# Patient Record
Sex: FEMALE | Race: WHITE | Employment: OTHER | URBAN - METROPOLITAN AREA
[De-identification: names, ages, dates, MRNs, and addresses within clinical notes are randomized per-mention and may not be internally consistent; named-entity substitution may affect disease eponyms.]

---

## 2017-01-05 ENCOUNTER — OFFICE VISIT (OUTPATIENT)
Dept: ORTHOPEDIC SURGERY | Age: 67
End: 2017-01-05

## 2017-01-05 VITALS
HEART RATE: 71 BPM | HEIGHT: 66 IN | SYSTOLIC BLOOD PRESSURE: 125 MMHG | WEIGHT: 143.08 LBS | BODY MASS INDEX: 22.99 KG/M2 | DIASTOLIC BLOOD PRESSURE: 76 MMHG

## 2017-01-05 DIAGNOSIS — Z96.611 STATUS POST TOTAL SHOULDER ARTHROPLASTY, RIGHT: Primary | ICD-10-CM

## 2017-01-05 DIAGNOSIS — M12.811 ROTATOR CUFF ARTHROPATHY, RIGHT: ICD-10-CM

## 2017-01-05 DIAGNOSIS — M25.511 RIGHT SHOULDER PAIN, UNSPECIFIED CHRONICITY: ICD-10-CM

## 2017-01-05 PROBLEM — Z96.619 STATUS POST TOTAL SHOULDER ARTHROPLASTY: Status: ACTIVE | Noted: 2017-01-05

## 2017-01-05 PROCEDURE — 99024 POSTOP FOLLOW-UP VISIT: CPT | Performed by: ORTHOPAEDIC SURGERY

## 2017-02-02 ENCOUNTER — OFFICE VISIT (OUTPATIENT)
Dept: ORTHOPEDIC SURGERY | Age: 67
End: 2017-02-02

## 2017-02-02 VITALS
DIASTOLIC BLOOD PRESSURE: 72 MMHG | HEIGHT: 66 IN | BODY MASS INDEX: 22.5 KG/M2 | SYSTOLIC BLOOD PRESSURE: 118 MMHG | HEART RATE: 82 BPM | WEIGHT: 140 LBS

## 2017-02-02 DIAGNOSIS — Z96.611 STATUS POST TOTAL SHOULDER ARTHROPLASTY, RIGHT: Primary | ICD-10-CM

## 2017-02-02 PROCEDURE — 99024 POSTOP FOLLOW-UP VISIT: CPT | Performed by: ORTHOPAEDIC SURGERY

## 2017-03-30 ENCOUNTER — OFFICE VISIT (OUTPATIENT)
Dept: ORTHOPEDIC SURGERY | Age: 67
End: 2017-03-30

## 2017-03-30 VITALS — BODY MASS INDEX: 22.5 KG/M2 | HEIGHT: 66 IN | WEIGHT: 139.99 LBS

## 2017-03-30 DIAGNOSIS — M79.645 FINGER PAIN, LEFT: ICD-10-CM

## 2017-03-30 DIAGNOSIS — M25.80 SESAMOIDITIS: Primary | ICD-10-CM

## 2017-03-30 PROCEDURE — 1123F ACP DISCUSS/DSCN MKR DOCD: CPT | Performed by: ORTHOPAEDIC SURGERY

## 2017-03-30 PROCEDURE — 4040F PNEUMOC VAC/ADMIN/RCVD: CPT | Performed by: ORTHOPAEDIC SURGERY

## 2017-03-30 PROCEDURE — G8484 FLU IMMUNIZE NO ADMIN: HCPCS | Performed by: ORTHOPAEDIC SURGERY

## 2017-03-30 PROCEDURE — G8419 CALC BMI OUT NRM PARAM NOF/U: HCPCS | Performed by: ORTHOPAEDIC SURGERY

## 2017-03-30 PROCEDURE — 99213 OFFICE O/P EST LOW 20 MIN: CPT | Performed by: ORTHOPAEDIC SURGERY

## 2017-03-30 PROCEDURE — 1036F TOBACCO NON-USER: CPT | Performed by: ORTHOPAEDIC SURGERY

## 2017-03-30 PROCEDURE — G8427 DOCREV CUR MEDS BY ELIG CLIN: HCPCS | Performed by: ORTHOPAEDIC SURGERY

## 2017-03-30 PROCEDURE — 73140 X-RAY EXAM OF FINGER(S): CPT | Performed by: ORTHOPAEDIC SURGERY

## 2017-03-30 PROCEDURE — 3017F COLORECTAL CA SCREEN DOC REV: CPT | Performed by: ORTHOPAEDIC SURGERY

## 2017-03-30 PROCEDURE — 3014F SCREEN MAMMO DOC REV: CPT | Performed by: ORTHOPAEDIC SURGERY

## 2017-03-30 PROCEDURE — 1090F PRES/ABSN URINE INCON ASSESS: CPT | Performed by: ORTHOPAEDIC SURGERY

## 2017-03-30 PROCEDURE — G8400 PT W/DXA NO RESULTS DOC: HCPCS | Performed by: ORTHOPAEDIC SURGERY

## 2017-07-06 ENCOUNTER — TELEPHONE (OUTPATIENT)
Dept: FAMILY MEDICINE CLINIC | Age: 67
End: 2017-07-06

## 2017-07-06 ENCOUNTER — OFFICE VISIT (OUTPATIENT)
Dept: FAMILY MEDICINE CLINIC | Age: 67
End: 2017-07-06

## 2017-07-06 VITALS
HEART RATE: 86 BPM | OXYGEN SATURATION: 97 % | WEIGHT: 152 LBS | HEIGHT: 66 IN | SYSTOLIC BLOOD PRESSURE: 98 MMHG | BODY MASS INDEX: 24.43 KG/M2 | DIASTOLIC BLOOD PRESSURE: 40 MMHG

## 2017-07-06 DIAGNOSIS — Z12.39 BREAST CANCER SCREENING: ICD-10-CM

## 2017-07-06 DIAGNOSIS — G25.81 RLS (RESTLESS LEGS SYNDROME): ICD-10-CM

## 2017-07-06 DIAGNOSIS — M79.642 PAIN OF LEFT HAND: ICD-10-CM

## 2017-07-06 DIAGNOSIS — E11.9 TYPE 2 DIABETES MELLITUS WITHOUT COMPLICATION, WITHOUT LONG-TERM CURRENT USE OF INSULIN (HCC): ICD-10-CM

## 2017-07-06 DIAGNOSIS — E03.9 HYPOTHYROIDISM, UNSPECIFIED TYPE: Chronic | ICD-10-CM

## 2017-07-06 DIAGNOSIS — K59.09 CHRONIC CONSTIPATION: ICD-10-CM

## 2017-07-06 DIAGNOSIS — F60.3 BORDERLINE PERSONALITY DISORDER (HCC): Chronic | ICD-10-CM

## 2017-07-06 DIAGNOSIS — E78.00 HYPERCHOLESTEREMIA: ICD-10-CM

## 2017-07-06 DIAGNOSIS — F32.1 MODERATE MAJOR DEPRESSION (HCC): Chronic | ICD-10-CM

## 2017-07-06 DIAGNOSIS — I10 ESSENTIAL HYPERTENSION: Primary | Chronic | ICD-10-CM

## 2017-07-06 LAB
A/G RATIO: 2 (ref 1.1–2.2)
ALBUMIN SERPL-MCNC: 4.4 G/DL (ref 3.4–5)
ALP BLD-CCNC: 84 U/L (ref 40–129)
ALT SERPL-CCNC: 19 U/L (ref 10–40)
ANION GAP SERPL CALCULATED.3IONS-SCNC: 13 MMOL/L (ref 3–16)
AST SERPL-CCNC: 21 U/L (ref 15–37)
BILIRUB SERPL-MCNC: 0.4 MG/DL (ref 0–1)
BUN BLDV-MCNC: 21 MG/DL (ref 7–20)
CALCIUM SERPL-MCNC: 9.7 MG/DL (ref 8.3–10.6)
CHLORIDE BLD-SCNC: 100 MMOL/L (ref 99–110)
CHOLESTEROL, TOTAL: 173 MG/DL (ref 0–199)
CO2: 30 MMOL/L (ref 21–32)
CREAT SERPL-MCNC: 0.7 MG/DL (ref 0.6–1.2)
GFR AFRICAN AMERICAN: >60
GFR NON-AFRICAN AMERICAN: >60
GLOBULIN: 2.2 G/DL
GLUCOSE BLD-MCNC: 74 MG/DL (ref 70–99)
HCT VFR BLD CALC: 35.3 % (ref 36–48)
HDLC SERPL-MCNC: 73 MG/DL (ref 40–60)
HEMOGLOBIN: 11.9 G/DL (ref 12–16)
LDL CHOLESTEROL CALCULATED: 80 MG/DL
MCH RBC QN AUTO: 31.6 PG (ref 26–34)
MCHC RBC AUTO-ENTMCNC: 33.8 G/DL (ref 31–36)
MCV RBC AUTO: 93.4 FL (ref 80–100)
PDW BLD-RTO: 13.3 % (ref 12.4–15.4)
PLATELET # BLD: 328 K/UL (ref 135–450)
PMV BLD AUTO: 7.4 FL (ref 5–10.5)
POTASSIUM SERPL-SCNC: 4.3 MMOL/L (ref 3.5–5.1)
RBC # BLD: 3.78 M/UL (ref 4–5.2)
SODIUM BLD-SCNC: 143 MMOL/L (ref 136–145)
TOTAL PROTEIN: 6.6 G/DL (ref 6.4–8.2)
TRIGL SERPL-MCNC: 99 MG/DL (ref 0–150)
TSH REFLEX: 1.92 UIU/ML (ref 0.27–4.2)
VLDLC SERPL CALC-MCNC: 20 MG/DL
WBC # BLD: 5 K/UL (ref 4–11)

## 2017-07-06 PROCEDURE — 99203 OFFICE O/P NEW LOW 30 MIN: CPT | Performed by: NURSE PRACTITIONER

## 2017-07-06 RX ORDER — DULOXETIN HYDROCHLORIDE 60 MG/1
60 CAPSULE, DELAYED RELEASE ORAL 2 TIMES DAILY
Qty: 180 CAPSULE | Refills: 1 | Status: SHIPPED | OUTPATIENT
Start: 2017-07-06 | End: 2017-10-12 | Stop reason: SDUPTHER

## 2017-07-06 RX ORDER — LANOLIN ALCOHOL/MO/W.PET/CERES
3 CREAM (GRAM) TOPICAL NIGHTLY
Qty: 90 TABLET | Refills: 1 | Status: SHIPPED | OUTPATIENT
Start: 2017-07-06 | End: 2017-10-12 | Stop reason: SDUPTHER

## 2017-07-06 RX ORDER — B-COMPLEX WITH VITAMIN C
1 TABLET ORAL 2 TIMES DAILY
Qty: 180 TABLET | Refills: 1 | Status: SHIPPED | OUTPATIENT
Start: 2017-07-06 | End: 2017-10-12 | Stop reason: SDUPTHER

## 2017-07-06 RX ORDER — ASPIRIN 81 MG/1
81 TABLET, CHEWABLE ORAL DAILY
Qty: 90 TABLET | Refills: 1 | Status: SHIPPED | OUTPATIENT
Start: 2017-07-06 | End: 2017-10-12 | Stop reason: SDUPTHER

## 2017-07-06 RX ORDER — ROPINIROLE 1 MG/1
1 TABLET, FILM COATED ORAL NIGHTLY
Qty: 90 TABLET | Refills: 1 | Status: SHIPPED | OUTPATIENT
Start: 2017-07-06 | End: 2017-10-12 | Stop reason: SDUPTHER

## 2017-07-06 RX ORDER — ROPINIROLE 1 MG/1
1 TABLET, FILM COATED ORAL DAILY PRN
Qty: 90 TABLET | Refills: 1 | Status: SHIPPED | OUTPATIENT
Start: 2017-07-06 | End: 2017-12-13

## 2017-07-06 RX ORDER — HYDROCHLOROTHIAZIDE 25 MG/1
25 TABLET ORAL DAILY
Qty: 90 TABLET | Refills: 1 | Status: SHIPPED | OUTPATIENT
Start: 2017-07-06 | End: 2017-10-12 | Stop reason: SDUPTHER

## 2017-07-06 RX ORDER — QUETIAPINE 150 MG/1
150 TABLET, FILM COATED, EXTENDED RELEASE ORAL NIGHTLY
Qty: 90 TABLET | Refills: 1 | Status: SHIPPED | OUTPATIENT
Start: 2017-07-06 | End: 2017-10-12 | Stop reason: SDUPTHER

## 2017-07-06 RX ORDER — SPIRONOLACTONE 50 MG/1
50 TABLET, FILM COATED ORAL DAILY
Qty: 90 TABLET | Refills: 1 | Status: SHIPPED | OUTPATIENT
Start: 2017-07-06 | End: 2017-10-12 | Stop reason: SDUPTHER

## 2017-07-06 RX ORDER — LEVOTHYROXINE SODIUM 0.07 MG/1
75 TABLET ORAL DAILY
Qty: 90 TABLET | Refills: 1 | Status: SHIPPED | OUTPATIENT
Start: 2017-07-06 | End: 2017-10-12 | Stop reason: SDUPTHER

## 2017-07-06 RX ORDER — ESCITALOPRAM OXALATE 20 MG/1
20 TABLET ORAL DAILY
Qty: 90 TABLET | Refills: 1 | Status: SHIPPED | OUTPATIENT
Start: 2017-07-06 | End: 2017-10-12 | Stop reason: SDUPTHER

## 2017-07-06 RX ORDER — TRAZODONE HYDROCHLORIDE 100 MG/1
100 TABLET ORAL NIGHTLY
Qty: 90 TABLET | Refills: 1 | Status: SHIPPED | OUTPATIENT
Start: 2017-07-06 | End: 2017-10-12 | Stop reason: SDUPTHER

## 2017-07-06 RX ORDER — ATORVASTATIN CALCIUM 20 MG/1
20 TABLET, FILM COATED ORAL DAILY
Qty: 90 TABLET | Refills: 1 | Status: SHIPPED | OUTPATIENT
Start: 2017-07-06 | End: 2017-10-12 | Stop reason: SDUPTHER

## 2017-07-06 RX ORDER — IBUPROFEN 200 MG
600 TABLET ORAL EVERY MORNING
Qty: 270 TABLET | Refills: 1 | Status: SHIPPED | OUTPATIENT
Start: 2017-07-06 | End: 2017-10-12

## 2017-07-07 LAB
ESTIMATED AVERAGE GLUCOSE: 119.8 MG/DL
HBA1C MFR BLD: 5.8 %

## 2017-08-31 ENCOUNTER — HOSPITAL ENCOUNTER (OUTPATIENT)
Dept: MAMMOGRAPHY | Age: 67
Discharge: HOME | End: 2017-08-31
Attending: NURSE PRACTITIONER | Admitting: NURSE PRACTITIONER

## 2017-08-31 DIAGNOSIS — Z12.31 VISIT FOR SCREENING MAMMOGRAM: ICD-10-CM

## 2017-09-01 DIAGNOSIS — N63.0 BREAST MASS: Primary | ICD-10-CM

## 2017-09-07 ENCOUNTER — HOSPITAL ENCOUNTER (OUTPATIENT)
Dept: MAMMOGRAPHY | Age: 67
Discharge: HOME | End: 2017-09-07
Attending: NURSE PRACTITIONER | Admitting: NURSE PRACTITIONER

## 2017-09-07 DIAGNOSIS — N63.0 BREAST MASS: ICD-10-CM

## 2017-09-07 DIAGNOSIS — N63.0 BREAST MASS: Primary | ICD-10-CM

## 2017-09-07 DIAGNOSIS — R92.8 ABNORMAL MAMMOGRAM, UNSPECIFIED: ICD-10-CM

## 2017-09-13 RX ORDER — CLINDAMYCIN HYDROCHLORIDE 300 MG/1
CAPSULE ORAL
Qty: 12 CAPSULE | Refills: 0 | Status: SHIPPED | OUTPATIENT
Start: 2017-09-13 | End: 2017-09-20 | Stop reason: SDUPTHER

## 2017-09-20 RX ORDER — CLINDAMYCIN HYDROCHLORIDE 300 MG/1
CAPSULE ORAL
Qty: 12 CAPSULE | Refills: 0 | Status: SHIPPED | OUTPATIENT
Start: 2017-09-20 | End: 2017-10-12

## 2017-10-12 ENCOUNTER — OFFICE VISIT (OUTPATIENT)
Dept: FAMILY MEDICINE CLINIC | Age: 67
End: 2017-10-12

## 2017-10-12 VITALS
BODY MASS INDEX: 25.39 KG/M2 | WEIGHT: 158 LBS | OXYGEN SATURATION: 97 % | DIASTOLIC BLOOD PRESSURE: 60 MMHG | SYSTOLIC BLOOD PRESSURE: 112 MMHG | HEIGHT: 66 IN | HEART RATE: 95 BPM

## 2017-10-12 DIAGNOSIS — Z23 IMMUNIZATION DUE: ICD-10-CM

## 2017-10-12 DIAGNOSIS — E78.00 HYPERCHOLESTEREMIA: ICD-10-CM

## 2017-10-12 DIAGNOSIS — F32.1 MODERATE MAJOR DEPRESSION (HCC): Chronic | ICD-10-CM

## 2017-10-12 DIAGNOSIS — G25.81 RLS (RESTLESS LEGS SYNDROME): ICD-10-CM

## 2017-10-12 DIAGNOSIS — R20.2 PARESTHESIAS: Primary | ICD-10-CM

## 2017-10-12 DIAGNOSIS — E11.9 TYPE 2 DIABETES MELLITUS WITHOUT COMPLICATION, WITHOUT LONG-TERM CURRENT USE OF INSULIN (HCC): ICD-10-CM

## 2017-10-12 DIAGNOSIS — I10 ESSENTIAL HYPERTENSION: Chronic | ICD-10-CM

## 2017-10-12 DIAGNOSIS — K59.09 CHRONIC CONSTIPATION: ICD-10-CM

## 2017-10-12 DIAGNOSIS — G47.00 INSOMNIA, UNSPECIFIED TYPE: ICD-10-CM

## 2017-10-12 DIAGNOSIS — M19.90 ARTHRITIS: ICD-10-CM

## 2017-10-12 DIAGNOSIS — F60.3 BORDERLINE PERSONALITY DISORDER (HCC): Chronic | ICD-10-CM

## 2017-10-12 DIAGNOSIS — E03.9 HYPOTHYROIDISM, UNSPECIFIED TYPE: Chronic | ICD-10-CM

## 2017-10-12 PROCEDURE — 1036F TOBACCO NON-USER: CPT | Performed by: NURSE PRACTITIONER

## 2017-10-12 PROCEDURE — 1090F PRES/ABSN URINE INCON ASSESS: CPT | Performed by: NURSE PRACTITIONER

## 2017-10-12 PROCEDURE — 90732 PPSV23 VACC 2 YRS+ SUBQ/IM: CPT | Performed by: NURSE PRACTITIONER

## 2017-10-12 PROCEDURE — 3046F HEMOGLOBIN A1C LEVEL >9.0%: CPT | Performed by: NURSE PRACTITIONER

## 2017-10-12 PROCEDURE — G8400 PT W/DXA NO RESULTS DOC: HCPCS | Performed by: NURSE PRACTITIONER

## 2017-10-12 PROCEDURE — 4040F PNEUMOC VAC/ADMIN/RCVD: CPT | Performed by: NURSE PRACTITIONER

## 2017-10-12 PROCEDURE — G8419 CALC BMI OUT NRM PARAM NOF/U: HCPCS | Performed by: NURSE PRACTITIONER

## 2017-10-12 PROCEDURE — G8427 DOCREV CUR MEDS BY ELIG CLIN: HCPCS | Performed by: NURSE PRACTITIONER

## 2017-10-12 PROCEDURE — G0009 ADMIN PNEUMOCOCCAL VACCINE: HCPCS | Performed by: NURSE PRACTITIONER

## 2017-10-12 PROCEDURE — 3017F COLORECTAL CA SCREEN DOC REV: CPT | Performed by: NURSE PRACTITIONER

## 2017-10-12 PROCEDURE — 3014F SCREEN MAMMO DOC REV: CPT | Performed by: NURSE PRACTITIONER

## 2017-10-12 PROCEDURE — G0008 ADMIN INFLUENZA VIRUS VAC: HCPCS | Performed by: NURSE PRACTITIONER

## 2017-10-12 PROCEDURE — 99213 OFFICE O/P EST LOW 20 MIN: CPT | Performed by: NURSE PRACTITIONER

## 2017-10-12 PROCEDURE — 1123F ACP DISCUSS/DSCN MKR DOCD: CPT | Performed by: NURSE PRACTITIONER

## 2017-10-12 PROCEDURE — G8484 FLU IMMUNIZE NO ADMIN: HCPCS | Performed by: NURSE PRACTITIONER

## 2017-10-12 PROCEDURE — 90662 IIV NO PRSV INCREASED AG IM: CPT | Performed by: NURSE PRACTITIONER

## 2017-10-12 RX ORDER — ESCITALOPRAM OXALATE 20 MG/1
20 TABLET ORAL DAILY
Qty: 90 TABLET | Refills: 1 | Status: SHIPPED | OUTPATIENT
Start: 2017-10-12 | End: 2018-05-16

## 2017-10-12 RX ORDER — LEVOTHYROXINE SODIUM 0.07 MG/1
75 TABLET ORAL DAILY
Qty: 90 TABLET | Refills: 1 | Status: SHIPPED | OUTPATIENT
Start: 2017-10-12 | End: 2018-06-13 | Stop reason: SDUPTHER

## 2017-10-12 RX ORDER — QUETIAPINE 150 MG/1
150 TABLET, FILM COATED, EXTENDED RELEASE ORAL NIGHTLY
Qty: 90 TABLET | Refills: 1 | Status: SHIPPED | OUTPATIENT
Start: 2017-10-12 | End: 2019-05-22 | Stop reason: ALTCHOICE

## 2017-10-12 RX ORDER — LANOLIN ALCOHOL/MO/W.PET/CERES
3 CREAM (GRAM) TOPICAL NIGHTLY
Qty: 90 TABLET | Refills: 1 | Status: SHIPPED | OUTPATIENT
Start: 2017-10-12 | End: 2018-07-31 | Stop reason: SDUPTHER

## 2017-10-12 RX ORDER — ATORVASTATIN CALCIUM 20 MG/1
20 TABLET, FILM COATED ORAL DAILY
Qty: 90 TABLET | Refills: 1 | Status: SHIPPED | OUTPATIENT
Start: 2017-10-12 | End: 2018-06-13 | Stop reason: SDUPTHER

## 2017-10-12 RX ORDER — TRAZODONE HYDROCHLORIDE 100 MG/1
100 TABLET ORAL NIGHTLY
Qty: 90 TABLET | Refills: 1 | Status: SHIPPED | OUTPATIENT
Start: 2017-10-12 | End: 2018-06-13 | Stop reason: SDUPTHER

## 2017-10-12 RX ORDER — B-COMPLEX WITH VITAMIN C
1 TABLET ORAL 2 TIMES DAILY
Qty: 180 TABLET | Refills: 1 | Status: SHIPPED | OUTPATIENT
Start: 2017-10-12

## 2017-10-12 RX ORDER — HYDROCHLOROTHIAZIDE 25 MG/1
25 TABLET ORAL DAILY
Qty: 90 TABLET | Refills: 1 | Status: SHIPPED | OUTPATIENT
Start: 2017-10-12 | End: 2018-06-13 | Stop reason: SDUPTHER

## 2017-10-12 RX ORDER — DULOXETIN HYDROCHLORIDE 60 MG/1
60 CAPSULE, DELAYED RELEASE ORAL 2 TIMES DAILY
Qty: 180 CAPSULE | Refills: 1 | Status: SHIPPED | OUTPATIENT
Start: 2017-10-12 | End: 2018-06-13 | Stop reason: SDUPTHER

## 2017-10-12 RX ORDER — SPIRONOLACTONE 50 MG/1
50 TABLET, FILM COATED ORAL DAILY
Qty: 90 TABLET | Refills: 1 | Status: SHIPPED | OUTPATIENT
Start: 2017-10-12 | End: 2018-06-13 | Stop reason: SDUPTHER

## 2017-10-12 RX ORDER — ROPINIROLE 1 MG/1
1 TABLET, FILM COATED ORAL NIGHTLY
Qty: 90 TABLET | Refills: 1 | Status: SHIPPED | OUTPATIENT
Start: 2017-10-12 | End: 2018-06-11 | Stop reason: SDUPTHER

## 2017-10-12 RX ORDER — DICLOFENAC SODIUM 75 MG/1
75 TABLET, DELAYED RELEASE ORAL 2 TIMES DAILY
Qty: 60 TABLET | Refills: 2 | Status: SHIPPED | OUTPATIENT
Start: 2017-10-12 | End: 2018-01-11 | Stop reason: SDUPTHER

## 2017-10-12 RX ORDER — ASPIRIN 81 MG/1
81 TABLET, CHEWABLE ORAL DAILY
Qty: 90 TABLET | Refills: 1 | Status: SHIPPED | OUTPATIENT
Start: 2017-10-12 | End: 2018-06-13 | Stop reason: SDUPTHER

## 2017-10-12 ASSESSMENT — ENCOUNTER SYMPTOMS
BACK PAIN: 1
WHEEZING: 0
SHORTNESS OF BREATH: 0
COUGH: 0

## 2017-10-12 NOTE — PROGRESS NOTES
for cough, shortness of breath and wheezing. Cardiovascular: Negative for chest pain, palpitations and leg swelling. Musculoskeletal: Positive for arthralgias and back pain. Negative for gait problem, joint swelling, myalgias, neck pain and neck stiffness. Right shoulder replacement 2017   Neurological: Positive for numbness. Negative for seizures, syncope, speech difficulty, light-headedness and headaches. Hematological: Negative for adenopathy. Does not bruise/bleed easily. /60 (Site: Left Arm, Position: Sitting)   Pulse 95   Ht 5' 6\" (1.676 m)   Wt 158 lb (71.7 kg)   SpO2 97%   BMI 25.50 kg/m²    Objective:   Physical Exam   Constitutional: She is oriented to person, place, and time. She appears well-developed and well-nourished. No distress. HENT:   Head: Normocephalic and atraumatic. Cardiovascular: Normal rate, regular rhythm and normal heart sounds. Exam reveals no gallop and no friction rub. No murmur heard. Pulmonary/Chest: Effort normal and breath sounds normal. No respiratory distress. She has no wheezes. She has no rales. Neurological: She is alert and oriented to person, place, and time. No cranial nerve deficit or sensory deficit. Reflex Scores:       Tricep reflexes are 2+ on the right side and 2+ on the left side. Bicep reflexes are 2+ on the right side and 2+ on the left side. Right wrist: Tinel's and compression reproduce symptoms    Skin: Skin is warm and dry. No rash noted. She is not diaphoretic. No erythema. No pallor. Psychiatric: She has a normal mood and affect. Her behavior is normal. Judgment and thought content normal.   Nursing note and vitals reviewed. Assessment:   1. Paresthesias  - EMG; Future    2. Type 2 diabetes mellitus without complication, without long-term current use of insulin (HCC)  - metFORMIN (GLUCOPHAGE) 500 MG tablet; Take 1 tablet by mouth daily At 1600  Dispense: 90 tablet; Refill: 1    3.  Insomnia, unspecified type  - melatonin 3 MG TABS tablet; Take 1 tablet by mouth nightly  Dispense: 90 tablet; Refill: 1    4. Arthritis  - diclofenac (VOLTAREN) 75 MG EC tablet; Take 1 tablet by mouth 2 times daily  Dispense: 60 tablet; Refill: 2    5. RLS (restless legs syndrome)  - rOPINIRole (REQUIP) 1 MG tablet; Take 1 tablet by mouth nightly  Dispense: 90 tablet; Refill: 1    6. Moderate major depression (HCC)  - escitalopram (LEXAPRO) 20 MG tablet; Take 1 tablet by mouth daily  Dispense: 90 tablet; Refill: 1  - QUEtiapine (SEROQUEL XR) 150 MG TB24 extended release tablet; Take 1 tablet by mouth nightly  Dispense: 90 tablet; Refill: 1  - DULoxetine (CYMBALTA) 60 MG extended release capsule; Take 1 capsule by mouth 2 times daily  Dispense: 180 capsule; Refill: 1  - traZODone (DESYREL) 100 MG tablet; Take 1 tablet by mouth nightly  Dispense: 90 tablet; Refill: 1    7. Essential hypertension  - hydrochlorothiazide (HYDRODIURIL) 25 MG tablet; Take 1 tablet by mouth daily  Dispense: 90 tablet; Refill: 1  - spironolactone (ALDACTONE) 50 MG tablet; Take 1 tablet by mouth daily  Dispense: 90 tablet; Refill: 1    8. Hypothyroidism, unspecified type  - levothyroxine (SYNTHROID) 75 MCG tablet; Take 1 tablet by mouth Daily  Dispense: 90 tablet; Refill: 1    9. Borderline personality disorder  - QUEtiapine (SEROQUEL XR) 150 MG TB24 extended release tablet; Take 1 tablet by mouth nightly  Dispense: 90 tablet; Refill: 1  - DULoxetine (CYMBALTA) 60 MG extended release capsule; Take 1 capsule by mouth 2 times daily  Dispense: 180 capsule; Refill: 1  - traZODone (DESYREL) 100 MG tablet; Take 1 tablet by mouth nightly  Dispense: 90 tablet; Refill: 1    10. Hypercholesteremia  - atorvastatin (LIPITOR) 20 MG tablet; Take 1 tablet by mouth daily  Dispense: 90 tablet; Refill: 1    11. Chronic constipation  - linaclotide (LINZESS) 145 MCG capsule; Take 2 capsules by mouth every morning (before breakfast)  Dispense: 180 capsule; Refill: 1    12.

## 2017-10-24 ENCOUNTER — HOSPITAL ENCOUNTER (OUTPATIENT)
Dept: NEUROLOGY | Age: 67
Discharge: HOME | End: 2017-10-24
Admitting: NURSE PRACTITIONER

## 2017-10-24 DIAGNOSIS — R20.2 PARESTHESIA OF SKIN: ICD-10-CM

## 2017-10-24 NOTE — PROCEDURES
Site NR Peak (ms) Norm Peak (ms) P-T Amp (µV) Norm P-T Amp Site1 Site2 Delta-P (ms) Dist (cm) Stan (m/s) Norm Stan (m/s)   Left Median Anti Sensory (2nd Digit)   Wrist    6.5 <3.6 7.6 >10 Wrist 2nd Digit 6.5 14.0 22    Right Median Anti Sensory (2nd Digit)   Wrist    6.2 <3.6 20.5 >10 Wrist 2nd Digit 6.2 14.0 23    Left Ulnar Anti Sensory (5th Digit)   Wrist    3.8 <3.7 23.2 >15.0 Wrist 5th Digit 3.8 14.0 37    Right Ulnar Anti Sensory (5th Digit)   Wrist    3.8 <3.7 16.1 >15.0 Wrist 5th Digit 3.8 14.0 37      Motor Summary Table     Stim Site NR Onset (ms) Norm Onset (ms) O-P Amp (mV) Norm O-P Amp Site1 Site2 Delta-0 (ms) Dist (cm) Stan (m/s) Norm Stan (m/s)   Left Median Motor (Abd Poll Brev)   Wrist    6.7 <4.3 4.4 >5 Elbow Wrist 4.0 21.0 53 >50   Elbow    10.7  4.8          Right Median Motor (Abd Poll Brev)   Wrist    6.1 <4.3 7.2 >5 Elbow Wrist 3.8 22.0 58 >50   Elbow    9.9  5.9          Left Ulnar Motor (Abd Dig Minimi)   Wrist    4.0 <4.2 3.6 >3 B Elbow Wrist 3.7 23.0 62 >50   B Elbow    7.7  4.5  A Elbow B Elbow 1.3 8.0 62 >50   A Elbow    9.0  4.4          Right Ulnar Motor (Abd Dig Minimi)   Wrist    3.8 <4.2 6.6 >3 B Elbow Wrist 3.5 23.0 66 >50   B Elbow    7.3  4.8  A Elbow B Elbow 1.0 6.0 60 >50   A Elbow    8.3  3.7            EMG   Side Muscle Nerve Root Ins Act Fibs Psw Amp Dur Poly Recrt Int Efren Dave Comment   Right Deltoid Axillary C5-6 Nml Nml Nml Nml Nml 0 Nml Nml    Right Biceps Musculocut C5-6 Nml Nml Nml Nml Nml 0 Nml Nml    Right Triceps Radial C6-7-8 Nml Nml Nml Nml Nml 0 Nml Nml    Right BrachioRad Radial C5-6 Nml Nml Nml Nml Nml 0 Nml Nml    Right PronatorTeres Median C6-7 Nml Nml Nml Nml Nml 0 Nml Nml    Right Ext Indicis Radial (Post Int) C7-8 Nml Nml Nml Nml Nml 0 Nml Nml    Right 1stDorInt Ulnar C8-T1 Nml Nml Nml Nml Nml 0 Nml Nml    Right Abd Poll Brev Median C8-T1 Nml Nml Nml Nml Nml 0 Nml Nml    Right Cervical Parasp Up Rami C1-3 Nml Nml Nml         Right Cervical Parasp Mid Rami C4-6 Nml Nml Nml         Right Cervical Parasp Low Rami C7-8 Nml Nml Nml         Left Deltoid Axillary C5-6 Nml Nml Nml Nml Nml 0 Nml Nml    Left Biceps Musculocut C5-6 Nml Nml Nml Nml Nml 0 Nml Nml    Left Triceps Radial C6-7-8 Nml Nml Nml Nml Nml 0 Nml Nml    Left BrachioRad Radial C5-6 Nml Nml Nml Nml Nml 0 Nml Nml    Left PronatorTeres Median C6-7 Nml Nml Nml Nml Nml 0 Nml Nml    Left Ext Indicis Radial (Post Int) C7-8 Nml Nml Nml Nml Nml 0 Nml Nml    Left 1stDorInt Ulnar C8-T1 Nml Nml Nml Nml Nml 0 Nml Nml    Left Abd Poll Brev Median C8-T1 Nml Nml Nml Nml Nml 0 Nml Nml    Left Cervical Parasp Up Rami C1-3 Nml Nml Nml         Left Cervical Parasp Mid Rami C4-6 Nml Nml Nml         Left Cervical Parasp Low Rami C7-8 Nml Nml Nml           Electronically signed by Juan Yan DO on 10/24/2017 at 8:34 AM

## 2017-11-03 ENCOUNTER — TELEPHONE (OUTPATIENT)
Dept: FAMILY MEDICINE CLINIC | Age: 67
End: 2017-11-03

## 2017-11-03 DIAGNOSIS — G56.03 CARPAL TUNNEL SYNDROME, BILATERAL: Primary | ICD-10-CM

## 2017-11-05 NOTE — TELEPHONE ENCOUNTER
EMG shows bilateral carpal tunnel, moderate in severity. She should follow up with orthopedic hand specialist, you can give her Dr. Britta Garcia office information.

## 2017-11-10 ENCOUNTER — TELEPHONE (OUTPATIENT)
Dept: FAMILY MEDICINE CLINIC | Age: 67
End: 2017-11-10

## 2017-11-14 ENCOUNTER — OFFICE VISIT (OUTPATIENT)
Dept: FAMILY MEDICINE CLINIC | Age: 67
End: 2017-11-14

## 2017-11-14 VITALS
SYSTOLIC BLOOD PRESSURE: 120 MMHG | DIASTOLIC BLOOD PRESSURE: 60 MMHG | BODY MASS INDEX: 25.88 KG/M2 | WEIGHT: 161 LBS | HEIGHT: 66 IN | HEART RATE: 81 BPM | OXYGEN SATURATION: 97 %

## 2017-11-14 DIAGNOSIS — M25.512 CHRONIC LEFT SHOULDER PAIN: Primary | ICD-10-CM

## 2017-11-14 DIAGNOSIS — S76.012A MUSCLE STRAIN OF LEFT GLUTEAL REGION, INITIAL ENCOUNTER: ICD-10-CM

## 2017-11-14 DIAGNOSIS — Z23 IMMUNIZATION DUE: ICD-10-CM

## 2017-11-14 DIAGNOSIS — G89.29 CHRONIC LEFT SHOULDER PAIN: Primary | ICD-10-CM

## 2017-11-14 PROCEDURE — 90715 TDAP VACCINE 7 YRS/> IM: CPT | Performed by: NURSE PRACTITIONER

## 2017-11-14 PROCEDURE — G8419 CALC BMI OUT NRM PARAM NOF/U: HCPCS | Performed by: NURSE PRACTITIONER

## 2017-11-14 PROCEDURE — 3017F COLORECTAL CA SCREEN DOC REV: CPT | Performed by: NURSE PRACTITIONER

## 2017-11-14 PROCEDURE — 1123F ACP DISCUSS/DSCN MKR DOCD: CPT | Performed by: NURSE PRACTITIONER

## 2017-11-14 PROCEDURE — G8400 PT W/DXA NO RESULTS DOC: HCPCS | Performed by: NURSE PRACTITIONER

## 2017-11-14 PROCEDURE — 4040F PNEUMOC VAC/ADMIN/RCVD: CPT | Performed by: NURSE PRACTITIONER

## 2017-11-14 PROCEDURE — G8427 DOCREV CUR MEDS BY ELIG CLIN: HCPCS | Performed by: NURSE PRACTITIONER

## 2017-11-14 PROCEDURE — G8484 FLU IMMUNIZE NO ADMIN: HCPCS | Performed by: NURSE PRACTITIONER

## 2017-11-14 PROCEDURE — 3014F SCREEN MAMMO DOC REV: CPT | Performed by: NURSE PRACTITIONER

## 2017-11-14 PROCEDURE — 99213 OFFICE O/P EST LOW 20 MIN: CPT | Performed by: NURSE PRACTITIONER

## 2017-11-14 PROCEDURE — 90471 IMMUNIZATION ADMIN: CPT | Performed by: NURSE PRACTITIONER

## 2017-11-14 PROCEDURE — 1090F PRES/ABSN URINE INCON ASSESS: CPT | Performed by: NURSE PRACTITIONER

## 2017-11-14 PROCEDURE — 1036F TOBACCO NON-USER: CPT | Performed by: NURSE PRACTITIONER

## 2017-11-14 RX ORDER — METHYLPREDNISOLONE 4 MG/1
TABLET ORAL
Qty: 1 KIT | Refills: 0 | Status: SHIPPED | OUTPATIENT
Start: 2017-11-14 | End: 2017-12-11

## 2017-11-14 RX ORDER — BACLOFEN 10 MG/1
10 TABLET ORAL 3 TIMES DAILY
Qty: 30 TABLET | Refills: 0 | Status: SHIPPED | OUTPATIENT
Start: 2017-11-14 | End: 2017-11-24

## 2017-11-14 NOTE — PROGRESS NOTES
Normocephalic and atraumatic. Eyes: Conjunctivae and EOM are normal. Pupils are equal, round, and reactive to light. Right eye exhibits no discharge. Left eye exhibits no discharge. No scleral icterus. Neck: Normal range of motion. Neck supple. No tracheal deviation present. No thyromegaly present. Cardiovascular: Normal rate, regular rhythm and normal heart sounds. Exam reveals no gallop and no friction rub. No murmur heard. Pulmonary/Chest: Effort normal and breath sounds normal. No stridor. No respiratory distress. She has no wheezes. She has no rales. She exhibits no tenderness. Musculoskeletal:        Left shoulder: She exhibits decreased range of motion and tenderness. She exhibits no bony tenderness. Left shoulder: decreased ROM, difficultly raising arm above head   Lymphadenopathy:     She has no cervical adenopathy. Neurological: She is alert and oriented to person, place, and time. No cranial nerve deficit. Skin: Skin is warm and dry. No rash noted. She is not diaphoretic. No erythema. No pallor. Nursing note and vitals reviewed. Assessment:   1. Chronic left shoulder pain  - methylPREDNISolone (MEDROL DOSEPACK) 4 MG tablet; Take by mouth. Dispense: 1 kit; Refill: 0  - baclofen (LIORESAL) 10 MG tablet; Take 1 tablet by mouth 3 times daily for 10 days  Dispense: 30 tablet; Refill: 0  - Angelica Gibbs MD (Sports, Shoulder)    2. Muscle strain of left gluteal region, initial encounter  - methylPREDNISolone (MEDROL DOSEPACK) 4 MG tablet; Take by mouth. Dispense: 1 kit; Refill: 0  - baclofen (LIORESAL) 10 MG tablet; Take 1 tablet by mouth 3 times daily for 10 days  Dispense: 30 tablet; Refill: 0    3.  Immunization due  - Tdap (age 10y-63y) IM (Adacel)    Plan:   - Medrol dose pack (oral steroid taper)- hold diclofenac while taking  - Baclofen (muscle relaxer) can take 3 times daily as needed for leg and shoulder pain, this may make you drowsy so space out from

## 2017-11-14 NOTE — PATIENT INSTRUCTIONS
Medrol dose pack (oral steroid taper)- hold diclofenac while taking  Baclofen (muscle relaxer) can take 3 times daily as needed for leg and shoulder pain, this may make you drowsy so space out from other drowsy medications.    Referral to Dr. Jovan Gomez is no improvement in shoulder pain  Follow up in 1 week if needed

## 2017-11-20 ENCOUNTER — OFFICE VISIT (OUTPATIENT)
Dept: ORTHOPEDIC SURGERY | Age: 67
End: 2017-11-20

## 2017-11-20 VITALS
DIASTOLIC BLOOD PRESSURE: 77 MMHG | HEIGHT: 66 IN | WEIGHT: 160.94 LBS | BODY MASS INDEX: 25.86 KG/M2 | SYSTOLIC BLOOD PRESSURE: 138 MMHG | HEART RATE: 82 BPM

## 2017-11-20 DIAGNOSIS — M25.532 LEFT WRIST PAIN: Primary | ICD-10-CM

## 2017-11-20 DIAGNOSIS — M25.531 RIGHT WRIST PAIN: ICD-10-CM

## 2017-11-20 PROBLEM — G56.03 BILATERAL CARPAL TUNNEL SYNDROME: Status: ACTIVE | Noted: 2017-11-20

## 2017-11-20 PROCEDURE — G8400 PT W/DXA NO RESULTS DOC: HCPCS | Performed by: ORTHOPAEDIC SURGERY

## 2017-11-20 PROCEDURE — 4040F PNEUMOC VAC/ADMIN/RCVD: CPT | Performed by: ORTHOPAEDIC SURGERY

## 2017-11-20 PROCEDURE — 1123F ACP DISCUSS/DSCN MKR DOCD: CPT | Performed by: ORTHOPAEDIC SURGERY

## 2017-11-20 PROCEDURE — 1036F TOBACCO NON-USER: CPT | Performed by: ORTHOPAEDIC SURGERY

## 2017-11-20 PROCEDURE — 1090F PRES/ABSN URINE INCON ASSESS: CPT | Performed by: ORTHOPAEDIC SURGERY

## 2017-11-20 PROCEDURE — G8484 FLU IMMUNIZE NO ADMIN: HCPCS | Performed by: ORTHOPAEDIC SURGERY

## 2017-11-20 PROCEDURE — 3017F COLORECTAL CA SCREEN DOC REV: CPT | Performed by: ORTHOPAEDIC SURGERY

## 2017-11-20 PROCEDURE — 3014F SCREEN MAMMO DOC REV: CPT | Performed by: ORTHOPAEDIC SURGERY

## 2017-11-20 PROCEDURE — 99214 OFFICE O/P EST MOD 30 MIN: CPT | Performed by: ORTHOPAEDIC SURGERY

## 2017-11-20 PROCEDURE — G8427 DOCREV CUR MEDS BY ELIG CLIN: HCPCS | Performed by: ORTHOPAEDIC SURGERY

## 2017-11-20 PROCEDURE — G8419 CALC BMI OUT NRM PARAM NOF/U: HCPCS | Performed by: ORTHOPAEDIC SURGERY

## 2017-11-20 NOTE — PROGRESS NOTES
Assessment: EMG documented moderate to severe bilateral carpal tunnel syndrome which is quite symptomatic. #2 moderate osteoarthritis of both carpometacarpal joints    Treatment Plan: I don't think her osteoarthritis is symptomatic enough to warrant operative intervention. I do think her carpal tunnel symptoms are severe enough to warrant operative intervention we've discussed risks and benefits of surgery including the incidence of pillar pain scar tissue tenderness and recurrence. We have tentatively scheduled her for bilateral carpal tunnel releases    Return for post op. Chief Complaint:  Hand Pain (OPNP TEODORA HAND PAIN, HAND NUMBNESS , EMG NEW XRAYS )      History of Present Illness  Gricelda Nogueira is a 78 y.o. female. Patient's very nice lady that I had seen about 3-4 years ago with bilateral CMC arthritis. We treated her with neoprene thumb stabilization splints and this is definitely helped her symptoms slightly. She now presents with a 1-2 year history of increasing numbness and tingling in both hands. She states that wakes her up in the middle of the night with deep aching pain in her hand she has to shake them to get this to go away. She has had EMGs prior to today's visit. Medical History  Patient's medications, allergies, past medical, surgical, social and family histories were reviewed and updated as appropriate. Review of Systems  Complete Review of Systems performed and is non-contributory except for what is noted in HPI. Vital Signs  Vitals:    11/20/17 0841   BP: 138/77   Pulse: 82   Weight: 160 lb 15 oz (73 kg)   Height: 5' 5.98\" (1.676 m)     Body mass index is 25.99 kg/m². Physical Exam  Constitutional:  Patient is well-nourished and demonstrates normal hygiene. Mental Status:  Patient is alert and oriented to person, place and time.   Skin:  No rashes or erythema    Right Hand Examination:  Inspection:  No atrophy  Finger Range of Motion:  Full  Wrist Range of Motion:

## 2017-12-08 ENCOUNTER — TELEPHONE (OUTPATIENT)
Dept: ORTHOPEDIC SURGERY | Age: 67
End: 2017-12-08

## 2017-12-11 ENCOUNTER — OFFICE VISIT (OUTPATIENT)
Dept: FAMILY MEDICINE CLINIC | Age: 67
End: 2017-12-11

## 2017-12-11 VITALS
SYSTOLIC BLOOD PRESSURE: 116 MMHG | BODY MASS INDEX: 25.55 KG/M2 | OXYGEN SATURATION: 96 % | DIASTOLIC BLOOD PRESSURE: 64 MMHG | WEIGHT: 159 LBS | TEMPERATURE: 97.9 F | HEART RATE: 88 BPM | HEIGHT: 66 IN

## 2017-12-11 DIAGNOSIS — G56.02 CARPAL TUNNEL SYNDROME OF LEFT WRIST: ICD-10-CM

## 2017-12-11 DIAGNOSIS — E11.9 TYPE 2 DIABETES MELLITUS WITHOUT COMPLICATION, WITHOUT LONG-TERM CURRENT USE OF INSULIN (HCC): ICD-10-CM

## 2017-12-11 DIAGNOSIS — Z01.818 PRE-OP EXAM: ICD-10-CM

## 2017-12-11 DIAGNOSIS — Z01.818 PRE-OP EXAM: Primary | ICD-10-CM

## 2017-12-11 LAB
ANION GAP SERPL CALCULATED.3IONS-SCNC: 10 MMOL/L (ref 3–16)
BUN BLDV-MCNC: 19 MG/DL (ref 7–20)
CALCIUM SERPL-MCNC: 10.1 MG/DL (ref 8.3–10.6)
CHLORIDE BLD-SCNC: 101 MMOL/L (ref 99–110)
CO2: 32 MMOL/L (ref 21–32)
CREAT SERPL-MCNC: 0.8 MG/DL (ref 0.6–1.2)
CREATININE URINE POCT: 200
GFR AFRICAN AMERICAN: >60
GFR NON-AFRICAN AMERICAN: >60
GLUCOSE BLD-MCNC: 72 MG/DL (ref 70–99)
MICROALBUMIN/CREAT 24H UR: 10 MG/G{CREAT}
MICROALBUMIN/CREAT UR-RTO: 30
POTASSIUM SERPL-SCNC: 4.5 MMOL/L (ref 3.5–5.1)
SODIUM BLD-SCNC: 143 MMOL/L (ref 136–145)

## 2017-12-11 PROCEDURE — G8419 CALC BMI OUT NRM PARAM NOF/U: HCPCS | Performed by: NURSE PRACTITIONER

## 2017-12-11 PROCEDURE — 93000 ELECTROCARDIOGRAM COMPLETE: CPT | Performed by: NURSE PRACTITIONER

## 2017-12-11 PROCEDURE — 3014F SCREEN MAMMO DOC REV: CPT | Performed by: NURSE PRACTITIONER

## 2017-12-11 PROCEDURE — 4040F PNEUMOC VAC/ADMIN/RCVD: CPT | Performed by: NURSE PRACTITIONER

## 2017-12-11 PROCEDURE — G8484 FLU IMMUNIZE NO ADMIN: HCPCS | Performed by: NURSE PRACTITIONER

## 2017-12-11 PROCEDURE — G8427 DOCREV CUR MEDS BY ELIG CLIN: HCPCS | Performed by: NURSE PRACTITIONER

## 2017-12-11 PROCEDURE — 1090F PRES/ABSN URINE INCON ASSESS: CPT | Performed by: NURSE PRACTITIONER

## 2017-12-11 PROCEDURE — 82044 UR ALBUMIN SEMIQUANTITATIVE: CPT | Performed by: NURSE PRACTITIONER

## 2017-12-11 PROCEDURE — 99213 OFFICE O/P EST LOW 20 MIN: CPT | Performed by: NURSE PRACTITIONER

## 2017-12-11 PROCEDURE — 3017F COLORECTAL CA SCREEN DOC REV: CPT | Performed by: NURSE PRACTITIONER

## 2017-12-11 NOTE — PROGRESS NOTES
[facility]   Telephone: [phone]  Fax: [phone]  Preoperative History & Physical        DIAGNOSIS:  Carpal tunnel syndrome    PROCEDURE:  Carpal tunnel release, left      History Obtained From:  patient    HISTORY OF PRESENT ILLNESS:    The patient is a 78 y.o. female who presents for preoperative examination for left carpal tunnel release with Clinton Tong on 12/19/2017 at [facility]. She denies complaints or concerns today.       Past Medical History:   Diagnosis Date    Bipolar disorder ([address]     Borderline personality disorder     Borderline personality disorder     Chronic pain     Colon disorder     hard time pushing stool out    Constipation     Diabetes mellitus ([address]     Diabetic neuropathy ([address]     Electric shock     ECT therapy    Hyperlipidemia     Hypertension     Insomnia     Major depressive disorder, recurrent ([address]     Morbid obesity ([address]     Osteoarthrosis     PONV (postoperative nausea and vomiting)     RLS (restless legs syndrome)     Suicidal ideation     Unspecified hypothyroidism     Vitamin deficiency      Past Surgical History:   Procedure Laterality Date    APPENDECTOMY      BACK SURGERY      COLONOSCOPY      ELBOW SURGERY Right     for pinched nerve    JOINT REPLACEMENT Bilateral     knees    KNEE SURGERY      bilateral    SHOULDER ARTHROSCOPY Right 12/02/2016    Right Total Shoulder Arthroscopy with Reverse Ball and Socket Deta Prosthesis    SHOULDER SURGERY      TONSILLECTOMY       Outpatient Encounter Prescriptions as of 12/11/2017   Medication Sig Dispense Refill    diclofenac (VOLTAREN) 75 MG EC tablet Take 1 tablet by mouth 2 times daily 60 tablet 2    rOPINIRole (REQUIP) 1 MG tablet Take 1 tablet by mouth nightly 90 tablet 1    escitalopram (LEXAPRO) 20 MG tablet Take 1 tablet by mouth daily 90 tablet 1    hydrochlorothiazide (HYDRODIURIL) 25 MG tablet Take 1 tablet by mouth daily 90 tablet 1    spironolactone (ALDACTONE) 50 MG tablet Take 1 tablet by mouth daily 90 tablet 1    vitamin D (CHOLECALCIFEROL) 1000 UNIT TABS tablet Take 1 tablet by mouth daily 90 tablet 1    levothyroxine (SYNTHROID) 75 MCG tablet Take 1 tablet by mouth Daily 90 tablet 1    QUEtiapine (SEROQUEL XR) 150 MG TB24 extended release tablet Take 1 tablet by mouth nightly 90 tablet 1    metFORMIN (GLUCOPHAGE) 500 MG tablet Take 1 tablet by mouth daily At 1600 90 tablet 1    B Complex Vitamins (VITAMIN B COMPLEX) TABS Take 1 tablet by mouth 2 times daily 180 tablet 1    aspirin 81 MG chewable tablet Take 1 tablet by mouth daily 90 tablet 1    DULoxetine (CYMBALTA) 60 MG extended release capsule Take 1 capsule by mouth 2 times daily 180 capsule 1    atorvastatin (LIPITOR) 20 MG tablet Take 1 tablet by mouth daily 90 tablet 1    traZODone (DESYREL) 100 MG tablet Take 1 tablet by mouth nightly 90 tablet 1    melatonin 3 MG TABS tablet Take 1 tablet by mouth nightly 90 tablet 1    linaclotide (LINZESS) 145 MCG capsule Take 2 capsules by mouth every morning (before breakfast) 180 capsule 1    rOPINIRole (REQUIP) 1 MG tablet Take 1 tablet by mouth daily as needed (Restless legs syndrome) 90 tablet 1    magnesium hydroxide (MILK OF MAGNESIA) 400 MG/5ML suspension Take by mouth daily as needed for Constipation      LORazepam (ATIVAN) 0.5 MG tablet Take 0.5 mg by mouth every 6 hours as needed for Anxiety      bisacodyl (DULCOLAX) 10 MG suppository Place 10 mg rectally daily as needed for Constipation      bisacodyl (DULCOLAX) 5 MG EC tablet Take 5 mg by mouth 2 times daily      acetaminophen (TYLENOL) 325 MG tablet Take 650 mg by mouth every 6 hours as needed for Pain.  [DISCONTINUED] methylPREDNISolone (MEDROL DOSEPACK) 4 MG tablet Take by mouth. 1 kit 0     No facility-administered encounter medications on file as of 12/11/2017. Allergies:  Levaquin [levofloxacin in d5w];  Levofloxacin; Lidocaine; and Pcn wheezing    NEUROLOGIC:  Awake, alert, oriented to name, place and time. Cranial nerves II-XII are grossly intact. Motor is 5 out of 5 bilaterally. DATA:  EKG:  Date:  12-  I have reviewed EKG with the following interpretation:  Impression:  NSR      ASSESSMENT AND PLAN:  Pre-op exam  - EKG 12 Lead  - BASIC METABOLIC PANEL; Future    Carpal tunnel syndrome of left wrist      Type 2 diabetes mellitus without complication, without long-term current use of insulin (HCC)  - POCT microalbumin  - BASIC METABOLIC PANEL; Future      1. Preoperative workup as follows: ECG  2. Change in medication regimen before surgery: Discontinue NSAIDs (Diclofenac and aspirin) 7 days before surgery  3. Prophylaxis for cardiac events with perioperative beta-blockers: Not indicated  ACC/AHA indications for pre-operative beta-blocker use:    · Vascular surgery with history of postitive stress test  · Intermediate or high risk surgery with history of CAD   · Intermediate or high risk surgery with multiple clinical predictors of CAD- 2 of the following: history of compensated or prior heart failure, history of cerebrovascular disease, DM, or renal insufficiency    Routine administration of higher-dose, long-acting metoprolol in beta-blockernaïve patients on the day of surgery, and in the absence of dose titration is associated with an overall increase in mortality. Beta-blockers should be started days to weeks prior to surgery and titrated to pulse < 70.  4. Deep vein thrombosis prophylaxis: regimen to be chosen by surgical team  5. No contraindications to planned surgery      Yelitza Aaron CNP    [address]  [address]  [phone]

## 2017-12-11 NOTE — PATIENT INSTRUCTIONS
Eat and drink nothing after midnight the night before the planned procedure. Stop all aspirin, ibuprofen, aleve (tylenol/acetaminophen is ok) for seven days prior to the procedure. Ideally your blood pressure goal should be below 130/80. You can learn more about blood pressure and ways to incorporate a healthy lifestlye to help treat it from the American Heart Association website: www.heart. org under the  Getting Healthy link, and the [address] website: www.nhlbi.nih.gov/hbp    A for Activity  It helps your blood pressure when you exercise regularly. You should try to exercise at least 3 times a week for 20 minutes at a pace that you can comfortably carry on a conversation without being out of breath. B for BMI  The Body Mass Index should be below 30. This is your weight and height measurement. A BMI below 30 is preferred to help lower the risk of Type 2 diabetes, high cholesterol, heart disease, high blood pressure, sleep apnea, gallbladder disease and strokes. C for Control your Salt Intake  Avoid adding salt to your food. Avoid processed food, fast food, and junk food which all has high levels of sodium added. Read labels and get no more than 1500-2300mg of sodium a day.      How confident do you feel that you will be able to begin working on your goals before next visit? :     *Some confidence       Please record your blood pressure once every two weeks below:  Date  Blood pressure

## 2017-12-12 ENCOUNTER — OFFICE VISIT (OUTPATIENT)
Dept: ORTHOPEDIC SURGERY | Age: 67
End: 2017-12-12

## 2017-12-12 VITALS — BODY MASS INDEX: 25.55 KG/M2 | HEIGHT: 66 IN | WEIGHT: 158.95 LBS

## 2017-12-12 DIAGNOSIS — M25.512 LEFT SHOULDER PAIN, UNSPECIFIED CHRONICITY: Primary | ICD-10-CM

## 2017-12-12 PROCEDURE — 3014F SCREEN MAMMO DOC REV: CPT | Performed by: PHYSICIAN ASSISTANT

## 2017-12-12 PROCEDURE — G8400 PT W/DXA NO RESULTS DOC: HCPCS | Performed by: PHYSICIAN ASSISTANT

## 2017-12-12 PROCEDURE — G8419 CALC BMI OUT NRM PARAM NOF/U: HCPCS | Performed by: PHYSICIAN ASSISTANT

## 2017-12-12 PROCEDURE — G8427 DOCREV CUR MEDS BY ELIG CLIN: HCPCS | Performed by: PHYSICIAN ASSISTANT

## 2017-12-12 PROCEDURE — 1123F ACP DISCUSS/DSCN MKR DOCD: CPT | Performed by: PHYSICIAN ASSISTANT

## 2017-12-12 PROCEDURE — 1036F TOBACCO NON-USER: CPT | Performed by: PHYSICIAN ASSISTANT

## 2017-12-12 PROCEDURE — G8484 FLU IMMUNIZE NO ADMIN: HCPCS | Performed by: PHYSICIAN ASSISTANT

## 2017-12-12 PROCEDURE — 1090F PRES/ABSN URINE INCON ASSESS: CPT | Performed by: PHYSICIAN ASSISTANT

## 2017-12-12 PROCEDURE — 99213 OFFICE O/P EST LOW 20 MIN: CPT | Performed by: PHYSICIAN ASSISTANT

## 2017-12-12 PROCEDURE — 3017F COLORECTAL CA SCREEN DOC REV: CPT | Performed by: PHYSICIAN ASSISTANT

## 2017-12-12 PROCEDURE — 4040F PNEUMOC VAC/ADMIN/RCVD: CPT | Performed by: PHYSICIAN ASSISTANT

## 2017-12-12 NOTE — PROGRESS NOTES
REFERRING PHYSICIAN: Nelsy Aguilar CNP    CHIEF COMPLAINT:  Left shoulder pain     HISTORY OF PRESENT ILLNESS:  Corona Knox is a 78-year-old right-hand-dominant female who presents today at the request of Emilia Staples CNP for evaluation consultation of her left shoulder. She has had 1+ years of atraumatic left shoulder discomfort. She reports her symptoms worsen this fall while raking leaves. She reports pain at night, especially when lying on her left shoulder that is affecting her sleep. She reports intermittent numbness and tingling in her left hand which relates to carpal tunnel. She is a history of a right shoulder reverse total shoulder arthroplasty by Dr. Brit Guevara approximately 1 year ago due to an irreparable rotator cuff tear and glenohumeral osteoarthritis. She reports that she continues to have some limitations and discomfort following this procedure. REVIEW OF SYSTEMS:  Pertinent items are noted in the HPI. Review of systems was reviewed from Patient History Form dated on December 12, 2017 and available in the patient's chart under the Media tab. CONSTITUTIONAL: Denies unexplained weight loss, fevers, chills or fatigue  NEUROLOGICAL: Denies unsteady gait or progressive weakness  SKIN: Denies skin changes, delayed healing, rash, itching     PHYSICAL EXAMINATION: Inspection of the left shoulder reveals warm, dry, intact skin. There is no adenopathy. The distal neurovascular exam is grossly intact. There is moderate tenderness about the posterior aspect of the left shoulder. Range of motion reveals 95° of active forward elevation. She has 10° of external rotation with her arm at her side. She has 50° of shoulder abduction. She has 15° of external rotation and 5° of internal rotation with her arm maximally abducted. She has 4 out of 5 strength in forward elevation and external rotation.   She has discomfort and weakness with a belly press test.  She has discomfort and weakness with resisted supination. Sensation about the lateral aspect of the left shoulder in an axillary nerve distribution pattern is intact. She is able to actively contract her deltoid. Examination of the contralateral shoulder reveals no atrophy or deformity. The skin is warm and dry. There is a well-healed incision. Range of motion reveals 120° of active forward elevation. She is able position her hand behind her head. She has external rotation with her arm at her side to neutral.  She has 90° of shoulder abduction. She has 0° of external rotation and 0° of internal rotation with the arm maximally abducted. She has 5 minus out of 5 strength in forward elevation. She has 4+ out of 5 strength in external rotation. There is no focal tenderness with palpation. The distal neurovascular exam is grossly intact. Cervical spine: The skin is warm and dry. There is no swelling, warmth, or erythema. Range of motion is within normal limits. There is no paraspinal or spinous process tenderness. Spurling's sign is negative and did not produce shoulder pain. The distal neurovascular exam is grossly intact. X-RAYS: 4 views of the left shoulder were obtained in the office and reviewed today. Glenohumeral joint is diminished. There is osteophyte formation visualized in the inferior aspect of the glenoid. There are changes about the undersurface of the lateral acromion and greater tuberosity. The acromiohumeral interval is severely diminished. Lung fields are clear with normal pulmonary markings. ASSESSMENT:    1. Left shoulder osteoarthritis  2. Rotator cuff arthropathy    PLAN: I had detailed discussion with Vern Mckeon regarding diagnosis and treatment options. I recommended an MRI to evaluate the integrity of the rotator cuff. I'll see her back for reevaluation following the MRI. She is in agreement with this plan.     Patient seen and examined by Arabella Casillas PA-C and Dr. Jose Elias Pereira MD

## 2017-12-15 ENCOUNTER — TELEPHONE (OUTPATIENT)
Dept: ORTHOPEDIC SURGERY | Age: 67
End: 2017-12-15

## 2017-12-15 NOTE — TELEPHONE ENCOUNTER
Spoke with patient about MRI denial. Looks as if another office has been trying to push through an order, and we can not get approval at this time. Made the patient award to get  A hold of whoever in the order to cancel theirs or to go through with it and then follow up afterwards.

## 2017-12-18 RX ORDER — CAPSAICIN 0.15 %
LIQUID (ML) TOPICAL
Qty: 180 TABLET | Refills: 1 | Status: SHIPPED | OUTPATIENT
Start: 2017-12-18 | End: 2019-05-22 | Stop reason: ALTCHOICE

## 2017-12-19 ENCOUNTER — HOSPITAL ENCOUNTER (OUTPATIENT)
Dept: SURGERY | Age: 67
Discharge: HOME | End: 2017-12-19
Attending: ORTHOPAEDIC SURGERY | Admitting: ORTHOPAEDIC SURGERY

## 2017-12-19 VITALS
SYSTOLIC BLOOD PRESSURE: 108 MMHG | WEIGHT: 158 LBS | TEMPERATURE: 97 F | BODY MASS INDEX: 25.39 KG/M2 | OXYGEN SATURATION: 97 % | DIASTOLIC BLOOD PRESSURE: 63 MMHG | HEART RATE: 67 BPM | RESPIRATION RATE: 19 BRPM | HEIGHT: 66 IN

## 2017-12-19 LAB
GLUCOSE BLD-MCNC: 92 MG/DL (ref 70–99)
PERFORMED ON: NORMAL

## 2017-12-19 RX ORDER — LABETALOL HYDROCHLORIDE 5 MG/ML
5 INJECTION, SOLUTION INTRAVENOUS
Status: DISCONTINUED | OUTPATIENT
Start: 2017-12-19 | End: 2017-12-20 | Stop reason: HOSPADM

## 2017-12-19 RX ORDER — HYDRALAZINE HYDROCHLORIDE 20 MG/ML
5 INJECTION INTRAMUSCULAR; INTRAVENOUS EVERY 30 MIN PRN
Status: DISCONTINUED | OUTPATIENT
Start: 2017-12-19 | End: 2017-12-20 | Stop reason: HOSPADM

## 2017-12-19 RX ORDER — DIPHENHYDRAMINE HYDROCHLORIDE 50 MG/ML
6.25 INJECTION INTRAMUSCULAR; INTRAVENOUS
Status: ACTIVE | OUTPATIENT
Start: 2017-12-19 | End: 2017-12-19

## 2017-12-19 RX ORDER — ONDANSETRON 2 MG/ML
4 INJECTION INTRAMUSCULAR; INTRAVENOUS EVERY 30 MIN PRN
Status: DISCONTINUED | OUTPATIENT
Start: 2017-12-19 | End: 2017-12-20 | Stop reason: HOSPADM

## 2017-12-19 RX ORDER — OXYCODONE HYDROCHLORIDE AND ACETAMINOPHEN 5; 325 MG/1; MG/1
2 TABLET ORAL PRN
Status: ACTIVE | OUTPATIENT
Start: 2017-12-19 | End: 2017-12-19

## 2017-12-19 RX ORDER — SODIUM CHLORIDE, SODIUM LACTATE, POTASSIUM CHLORIDE, CALCIUM CHLORIDE 600; 310; 30; 20 MG/100ML; MG/100ML; MG/100ML; MG/100ML
INJECTION, SOLUTION INTRAVENOUS CONTINUOUS
Status: DISCONTINUED | OUTPATIENT
Start: 2017-12-19 | End: 2017-12-20 | Stop reason: HOSPADM

## 2017-12-19 RX ORDER — OXYCODONE HYDROCHLORIDE AND ACETAMINOPHEN 5; 325 MG/1; MG/1
1 TABLET ORAL PRN
Status: ACTIVE | OUTPATIENT
Start: 2017-12-19 | End: 2017-12-19

## 2017-12-19 RX ORDER — MEPERIDINE HYDROCHLORIDE 50 MG/ML
12.5 INJECTION INTRAMUSCULAR; INTRAVENOUS; SUBCUTANEOUS EVERY 5 MIN PRN
Status: DISCONTINUED | OUTPATIENT
Start: 2017-12-19 | End: 2017-12-20 | Stop reason: HOSPADM

## 2017-12-19 RX ADMIN — SODIUM CHLORIDE, SODIUM LACTATE, POTASSIUM CHLORIDE, CALCIUM CHLORIDE: 600; 310; 30; 20 INJECTION, SOLUTION INTRAVENOUS at 10:41

## 2017-12-19 ASSESSMENT — PAIN - FUNCTIONAL ASSESSMENT: PAIN_FUNCTIONAL_ASSESSMENT: 0-10

## 2017-12-19 ASSESSMENT — PAIN SCALES - GENERAL
PAINLEVEL_OUTOF10: 0
PAINLEVEL_OUTOF10: 0

## 2017-12-19 NOTE — ANESTHESIA PRE-OP
Department of Anesthesiology  Preprocedure Note       Name:  Cyndee Springer   Age:  78 y.o.  :  1950                                          MRN:  [id number]         Date:  2017      Surgeon:    Procedure:    Medications prior to admission:   Prior to Admission medications    Medication Sig Start Date End Date Taking?  Authorizing Provider   Vitamins-Lipotropics ([employer] BALANCED B50) TABS TAKE 1 TABLET BY MOUTH TWICE A DAY 17  Yes Alex Pagan CNP   rOPINIRole (REQUIP) 1 MG tablet Take 1 tablet by mouth nightly 10/12/17  Yes Alex Pagan CNP   escitalopram (LEXAPRO) 20 MG tablet Take 1 tablet by mouth daily 10/12/17  Yes Alex Pagan, YUDI   hydrochlorothiazide (HYDRODIURIL) 25 MG tablet Take 1 tablet by mouth daily 10/12/17  Yes Alex Pagan, YUDI   spironolactone (ALDACTONE) 50 MG tablet Take 1 tablet by mouth daily 10/12/17  Yes Alex Pagan CNP   vitamin D (CHOLECALCIFEROL) 1000 UNIT TABS tablet Take 1 tablet by mouth daily 10/12/17  Yes Alex Pagan CNP   levothyroxine (SYNTHROID) 75 MCG tablet Take 1 tablet by mouth Daily 10/12/17  Yes Alex Pagan CNP   QUEtiapine (SEROQUEL XR) 150 MG TB24 extended release tablet Take 1 tablet by mouth nightly 10/12/17  Yes Alex Pagan CNP   metFORMIN (GLUCOPHAGE) 500 MG tablet Take 1 tablet by mouth daily At 1600 10/12/17  Yes Alex Pagan CNP   B Complex Vitamins (VITAMIN B COMPLEX) TABS Take 1 tablet by mouth 2 times daily 10/12/17  Yes Alex Pagan CNP   DULoxetine (CYMBALTA) 60 MG extended release capsule Take 1 capsule by mouth 2 times daily 10/12/17  Yes Aelx Pagan CNP   atorvastatin (LIPITOR) 20 MG tablet Take 1 tablet by mouth daily 10/12/17  Yes Alex Pagan CNP   traZODone (DESYREL) 100 MG tablet Take 1 tablet by mouth nightly 10/12/17  Yes Alex Pagan CNP   melatonin 3 MG TABS tablet Take 1 tablet by mouth nightly 10/12/17 daily 180 capsule 1    atorvastatin (LIPITOR) 20 MG tablet Take 1 tablet by mouth daily 90 tablet 1    traZODone (DESYREL) 100 MG tablet Take 1 tablet by mouth nightly 90 tablet 1    melatonin 3 MG TABS tablet Take 1 tablet by mouth nightly 90 tablet 1    linaclotide (LINZESS) 145 MCG capsule Take 2 capsules by mouth every morning (before breakfast) 180 capsule 1    bisacodyl (DULCOLAX) 10 MG suppository Place 10 mg rectally daily as needed for Constipation      bisacodyl (DULCOLAX) 5 MG EC tablet Take 10 mg by mouth 2 times daily       diclofenac (VOLTAREN) 75 MG EC tablet Take 1 tablet by mouth 2 times daily 60 tablet 2    aspirin 81 MG chewable tablet Take 1 tablet by mouth daily 90 tablet 1    LORazepam (ATIVAN) 0.5 MG tablet Take 0.5 mg by mouth every 6 hours as needed for Anxiety      acetaminophen (TYLENOL) 325 MG tablet Take 650 mg by mouth every 6 hours as needed for Pain.        Current Facility-Administered Medications   Medication Dose Route Frequency Provider Last Rate Last Dose    lactated ringers infusion   Intravenous Continuous Irvin Cobos MD 50 mL/hr at 12/19/17 1041      clindamycin (CLEOCIN) 900 mg in dextrose 5% 50 mL IVPB  900 mg Intravenous On Call to [facility], [place]        HYDROmorphone (DILAUDID) injection 0.5 mg  0.5 mg Intravenous Q10 Min PRN Fox Lara MD        HYDROmorphone (DILAUDID) injection 0.5 mg  0.5 mg Intravenous Q5 Min PRN Fox Lara MD        oxyCODONE-acetaminophen (PERCOCET) 5-325 MG per tablet 1 tablet  1 tablet Oral PRN Fox Lara MD        Or    oxyCODONE-acetaminophen (PERCOCET) 5-325 MG per tablet 2 tablet  2 tablet Oral PRN Fox Lara MD        diphenhydrAMINE (BENADRYL) injection 6.25 mg  6.25 mg Intravenous Once PRN Fox Lara MD        [facility]) injection 4 mg  4 mg Intravenous Q30 Min PRN Fox Lara MD        labetalol (NORMODYNE;TRANDATE) injection 5 mg 5 mg Intravenous Q15 Min PRN Marcelo Reyes MD        hydrALAZINE (APRESOLINE) injection 5 mg  5 mg Intravenous Q30 Min PRN Marcelo Reyes MD        meperidine (DEMEROL) injection 12.5 mg  12.5 mg Intravenous Q5 Min PRN Marcelo Reyes MD           Allergies: Allergies   Allergen Reactions    Levaquin [Levofloxacin In D5w]      Unknown reaction    Levofloxacin     Lidocaine Hives     Tested at allergist for marcaine and procaine - tested negative for these allergies . Reaction was noted after dental procedure when she was a child-probable Novacaine    Pcn [Penicillins] Hives       Problem List:    Patient Active Problem List   Diagnosis Code    CMC arthritis, thumb, degenerative M18.9    Rotator cuff tear M75.100    Vertigo R42    Encephalopathy G93.40    Polypharmacy Z79.899    Borderline personality disorder F60.3    Moderate major depression ([address] F32.1    HTN (hypertension) I10    Hypothyroid E03.9    Osteoarthritis of glenohumeral joint M19.019    Rotator cuff arthropathy M12.819    Right shoulder pain M25.511    Status post total shoulder arthroplasty Z96.619    Chronic constipation K59.09    Bilateral carpal tunnel syndrome G56.03    Right wrist pain M25.531    Left wrist pain M25.532       Past Medical History:        Diagnosis Date    Borderline personality disorder     Chronic pain     Colon disorder     hard time pushing stool out    Constipation     Diabetes mellitus ([address]     Diabetic neuropathy ([address]     Electric shock     ECT therapy    Hyperlipidemia     Hypertension     Insomnia     Major depressive disorder, recurrent ([address]     Morbid obesity ([address]     Osteoarthrosis     PONV (postoperative nausea and vomiting)     RLS (restless legs syndrome)     Suicidal ideation     Unspecified hypothyroidism     Vitamin deficiency        Past Surgical History:        Procedure Laterality Date    APPENDECTOMY      BACK SURGERY      COLONOSCOPY      ELBOW SURGERY Right     for pinched nerve    JOINT REPLACEMENT Bilateral     knees    KNEE SURGERY      bilateral VAS    SHOULDER ARTHROSCOPY Right 12/02/2016    Right Total Shoulder Arthroscopy with Reverse Peyton Uriostegui and Socket Deta Prosthesis    TONSILLECTOMY         Social History:    Social History   Substance Use Topics    Smoking status: Former Smoker     Packs/day: 2.00     Years: 20.00     Types: Cigarettes     Start date: 12/2/1971     Quit date: 12/2/1991    Smokeless tobacco: Never Used    Alcohol use No                                Counseling given: Not Answered      Vital Signs (Current): There were no vitals filed for this visit. BP Readings from Last 3 Encounters:   12/11/17 116/64   11/20/17 138/77   11/14/17 120/60       NPO Status: Time of last liquid consumption: 2300                        Time of last solid consumption: 2100                        Date of last liquid consumption: 12/18/17                        Date of last solid food consumption: 12/18/17    BMI:   Wt Readings from Last 3 Encounters:   12/13/17 158 lb (71.7 kg)   12/12/17 158 lb 15.2 oz (72.1 kg)   12/11/17 159 lb (72.1 kg)     There is no height or weight on file to calculate BMI. Anesthesia Evaluation  Patient summary reviewed and Nursing notes reviewed   history of anesthetic complications: PONV. Airway: Mallampati: II     Neck ROM: full   Dental:          Pulmonary:                              Cardiovascular:    (+) hypertension:,                   Neuro/Psych:   (+) neuromuscular disease:, psychiatric history:            GI/Hepatic/Renal:             Endo/Other:    (+) hypothyroidism::., .                 Abdominal:           Vascular:                                        Anesthesia Plan      general     ASA 2     (Medications & allergies reviewed  All available lab & EKG data reviewed)  Induction: intravenous.       Anesthetic plan and risks discussed with patient. Plan discussed with CRNA.                   Emilia Chapa MD   12/19/2017

## 2017-12-19 NOTE — PROGRESS NOTES
POCT      Blood Glucose:92      (Normal Range 70-99)    PT:      (Normal Range 10-12. 8)    INR:      (Normal Range 0.85-1.15)

## 2017-12-19 NOTE — OP NOTE
Operative Report  Patient:  Gabe  Record #: [id number]  Date: 12/19/2017  Surgeon:  Tommy Castañeda. Chelly Augustine M.D. PREOPERATIVE DIAGNOSIS:  1. left carpal tunnel syndrome. POSTOPERATIVE DIAGNOSIS:  1. left carpal tunnel syndrome. PROCEDURE PERFORMED:  1. left open carpal tunnel release. SURGEON:  Tommy Castañeda. Chelly Augustine M.D. ANESTHESIA: General    COMPLICATIONS:  None    DETAILS OF PROCEDURE:  The patient was placed on the operating table in the supine position and General anesthesia was placed. The hand was scrubbed with ChloraPrep, and draped in a sterile fashion. The arm was exsanguinated using an Ace bandage and a well padded tourniquet was inflated to 250 mmHg. A curvilinear incision was made paralleling the thenar crease but ulnar to the palmaris longus tendon. Dissection was carried down through the palmar aponeurosis to expose the transverse carpal ligament. The transverse carpal ligament was first divided in its mid portion using the scalpel, and the median nerve was identified. The distal-most portion of the transverse carpal ligament was completely divided taking care to visualize and avoid injury to the motor branch of the median nerve. The proximal subcutaneous tissues were then elevated from the proximal portion of the transverse carpal ligament and distal antebrachial fascia, and this area was completely divided ensuring complete release. The floor of the carpal tunnel was palpated. There were no significant bony prominences present within the carpal tunnel. The tourniquet was then deflated. Hemostasis was achieved using the bipolar electrocautery. The wound edges were infiltrated with 0.5% Marcaine, and the wound was closed with 5-0 nylon vertical mattress sutures. A dry sterile dressing with Polysporin, adaptic and a volar splint were applied. The patient tolerated the procedure well.

## 2017-12-20 NOTE — ANESTHESIA POST-OP
Postoperative Anesthesia Note    Name:    Ginny Turcios  MRN:      [id number]    Patient Vitals for the past 12 hrs:   BP Temp Temp src Pulse Resp SpO2 Height Weight   12/19/17 1250 108/63 - - 67 19 97 % - -   12/19/17 1237 115/64 97 °F (36.1 °C) Temporal 67 15 98 % - -   12/19/17 1232 108/61 - - 67 11 99 % - -   12/19/17 1222 (!) 101/54 97.3 °F (36.3 °C) Temporal 71 14 (!) 10 % - -   12/19/17 1054 122/77 98.9 °F (37.2 °C) Temporal 77 14 93 % 5' 6\" (1.676 m) 158 lb (71.7 kg)        LABS:    CBC  Lab Results   Component Value Date/Time    WBC 5.0 07/06/2017 09:09 AM    HGB 11.9 (L) 07/06/2017 09:09 AM    HCT 35.3 (L) 07/06/2017 09:09 AM     07/06/2017 09:09 AM     RENAL  Lab Results   Component Value Date/Time     12/11/2017 04:07 PM    K 4.5 12/11/2017 04:07 PM     12/11/2017 04:07 PM    CO2 32 12/11/2017 04:07 PM    BUN 19 12/11/2017 04:07 PM    CREATININE 0.8 12/11/2017 04:07 PM    GLUCOSE 72 12/11/2017 04:07 PM     COAGS  No results found for: PROTIME, INR, APTT    Intake & Output: In: 800 [I.V.:800]  Out: -     Nausea & Vomiting:  No    Level of Consciousness:  Awake    Pain Assessment:  Adequate analgesia    Anesthesia Complications:  No apparent anesthetic complications    SUMMARY      Vital signs stable  OK to discharge from Stage I post anesthesia care.   Care transferred from Anesthesiology department on discharge from perioperative area

## 2017-12-27 ENCOUNTER — NURSE ONLY (OUTPATIENT)
Dept: ORTHOPEDIC SURGERY | Age: 67
End: 2017-12-27

## 2017-12-27 DIAGNOSIS — G56.02 CARPAL TUNNEL SYNDROME ON LEFT: Primary | ICD-10-CM

## 2018-01-10 ENCOUNTER — OFFICE VISIT (OUTPATIENT)
Dept: FAMILY MEDICINE CLINIC | Age: 68
End: 2018-01-10

## 2018-01-10 ENCOUNTER — TELEPHONE (OUTPATIENT)
Dept: ORTHOPEDIC SURGERY | Age: 68
End: 2018-01-10

## 2018-01-10 VITALS
DIASTOLIC BLOOD PRESSURE: 60 MMHG | SYSTOLIC BLOOD PRESSURE: 124 MMHG | HEIGHT: 66 IN | OXYGEN SATURATION: 96 % | HEART RATE: 98 BPM | WEIGHT: 165 LBS | BODY MASS INDEX: 26.52 KG/M2

## 2018-01-10 DIAGNOSIS — S76.302D LEFT HAMSTRING INJURY, SUBSEQUENT ENCOUNTER: Primary | ICD-10-CM

## 2018-01-10 DIAGNOSIS — M79.605 PAIN OF LEFT LOWER EXTREMITY: ICD-10-CM

## 2018-01-10 DIAGNOSIS — E11.9 CONTROLLED TYPE 2 DIABETES MELLITUS WITHOUT COMPLICATION, WITH LONG-TERM CURRENT USE OF INSULIN (HCC): ICD-10-CM

## 2018-01-10 DIAGNOSIS — Z79.4 CONTROLLED TYPE 2 DIABETES MELLITUS WITHOUT COMPLICATION, WITH LONG-TERM CURRENT USE OF INSULIN (HCC): ICD-10-CM

## 2018-01-10 LAB — HBA1C MFR BLD: 6.1 %

## 2018-01-10 PROCEDURE — G8427 DOCREV CUR MEDS BY ELIG CLIN: HCPCS | Performed by: NURSE PRACTITIONER

## 2018-01-10 PROCEDURE — 83036 HEMOGLOBIN GLYCOSYLATED A1C: CPT | Performed by: NURSE PRACTITIONER

## 2018-01-10 PROCEDURE — 4040F PNEUMOC VAC/ADMIN/RCVD: CPT | Performed by: NURSE PRACTITIONER

## 2018-01-10 PROCEDURE — 1090F PRES/ABSN URINE INCON ASSESS: CPT | Performed by: NURSE PRACTITIONER

## 2018-01-10 PROCEDURE — G8484 FLU IMMUNIZE NO ADMIN: HCPCS | Performed by: NURSE PRACTITIONER

## 2018-01-10 PROCEDURE — 1123F ACP DISCUSS/DSCN MKR DOCD: CPT | Performed by: NURSE PRACTITIONER

## 2018-01-10 PROCEDURE — 3017F COLORECTAL CA SCREEN DOC REV: CPT | Performed by: NURSE PRACTITIONER

## 2018-01-10 PROCEDURE — 99213 OFFICE O/P EST LOW 20 MIN: CPT | Performed by: NURSE PRACTITIONER

## 2018-01-10 PROCEDURE — 1036F TOBACCO NON-USER: CPT | Performed by: NURSE PRACTITIONER

## 2018-01-10 PROCEDURE — G8419 CALC BMI OUT NRM PARAM NOF/U: HCPCS | Performed by: NURSE PRACTITIONER

## 2018-01-10 PROCEDURE — 3046F HEMOGLOBIN A1C LEVEL >9.0%: CPT | Performed by: NURSE PRACTITIONER

## 2018-01-10 PROCEDURE — 3014F SCREEN MAMMO DOC REV: CPT | Performed by: NURSE PRACTITIONER

## 2018-01-10 PROCEDURE — G8400 PT W/DXA NO RESULTS DOC: HCPCS | Performed by: NURSE PRACTITIONER

## 2018-01-10 RX ORDER — BACLOFEN 10 MG/1
10 TABLET ORAL 2 TIMES DAILY
Qty: 20 TABLET | Refills: 0 | Status: SHIPPED | OUTPATIENT
Start: 2018-01-10 | End: 2018-05-22

## 2018-01-10 ASSESSMENT — ENCOUNTER SYMPTOMS: BACK PAIN: 1

## 2018-01-10 NOTE — PROGRESS NOTES
Subjective:      Patient ID: Fern Herzog is a 78 y.o. female. Morris Jones is here with complaints of pain to left buttock and posterior thigh that started about 8 weeks ago after injuring herself while playing pickle ball. She was reaching for a ball down low with her left leg extended and felt a \"pop\" and then was unable to apply pressure to that leg for a few minutes. Since injury pain has improved somewhat but still has been unable to play pickle ball which she really enjoys due to the pain. Pain is worsened with sitting more than standing or walking. She has tried rest and diclofenac with slight improvement in her symptoms. She denies any leg weakness or loss of control of bowel or bladder. She also complains of morning sleepiness, thinks it is related to her night time medications. (Trazodone, melatonin, Requip, Seroquel)      Leg Pain    The incident occurred more than 1 week ago (2 months). Incident location: playing pickle ball. Injury mechanism: over extending leg. The pain is present in the left thigh. The quality of the pain is described as aching. The pain is at a severity of 3/10. Pertinent negatives include no inability to bear weight, loss of motion, loss of sensation or numbness. Exacerbated by: sitting, walking, running. She has tried rest and heat for the symptoms. The treatment provided mild relief. Review of Systems   Cardiovascular: Negative for chest pain, palpitations and leg swelling. Musculoskeletal: Positive for arthralgias and back pain. Negative for gait problem, joint swelling, myalgias, neck pain and neck stiffness. Left shoulder pain on going, has been seeing [facility] and trying to get MRI approved through insurance. Pain of left hamstring x 8 weeks ago after injury from playing pickle ball. Neurological: Negative for numbness.      Vitals:    01/10/18 0757   BP: 124/60   Pulse: 98   SpO2: 96%   Weight: 165 lb (74.8 kg)   Height: 5' 6\" (1.676 m) Objective:   Physical Exam   Constitutional: She appears well-developed and well-nourished. No distress. HENT:   Head: Normocephalic and atraumatic. Cardiovascular: Normal rate, regular rhythm and normal heart sounds. Exam reveals no gallop and no friction rub. No murmur heard. Pulmonary/Chest: Effort normal and breath sounds normal. No respiratory distress. She has no wheezes. She has no rales. She exhibits no tenderness. Musculoskeletal:        Left upper leg: She exhibits tenderness. She exhibits no bony tenderness, no swelling, no edema and no deformity. Legs:  Neurological: She is alert. No cranial nerve deficit. Skin: Skin is warm and dry. She is not diaphoretic. Nursing note and vitals reviewed. Assessment/Plan:   1. Left hamstring injury, subsequent encounter  - [facility]  - External Referral To Physical Therapy  - baclofen (LIORESAL) 10 MG tablet; Take 1 tablet by mouth 2 times daily  Dispense: 20 tablet; Refill: 0    2. Pain of left lower extremity  - OSR PT - [facility]  - External Referral To Physical Therapy  - XR HIP BILATERAL W AP PELVIS (2 VIEWS); Future    3. Controlled type 2 diabetes mellitus without complication, with long-term current use of insulin ([facility])  - POCT glycosylated hemoglobin (Hb A1C)-6.1    Patient Instructions   Half melatonin dose nightly or take every other night to see if it helps with morning sleepiness. Physical therapy referral for hamstring pain  Continue diclofenac  Baclofen twice daily as needed (muscle relaxer)      Ideally your blood pressure goal should be below 130/80. You can learn more about blood pressure and ways to incorporate a healthy lifestlye to help treat it from the American Heart Association website: www.heart. org under the  Getting Healthy link, and the [address] website: www.nhlbi.nih.gov/hbp    A for Activity  It helps your blood pressure when you tablet 1    B Complex Vitamins (VITAMIN B COMPLEX) TABS Take 1 tablet by mouth 2 times daily 180 tablet 1    aspirin 81 MG chewable tablet Take 1 tablet by mouth daily 90 tablet 1    DULoxetine (CYMBALTA) 60 MG extended release capsule Take 1 capsule by mouth 2 times daily 180 capsule 1    atorvastatin (LIPITOR) 20 MG tablet Take 1 tablet by mouth daily 90 tablet 1    traZODone (DESYREL) 100 MG tablet Take 1 tablet by mouth nightly 90 tablet 1    melatonin 3 MG TABS tablet Take 1 tablet by mouth nightly 90 tablet 1    linaclotide (LINZESS) 145 MCG capsule Take 2 capsules by mouth every morning (before breakfast) 180 capsule 1    LORazepam (ATIVAN) 0.5 MG tablet Take 0.5 mg by mouth every 6 hours as needed for Anxiety      bisacodyl (DULCOLAX) 10 MG suppository Place 10 mg rectally daily as needed for Constipation      bisacodyl (DULCOLAX) 5 MG EC tablet Take 10 mg by mouth 2 times daily       acetaminophen (TYLENOL) 325 MG tablet Take 650 mg by mouth every 6 hours as needed for Pain. No facility-administered encounter medications on file as of 1/10/2018.

## 2018-01-10 NOTE — PATIENT INSTRUCTIONS
Half melatonin dose nightly or take every other night to see if it helps with morning sleepiness. Physical therapy referral for hamstring pain  Continue diclofenac  Baclofen twice daily as needed (muscle relaxer)      Ideally your blood pressure goal should be below 130/80. You can learn more about blood pressure and ways to incorporate a healthy lifestlye to help treat it from the American Heart Association website: www.heart. org under the  Getting Healthy link, and the [address] website: www.nhlbi.nih.gov/hbp    A for Activity  It helps your blood pressure when you exercise regularly. You should try to exercise at least 3 times a week for 20 minutes at a pace that you can comfortably carry on a conversation without being out of breath. B for BMI  The Body Mass Index should be below 30. This is your weight and height measurement. A BMI below 30 is preferred to help lower the risk of Type 2 diabetes, high cholesterol, heart disease, high blood pressure, sleep apnea, gallbladder disease and strokes. C for Control your Salt Intake  Avoid adding salt to your food. Avoid processed food, fast food, and junk food which all has high levels of sodium added. Read labels and get no more than 1500-2300mg of sodium a day.      How confident do you feel that you will be able to begin working on your goals before next visit? :     *Some confidence       Please record your blood pressure once every two weeks below:  Date  Blood pressure

## 2018-01-11 DIAGNOSIS — M19.90 ARTHRITIS: ICD-10-CM

## 2018-01-11 RX ORDER — DICLOFENAC SODIUM 75 MG/1
75 TABLET, DELAYED RELEASE ORAL 2 TIMES DAILY
Qty: 60 TABLET | Refills: 2 | Status: SHIPPED | OUTPATIENT
Start: 2018-01-11 | End: 2018-05-16

## 2018-01-30 ENCOUNTER — HOSPITAL ENCOUNTER (OUTPATIENT)
Dept: PHYSICAL THERAPY | Age: 68
Discharge: HOME | End: 2018-01-31
Admitting: INTERNAL MEDICINE

## 2018-01-30 NOTE — PLAN OF CARE
[facility], [address]  Phone: [phone]  Fax [phone]     Sana Bran    Dear Dr. Delvin Gonzalez MD, Cheo Staples CNP ,    We had the pleasure of evaluating the following patient for physical therapy services at [address] [place] [address]. A summary of our findings can be found in the initial assessment below. This includes our plan of care. If you have any questions or concerns regarding these findings, please do not hesitate to contact me at the office phone number checked above. Thank you for the referral.       Physician Signature:_______________________________Date:__________________  By signing above (or electronic signature), therapists plan is approved by physician    Patient: Anil Schmidt   : 1950   MRN: [id number]  Referring Physician:  Wan Woodruff     Evaluation Date: 2018      Medical Diagnosis Information: L hamstring strain D55.330U,  TX DX Pain in LE79.605                                                Insurance information[facility]  [facility]     Precautions/ Contra-indications:   Latex Allergy:  [x]NO      []YES  Preferred Language for Healthcare:   [x]English       []other:    SUBJECTIVE: Patient stated complaint: Pain in L HS which causes severe pain sitting and stopped her from playing pickle ball    Relevant Medical History 2 TKR about 20 yrs ago, Reverse R TSR , Carpal tunnel surgery L  Scheduled for R  Functional Disability Index: LEFS  56=30%    Pain Scale: 9/10  Easing factors: Getting up and off the L H'  Provocative factors: Sitting for 10+ min. , playing pickleball    Type: []Constant   [x]Intermittent  []Radiating []Localized []other:     Numbness/Tingling: R hand,scheduled for release 18    Occupation/School: Retired    Living Status/Prior Level of Function: Independent with ADLs and IADLs,  2 story apt.  Lives alone  OBJECTIVE: ROM LEFT ** RIGHT   HIP Flex 100 100   HIP Abd 45 45   HIP Ext     HIP IR     HIP ER     Knee ext -10 -15   Knee Flex 124 126   Ankle PF     Ankle DF     Ankle In     Ankle Ev     Strength  LEFT WNL  RIGHT WNL   HIP Flexors     HIP Abductors     HIP Ext     Hip ER     Knee EXT (quad) 5- 5-   Knee Flex (HS)     Ankle DF     Ankle PF     Ankle Inv     Ankle EV          Circumference  Mid apex  7 cm prox             Reflexes/Sensation:    []Dermatomes/Myotomes intact    [x]Reflexes equal and normal bilaterally   []Other:    Joint mobility:    []Normal    [x]Hypo   []Hyper    Palpation:   Tender at the origin of the medial L HS  Functional Mobility/Transfers: Indep    Posture: Good    Bandages/Dressings/Incisions: NA    Gait: (include devices/WB status) B loss of extension in the Ks (B TKR) and some L HS. Antalgia  Orthopedic Special Tests:                        [x] Patient history, allergies, meds reviewed. Medical chart reviewed. See intake form. Review Of Systems (ROS):  [x]Performed Review of systems (Integumentary, CardioPulmonary, Neurological) by intake and observation. Intake form has been scanned into medical record. Patient has been instructed to contact their primary care physician regarding ROS issues if not already being addressed at this time.       Co-morbidities/Complexities (which will affect course of rehabilitation):   []None           Arthritic conditions   []Rheumatoid arthritis (M05.9)  [x]Osteoarthritis (M19.91)   Cardiovascular conditions   [x]Hypertension (I10)  []Hyperlipidemia (E78.5)  []Angina pectoris (I20)  []Atherosclerosis (I70)   Musculoskeletal conditions   []Disc pathology   []Congenital spine pathologies   []Prior surgical intervention  []Osteoporosis (M81.8)  []Osteopenia (M85.8)   Endocrine conditions   [x]Hypothyroid (E03.9)  []Hyperthyroid Gastrointestinal conditions   []Constipation (V60.93)   Metabolic conditions   []Morbid obesity (E66.01)  [x]Diabetes type 1(E10.65) or elements   [] a total of 3 or more elements   [] a total of 4 or more elements   [x] A clinical presentation with:  [] stable and/or uncomplicated characteristics   [x] evolving clinical presentation with changing characteristics  [] unstable and unpredictable characteristics;   [x] Clinical decision making of [] low, [] moderate, [] high complexity using standardized patient assessment instrument and/or measurable assessment of functional outcome. [] EVAL (LOW) 03435 (typically 20 minutes face-to-face)  [x] EVAL (MOD) 18728 (typically 30 minutes face-to-face)  [] EVAL (HIGH) 04066 (typically 45 minutes face-to-face)  [] RE-EVAL       PLAN:   Frequency/Duration:  2 days per week for 6 Weeks:  Interventions:  [x]  Therapeutic exercise including: strength training, ROM, for Lower extremity and core   [x]  NMR activation and proprioception for LE, Glutes and Core   [x]  Manual therapy as indicated for LE, Hip and spine to include: Dry Needling/IASTM, STM, PROM, Gr I-IV mobilizations, manipulation. [x] Modalities as needed that may include: thermal agents, E-stim, Biofeedback, US, iontophoresis as indicated  [x] Patient education on joint protection, postural re-education, activity modification, progression of HEP. HEP instruction: (see scanned forms)    GOALS:  Patient stated goal:  To be able sit w/o pain in the L H', return to pickleball as tolerated    Therapist goals for Patient:   Short Term Goals: To be achieved in: 2 weeks  1. Independent in HEP and progression per patient tolerance, in order to prevent re-injury. 2. Patient will have a decrease in pain to facilitate improvement in movement, function, and ADLs as indicated by Functional Deficits. Long Term Goals: To be achieved in: weeks  1. Disability index score of 20% or less for the LEFS to assist with reaching prior level of function.    2. Patient will demonstrate increased AROM to [facility] to allow for proper joint functioning as indicated by

## 2018-01-30 NOTE — FLOWSHEET NOTE
verbal/tactile cueing for activities related to strengthening, flexibility, endurance, ROM for improvements in LE, proximal hip, and core control with self care, mobility, lifting, ambulation.  [] (02243) Provided verbal/tactile cueing for activities related to improving balance, coordination, kinesthetic sense, posture, motor skill, proprioception  to assist with LE, proximal hip, and core control in self care, mobility, lifting, ambulation and eccentric single leg control.      NMR and Therapeutic Activities:    [x] (30018 or 56416) Provided verbal/tactile cueing for activities related to improving balance, coordination, kinesthetic sense, posture, motor skill, proprioception and motor activation to allow for proper function of core, proximal hip and LE with self care and ADLs  [] (38016) Gait Re-education- Provided training and instruction to the patient for proper LE, core and proximal hip recruitment and positioning and eccentric body weight control with ambulation re-education including up and down stairs     Home Exercise Program:    [x] (99336) Reviewed/Progressed HEP activities related to strengthening, flexibility, endurance, ROM of core, proximal hip and LE for functional self-care, mobility, lifting and ambulation/stair navigation   [] (86991)Reviewed/Progressed HEP activities related to improving balance, coordination, kinesthetic sense, posture, motor skill, proprioception of core, proximal hip and LE for self care, mobility, lifting, and ambulation/stair navigation      Manual Treatments:  PROM / STM / Oscillations-Mobs:  G-I, II, III, IV (PA's, Inf., Post.)  [x] (61018) Provided manual therapy to mobilize LE, proximal hip and/or LS spine soft tissue/joints for the purpose of modulating pain, promoting relaxation,  increasing ROM, reducing/eliminating soft tissue swelling/inflammation/restriction, improving soft tissue extensibility and allowing for proper ROM for normal function with self care,

## 2018-01-31 ENCOUNTER — TELEPHONE (OUTPATIENT)
Dept: ORTHOPEDIC SURGERY | Age: 68
End: 2018-01-31

## 2018-02-01 ENCOUNTER — HOSPITAL ENCOUNTER (OUTPATIENT)
Dept: PHYSICAL THERAPY | Age: 68
Discharge: HOME | End: 2018-02-02
Admitting: INTERNAL MEDICINE

## 2018-02-01 ENCOUNTER — HOSPITAL ENCOUNTER (OUTPATIENT)
Dept: PHYSICAL THERAPY | Age: 68
Discharge: HOME | End: 2018-02-28
Attending: INTERNAL MEDICINE | Admitting: INTERNAL MEDICINE

## 2018-02-01 NOTE — FLOWSHEET NOTE
improving balance, coordination, kinesthetic sense, posture, motor skill, proprioception  to assist with LE, proximal hip, and core control in self care, mobility, lifting, ambulation and eccentric single leg control. NMR and Therapeutic Activities:    [x] (55270 or 91780) Provided verbal/tactile cueing for activities related to improving balance, coordination, kinesthetic sense, posture, motor skill, proprioception and motor activation to allow for proper function of core, proximal hip and LE with self care and ADLs  [] (47083) Gait Re-education- Provided training and instruction to the patient for proper LE, core and proximal hip recruitment and positioning and eccentric body weight control with ambulation re-education including up and down stairs     Home Exercise Program:    [x] (55725) Reviewed/Progressed HEP activities related to strengthening, flexibility, endurance, ROM of core, proximal hip and LE for functional self-care, mobility, lifting and ambulation/stair navigation   [] (71215)Reviewed/Progressed HEP activities related to improving balance, coordination, kinesthetic sense, posture, motor skill, proprioception of core, proximal hip and LE for self care, mobility, lifting, and ambulation/stair navigation      Manual Treatments:  PROM / STM / Oscillations-Mobs:  G-I, II, III, IV (PA's, Inf., Post.)  [x] (62840) Provided manual therapy to mobilize LE, proximal hip and/or LS spine soft tissue/joints for the purpose of modulating pain, promoting relaxation,  increasing ROM, reducing/eliminating soft tissue swelling/inflammation/restriction, improving soft tissue extensibility and allowing for proper ROM for normal function with self care, mobility, lifting and ambulation.      Modalities:  Declined    Charges:  Timed Code Treatment Minutes: 45   Total Treatment Minutes: 50     [] EVAL (LOW) 30428 (typically 20 minutes face-to-face)  [] EVAL (MOD) 88852 (typically 30 minutes face-to-face)  [] EVAL (HIGH)

## 2018-02-07 ENCOUNTER — TELEPHONE (OUTPATIENT)
Dept: ORTHOPEDIC SURGERY | Age: 68
End: 2018-02-07

## 2018-02-08 ENCOUNTER — TELEPHONE (OUTPATIENT)
Dept: ORTHOPEDIC SURGERY | Age: 68
End: 2018-02-08

## 2018-02-08 ENCOUNTER — HOSPITAL ENCOUNTER (OUTPATIENT)
Dept: PHYSICAL THERAPY | Age: 68
Discharge: HOME | End: 2018-02-09
Admitting: INTERNAL MEDICINE

## 2018-02-08 NOTE — FLOWSHEET NOTE
verbal/tactile cueing for activities related to improving balance, coordination, kinesthetic sense, posture, motor skill, proprioception  to assist with LE, proximal hip, and core control in self care, mobility, lifting, ambulation and eccentric single leg control. NMR and Therapeutic Activities:    [x] (69826 or 09429) Provided verbal/tactile cueing for activities related to improving balance, coordination, kinesthetic sense, posture, motor skill, proprioception and motor activation to allow for proper function of core, proximal hip and LE with self care and ADLs  [] (43694) Gait Re-education- Provided training and instruction to the patient for proper LE, core and proximal hip recruitment and positioning and eccentric body weight control with ambulation re-education including up and down stairs     Home Exercise Program:    [x] (62830) Reviewed/Progressed HEP activities related to strengthening, flexibility, endurance, ROM of core, proximal hip and LE for functional self-care, mobility, lifting and ambulation/stair navigation   [] (42703)Reviewed/Progressed HEP activities related to improving balance, coordination, kinesthetic sense, posture, motor skill, proprioception of core, proximal hip and LE for self care, mobility, lifting, and ambulation/stair navigation      Manual Treatments:  PROM / STM / Oscillations-Mobs:  G-I, II, III, IV (PA's, Inf., Post.)  [x] (33987) Provided manual therapy to mobilize LE, proximal hip and/or LS spine soft tissue/joints for the purpose of modulating pain, promoting relaxation,  increasing ROM, reducing/eliminating soft tissue swelling/inflammation/restriction, improving soft tissue extensibility and allowing for proper ROM for normal function with self care, mobility, lifting and ambulation.      Modalities:  Ice 10\"    Charges:  Timed Code Treatment Minutes: 45   Total Treatment Minutes: 55     [] EVAL (LOW) 93350 (typically 20 minutes face-to-face)  [] EVAL (MOD) 12762

## 2018-02-13 ENCOUNTER — HOSPITAL ENCOUNTER (OUTPATIENT)
Dept: PHYSICAL THERAPY | Age: 68
Discharge: HOME | End: 2018-02-14
Admitting: INTERNAL MEDICINE

## 2018-02-13 NOTE — FLOWSHEET NOTE
[facility],  [address] [place]  Phone: [phone]  Fax [phone]    Physical Therapy Daily Treatment Note  Date:  2018    Patient Name:  Patito Newman    :  1950  MRN: [id number]  Restrictions/Precautions:    Medical/Treatment Diagnosis Information:     ·  L HS strain'   Pain in Frances Manzano' M39.926  Insurance/Certification information: [employer]   No copay med. nec  Physician Information: Kimber Medina    Plan of care signed (Y/N):     Date of Patient follow up with Physician:     G-Code (if applicable):      Date G-Code Applied:         Progress Note: []  Yes  [x]  No  Next due by: Visit #10       Latex Allergy:  [x]NO      []YES  Preferred Language for Healthcare:   [x]English       []other:    Visit # Insurance Allowable Requires auth   5 BMN    [x]no        []yes:       Pain level:  0-2/10 when sitting     SUBJECTIVE: No c/o increased pain after eval.  Doing HEP    OBJECTIVE:  TTP over  L Proximal HS and piriformis  Observation:   Test measurements:      RESTRICTIONS/PRECAUTIONS: B TKR, L carpal tunnel release, R reverse TSR, , lumbar surgery about 15-20, R elbow release.     Exercises/Interventions:     Therapeutic Ex Sets/sec Reps Notes   Seated HS S 3x30\"     Seated Calf S w/ belt 3x30\"     Piriformis S, K to opp S' 20\" x3 B,     Supine K' extension S with weight 3'     Prone QS 5+ sec x10     AVINASH 5\"15x     Clamshells 2x10 B VA    Bridges w/ abd 5\" 10x VA    SLR, LSLR 2x10 each #2    Static K' ext S B 2' 4#    HR/TR 20x     Manual Intervention      ROM, S H' rotators, STM over piriformis/ HS origins, H' flex S, K' ext S 15'                                   NMR re-education                                              Therapeutic Exercise and NMR EXR  [x] (61294) Provided verbal/tactile cueing for activities related to strengthening, flexibility, endurance, ROM for improvements in LE, proximal hip, and core control

## 2018-02-15 ENCOUNTER — HOSPITAL ENCOUNTER (OUTPATIENT)
Dept: PHYSICAL THERAPY | Age: 68
Discharge: HOME | End: 2018-02-16
Admitting: INTERNAL MEDICINE

## 2018-02-15 NOTE — FLOWSHEET NOTE
45   Total Treatment Minutes: 55     [] EVAL (LOW) 42162 (typically 20 minutes face-to-face)  [] EVAL (MOD) 41927 (typically 30 minutes face-to-face)  [] EVAL (HIGH) 36372 (typically 45 minutes face-to-face)  [] RE-EVAL     [x] EY(95456) x  2   [] IONTO  [] NMR (78011) x      [x] VASO  [x] Manual (26287) x       [] Other:  [] TA x       [] Mech Traction (04050)  [] ES(attended) (19192)      [] ES (un) (09659):      GOALS:   Patient stated goal:  To be able sit w/o pain in the L H', return to pickleball as tolerated    Therapist goals for Patient:   Short Term Goals: To be achieved in: 2 weeks  1. Independent in HEP and progression per patient tolerance, in order to prevent re-injury. 2. Patient will have a decrease in pain to facilitate improvement in movement, function, and ADLs as indicated by Functional Deficits. Long Term Goals: To be achieved in: weeks  1. Disability index score of 20% or less for the LEFS to assist with reaching prior level of function. 2. Patient will demonstrate increased AROM to [facility] to allow for proper joint functioning as indicated by patients Functional Deficits. 3. Patient will demonstrate an increase in Strength to good proximal hip strength and control, within 5lb HHD in LE to allow for proper functional mobility as indicated by patients Functional Deficits. 4. Patient will return to all functional activities without increased symptoms or restriction. 5. Patient stated goal:  To be able sit w/o pain in the L H', return to pickleball as tolerated       Progression Towards Functional goals:  [x] Patient is progressing as expected towards functional goals listed. [] Progression is slowed due to complexities listed. [] Progression has been slowed due to co-morbidities.   [] Plan just implemented, too soon to assess goals progression  [] Other:     ASSESSMENT:  See eval    Treatment/Activity Tolerance:  [x] Patient tolerated treatment well [] Patient limited by wayne  [] Patient limited by pain  [] Patient limited by other medical complications  [] Other:     Prognosis: [x] Good [] Fair  [] Poor    Patient Requires Follow-up: [x] Yes  [] No    PLAN: See eval  [x] Continue per plan of care [] Alter current plan (see comments)  [] Plan of care initiated [] Hold pending MD visit [] Discharge    Electronically signed by: Cheryl Sylvester PT

## 2018-02-20 ENCOUNTER — HOSPITAL ENCOUNTER (OUTPATIENT)
Dept: PHYSICAL THERAPY | Age: 68
Discharge: HOME | End: 2018-02-21
Admitting: INTERNAL MEDICINE

## 2018-02-20 NOTE — FLOWSHEET NOTE
Provided verbal/tactile cueing for activities related to strengthening, flexibility, endurance, ROM for improvements in LE, proximal hip, and core control with self care, mobility, lifting, ambulation.  [] (41951) Provided verbal/tactile cueing for activities related to improving balance, coordination, kinesthetic sense, posture, motor skill, proprioception  to assist with LE, proximal hip, and core control in self care, mobility, lifting, ambulation and eccentric single leg control.      NMR and Therapeutic Activities:    [x] (02701 or 41112) Provided verbal/tactile cueing for activities related to improving balance, coordination, kinesthetic sense, posture, motor skill, proprioception and motor activation to allow for proper function of core, proximal hip and LE with self care and ADLs  [] (97534) Gait Re-education- Provided training and instruction to the patient for proper LE, core and proximal hip recruitment and positioning and eccentric body weight control with ambulation re-education including up and down stairs     Home Exercise Program:    [x] (95210) Reviewed/Progressed HEP activities related to strengthening, flexibility, endurance, ROM of core, proximal hip and LE for functional self-care, mobility, lifting and ambulation/stair navigation   [] (88397)Reviewed/Progressed HEP activities related to improving balance, coordination, kinesthetic sense, posture, motor skill, proprioception of core, proximal hip and LE for self care, mobility, lifting, and ambulation/stair navigation      Manual Treatments:  PROM / STM / Oscillations-Mobs:  G-I, II, III, IV (PA's, Inf., Post.)  [x] (54144) Provided manual therapy to mobilize LE, proximal hip and/or LS spine soft tissue/joints for the purpose of modulating pain, promoting relaxation,  increasing ROM, reducing/eliminating soft tissue swelling/inflammation/restriction, improving soft tissue extensibility and allowing for proper ROM for normal function with self care, mobility, lifting and ambulation. Modalities:     Charges:  Timed Code Treatment Minutes: 48   Total Treatment Minutes: 48     [] EVAL (LOW) 99486 (typically 20 minutes face-to-face)  [] EVAL (MOD) 43376 (typically 30 minutes face-to-face)  [] EVAL (HIGH) 17055 (typically 45 minutes face-to-face)  [] RE-EVAL     [x] CA(55409) x  2   [] IONTO  [] NMR (62578) x      [] VASO  [x] Manual (20942) x       [] Other:  [] TA x       [] Mech Traction (57207)  [] ES(attended) (86813)      [] ES (un) (42500):      GOALS:   Patient stated goal:  To be able sit w/o pain in the L H', return to pickleball as tolerated    Therapist goals for Patient:   Short Term Goals: To be achieved in: 2 weeks  1. Independent in HEP and progression per patient tolerance, in order to prevent re-injury. 2. Patient will have a decrease in pain to facilitate improvement in movement, function, and ADLs as indicated by Functional Deficits. Long Term Goals: To be achieved in: weeks  1. Disability index score of 20% or less for the LEFS to assist with reaching prior level of function. 2. Patient will demonstrate increased AROM to [facility] to allow for proper joint functioning as indicated by patients Functional Deficits. 3. Patient will demonstrate an increase in Strength to good proximal hip strength and control, within 5lb HHD in LE to allow for proper functional mobility as indicated by patients Functional Deficits. 4. Patient will return to all functional activities without increased symptoms or restriction. 5. Patient stated goal:  To be able sit w/o pain in the L H', return to pickleball as tolerated       Progression Towards Functional goals:  [x] Patient is progressing as expected towards functional goals listed. [] Progression is slowed due to complexities listed. [] Progression has been slowed due to co-morbidities.   [] Plan just implemented, too soon to assess goals progression  [] Other:     ASSESSMENT:  See

## 2018-02-22 ENCOUNTER — HOSPITAL ENCOUNTER (OUTPATIENT)
Dept: PHYSICAL THERAPY | Age: 68
Discharge: HOME | End: 2018-02-23
Admitting: INTERNAL MEDICINE

## 2018-02-27 ENCOUNTER — HOSPITAL ENCOUNTER (OUTPATIENT)
Dept: PHYSICAL THERAPY | Age: 68
Discharge: HOME | End: 2018-02-28
Admitting: INTERNAL MEDICINE

## 2018-02-27 NOTE — FLOWSHEET NOTE
Ladders                Square               Jump/Hop  Low                      Med.                      High                                                            Modality Declined    Initials                             SA    Time spent with PT assistant

## 2018-02-27 NOTE — FLOWSHEET NOTE
Trainer 2-27-18          Therapeutic Exercise and NMR EXR  [x] (31873) Provided verbal/tactile cueing for activities related to strengthening, flexibility, endurance, ROM for improvements in LE, proximal hip, and core control with self care, mobility, lifting, ambulation.  [] (50951) Provided verbal/tactile cueing for activities related to improving balance, coordination, kinesthetic sense, posture, motor skill, proprioception  to assist with LE, proximal hip, and core control in self care, mobility, lifting, ambulation and eccentric single leg control.      NMR and Therapeutic Activities:    [x] (86544 or 82244) Provided verbal/tactile cueing for activities related to improving balance, coordination, kinesthetic sense, posture, motor skill, proprioception and motor activation to allow for proper function of core, proximal hip and LE with self care and ADLs  [] (60898) Gait Re-education- Provided training and instruction to the patient for proper LE, core and proximal hip recruitment and positioning and eccentric body weight control with ambulation re-education including up and down stairs     Home Exercise Program:    [x] (30316) Reviewed/Progressed HEP activities related to strengthening, flexibility, endurance, ROM of core, proximal hip and LE for functional self-care, mobility, lifting and ambulation/stair navigation   [] (43553)Reviewed/Progressed HEP activities related to improving balance, coordination, kinesthetic sense, posture, motor skill, proprioception of core, proximal hip and LE for self care, mobility, lifting, and ambulation/stair navigation      Manual Treatments:  PROM / STM / Oscillations-Mobs:  G-I, II, III, IV (PA's, Inf., Post.)  [x] (67174) Provided manual therapy to mobilize LE, proximal hip and/or LS spine soft tissue/joints for the purpose of modulating pain, promoting relaxation,  increasing ROM, reducing/eliminating soft tissue swelling/inflammation/restriction, improving

## 2018-03-01 ENCOUNTER — HOSPITAL ENCOUNTER (OUTPATIENT)
Dept: PHYSICAL THERAPY | Age: 68
Discharge: HOME | End: 2018-03-02
Admitting: INTERNAL MEDICINE

## 2018-03-01 ENCOUNTER — HOSPITAL ENCOUNTER (OUTPATIENT)
Dept: PHYSICAL THERAPY | Age: 68
Discharge: HOME | End: 2018-03-31
Attending: INTERNAL MEDICINE | Admitting: INTERNAL MEDICINE

## 2018-03-06 ENCOUNTER — HOSPITAL ENCOUNTER (OUTPATIENT)
Dept: PHYSICAL THERAPY | Age: 68
Discharge: HOME | End: 2018-03-07
Admitting: INTERNAL MEDICINE

## 2018-03-06 NOTE — FLOWSHEET NOTE
[facility], [address] [place]  Phone: [phone]  Fax [phone]    Physical Therapy Daily Treatment Note  Date:  3/6/2018    Patient Name:  Awilda Bray    :  1950  MRN: [id number]  Restrictions/Precautions:    Medical/Treatment Diagnosis Information:     ·  L HS strain'   Pain in Shireen Rodgers' L17.531  Insurance/Certification information: [employer]   No copay med. nec  Physician Information: Luis Solares Day    Plan of care signed (Y/N):     Date of Patient follow up with Physician:     G-Code (if applicable):      Date G-Code Applied:         Progress Note: []  Yes  [x]  No  Next due by: Visit #10       Latex Allergy:  [x]NO      []YES  Preferred Language for Healthcare:   [x]English       []other:    Visit # Insurance Allowable Requires auth   9 BMN    [x]no        []yes:       Pain level:  0-1/10 when sitting     SUBJECTIVE: Pt. Reports feeling better, decreased pain and Increased flexibility Doing HEP. Some soreness over L proximal HS and in the R paraspinals lumbar. She reports over all improvement with ex/HEP She states since [place] she has gardened, dug ing the garden and picked up debris. She states her L HS and R L/S area are sore 5/10. Very flat affect, hard to pull out information    OBJECTIVE:   Observation:   Test measurements: -4 ext L,  -7 R both with some over pressure     RESTRICTIONS/PRECAUTIONS: B TKR, L carpal tunnel release, R reverse TSR, , lumbar surgery about 15-20, R elbow release.     Exercises/Interventions:     Therapeutic Ex Sets/sec Reps Notes   Seated HS S  B 3x30\"     /incline S 3x30\"     Piriformis S, K to opp S' 20\" x3 B,           Prone QS  Prone K' ext S   2' 5+ sec x10     AVINASH 5\"15x     Clamshells 2x10 B OVB Issued GTB   Bridges w/ abd 5\" 10x WA    SLR, LSLR  Warm up on bike 5' 2x10 each #2    Static K' ext S B 2' 4#    HR/TR 20x  Toe raise is B difficult   Manual Intervention      ROM, S H' LS spine soft tissue/joints for the purpose of modulating pain, promoting relaxation,  increasing ROM, reducing/eliminating soft tissue swelling/inflammation/restriction, improving soft tissue extensibility and allowing for proper ROM for normal function with self care, mobility, lifting and ambulation. Modalities:ICE 10'  Charges:  Timed Code Treatment Minutes: 48   Total Treatment Minutes: 58     [] EVAL (LOW) 83784 (typically 20 minutes face-to-face)  [] EVAL (MOD) 08767 (typically 30 minutes face-to-face)  [] EVAL (HIGH) 13938 (typically 45 minutes face-to-face)  [] RE-EVAL     [x] UZ(36903) x  2   [] IONTO  [] NMR (49778) x      [] VASO  [x] Manual (78544) x       [] Other:  [] TA x       [] Mech Traction (93275)  [] ES(attended) (60768)      [] ES (un) (21551):      GOALS:   Patient stated goal:  To be able sit w/o pain in the L H', return to pickleball as tolerated    Therapist goals for Patient:   Short Term Goals: To be achieved in: 2 weeks  1. Independent in HEP and progression per patient tolerance, in order to prevent re-injury. 2. Patient will have a decrease in pain to facilitate improvement in movement, function, and ADLs as indicated by Functional Deficits. Long Term Goals: To be achieved in: weeks  1. Disability index score of 20% or less for the LEFS to assist with reaching prior level of function. 2. Patient will demonstrate increased AROM to [facility] to allow for proper joint functioning as indicated by patients Functional Deficits. 3. Patient will demonstrate an increase in Strength to good proximal hip strength and control, within 5lb HHD in LE to allow for proper functional mobility as indicated by patients Functional Deficits. 4. Patient will return to all functional activities without increased symptoms or restriction.    5. Patient stated goal:  To be able sit w/o pain in the L H', return to pickleball as tolerated       Progression Towards Functional goals:  [x] Patient is

## 2018-03-08 ENCOUNTER — HOSPITAL ENCOUNTER (OUTPATIENT)
Dept: PHYSICAL THERAPY | Age: 68
Discharge: HOME | End: 2018-03-09
Admitting: INTERNAL MEDICINE

## 2018-03-08 NOTE — FLOWSHEET NOTE
[facility], [address] [place]  Phone: [phone]  Fax [phone]    Physical Therapy Daily Treatment Note  Date:  3/8/2018    Patient Name:  Luz Elena Pichardo    :  1950  MRN: [id number]  Restrictions/Precautions:    Medical/Treatment Diagnosis Information:     ·  L HS strain'   Pain in Roberta Wright' F91.941  Insurance/Certification information: Bertha Aponte   No copay med. nec  Physician Information: Praveena Ortiz Day    Plan of care signed (Y/N):     Date of Patient follow up with Physician:     G-Code (if applicable):      Date G-Code Applied:         Progress Note: [x]  Yes  []  No  Next due by: Visit #10       Latex Allergy:  [x]NO      []YES  Preferred Language for Healthcare:   [x]English       []other:    Visit # Insurance Allowable Requires auth   10 BMN    [x]no        []yes:       Pain level:  0-1/10 when sitting     SUBJECTIVE: Pt. Reports feeling better, decreased pain and Increased flexibility Doing HEP. Some soreness over L proximal HS and in the R paraspinals lumbar. She reports over all improvement with ex/HEP She states since LV she has gardened, dug ing the garden and picked up debris. She states her L HS and R L/S area are sore 5/10. Very flat affect, hard to pull out information    OBJECTIVE: SEE 10th VISIT NOTE  Observation:   Test measurements    RESTRICTIONS/PRECAUTIONS: B TKR, L carpal tunnel release, R reverse TSR, , lumbar surgery about 15-20, R elbow release.     Exercises/Interventions:     Therapeutic Ex Sets/sec Reps Notes   Seated HS S  B 3x30\"     /incline S 3x30\"     Piriformis S, K to opp S' 20\" x3 B,     TRUE HS, H' Flex, rotation X     Prone QS  Prone K' ext S   2' 5+ sec x10     AVINASH 5\"15x     Clamshells 2x10 B OVB Issued GTB   Bridges w/ abd 5\" 10x LA    SLR, LSLR  Warm up on bike 5' 2x10 each #2    Static K' ext S B 2' 4#    HR/TR 20x  Toe raise is B difficult   Manual Intervention      ROM, S H'

## 2018-03-13 ENCOUNTER — HOSPITAL ENCOUNTER (OUTPATIENT)
Dept: PHYSICAL THERAPY | Age: 68
Discharge: HOME | End: 2018-03-14
Admitting: INTERNAL MEDICINE

## 2018-03-13 NOTE — FLOWSHEET NOTE
[facility],  [address] [place]  Phone: [phone]  Fax [phone]    ATHLETIC TRAINING [address]  Date:  3/13/2018    Patient Name:  Francisca Orellana    :    MRN: [id number]  Restrictions/Precautions:    Medical/Treatment Diagnosis Information:  ·   L HS strain'   Pain in Pro [place]' M25.552  ·    Physician Information:    Burak Large Day                                   Activity Log                                                               DOS/DOI:                                                             Date: 3/13/18   ATC Communication:  Pickle ball  HX R TSA, L shldr OA  Gets R LB sharp pains w/ flexion exercises. Bike    Airdyne    Elliptical    Treadmill        Hamstring stretch    Quad stretch    Hip flexor stretch    Piriformis stretch    Gastroc stretch    Soleus stretch        Leg press  Bilat. 60# 2x10                      Unilat. Calf press 60# 2x10   Knee ext. Knee flex. Squats   Mini                   Wall                   [facility]        Step   Up               Up and Over               Lat. Down                   [facility]  Flex. ABd 15# R/L 2x10             ADd              Ext        Cable Column/Theraband    Rows NV                                                 Ext. Lat. pulldown NV                                                 Chops                                                  Trunk Rot.         Modality declined   Initials DB   Time spent with PT assistant
increased symptoms or restriction. 5. Patient stated goal:  To be able sit w/o pain in the L H', return to pickleball as tolerated       Progression Towards Functional goals:  [x] Patient is progressing as expected towards functional goals listed. [] Progression is slowed due to complexities listed. [] Progression has been slowed due to co-morbidities.   [] Plan just implemented, too soon to assess goals progression  [] Other:     ASSESSMENT:  See eval    Treatment/Activity Tolerance:  [x] Patient tolerated treatment well [] Patient limited by fatique  [] Patient limited by pain  [] Patient limited by other medical complications  [] Other:     Prognosis: [x] Good [] Fair  [] Poor    Patient Requires Follow-up: [x] Yes  [] No    PLAN: See eval  [x] Continue per plan of care [] Alter current plan (see comments)  [] Plan of care initiated [] Hold pending MD visit [] Discharge    Electronically signed by: Dorma Cabot, PT

## 2018-03-20 ENCOUNTER — HOSPITAL ENCOUNTER (OUTPATIENT)
Dept: PHYSICAL THERAPY | Age: 68
Discharge: HOME | End: 2018-03-21
Admitting: INTERNAL MEDICINE

## 2018-03-20 NOTE — FLOWSHEET NOTE
Functional Deficits. 3. Patient will demonstrate an increase in Strength to good proximal hip strength and control, within 5lb HHD in LE to allow for proper functional mobility as indicated by patients Functional Deficits. 4. Patient will return to all functional activities without increased symptoms or restriction. 5. Patient stated goal:  To be able sit w/o pain in the L H', return to pickleball as tolerated       Progression Towards Functional goals:  [x] Patient is progressing as expected towards functional goals listed. [] Progression is slowed due to complexities listed. [] Progression has been slowed due to co-morbidities.   [] Plan just implemented, too soon to assess goals progression  [] Other:     ASSESSMENT:  See eval    Treatment/Activity Tolerance:  [x] Patient tolerated treatment well [] Patient limited by fatique  [] Patient limited by pain  [] Patient limited by other medical complications  [] Other:     Prognosis: [x] Good [] Fair  [] Poor    Patient Requires Follow-up: [x] Yes  [] No    PLAN: See eval  [x] Continue per plan of care [] Alter current plan (see comments)  [] Plan of care initiated [] Hold pending MD visit [] Discharge    Electronically signed by: Paulie Machuca, PT

## 2018-03-22 ENCOUNTER — HOSPITAL ENCOUNTER (OUTPATIENT)
Dept: PHYSICAL THERAPY | Age: 68
Discharge: HOME | End: 2018-03-23
Admitting: INTERNAL MEDICINE

## 2018-03-22 NOTE — FLOWSHEET NOTE
proximal hip and LE for functional self-care, mobility, lifting and ambulation/stair navigation   [] (44095)Reviewed/Progressed HEP activities related to improving balance, coordination, kinesthetic sense, posture, motor skill, proprioception of core, proximal hip and LE for self care, mobility, lifting, and ambulation/stair navigation      Manual Treatments:  PROM / STM / Oscillations-Mobs:  G-I, II, III, IV (PA's, Inf., Post.)  [x] (23080) Provided manual therapy to mobilize LE, proximal hip and/or LS spine soft tissue/joints for the purpose of modulating pain, promoting relaxation,  increasing ROM, reducing/eliminating soft tissue swelling/inflammation/restriction, improving soft tissue extensibility and allowing for proper ROM for normal function with self care, mobility, lifting and ambulation. Modalities:ICE 10'  Charges:  Timed Code Treatment Minutes: 60'   Total Treatment Minutes: 79'     [] EVAL (LOW) 455 1011 (typically 20 minutes face-to-face)  [] EVAL (MOD) 27353 (typically 30 minutes face-to-face)  [] EVAL (HIGH) 50646 (typically 45 minutes face-to-face)  [] RE-EVAL     [x] ZU(71737) x  2   [] IONTO  [] NMR (20704) x      [] VASO  [x] Manual (99794) x       [] Other:  [] TA x       [] Mech Traction (19656)  [] ES(attended) (06138)      [] ES (un) (85900):      GOALS:   Patient stated goal:  To be able sit w/o pain in the L H', return to pickleball as tolerated    Therapist goals for Patient:   Short Term Goals: To be achieved in: 2 weeks  1. Independent in HEP and progression per patient tolerance, in order to prevent re-injury. 2. Patient will have a decrease in pain to facilitate improvement in movement, function, and ADLs as indicated by Functional Deficits. Long Term Goals: To be achieved in: weeks  1. Disability index score of 20% or less for the LEFS to assist with reaching prior level of function.    2. Patient will demonstrate increased AROM to [facility] to allow for proper joint functioning as

## 2018-03-27 ENCOUNTER — HOSPITAL ENCOUNTER (OUTPATIENT)
Dept: PHYSICAL THERAPY | Age: 68
Discharge: HOME | End: 2018-03-28
Admitting: INTERNAL MEDICINE

## 2018-03-27 ENCOUNTER — OFFICE VISIT (OUTPATIENT)
Dept: ORTHOPEDIC SURGERY | Age: 68
End: 2018-03-27

## 2018-03-27 VITALS — HEIGHT: 66 IN | BODY MASS INDEX: 26.5 KG/M2 | WEIGHT: 164.9 LBS

## 2018-03-27 DIAGNOSIS — M75.122 COMPLETE TEAR OF LEFT ROTATOR CUFF: Primary | ICD-10-CM

## 2018-03-27 PROCEDURE — 3014F SCREEN MAMMO DOC REV: CPT | Performed by: ORTHOPAEDIC SURGERY

## 2018-03-27 PROCEDURE — G8427 DOCREV CUR MEDS BY ELIG CLIN: HCPCS | Performed by: ORTHOPAEDIC SURGERY

## 2018-03-27 PROCEDURE — G8482 FLU IMMUNIZE ORDER/ADMIN: HCPCS | Performed by: ORTHOPAEDIC SURGERY

## 2018-03-27 PROCEDURE — 1036F TOBACCO NON-USER: CPT | Performed by: ORTHOPAEDIC SURGERY

## 2018-03-27 PROCEDURE — G8419 CALC BMI OUT NRM PARAM NOF/U: HCPCS | Performed by: ORTHOPAEDIC SURGERY

## 2018-03-27 PROCEDURE — 1123F ACP DISCUSS/DSCN MKR DOCD: CPT | Performed by: ORTHOPAEDIC SURGERY

## 2018-03-27 PROCEDURE — 99213 OFFICE O/P EST LOW 20 MIN: CPT | Performed by: ORTHOPAEDIC SURGERY

## 2018-03-27 PROCEDURE — 1090F PRES/ABSN URINE INCON ASSESS: CPT | Performed by: ORTHOPAEDIC SURGERY

## 2018-03-27 PROCEDURE — 4040F PNEUMOC VAC/ADMIN/RCVD: CPT | Performed by: ORTHOPAEDIC SURGERY

## 2018-03-27 PROCEDURE — 3017F COLORECTAL CA SCREEN DOC REV: CPT | Performed by: ORTHOPAEDIC SURGERY

## 2018-03-27 PROCEDURE — G8400 PT W/DXA NO RESULTS DOC: HCPCS | Performed by: ORTHOPAEDIC SURGERY

## 2018-03-27 NOTE — FLOWSHEET NOTE
ambulation/stair navigation   [] (56048)Reviewed/Progressed HEP activities related to improving balance, coordination, kinesthetic sense, posture, motor skill, proprioception of core, proximal hip and LE for self care, mobility, lifting, and ambulation/stair navigation      Manual Treatments:  PROM / STM / Oscillations-Mobs:  G-I, II, III, IV (PA's, Inf., Post.)  [x] (75343) Provided manual therapy to mobilize LE, proximal hip and/or LS spine soft tissue/joints for the purpose of modulating pain, promoting relaxation,  increasing ROM, reducing/eliminating soft tissue swelling/inflammation/restriction, improving soft tissue extensibility and allowing for proper ROM for normal function with self care, mobility, lifting and ambulation. Modalities:10'  Charges:  Timed Code Treatment Minutes: 60'   Total Treatment Minutes: 61'     [] EVAL (LOW) 54254 (typically 20 minutes face-to-face)  [] EVAL (MOD) 14244 (typically 30 minutes face-to-face)  [] EVAL (HIGH) 93605 (typically 45 minutes face-to-face)  [] RE-EVAL     [x] UR(37436) x  2   [] IONTO  [x] NMR (65937) x      [] VASO  [x] Manual (31919) x       [] Other:  [] TA x       [] Mech Traction (45646)  [] ES(attended) (50141)      [] ES (un) (59433):      GOALS:   Patient stated goal:  To be able sit w/o pain in the L H', return to pickleball as tolerated    Therapist goals for Patient:   Short Term Goals: To be achieved in: 2 weeks  1. Independent in HEP and progression per patient tolerance, in order to prevent re-injury. 2. Patient will have a decrease in pain to facilitate improvement in movement, function, and ADLs as indicated by Functional Deficits. Long Term Goals: To be achieved in: weeks  1. Disability index score of 20% or less for the LEFS to assist with reaching prior level of function. 2. Patient will demonstrate increased AROM to [facility] to allow for proper joint functioning as indicated by patients Functional Deficits.    3. Patient will demonstrate an increase in Strength to good proximal hip strength and control, within 5lb HHD in LE to allow for proper functional mobility as indicated by patients Functional Deficits. 4. Patient will return to all functional activities without increased symptoms or restriction. 5. Patient stated goal:  To be able sit w/o pain in the L H', return to pickleball as tolerated       Progression Towards Functional goals:  [x] Patient is progressing as expected towards functional goals listed. [] Progression is slowed due to complexities listed. [] Progression has been slowed due to co-morbidities.   [] Plan just implemented, too soon to assess goals progression  [] Other:     ASSESSMENT:  See eval    Treatment/Activity Tolerance:  [x] Patient tolerated treatment well [] Patient limited by fatique  [] Patient limited by pain  [] Patient limited by other medical complications  [] Other:     Prognosis: [x] Good [] Fair  [] Poor    Patient Requires Follow-up: [x] Yes  [] No    PLAN: See eval  [x] Continue per plan of care [] Alter current plan (see comments)  [] Plan of care initiated [] Hold pending MD visit [] Discharge    Electronically signed by: Stefani Jamison PT

## 2018-03-29 ENCOUNTER — HOSPITAL ENCOUNTER (OUTPATIENT)
Dept: PHYSICAL THERAPY | Age: 68
Discharge: HOME | End: 2018-03-30
Admitting: INTERNAL MEDICINE

## 2018-03-29 NOTE — FLOWSHEET NOTE
an increase in Strength to good proximal hip strength and control, within 5lb HHD in LE to allow for proper functional mobility as indicated by patients Functional Deficits. 4. Patient will return to all functional activities without increased symptoms or restriction. 5. Patient stated goal:  To be able sit w/o pain in the L H', return to pickleball as tolerated       Progression Towards Functional goals:  [x] Patient is progressing as expected towards functional goals listed. [] Progression is slowed due to complexities listed. [] Progression has been slowed due to co-morbidities.   [] Plan just implemented, too soon to assess goals progression  [] Other:     ASSESSMENT:  See eval    Treatment/Activity Tolerance:  [x] Patient tolerated treatment well [] Patient limited by fatique  [] Patient limited by pain  [] Patient limited by other medical complications  [] Other:     Prognosis: [x] Good [] Fair  [] Poor    Patient Requires Follow-up: [x] Yes  [] No    PLAN: See eval  [x] Continue per plan of care [] Alter current plan (see comments)  [] Plan of care initiated [] Hold pending MD visit [] Discharge    Electronically signed by: Lissa Castillo PT

## 2018-03-29 NOTE — DISCHARGE SUMMARY
[facility],  [address] [facility] [place]  Phone: [phone]  Fax [phone]       Physical Therapy Discharge  Date: 3/29/2018        Patient Name:  Ed Drummond    :  1950  MRN: [id number]  Referring Physician Dr. Morgan Townsend  Diagnosis: HS strain  L                         ICD Code:Pain L LE 79.605  [] Surgical [x] Conservative  Therapy Diagnosis/Practice Pattern:     Number of Comorbidities:  []0     [x]1-2    []3+  Total number of visits: 15  Reporting Period:   Beginning XSEW:3-09-62   End GAPD:    OBJECTIVE  Test used Initial score Discharge Score   Pain Summary VAS 6-9/10 0-3/10   Functional questionnaire LEFS 56=30% 67=16%   Functional Testing            ROM  K' ext R-15 K'ext L -10    3-29-18 -10 -7   Strength  5/5 5/5              Functional Limitation G-Code (if applicable):   Original: JOHANN, Goal:  CJ,  DC:            Test/tests used to determine % limitation:  Actual Score used to drive % limitation:  LEFS    Treatment to date:  [x] Therapeutic Exercise    [] Modalities:  [] Therapeutic Activity             []Ultrasound            []Electrical Stimulation  [] Gait Training     []Cervical Traction    [] Lumbar Traction  [x] Neuromuscular Re-education [x] Cold/hotpack         []Iontophoresis  [x] Instruction in HEP      Other:  [x] Manual Therapy                   []                       ?           []   Assessment:  [x] All Goals were achieved.   [] The following goals were achieved (#'s):  [] The following goals were not achieved for the following reasons:/assessmen of improvement as it relates to each goal:    Plan of Care:  [x] Discharge from Therapy Services due to:    Reason for Discharge:   [x] All goals achieved    [] Patient having surgery  [] Physician discontinued therapy  [] Insurance/Financial Limitations [] Patient did not return for follow up visits [] Home program/1 visit only   [] No subjective or

## 2018-04-01 ENCOUNTER — HOSPITAL ENCOUNTER (OUTPATIENT)
Dept: PHYSICAL THERAPY | Age: 68
Discharge: HOME | End: 2018-04-30
Attending: INTERNAL MEDICINE | Admitting: INTERNAL MEDICINE

## 2018-04-17 LAB
CATARACTS: POSITIVE
DIABETIC RETINOPATHY: NEGATIVE
GLAUCOMA: NEGATIVE
INTRAOCULAR PRESSURE EYE: NORMAL
VISUAL ACUITY DISTANCE LEFT EYE: NORMAL
VISUAL ACUITY DISTANCE RIGHT EYE: NORMAL

## 2018-05-16 ENCOUNTER — OFFICE VISIT (OUTPATIENT)
Dept: FAMILY MEDICINE CLINIC | Age: 68
End: 2018-05-16

## 2018-05-16 VITALS
DIASTOLIC BLOOD PRESSURE: 60 MMHG | BODY MASS INDEX: 26.82 KG/M2 | SYSTOLIC BLOOD PRESSURE: 116 MMHG | HEIGHT: 65 IN | OXYGEN SATURATION: 97 % | WEIGHT: 161 LBS | HEART RATE: 79 BPM

## 2018-05-16 DIAGNOSIS — Z01.818 PREOP EXAMINATION: ICD-10-CM

## 2018-05-16 DIAGNOSIS — I10 ESSENTIAL HYPERTENSION: Chronic | ICD-10-CM

## 2018-05-16 DIAGNOSIS — E11.9 TYPE 2 DIABETES MELLITUS WITHOUT COMPLICATION, WITHOUT LONG-TERM CURRENT USE OF INSULIN (HCC): ICD-10-CM

## 2018-05-16 DIAGNOSIS — M75.122 COMPLETE TEAR OF LEFT ROTATOR CUFF: ICD-10-CM

## 2018-05-16 DIAGNOSIS — Z01.818 PREOP EXAMINATION: Primary | ICD-10-CM

## 2018-05-16 LAB
A/G RATIO: 2 (ref 1.1–2.2)
ALBUMIN SERPL-MCNC: 4.4 G/DL (ref 3.4–5)
ALP BLD-CCNC: 78 U/L (ref 40–129)
ALT SERPL-CCNC: 20 U/L (ref 10–40)
ANION GAP SERPL CALCULATED.3IONS-SCNC: 13 MMOL/L (ref 3–16)
AST SERPL-CCNC: 21 U/L (ref 15–37)
BASOPHILS ABSOLUTE: 0.1 K/UL (ref 0–0.2)
BASOPHILS RELATIVE PERCENT: 1.3 %
BILIRUB SERPL-MCNC: 0.4 MG/DL (ref 0–1)
BUN BLDV-MCNC: 19 MG/DL (ref 7–20)
CALCIUM SERPL-MCNC: 9.7 MG/DL (ref 8.3–10.6)
CHLORIDE BLD-SCNC: 99 MMOL/L (ref 99–110)
CO2: 29 MMOL/L (ref 21–32)
CREAT SERPL-MCNC: 0.7 MG/DL (ref 0.6–1.2)
EOSINOPHILS ABSOLUTE: 0.2 K/UL (ref 0–0.6)
EOSINOPHILS RELATIVE PERCENT: 3.1 %
GFR AFRICAN AMERICAN: >60
GFR NON-AFRICAN AMERICAN: >60
GLOBULIN: 2.2 G/DL
GLUCOSE BLD-MCNC: 101 MG/DL (ref 70–99)
HBA1C MFR BLD: 5.8 %
HCT VFR BLD CALC: 35.6 % (ref 36–48)
HEMOGLOBIN: 12.1 G/DL (ref 12–16)
LYMPHOCYTES ABSOLUTE: 1.7 K/UL (ref 1–5.1)
LYMPHOCYTES RELATIVE PERCENT: 33.1 %
MCH RBC QN AUTO: 32.2 PG (ref 26–34)
MCHC RBC AUTO-ENTMCNC: 34 G/DL (ref 31–36)
MCV RBC AUTO: 94.8 FL (ref 80–100)
MONOCYTES ABSOLUTE: 0.5 K/UL (ref 0–1.3)
MONOCYTES RELATIVE PERCENT: 9.6 %
NEUTROPHILS ABSOLUTE: 2.7 K/UL (ref 1.7–7.7)
NEUTROPHILS RELATIVE PERCENT: 52.9 %
PDW BLD-RTO: 12.9 % (ref 12.4–15.4)
PLATELET # BLD: 355 K/UL (ref 135–450)
PMV BLD AUTO: 7.7 FL (ref 5–10.5)
POTASSIUM SERPL-SCNC: 4.2 MMOL/L (ref 3.5–5.1)
RBC # BLD: 3.76 M/UL (ref 4–5.2)
SODIUM BLD-SCNC: 141 MMOL/L (ref 136–145)
TOTAL PROTEIN: 6.6 G/DL (ref 6.4–8.2)
WBC # BLD: 5 K/UL (ref 4–11)

## 2018-05-16 PROCEDURE — G8400 PT W/DXA NO RESULTS DOC: HCPCS | Performed by: NURSE PRACTITIONER

## 2018-05-16 PROCEDURE — G8419 CALC BMI OUT NRM PARAM NOF/U: HCPCS | Performed by: NURSE PRACTITIONER

## 2018-05-16 PROCEDURE — 2022F DILAT RTA XM EVC RTNOPTHY: CPT | Performed by: NURSE PRACTITIONER

## 2018-05-16 PROCEDURE — 1090F PRES/ABSN URINE INCON ASSESS: CPT | Performed by: NURSE PRACTITIONER

## 2018-05-16 PROCEDURE — 83036 HEMOGLOBIN GLYCOSYLATED A1C: CPT | Performed by: NURSE PRACTITIONER

## 2018-05-16 PROCEDURE — G8427 DOCREV CUR MEDS BY ELIG CLIN: HCPCS | Performed by: NURSE PRACTITIONER

## 2018-05-16 PROCEDURE — 1123F ACP DISCUSS/DSCN MKR DOCD: CPT | Performed by: NURSE PRACTITIONER

## 2018-05-16 PROCEDURE — 3017F COLORECTAL CA SCREEN DOC REV: CPT | Performed by: NURSE PRACTITIONER

## 2018-05-16 PROCEDURE — 1036F TOBACCO NON-USER: CPT | Performed by: NURSE PRACTITIONER

## 2018-05-16 PROCEDURE — 99213 OFFICE O/P EST LOW 20 MIN: CPT | Performed by: NURSE PRACTITIONER

## 2018-05-16 PROCEDURE — 3044F HG A1C LEVEL LT 7.0%: CPT | Performed by: NURSE PRACTITIONER

## 2018-05-16 PROCEDURE — 4040F PNEUMOC VAC/ADMIN/RCVD: CPT | Performed by: NURSE PRACTITIONER

## 2018-05-16 RX ORDER — CLONIDINE HYDROCHLORIDE 0.1 MG/1
0.1 TABLET ORAL 2 TIMES DAILY PRN
COMMUNITY
End: 2018-05-22

## 2018-05-16 ASSESSMENT — PATIENT HEALTH QUESTIONNAIRE - PHQ9
1. LITTLE INTEREST OR PLEASURE IN DOING THINGS: 0
SUM OF ALL RESPONSES TO PHQ QUESTIONS 1-9: 0
SUM OF ALL RESPONSES TO PHQ9 QUESTIONS 1 & 2: 0
2. FEELING DOWN, DEPRESSED OR HOPELESS: 0

## 2018-05-21 ENCOUNTER — TELEPHONE (OUTPATIENT)
Dept: FAMILY MEDICINE CLINIC | Age: 68
End: 2018-05-21

## 2018-05-21 DIAGNOSIS — L98.9 SKIN LESION OF FACE: Primary | ICD-10-CM

## 2018-05-22 ENCOUNTER — TELEPHONE (OUTPATIENT)
Dept: DERMATOLOGY | Age: 68
End: 2018-05-22

## 2018-05-23 ENCOUNTER — TELEPHONE (OUTPATIENT)
Dept: ORTHOPEDIC SURGERY | Age: 68
End: 2018-05-23

## 2018-05-30 ENCOUNTER — HOSPITAL ENCOUNTER (OUTPATIENT)
Dept: SURGERY | Age: 68
Discharge: HOME | End: 2018-05-30
Attending: ORTHOPAEDIC SURGERY | Admitting: ORTHOPAEDIC SURGERY

## 2018-05-30 VITALS
HEART RATE: 81 BPM | OXYGEN SATURATION: 95 % | HEIGHT: 65 IN | SYSTOLIC BLOOD PRESSURE: 117 MMHG | WEIGHT: 156 LBS | DIASTOLIC BLOOD PRESSURE: 61 MMHG | RESPIRATION RATE: 21 BRPM | TEMPERATURE: 97.7 F | BODY MASS INDEX: 25.99 KG/M2

## 2018-05-30 DIAGNOSIS — M75.122 COMPLETE ROTATOR CUFF TEAR OF LEFT SHOULDER: ICD-10-CM

## 2018-05-30 LAB
GLUCOSE BLD-MCNC: 123 MG/DL (ref 70–99)
GLUCOSE BLD-MCNC: 91 MG/DL (ref 70–99)
PERFORMED ON: ABNORMAL
PERFORMED ON: NORMAL

## 2018-05-30 RX ORDER — SODIUM CHLORIDE 0.9 % (FLUSH) 0.9 %
10 SYRINGE (ML) INJECTION EVERY 12 HOURS SCHEDULED
Status: DISCONTINUED | OUTPATIENT
Start: 2018-05-30 | End: 2018-05-31 | Stop reason: HOSPADM

## 2018-05-30 RX ORDER — SODIUM CHLORIDE, SODIUM LACTATE, POTASSIUM CHLORIDE, CALCIUM CHLORIDE 600; 310; 30; 20 MG/100ML; MG/100ML; MG/100ML; MG/100ML
INJECTION, SOLUTION INTRAVENOUS CONTINUOUS
Status: DISCONTINUED | OUTPATIENT
Start: 2018-05-30 | End: 2018-05-31 | Stop reason: HOSPADM

## 2018-05-30 RX ORDER — OXYCODONE HYDROCHLORIDE AND ACETAMINOPHEN 5; 325 MG/1; MG/1
2 TABLET ORAL PRN
Status: ACTIVE | OUTPATIENT
Start: 2018-05-30 | End: 2018-05-30

## 2018-05-30 RX ORDER — HYDRALAZINE HYDROCHLORIDE 20 MG/ML
5 INJECTION INTRAMUSCULAR; INTRAVENOUS EVERY 10 MIN PRN
Status: DISCONTINUED | OUTPATIENT
Start: 2018-05-30 | End: 2018-05-31 | Stop reason: HOSPADM

## 2018-05-30 RX ORDER — ACETAMINOPHEN 10 MG/ML
1000 INJECTION, SOLUTION INTRAVENOUS ONCE
Status: COMPLETED | OUTPATIENT
Start: 2018-05-30 | End: 2018-05-30

## 2018-05-30 RX ORDER — MORPHINE SULFATE 2 MG/ML
2 INJECTION, SOLUTION INTRAMUSCULAR; INTRAVENOUS EVERY 5 MIN PRN
Status: DISCONTINUED | OUTPATIENT
Start: 2018-05-30 | End: 2018-05-31 | Stop reason: HOSPADM

## 2018-05-30 RX ORDER — LABETALOL HYDROCHLORIDE 5 MG/ML
5 INJECTION, SOLUTION INTRAVENOUS EVERY 10 MIN PRN
Status: DISCONTINUED | OUTPATIENT
Start: 2018-05-30 | End: 2018-05-31 | Stop reason: HOSPADM

## 2018-05-30 RX ORDER — MEPERIDINE HYDROCHLORIDE 50 MG/ML
12.5 INJECTION INTRAMUSCULAR; INTRAVENOUS; SUBCUTANEOUS EVERY 5 MIN PRN
Status: DISCONTINUED | OUTPATIENT
Start: 2018-05-30 | End: 2018-05-31 | Stop reason: HOSPADM

## 2018-05-30 RX ORDER — MORPHINE SULFATE 2 MG/ML
1 INJECTION, SOLUTION INTRAMUSCULAR; INTRAVENOUS EVERY 5 MIN PRN
Status: DISCONTINUED | OUTPATIENT
Start: 2018-05-30 | End: 2018-05-31 | Stop reason: HOSPADM

## 2018-05-30 RX ORDER — OXYCODONE HYDROCHLORIDE AND ACETAMINOPHEN 5; 325 MG/1; MG/1
1 TABLET ORAL EVERY 6 HOURS PRN
Qty: 28 TABLET | Refills: 0 | Status: SHIPPED | OUTPATIENT
Start: 2018-05-30 | End: 2018-06-06

## 2018-05-30 RX ORDER — DIPHENHYDRAMINE HYDROCHLORIDE 50 MG/ML
12.5 INJECTION INTRAMUSCULAR; INTRAVENOUS
Status: ACTIVE | OUTPATIENT
Start: 2018-05-30 | End: 2018-05-30

## 2018-05-30 RX ORDER — OXYCODONE HYDROCHLORIDE AND ACETAMINOPHEN 5; 325 MG/1; MG/1
1 TABLET ORAL PRN
Status: ACTIVE | OUTPATIENT
Start: 2018-05-30 | End: 2018-05-30

## 2018-05-30 RX ORDER — PROMETHAZINE HYDROCHLORIDE 25 MG/ML
6.25 INJECTION, SOLUTION INTRAMUSCULAR; INTRAVENOUS
Status: ACTIVE | OUTPATIENT
Start: 2018-05-30 | End: 2018-05-30

## 2018-05-30 RX ORDER — SODIUM CHLORIDE 0.9 % (FLUSH) 0.9 %
10 SYRINGE (ML) INJECTION PRN
Status: DISCONTINUED | OUTPATIENT
Start: 2018-05-30 | End: 2018-05-31 | Stop reason: HOSPADM

## 2018-05-30 RX ORDER — ONDANSETRON 2 MG/ML
4 INJECTION INTRAMUSCULAR; INTRAVENOUS
Status: ACTIVE | OUTPATIENT
Start: 2018-05-30 | End: 2018-05-30

## 2018-05-30 RX ADMIN — SODIUM CHLORIDE, SODIUM LACTATE, POTASSIUM CHLORIDE, CALCIUM CHLORIDE: 600; 310; 30; 20 INJECTION, SOLUTION INTRAVENOUS at 06:50

## 2018-05-30 RX ADMIN — ACETAMINOPHEN 1000 MG: 10 INJECTION, SOLUTION INTRAVENOUS at 07:20

## 2018-05-30 ASSESSMENT — PAIN - FUNCTIONAL ASSESSMENT: PAIN_FUNCTIONAL_ASSESSMENT: 0-10

## 2018-06-01 ENCOUNTER — HOSPITAL ENCOUNTER (OUTPATIENT)
Dept: PHYSICAL THERAPY | Age: 68
Discharge: HOME | End: 2018-06-30
Attending: ORTHOPAEDIC SURGERY | Admitting: ORTHOPAEDIC SURGERY

## 2018-06-05 ENCOUNTER — OFFICE VISIT (OUTPATIENT)
Dept: ORTHOPEDIC SURGERY | Age: 68
End: 2018-06-05

## 2018-06-05 ENCOUNTER — HOSPITAL ENCOUNTER (OUTPATIENT)
Dept: PHYSICAL THERAPY | Age: 68
Discharge: HOME | End: 2018-06-06
Admitting: ORTHOPAEDIC SURGERY

## 2018-06-05 ENCOUNTER — HOSPITAL ENCOUNTER (OUTPATIENT)
Dept: PHYSICAL THERAPY | Age: 68
Discharge: HOME | End: 2018-05-31
Admitting: ORTHOPAEDIC SURGERY

## 2018-06-05 DIAGNOSIS — M75.122 COMPLETE ROTATOR CUFF TEAR OF LEFT SHOULDER: ICD-10-CM

## 2018-06-05 DIAGNOSIS — Z98.890 S/P ROTATOR CUFF REPAIR: Primary | ICD-10-CM

## 2018-06-05 PROCEDURE — 99024 POSTOP FOLLOW-UP VISIT: CPT | Performed by: ORTHOPAEDIC SURGERY

## 2018-06-11 DIAGNOSIS — G25.81 RLS (RESTLESS LEGS SYNDROME): ICD-10-CM

## 2018-06-11 RX ORDER — ROPINIROLE 1 MG/1
1 TABLET, FILM COATED ORAL NIGHTLY
Qty: 90 TABLET | Refills: 1 | Status: SHIPPED | OUTPATIENT
Start: 2018-06-11 | End: 2018-12-06 | Stop reason: SDUPTHER

## 2018-06-13 DIAGNOSIS — E03.9 HYPOTHYROIDISM, UNSPECIFIED TYPE: Chronic | ICD-10-CM

## 2018-06-13 DIAGNOSIS — E78.00 HYPERCHOLESTEREMIA: ICD-10-CM

## 2018-06-13 DIAGNOSIS — F32.1 MODERATE MAJOR DEPRESSION (HCC): Chronic | ICD-10-CM

## 2018-06-13 DIAGNOSIS — F60.3 BORDERLINE PERSONALITY DISORDER (HCC): Chronic | ICD-10-CM

## 2018-06-13 DIAGNOSIS — E11.9 TYPE 2 DIABETES MELLITUS WITHOUT COMPLICATION, WITHOUT LONG-TERM CURRENT USE OF INSULIN (HCC): ICD-10-CM

## 2018-06-13 DIAGNOSIS — I10 ESSENTIAL HYPERTENSION: Chronic | ICD-10-CM

## 2018-06-13 RX ORDER — ATORVASTATIN CALCIUM 20 MG/1
20 TABLET, FILM COATED ORAL DAILY
Qty: 90 TABLET | Refills: 1 | Status: SHIPPED | OUTPATIENT
Start: 2018-06-13 | End: 2018-12-06 | Stop reason: SDUPTHER

## 2018-06-13 RX ORDER — TRAZODONE HYDROCHLORIDE 100 MG/1
100 TABLET ORAL NIGHTLY
Qty: 90 TABLET | Refills: 1 | Status: SHIPPED | OUTPATIENT
Start: 2018-06-13 | End: 2018-11-12 | Stop reason: ALTCHOICE

## 2018-06-13 RX ORDER — DULOXETIN HYDROCHLORIDE 60 MG/1
60 CAPSULE, DELAYED RELEASE ORAL 2 TIMES DAILY
Qty: 180 CAPSULE | Refills: 1 | Status: SHIPPED | OUTPATIENT
Start: 2018-06-13 | End: 2021-01-26

## 2018-06-13 RX ORDER — ESCITALOPRAM OXALATE 20 MG/1
20 TABLET ORAL DAILY
Qty: 90 TABLET | Refills: 1 | Status: SHIPPED | OUTPATIENT
Start: 2018-06-13 | End: 2018-09-12

## 2018-06-13 RX ORDER — HYDROCHLOROTHIAZIDE 25 MG/1
25 TABLET ORAL DAILY
Qty: 90 TABLET | Refills: 1 | Status: SHIPPED | OUTPATIENT
Start: 2018-06-13 | End: 2018-12-06 | Stop reason: SDUPTHER

## 2018-06-13 RX ORDER — LEVOTHYROXINE SODIUM 0.07 MG/1
75 TABLET ORAL DAILY
Qty: 90 TABLET | Refills: 1 | Status: SHIPPED | OUTPATIENT
Start: 2018-06-13 | End: 2018-12-06 | Stop reason: SDUPTHER

## 2018-06-13 RX ORDER — SPIRONOLACTONE 50 MG/1
50 TABLET, FILM COATED ORAL DAILY
Qty: 90 TABLET | Refills: 1 | Status: SHIPPED | OUTPATIENT
Start: 2018-06-13 | End: 2018-12-06 | Stop reason: SDUPTHER

## 2018-06-27 DIAGNOSIS — K59.09 CHRONIC CONSTIPATION: ICD-10-CM

## 2018-06-28 RX ORDER — LINACLOTIDE 145 UG/1
290 CAPSULE, GELATIN COATED ORAL
Qty: 180 CAPSULE | Refills: 1 | Status: SHIPPED | OUTPATIENT
Start: 2018-06-28 | End: 2019-01-22 | Stop reason: SDUPTHER

## 2018-07-01 ENCOUNTER — HOSPITAL ENCOUNTER (OUTPATIENT)
Dept: PHYSICAL THERAPY | Age: 68
Discharge: HOME | End: 2018-07-01
Attending: ORTHOPAEDIC SURGERY | Admitting: ORTHOPAEDIC SURGERY

## 2018-07-19 ENCOUNTER — HOSPITAL ENCOUNTER (OUTPATIENT)
Dept: PHYSICAL THERAPY | Age: 68
Setting detail: THERAPIES SERIES
Discharge: HOME | End: 2018-07-19
Payer: COMMERCIAL

## 2018-07-19 PROCEDURE — 97140 MANUAL THERAPY 1/> REGIONS: CPT | Performed by: PHYSICAL THERAPIST

## 2018-07-19 PROCEDURE — G0283 ELEC STIM OTHER THAN WOUND: HCPCS | Performed by: PHYSICAL THERAPIST

## 2018-07-19 PROCEDURE — 97110 THERAPEUTIC EXERCISES: CPT | Performed by: PHYSICAL THERAPIST

## 2018-07-19 NOTE — FLOWSHEET NOTE
[facility], [address]  Phone: [phone]  Fax [phone]      Physical Therapy Daily Treatment Note  Date:  2018    Patient Name:  Anderson Fishman    :  1950  MRN: [id number]  Restrictions/Precautions:    Medical/Treatment Diagnosis Information:  Diagnosis: L shoulder s/p RTC repair, SAD and bicep tenotomy scx 18 M75.102  Treatment Diagnosis: L shoulder pain B36.759   Insurance/Certification information:  PT Insurance Information: luis m   Physician Information:  Referring Practitioner: Dr. Eusebio Méndez of care signed (Y/N): sent; POC expires 18    Date of Patient follow up with Physician: 6 weeks     G-Code (if applicable):      Date G-Code Applied:  18 quick dash 86%        Progress Note: []  Yes  [x]  No  Next due by: Visit #10      Latex Allergy:  [x]NO      []YES  Preferred Language for Healthcare:   [x]English       []other:    Visit # Insurance Allowable Requires auth    (used 15 already)    [x]no        []yes:     Pain level:  0-6/10 L shoulder     SUBJECTIVE:  Pt returns after 6 wk recovery from surgery  RTC/SAD surgery --. She reports DC the brace last week and just wears it when out. She c/o twinges of pain. OBJECTIVE:   Observation:   Test measurements:  AA flexion 138deg, 55 deg of ER with arm in 50 deg abd    RESTRICTIONS/PRECAUTIONS: R reverse TSR     Exercises/Interventions:   Therapeutic Ex Sets/rep comments   Elbow flex-ext AAROM  10x  Hep    Shoulder shrug  x10  Hep    scap squeeze  5\" x10  Hep    Sitting ER  5\" x10  Hep    Table sldies flex  10\"x10  Scoot back on rolling stool;  Hep     10 min      10x  Hep    AA  Flex., ER in 50 Abd w/ cane     B ceiling punch with cane 2x10                                                      Manual Intervention 15'    Jt mobs, PROM                                   NMR re-education

## 2018-07-23 ENCOUNTER — HOSPITAL ENCOUNTER (OUTPATIENT)
Dept: PHYSICAL THERAPY | Age: 68
Setting detail: THERAPIES SERIES
Discharge: HOME | End: 2018-07-23
Payer: COMMERCIAL

## 2018-07-23 PROCEDURE — 97112 NEUROMUSCULAR REEDUCATION: CPT | Performed by: PHYSICAL THERAPIST

## 2018-07-23 PROCEDURE — G0283 ELEC STIM OTHER THAN WOUND: HCPCS | Performed by: PHYSICAL THERAPIST

## 2018-07-23 PROCEDURE — 97140 MANUAL THERAPY 1/> REGIONS: CPT | Performed by: PHYSICAL THERAPIST

## 2018-07-23 PROCEDURE — 97110 THERAPEUTIC EXERCISES: CPT | Performed by: PHYSICAL THERAPIST

## 2018-07-23 NOTE — FLOWSHEET NOTE
Isometrics 10x                                       Manual Intervention 15'    Jt mobs, PROM                                   NMR re-education     RTC isolation                                            Therapeutic Exercise and NMR EXR  [] (12201) Provided verbal/tactile cueing for activities related to strengthening, flexibility, endurance, ROM  for improvements in scapular, scapulothoracic and UE control with self care, reaching, carrying, lifting, house/yardwork, driving/computer work.    [] (58973) Provided verbal/tactile cueing for activities related to improving balance, coordination, kinesthetic sense, posture, motor skill, proprioception  to assist with  scapular, scapulothoracic and UE control with self care, reaching, carrying, lifting, house/yardwork, driving/computer work. Therapeutic Activities:    [] (41124 or 99938) Provided verbal/tactile cueing for activities related to improving balance, coordination, kinesthetic sense, posture, motor skill, proprioception and motor activation to allow for proper function of scapular, scapulothoracic and UE control with self care, carrying, lifting, driving/computer work.      Home Exercise Program:    [x] (24319) Reviewed/Progressed HEP activities related to strengthening, flexibility, endurance, ROM of scapular, scapulothoracic and UE control with self care, reaching, carrying, lifting, house/yardwork, driving/computer work  [] (42559) Reviewed/Progressed HEP activities related to improving balance, coordination, kinesthetic sense, posture, motor skill, proprioception of scapular, scapulothoracic and UE control with self care, reaching, carrying, lifting, house/yardwork, driving/computer work      Manual Treatments:  PROM / STM / Oscillations-Mobs:  G-I, II, III, IV (PA's, Inf., Post.)  [] (63488) Provided manual therapy to mobilize soft tissue/joints of cervical/CT, scapular GHJ and UE for the purpose of modulating pain, promoting relaxation,  increasing ROM, reducing/eliminating soft tissue swelling/inflammation/restriction, improving soft tissue extensibility and allowing for proper ROM for normal function with self care, reaching, carrying, lifting, house/yardwork, driving/computer work    Modalities:  ES PM with CP to L shoulder for  15 min     Charges:  Timed Code Treatment Minutes: 45   Total Treatment Minutes: 60     [x] EVAL (LOW) 96787 (typically 20 minutes face-to-face)  [] EVAL (MOD) 48418 (typically 30 minutes face-to-face)  [] EVAL (HIGH) 26540 (typically 45 minutes face-to-face)  [] RE-EVAL     [x] KN(85190) x      [] IONTO  [x] NMR (43273) x      [] VASO  [x] Manual (03514) x       [] Other:  [] TA x       [] Mech Traction (32554)  [] ES(attended) (88524)      [x] ES (un) (11577):     GOALS:  Therapist goals for Patient:   Short Term Goals: To be achieved in: 2 weeks  1. Independent in HEP and progression per patient tolerance, in order to prevent re-injury. 2. Patient will have a decrease in pain to facilitate improvement in movement, function, and ADLs as indicated by Functional Deficits. Long Term Goals: To be achieved in: 12 weeks  1. Disability index score of 25% or less for the [facility] to assist with reaching prior level of function. 2. Patient will demonstrate increased AROM to  L shoulder flex to 160°, abd to 160°, IR to 50° and ER to 70° to allow for proper joint functioning as indicated by patients Functional Deficits. 3. Patient will demonstrate an increase in Strength to L shoulder to at least 4/5  to allow for proper functional mobility as indicated by patients Functional Deficits. 4. Patient will return to being able to use L UE for ADL's including dressing, reaching, and lifting  without increased symptoms or restriction. 5. D/c to [facility] for return to pickle ball      Progression Towards Functional goals:  [] Patient is progressing as expected towards functional goals listed.     [] Progression is slowed due to complexities listed. [] Progression has been slowed due to co-morbidities.   [x] Plan just implemented, too soon to assess goals progression  [] Other:     ASSESSMENT:  See eval    Treatment/Activity Tolerance:  [x] Patient tolerated treatment well [] Patient limited by fatique  [] Patient limited by pain  [] Patient limited by other medical complications  [] Other:     Prognosis: [x] Good [] Fair  [] Poor    Patient Requires Follow-up: [x] Yes  [] No    PLAN: See eval; pt to do HEP and call or email if any questions or issues ; pt to return in 6 weeks for PT 2x week per MD orders   [] Continue per plan of care [] Alter current plan (see comments)  [x] Plan of care initiated [] Hold pending MD visit [] Discharge    Electronically signed by: Ventura Thomas, [address]

## 2018-07-25 ENCOUNTER — TELEPHONE (OUTPATIENT)
Dept: FAMILY MEDICINE CLINIC | Age: 68
End: 2018-07-25

## 2018-07-25 DIAGNOSIS — F32.1 MODERATE MAJOR DEPRESSION (HCC): Primary | Chronic | ICD-10-CM

## 2018-07-25 DIAGNOSIS — F60.3 BORDERLINE PERSONALITY DISORDER (HCC): Chronic | ICD-10-CM

## 2018-07-25 NOTE — TELEPHONE ENCOUNTER
Patient is actually going to see Dr. Jaida Blancas after all because [facility] is not on her insurance.

## 2018-07-26 ENCOUNTER — HOSPITAL ENCOUNTER (OUTPATIENT)
Dept: PHYSICAL THERAPY | Age: 68
Setting detail: THERAPIES SERIES
Discharge: HOME | End: 2018-07-26
Payer: COMMERCIAL

## 2018-07-26 PROCEDURE — 97112 NEUROMUSCULAR REEDUCATION: CPT | Performed by: PHYSICAL THERAPIST

## 2018-07-26 PROCEDURE — 97140 MANUAL THERAPY 1/> REGIONS: CPT | Performed by: PHYSICAL THERAPIST

## 2018-07-26 PROCEDURE — G0283 ELEC STIM OTHER THAN WOUND: HCPCS | Performed by: PHYSICAL THERAPIST

## 2018-07-26 PROCEDURE — 97110 THERAPEUTIC EXERCISES: CPT | Performed by: PHYSICAL THERAPIST

## 2018-07-26 NOTE — FLOWSHEET NOTE
Isometrics 5 ways 10x    TB SBS, extension Y/RTB 2x10                                  Manual Intervention 15'    Jt mobs, PROM                                   NMR re-education     RTC isolation                                            Therapeutic Exercise and NMR EXR  [] (06850) Provided verbal/tactile cueing for activities related to strengthening, flexibility, endurance, ROM  for improvements in scapular, scapulothoracic and UE control with self care, reaching, carrying, lifting, house/yardwork, driving/computer work.    [] (92673) Provided verbal/tactile cueing for activities related to improving balance, coordination, kinesthetic sense, posture, motor skill, proprioception  to assist with  scapular, scapulothoracic and UE control with self care, reaching, carrying, lifting, house/yardwork, driving/computer work. Therapeutic Activities:    [] (00559 or 04823) Provided verbal/tactile cueing for activities related to improving balance, coordination, kinesthetic sense, posture, motor skill, proprioception and motor activation to allow for proper function of scapular, scapulothoracic and UE control with self care, carrying, lifting, driving/computer work.      Home Exercise Program:    [x] (36186) Reviewed/Progressed HEP activities related to strengthening, flexibility, endurance, ROM of scapular, scapulothoracic and UE control with self care, reaching, carrying, lifting, house/yardwork, driving/computer work  [] (57784) Reviewed/Progressed HEP activities related to improving balance, coordination, kinesthetic sense, posture, motor skill, proprioception of scapular, scapulothoracic and UE control with self care, reaching, carrying, lifting, house/yardwork, driving/computer work      Manual Treatments:  PROM / STM / Oscillations-Mobs:  G-I, II, III, IV (PA's, Inf., Post.)  [x] (53952) Provided manual therapy to mobilize soft tissue/joints of cervical/CT, scapular GHJ and UE for the purpose of modulating pain, promoting relaxation,  increasing ROM, reducing/eliminating soft tissue swelling/inflammation/restriction, improving soft tissue extensibility and allowing for proper ROM for normal function with self care, reaching, carrying, lifting, house/yardwork, driving/computer work    Modalities:  ES PM with CP to L shoulder for  15 min     Charges:  Timed Code Treatment Minutes: 45   Total Treatment Minutes: 60     [] EVAL (LOW) 89458 (typically 20 minutes face-to-face)  [] EVAL (MOD) 95291 (typically 30 minutes face-to-face)  [] EVAL (HIGH) 12992 (typically 45 minutes face-to-face)  [] RE-EVAL     [x] WG(03035) x      [] IONTO  [x] NMR (92317) x      [] VASO  [x] Manual (30905) x       [] Other:  [] TA x       [] Mech Traction (45838)  [] ES(attended) (06122)      [x] ES (un) (81323):     GOALS:  Therapist goals for Patient:   Short Term Goals: To be achieved in: 2 weeks  1. Independent in HEP and progression per patient tolerance, in order to prevent re-injury. 2. Patient will have a decrease in pain to facilitate improvement in movement, function, and ADLs as indicated by Functional Deficits. Long Term Goals: To be achieved in: 12 weeks  1. Disability index score of 25% or less for the [facility] to assist with reaching prior level of function. 2. Patient will demonstrate increased AROM to  L shoulder flex to 160°, abd to 160°, IR to 50° and ER to 70° to allow for proper joint functioning as indicated by patients Functional Deficits. 3. Patient will demonstrate an increase in Strength to L shoulder to at least 4/5  to allow for proper functional mobility as indicated by patients Functional Deficits. 4. Patient will return to being able to use L UE for ADL's including dressing, reaching, and lifting  without increased symptoms or restriction. 5. D/c to [facility] for return to pickle ball      Progression Towards Functional goals:  [x] Patient is progressing as expected towards functional goals listed.     [] Progression is slowed due to complexities listed. [] Progression has been slowed due to co-morbidities.   [] Plan just implemented, too soon to assess goals progression  [] Other:     ASSESSMENT:  See eval    Treatment/Activity Tolerance:  [x] Patient tolerated treatment well [] Patient limited by fatique  [] Patient limited by pain  [] Patient limited by other medical complications  [] Other:     Prognosis: [x] Good [] Fair  [] Poor    Patient Requires Follow-up: [x] Yes  [] No    PLAN: See eval; pt to do HEP and call or email if any questions or issues ; pt to return in 6 weeks for PT 2x week per MD orders   [x] Continue per plan of care [] Alter current plan (see comments)  [] Plan of care initiated [] Hold pending MD visit [] Discharge    Electronically signed by: Catarino Flynn, [address]

## 2018-07-30 ENCOUNTER — HOSPITAL ENCOUNTER (OUTPATIENT)
Dept: PHYSICAL THERAPY | Age: 68
Setting detail: THERAPIES SERIES
Discharge: HOME | End: 2018-07-30
Payer: COMMERCIAL

## 2018-07-30 PROCEDURE — 97110 THERAPEUTIC EXERCISES: CPT | Performed by: PHYSICAL THERAPIST

## 2018-07-30 PROCEDURE — 97140 MANUAL THERAPY 1/> REGIONS: CPT | Performed by: PHYSICAL THERAPIST

## 2018-07-30 PROCEDURE — G0283 ELEC STIM OTHER THAN WOUND: HCPCS | Performed by: PHYSICAL THERAPIST

## 2018-07-30 NOTE — FLOWSHEET NOTE
Intervention     Jt mobs, PROM 15'                                   NMR re-education                                                 Therapeutic Exercise and NMR EXR  [x] (65774) Provided verbal/tactile cueing for activities related to strengthening, flexibility, endurance, ROM  for improvements in scapular, scapulothoracic and UE control with self care, reaching, carrying, lifting, house/yardwork, driving/computer work.    [] (37066) Provided verbal/tactile cueing for activities related to improving balance, coordination, kinesthetic sense, posture, motor skill, proprioception  to assist with  scapular, scapulothoracic and UE control with self care, reaching, carrying, lifting, house/yardwork, driving/computer work. Therapeutic Activities:    [] (05488 or 56007) Provided verbal/tactile cueing for activities related to improving balance, coordination, kinesthetic sense, posture, motor skill, proprioception and motor activation to allow for proper function of scapular, scapulothoracic and UE control with self care, carrying, lifting, driving/computer work.      Home Exercise Program:    [x] (26591) Reviewed/Progressed HEP activities related to strengthening, flexibility, endurance, ROM of scapular, scapulothoracic and UE control with self care, reaching, carrying, lifting, house/yardwork, driving/computer work  [] (42542) Reviewed/Progressed HEP activities related to improving balance, coordination, kinesthetic sense, posture, motor skill, proprioception of scapular, scapulothoracic and UE control with self care, reaching, carrying, lifting, house/yardwork, driving/computer work      Manual Treatments:  PROM / STM / Oscillations-Mobs:  G-I, II, III, IV (PA's, Inf., Post.)  [x] (85709) Provided manual therapy to mobilize soft tissue/joints of cervical/CT, scapular GHJ and UE for the purpose of modulating pain, promoting relaxation,  increasing ROM, reducing/eliminating soft tissue swelling/inflammation/restriction,

## 2018-07-31 DIAGNOSIS — G47.00 INSOMNIA, UNSPECIFIED TYPE: ICD-10-CM

## 2018-08-01 RX ORDER — OMEPRAZOLE MAGNESIUM 20 MG
3 CAPSULE,DELAYED RELEASE (ENTERIC COATED) ORAL NIGHTLY
Qty: 90 TABLET | Refills: 1 | Status: SHIPPED | OUTPATIENT
Start: 2018-08-01 | End: 2019-01-22 | Stop reason: SDUPTHER

## 2018-08-01 NOTE — TELEPHONE ENCOUNTER
Last ov 06/05/2018   Future Appointments  Date Time Provider Anastasiia Fuentesi   8/2/2018 9:00 AM Erleen Clamp, PT [facility] AT [place] AND PT None   8/6/2018 10:00 AM Erleen Clamp, PT [facility] AT [place] AND PT None   8/8/2018 9:30 AM Erleen Clamp, PT [facility] AND PT None   8/13/2018 10:30 AM Sonya Collins, PT [facility] AND PT None   8/16/2018 9:00 AM Erleen Clamp, PT [facility] AND PT None   8/20/2018 9:00 AM Erleen Clamp, PT [facility] AND PT None   8/23/2018 9:00 AM Erleen Clamp, PT [facility] AND PT None   8/27/2018 9:00 AM Erleen Clamp, PT [facility] AND PT None   8/30/2018 9:00 AM Erleen Clamp, PT [facility] AND PT None   9/17/2018 9:30 AM MD German Yuan [facility]

## 2018-08-02 ENCOUNTER — HOSPITAL ENCOUNTER (OUTPATIENT)
Dept: PHYSICAL THERAPY | Age: 68
Setting detail: THERAPIES SERIES
Discharge: HOME | End: 2018-08-02
Payer: COMMERCIAL

## 2018-08-02 PROCEDURE — 97140 MANUAL THERAPY 1/> REGIONS: CPT | Performed by: PHYSICAL THERAPIST

## 2018-08-02 PROCEDURE — 97110 THERAPEUTIC EXERCISES: CPT | Performed by: PHYSICAL THERAPIST

## 2018-08-02 PROCEDURE — G0283 ELEC STIM OTHER THAN WOUND: HCPCS | Performed by: PHYSICAL THERAPIST

## 2018-08-02 NOTE — FLOWSHEET NOTE
reducing/eliminating soft tissue swelling/inflammation/restriction, improving soft tissue extensibility and allowing for proper ROM for normal function with self care, reaching, carrying, lifting, house/yardwork, driving/computer work    Modalities:  ES PM with CP to L shoulder for  15 min     Charges:  Timed Code Treatment Minutes: 45   Total Treatment Minutes: 60     [] EVAL (LOW) 54241 (typically 20 minutes face-to-face)  [] EVAL (MOD) 68495 (typically 30 minutes face-to-face)  [] EVAL (HIGH) 25896 (typically 45 minutes face-to-face)  [] RE-EVAL     [x] ZU(20751) x  2   [] IONTO  [] NMR (59596) x      [] VASO  [x] Manual (59356) x  1    [] Other:  [] TA x       [] Mech Traction (58619)  [] ES(attended) (80933)      [x] ES (un) (56378):     GOALS:  Therapist goals for Patient:   Short Term Goals: To be achieved in: 2 weeks  1. Independent in HEP and progression per patient tolerance, in order to prevent re-injury. 2. Patient will have a decrease in pain to facilitate improvement in movement, function, and ADLs as indicated by Functional Deficits. Long Term Goals: To be achieved in: 12 weeks  1. Disability index score of 25% or less for the [facility] to assist with reaching prior level of function. 2. Patient will demonstrate increased AROM to  L shoulder flex to 160°, abd to 160°, IR to 50° and ER to 70° to allow for proper joint functioning as indicated by patients Functional Deficits. 3. Patient will demonstrate an increase in Strength to L shoulder to at least 4/5  to allow for proper functional mobility as indicated by patients Functional Deficits. 4. Patient will return to being able to use L UE for ADL's including dressing, reaching, and lifting  without increased symptoms or restriction. 5. D/c to [place] for return to pickle ball      Progression Towards Functional goals:  [x] Patient is progressing as expected towards functional goals listed.     [] Progression is slowed due to complexities

## 2018-08-06 ENCOUNTER — HOSPITAL ENCOUNTER (OUTPATIENT)
Dept: PHYSICAL THERAPY | Age: 68
Setting detail: THERAPIES SERIES
Discharge: HOME | End: 2018-08-06
Payer: COMMERCIAL

## 2018-08-06 PROCEDURE — G0283 ELEC STIM OTHER THAN WOUND: HCPCS | Performed by: PHYSICAL THERAPIST

## 2018-08-06 PROCEDURE — 97140 MANUAL THERAPY 1/> REGIONS: CPT | Performed by: PHYSICAL THERAPIST

## 2018-08-06 PROCEDURE — 97110 THERAPEUTIC EXERCISES: CPT | Performed by: PHYSICAL THERAPIST

## 2018-08-06 NOTE — FLOWSHEET NOTE
assess goals progression  [] Other:     ASSESSMENT: tight L shoulder beginning of treatment, improved with stretches and MT.  Pt very weak with shoulder ER     Treatment/Activity Tolerance:  [x] Patient tolerated treatment well [] Patient limited by fatique  [] Patient limited by pain  [] Patient limited by other medical complications  [] Other:     Prognosis: [x] Good [] Fair  [] Poor    Patient Requires Follow-up: [x] Yes  [] No    PLAN:   [x] Continue per plan of care [] Alter current plan (see comments)  [] Plan of care initiated [] Hold pending MD visit [] Discharge    Electronically signed by: [place] [id number]

## 2018-08-08 ENCOUNTER — HOSPITAL ENCOUNTER (OUTPATIENT)
Dept: PHYSICAL THERAPY | Age: 68
Setting detail: THERAPIES SERIES
Discharge: HOME | End: 2018-08-08
Payer: COMMERCIAL

## 2018-08-08 PROCEDURE — 97140 MANUAL THERAPY 1/> REGIONS: CPT | Performed by: PHYSICAL THERAPIST

## 2018-08-08 PROCEDURE — G0283 ELEC STIM OTHER THAN WOUND: HCPCS | Performed by: PHYSICAL THERAPIST

## 2018-08-08 PROCEDURE — 97110 THERAPEUTIC EXERCISES: CPT | Performed by: PHYSICAL THERAPIST

## 2018-08-08 NOTE — FLOWSHEET NOTE
pain, promoting relaxation,  increasing ROM, reducing/eliminating soft tissue swelling/inflammation/restriction, improving soft tissue extensibility and allowing for proper ROM for normal function with self care, reaching, carrying, lifting, house/yardwork, driving/computer work    Modalities:  ES PM with CP to L shoulder for  15 min     Charges:  Timed Code Treatment Minutes: 45   Total Treatment Minutes: 60     [] EVAL (LOW) 20038 (typically 20 minutes face-to-face)  [] EVAL (MOD) 57307 (typically 30 minutes face-to-face)  [] EVAL (HIGH) 66950 (typically 45 minutes face-to-face)  [] RE-EVAL     [x] SZ(01015) x  2   [] IONTO  [] NMR (51852) x      [] VASO  [x] Manual (89883) x  1    [] Other:  [] TA x       [] Mech Traction (53478)  [] ES(attended) (60842)      [x] ES (un) (66608):     GOALS:  Therapist goals for Patient:   Short Term Goals: To be achieved in: 2 weeks  1. Independent in HEP and progression per patient tolerance, in order to prevent re-injury. Met   2. Patient will have a decrease in pain to facilitate improvement in movement, function, and ADLs as indicated by Functional Deficits. met     Long Term Goals: To be achieved in: 12 weeks  1. Disability index score of 25% or less for the [facility] to assist with reaching prior level of function. 2. Patient will demonstrate increased AROM to  L shoulder flex to 160°, abd to 160°, IR to 50° and ER to 70° to allow for proper joint functioning as indicated by patients Functional Deficits improving . 3. Patient will demonstrate an increase in Strength to L shoulder to at least 4/5  to allow for proper functional mobility as indicated by patients Functional Deficits. 4. Patient will return to being able to use L UE for ADL's including dressing, reaching, and lifting  without increased symptoms or restriction. Improving   5.  D/c to [facility] [place] for return to pickle ball      Progression Towards Functional goals:  [x] Patient is progressing as expected towards functional goals listed. [] Progression is slowed due to complexities listed. [] Progression has been slowed due to co-morbidities. [] Plan just implemented, too soon to assess goals progression  [] Other:     ASSESSMENT: decreased discomfort with shoulder PROM today, increasing mobility.      Treatment/Activity Tolerance:  [x] Patient tolerated treatment well [] Patient limited by fatique  [] Patient limited by pain  [] Patient limited by other medical complications  [] Other:     Prognosis: [x] Good [] Fair  [] Poor    Patient Requires Follow-up: [x] Yes  [] No    PLAN:   [x] Continue per plan of care [] Alter current plan (see comments)  [] Plan of care initiated [] Hold pending MD visit [] Discharge    Electronically signed by: [place] [id number]

## 2018-08-13 ENCOUNTER — HOSPITAL ENCOUNTER (OUTPATIENT)
Dept: PHYSICAL THERAPY | Age: 68
Setting detail: THERAPIES SERIES
Discharge: HOME | End: 2018-08-13
Payer: COMMERCIAL

## 2018-08-13 PROCEDURE — G0283 ELEC STIM OTHER THAN WOUND: HCPCS

## 2018-08-13 PROCEDURE — 97110 THERAPEUTIC EXERCISES: CPT

## 2018-08-13 PROCEDURE — 97140 MANUAL THERAPY 1/> REGIONS: CPT

## 2018-08-13 NOTE — FLOWSHEET NOTE
[facility], [address] [place]  Phone: [phone]  Fax [phone]      Physical Therapy Daily Treatment Note  Date:  2018    Patient Name:  Earl Snell    :  1950  MRN: [id number]  Restrictions/Precautions:    Medical/Treatment Diagnosis Information:  Diagnosis: L shoulder s/p RTC repair, SAD and bicep tenotomy scx 18 M75.102  Treatment Diagnosis: L shoulder pain B77.024   Insurance/Certification information:  PT Insurance Information: luis m   Physician Information:  Referring Practitioner: Dr. Kenzie Combs of care signed (Y/N): sent; POC expires 18    Date of Patient follow up with Physician: September     G-Code (if applicable):      Date G-Code Applied:  18 quick dash 86%        Progress Note: []  Yes  [x]  No  Next due by: Visit #10      Latex Allergy:  [x]NO      []YES  Preferred Language for Healthcare:   [x]English       []other:    Visit #20 Insurance Allowable Requires auth    (used 15 already)    [x]no        []yes:     Pain level: 1/10 L shoulder (ant)    SUBJECTIVE: Pt states that she did not do her HEP over the weekend. OBJECTIVE: 10th Visit Progress Note NV.  10.5wks post op  Observation: Pt enters clinic with 85° active shoulder flexion and scapular hiking on the left. Significant improvement in active left shoulder flexion ROM and decrease pain when patient receives manual cuing @ left scapula. Test measurements:AROM by end of  w/ mild scap hike. PROM 153°. Stressed importance of increasing compliance with HEP with patient.   (Previous:  L shoulder flex AROM 125°)    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    Exercises/Interventions:   Therapeutic Ex Sets/rep comments   Arm bike  5 min warm up   Pulley flex  5\" 15     Pulley IR 5\" 10    Wall Wipes Y's 0# 2x10  1\" 2x10 MC @ scap   Shld flexion dips in Trustretch H10x5    Plank Mat's Edge Scapular Reviewed/Progressed HEP activities related to improving balance, coordination, kinesthetic sense, posture, motor skill, proprioception of scapular, scapulothoracic and UE control with self care, reaching, carrying, lifting, house/yardwork, driving/computer work      Manual Treatments:  PROM / STM / Oscillations-Mobs:  G-I, II, III, IV (PA's, Inf., Post.)  [x] (35670) Provided manual therapy to mobilize soft tissue/joints of cervical/CT, scapular GHJ and UE for the purpose of modulating pain, promoting relaxation,  increasing ROM, reducing/eliminating soft tissue swelling/inflammation/restriction, improving soft tissue extensibility and allowing for proper ROM for normal function with self care, reaching, carrying, lifting, house/yardwork, driving/computer work    Modalities:  ES PM with CP to L shoulder for  15 min     Charges:  Timed Code Treatment Minutes: 50   Total Treatment Minutes: 65     [] EVAL (LOW) 29358 (typically 20 minutes face-to-face)  [] EVAL (MOD) 98376 (typically 30 minutes face-to-face)  [] EVAL (HIGH) 76742 (typically 45 minutes face-to-face)  [] RE-EVAL     [x] HI(22815) x  2   [] IONTO  [] NMR (14272) x      [] VASO  [x] Manual (26185) x  1    [] Other:  [] TA x       [] Mech Traction (44026)  [] ES(attended) (32102)      [x] ES (un) (40986):     GOALS:  Therapist goals for Patient:   Short Term Goals: To be achieved in: 2 weeks  1. Independent in HEP and progression per patient tolerance, in order to prevent re-injury. Met   2. Patient will have a decrease in pain to facilitate improvement in movement, function, and ADLs as indicated by Functional Deficits. met     Long Term Goals: To be achieved in: 12 weeks  1. Disability index score of 25% or less for the [facility] to assist with reaching prior level of function.    2. Patient will demonstrate increased AROM to  L shoulder flex to 160°, abd to 160°, IR to 50° and ER to 70° to allow for proper joint functioning as indicated by patients

## 2018-08-16 ENCOUNTER — HOSPITAL ENCOUNTER (OUTPATIENT)
Dept: PHYSICAL THERAPY | Age: 68
Setting detail: THERAPIES SERIES
Discharge: HOME | End: 2018-08-16
Payer: COMMERCIAL

## 2018-08-16 PROCEDURE — 97140 MANUAL THERAPY 1/> REGIONS: CPT | Performed by: PHYSICAL THERAPIST

## 2018-08-16 PROCEDURE — G8987 SELF CARE CURRENT STATUS: HCPCS | Performed by: PHYSICAL THERAPIST

## 2018-08-16 PROCEDURE — G0283 ELEC STIM OTHER THAN WOUND: HCPCS | Performed by: PHYSICAL THERAPIST

## 2018-08-16 PROCEDURE — G8988 SELF CARE GOAL STATUS: HCPCS | Performed by: PHYSICAL THERAPIST

## 2018-08-16 PROCEDURE — 97110 THERAPEUTIC EXERCISES: CPT | Performed by: PHYSICAL THERAPIST

## 2018-08-16 NOTE — FLOWSHEET NOTE
balance, coordination, kinesthetic sense, posture, motor skill, proprioception of scapular, scapulothoracic and UE control with self care, reaching, carrying, lifting, house/yardwork, driving/computer work      Manual Treatments:  PROM / STM / Oscillations-Mobs:  G-I, II, III, IV (PA's, Inf., Post.)  [x] (19314) Provided manual therapy to mobilize soft tissue/joints of cervical/CT, scapular GHJ and UE for the purpose of modulating pain, promoting relaxation,  increasing ROM, reducing/eliminating soft tissue swelling/inflammation/restriction, improving soft tissue extensibility and allowing for proper ROM for normal function with self care, reaching, carrying, lifting, house/yardwork, driving/computer work    Modalities:  ES PM with CP to L shoulder for  15 min     Charges:  Timed Code Treatment Minutes: 50   Total Treatment Minutes: 65     [] EVAL (LOW) 18921 (typically 20 minutes face-to-face)  [] EVAL (MOD) 25068 (typically 30 minutes face-to-face)  [] EVAL (HIGH) 82158 (typically 45 minutes face-to-face)  [] RE-EVAL     [x] DR(67736) x  2   [] IONTO  [] NMR (91461) x      [] VASO  [x] Manual (04187) x  1    [] Other:  [] TA x       [] Mech Traction (32072)  [] ES(attended) (43342)      [x] ES (un) (12251):     GOALS:  Therapist goals for Patient:   Short Term Goals: To be achieved in: 2 weeks  1. Independent in HEP and progression per patient tolerance, in order to prevent re-injury. Met   2. Patient will have a decrease in pain to facilitate improvement in movement, function, and ADLs as indicated by Functional Deficits. met     Long Term Goals: To be achieved in: 12 weeks  1. Disability index score of 25% or less for the [facility] to assist with reaching prior level of function. 2. Patient will demonstrate increased AROM to  L shoulder flex to 160°, abd to 160°, IR to 50° and ER to 70° to allow for proper joint functioning as indicated by patients Functional Deficits improving . 3.  Patient will demonstrate an increase in Strength to L shoulder to at least 4/5  to allow for proper functional mobility as indicated by patients Functional Deficits. 4. Patient will return to being able to use L UE for ADL's including dressing, reaching, and lifting  without increased symptoms or restriction. Improving   5. D/c to [facility] for return to pickle ball      Progression Towards Functional goals:  [x] Patient is progressing as expected towards functional goals listed. [] Progression is slowed due to complexities listed. [] Progression has been slowed due to co-morbidities.   [] Plan just implemented, too soon to assess goals progression  [] Other:     ASSESSMENT:see 10 th visit note     Treatment/Activity Tolerance:  [x] Patient tolerated treatment well [] Patient limited by fatique  [] Patient limited by pain  [] Patient limited by other medical complications  [] Other:     Prognosis: [x] Good [] Fair  [] Poor    Patient Requires Follow-up: [x] Yes  [] No    PLAN:   [x] Continue per plan of care [] Alter current plan (see comments)  [] Plan of care initiated [] Hold pending MD visit [] Discharge    Electron[place]: [place]  [id number]

## 2018-08-16 NOTE — PLAN OF CARE
[facility], [address]     Physical Therapy 10th Visit Progress Note    Date: 2018        Patient Name:  Chelsey Mauricio    :  1950  MRN: [id number]  Referring Physician: Dr. Geraldine Friedman   Diagnosis:      L shoulder s/p RTC repair, SAD and bicep tenotomy scx 18                 ICD Code: M75.102  Therapy Diagnosis/Practice Pattern: 4I       Total number of visits: 10   Reporting Period:   Beginning Date:18   End Date:18     OBJECTIVE  Test used Initial score Current Score   Pain Summary  3-4/10  0/10    Functional questionnaire Quick dash  86% 34%   Functional Testing            ROM Shoulder flex   abd  ° with shrug   68°    IR  ER    0° at 20° abd  T12  C7   Strength Shoulder flex   abd   3-  2+    IR  ER   4-  2+/3-        Functional Limitation G-Code (if applicable):         PT G-Codes  Functional Assessment Tool Used: quick dash   Score: 34%  Functional Limitation: Self care  Self Care Current Status (): At least 20 percent but less than 40 percent impaired, limited or restricted  Self Care Goal Status (): At least 20 percent but less than 40 percent impaired, limited or restricted   Test/tests used to determine % limitation:  Actual Score used to drive % limitation:    Treatment to date:  [x] Therapeutic Exercise    [x] Modalities:  [] Therapeutic Activity             []Ultrasound            [x]Electrical Stimulation  [] Gait Training     []Cervical Traction    [] Lumbar Traction  [x] Neuromuscular Re-education [x] Cold/hotpack         []Iontophoresis  [x] Instruction in HEP      Other:  [x] Manual Therapy                   []                       ?           []  Assessment:  [x] Improvement noted relative to goals:improving shoulder ROM but still very weak L shoulder.  Able to wash hair but still has difficulty with reaching overhead   [] No Improvement noted related to

## 2018-08-20 ENCOUNTER — APPOINTMENT (OUTPATIENT)
Dept: PHYSICAL THERAPY | Age: 68
End: 2018-08-20
Payer: COMMERCIAL

## 2018-08-20 ENCOUNTER — HOSPITAL ENCOUNTER (OUTPATIENT)
Dept: PHYSICAL THERAPY | Age: 68
Setting detail: THERAPIES SERIES
Discharge: HOME | End: 2018-08-20
Payer: COMMERCIAL

## 2018-08-20 ENCOUNTER — TELEPHONE (OUTPATIENT)
Dept: FAMILY MEDICINE CLINIC | Age: 68
End: 2018-08-20

## 2018-08-20 DIAGNOSIS — N63.10 BREAST MASS, RIGHT: Primary | ICD-10-CM

## 2018-08-20 DIAGNOSIS — R92.8 OTHER ABNORMAL AND INCONCLUSIVE FINDINGS ON DIAGNOSTIC IMAGING OF BREAST: ICD-10-CM

## 2018-08-20 PROCEDURE — 97112 NEUROMUSCULAR REEDUCATION: CPT

## 2018-08-20 PROCEDURE — 97110 THERAPEUTIC EXERCISES: CPT

## 2018-08-20 PROCEDURE — 97140 MANUAL THERAPY 1/> REGIONS: CPT

## 2018-08-20 PROCEDURE — G0283 ELEC STIM OTHER THAN WOUND: HCPCS

## 2018-08-20 NOTE — FLOWSHEET NOTE
[facility], [address] [place]  Phone: [phone]  Fax [phone]      Physical Therapy Daily Treatment Note  Date:  2018    Patient Name:  Meg Bates    :  1950  MRN: [id number]  Restrictions/Precautions:    Medical/Treatment Diagnosis Information:  Diagnosis: L shoulder s/p RTC repair, SAD and bicep tenotomy scx 18 M75.102  Treatment Diagnosis: L shoulder pain J85.456   Insurance/Certification information:  PT Insurance Information: luis m   Physician Information:  Referring Practitioner: Dr. Emily Stroud of care signed (Y/N): sent; POC expires 18    Date of Patient follow up with Physician: September     G-Code (if applicable):      Date G-Code Applied:  18 quick dash 34%        Progress Note: []  Yes  [x]  No  Next due by: Visit #10      Latex Allergy:  [x]NO      []YES  Preferred Language for Healthcare:   [x]English       []other:    Visit #20 Insurance Allowable Requires auth   11 30 (used 15 already)    [x]no        []yes:     Pain level: 2-3 /10 L shoulder     SUBJECTIVE:  Pt states that she will be moving in the near future. OBJECTIVE:  11.5 wks post op  Pt cautioned about using left UE to lift more than 5# at waist level. Observation: Mild left shld crepitus above 90° w/ Y's  Initiated manually resisted PNF diagonal patterns to facilitate good left shoulder kinesis above 90°. Progressed HEP to add RTC TB strengthening @ 0. Pt given new handout after good technique demonstrated with all new TE.   Test measurements:      RESTRICTIONS/PRECAUTIONS: R reverse TSR -18    Exercises/Interventions:   Therapeutic Ex Sets/rep comments   Arm bike  5 min warm up   Pulley flex  5\" 15     Pulley IR 5\" 10    Wall Wipes Y's 1# 4x5 MC @ scap   Prone SB in chair T's 2# 2x10    Prone SB in chair Y's 0# 2x10    Standing TB ER RTB 2x10 +hep 8-20   Standing Extension GTB 2x10 +hep 8-20 Staniding IR GTB 2x10 +hep 8-20   Standing TB SA Punch in stride BTB 3x10    Wall ER stretch  10\"x6     Trustretch  bicep stretch  15\"x5     Supine SP  2x10   3#   SL shoulder ER  1# 2x10  Hep    Supine rythmic stab       Ball on wall   Circles 30x3 CW and CCW     See ATC sheet   Started 8/8/18    Prone shoulder abd  0# 2x6  Challenging, with asst    Prone MTrow, lats  2# 3x10 ea     SB wall push ups  3x10     Pulley flex and scaption with weight  2# 3x10 ea          Manual Intervention     Jt mobs, PROM 15'                                   NMR re-education     Supine PNF man resist D1 3x5    Supine PNF man resist D2 3x5                                      Therapeutic Exercise and NMR EXR  [x] (34751) Provided verbal/tactile cueing for activities related to strengthening, flexibility, endurance, ROM  for improvements in scapular, scapulothoracic and UE control with self care, reaching, carrying, lifting, house/yardwork, driving/computer work.    [] (58498) Provided verbal/tactile cueing for activities related to improving balance, coordination, kinesthetic sense, posture, motor skill, proprioception  to assist with  scapular, scapulothoracic and UE control with self care, reaching, carrying, lifting, house/yardwork, driving/computer work. Therapeutic Activities:    [] (88730 or 83390) Provided verbal/tactile cueing for activities related to improving balance, coordination, kinesthetic sense, posture, motor skill, proprioception and motor activation to allow for proper function of scapular, scapulothoracic and UE control with self care, carrying, lifting, driving/computer work.      Home Exercise Program:    [x] (73029) Reviewed/Progressed HEP activities related to strengthening, flexibility, endurance, ROM of scapular, scapulothoracic and UE control with self care, reaching, carrying, lifting, house/yardwork, driving/computer work  [] (92190) Reviewed/Progressed HEP activities related to improving balance, coordination, kinesthetic sense, posture, motor skill, proprioception of scapular, scapulothoracic and UE control with self care, reaching, carrying, lifting, house/yardwork, driving/computer work      Manual Treatments:  PROM / STM / Oscillations-Mobs:  G-I, II, III, IV (PA's, Inf., Post.)  [x] (65040) Provided manual therapy to mobilize soft tissue/joints of cervical/CT, scapular GHJ and UE for the purpose of modulating pain, promoting relaxation,  increasing ROM, reducing/eliminating soft tissue swelling/inflammation/restriction, improving soft tissue extensibility and allowing for proper ROM for normal function with self care, reaching, carrying, lifting, house/yardwork, driving/computer work    Modalities:  ES PM with CP to L shoulder for  15 min     Charges:  Timed Code Treatment Minutes: 65   Total Treatment Minutes: 80     [] EVAL (LOW) 50540 (typically 20 minutes face-to-face)  [] EVAL (MOD) 81755 (typically 30 minutes face-to-face)  [] EVAL (HIGH) 41644 (typically 45 minutes face-to-face)  [] RE-EVAL     [x] MP(73725) x  2   [] IONTO  [x] NMR (70584) x  1   [] VASO  [x] Manual (24256) x  1    [] Other:  [] TA x       [] Mech Traction (37066)  [] ES(attended) (90579)      [x] ES (un) (38594):     GOALS:  Therapist goals for Patient:   Short Term Goals: To be achieved in: 2 weeks  1. Independent in HEP and progression per patient tolerance, in order to prevent re-injury. Met   2. Patient will have a decrease in pain to facilitate improvement in movement, function, and ADLs as indicated by Functional Deficits. met     Long Term Goals: To be achieved in: 12 weeks  1. Disability index score of 25% or less for the [facility] to assist with reaching prior level of function. 2. Patient will demonstrate increased AROM to  L shoulder flex to 160°, abd to 160°, IR to 50° and ER to 70° to allow for proper joint functioning as indicated by patients Functional Deficits improving . 3.  Patient will demonstrate an

## 2018-08-20 NOTE — FLOWSHEET NOTE
2x10 15# 3x10 15# 3x10                              PNF D2   (down) 5# 2x10 R/L    Modality ES/CP 15' ES/CP 15'    Initials JLW DB EP   Time spent with PT assistant

## 2018-08-21 ENCOUNTER — HOSPITAL ENCOUNTER (OUTPATIENT)
Dept: WOMENS IMAGING | Age: 68
Discharge: HOME | End: 2018-08-21
Payer: COMMERCIAL

## 2018-08-21 ENCOUNTER — TELEPHONE (OUTPATIENT)
Dept: FAMILY MEDICINE CLINIC | Age: 68
End: 2018-08-21

## 2018-08-21 DIAGNOSIS — R92.8 ABNORMAL MAMMOGRAM: ICD-10-CM

## 2018-08-21 DIAGNOSIS — R92.8 ABNORMAL MAMMOGRAM: Primary | ICD-10-CM

## 2018-08-21 DIAGNOSIS — N63.10 LUMP OF RIGHT BREAST: ICD-10-CM

## 2018-08-21 PROCEDURE — G0279 TOMOSYNTHESIS, MAMMO: HCPCS

## 2018-08-23 ENCOUNTER — HOSPITAL ENCOUNTER (OUTPATIENT)
Dept: PHYSICAL THERAPY | Age: 68
Setting detail: THERAPIES SERIES
Discharge: HOME | End: 2018-08-23
Payer: COMMERCIAL

## 2018-08-23 PROCEDURE — G0283 ELEC STIM OTHER THAN WOUND: HCPCS | Performed by: PHYSICAL THERAPIST

## 2018-08-23 PROCEDURE — 97112 NEUROMUSCULAR REEDUCATION: CPT | Performed by: PHYSICAL THERAPIST

## 2018-08-23 PROCEDURE — 97110 THERAPEUTIC EXERCISES: CPT | Performed by: PHYSICAL THERAPIST

## 2018-08-23 PROCEDURE — 97140 MANUAL THERAPY 1/> REGIONS: CPT | Performed by: PHYSICAL THERAPIST

## 2018-08-23 NOTE — FLOWSHEET NOTE
[facility], [address] [place]  Phone: [phone]  Fax [phone]      Physical Therapy Daily Treatment Note  Date:  2018    Patient Name:  Alexsandra Deleon    :  1950  MRN: [id number]  Restrictions/Precautions:    Medical/Treatment Diagnosis Information:  Diagnosis: L shoulder s/p RTC repair, SAD and bicep tenotomy scx 18 M75.102  Treatment Diagnosis: L shoulder pain F42.077   Insurance/Certification information:  PT Insurance Information: luis m   Physician Information:  Referring Practitioner: Dr. Natalee Freed of care signed (Y/N): sent; POC expires 18    Date of Patient follow up with Physician: September     G-Code (if applicable):      Date G-Code Applied:  18 quick dash 34%        Progress Note: []  Yes  [x]  No  Next due by: Visit #10      Latex Allergy:  [x]NO      []YES  Preferred Language for Healthcare:   [x]English       []other:    Visit #20 Insurance Allowable Requires auth    (used 15 already)    [x]no        []yes:     Pain level: 0 /10 L shoulder     SUBJECTIVE:  Pt reports her shoulder feels a little stronger    OBJECTIVE:  12 wks post op  Pt cautioned about using left UE to lift more than 5# at waist level.   Observation:   Test measurements: L shoulder flex AROM 138° decreased UT shrug       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    Exercises/Interventions:   Therapeutic Ex Sets/rep comments   Arm bike  5 min warm up   Pulley flex  5\" 15     Pulley IR 5\" 10    Wall Wipes Y's 1# 4x5 MC @ scap   Prone SB in chair T's 2# 2x10    Prone SB in chair Y's 0# 2x10    Standing TB ER RTB 2x10 +hep 8-20   Standing Extension GTB 2x10 +hep 8-20   Staniding IR GTB 2x10 +hep 8-20   Standing TB SA Punch in stride BTB 3x10    Wall ER stretch  10\"x6     Trustretch  bicep stretch  15\"x5     SL shoulder abd rhythmic stab at 30° 3x20\"     SL shoulder ER  1# 3x15  Hep    Supine shoulder flex to 90 circles   3# 3x10 CW and CCW     Ball on wall   Circles 30x3 CW and CCW     See ATC sheet   Started 8/8/18    Prone shoulder abd  0# 2x6  Challenging, with asst    Supine scap retraction with TB with shoulder flex to 90° 5\" 3x5 OTB     SB wall push ups  3x10     Standing shoulder flex and scaption AROM      Ball on wall crawls  4# 2x5     Manual Intervention     Jt mobs, PROM 15'                                   NMR re-education     Supine PNF man resist D1 3x5    Supine PNF man resist D2 3x5                                      Therapeutic Exercise and NMR EXR  [x] (80037) Provided verbal/tactile cueing for activities related to strengthening, flexibility, endurance, ROM  for improvements in scapular, scapulothoracic and UE control with self care, reaching, carrying, lifting, house/yardwork, driving/computer work.    [] (12112) Provided verbal/tactile cueing for activities related to improving balance, coordination, kinesthetic sense, posture, motor skill, proprioception  to assist with  scapular, scapulothoracic and UE control with self care, reaching, carrying, lifting, house/yardwork, driving/computer work. Therapeutic Activities:    [] (46315 or 58988) Provided verbal/tactile cueing for activities related to improving balance, coordination, kinesthetic sense, posture, motor skill, proprioception and motor activation to allow for proper function of scapular, scapulothoracic and UE control with self care, carrying, lifting, driving/computer work.      Home Exercise Program:    [x] (10054) Reviewed/Progressed HEP activities related to strengthening, flexibility, endurance, ROM of scapular, scapulothoracic and UE control with self care, reaching, carrying, lifting, house/yardwork, driving/computer work  [] (34177) Reviewed/Progressed HEP activities related to improving balance, coordination, kinesthetic sense, posture, motor skill, proprioception of scapular, scapulothoracic and UE control Functional Deficits. 4. Patient will return to being able to use L UE for ADL's including dressing, reaching, and lifting  without increased symptoms or restriction. Improving   5. D/c to [facility] for return to [employer]      Progression Towards Functional goals:  [x] Patient is progressing as expected towards functional goals listed. [] Progression is slowed due to complexities listed. [] Progression has been slowed due to co-morbidities.   [] Plan just implemented, too soon to assess goals progression  [] Other:     ASSESSMENT: slowly improving shoulder AROM with decreasing UT shrug but still very weak RTC     Treatment/Activity Tolerance:  [x] Patient tolerated treatment well [] Patient limited by fatique  [] Patient limited by pain  [] Patient limited by other medical complications  [] Other:     Prognosis: [x] Good [] Fair  [] Poor    Patient Requires Follow-up: [x] Yes  [] No    PLAN:   [x] Continue per plan of care [] Alter current plan (see comments)  [] Plan of care initiated [] Hold pending MD visit [] Discharge    Electronically [place]: [place]  [id number]

## 2018-08-27 ENCOUNTER — HOSPITAL ENCOUNTER (OUTPATIENT)
Dept: PHYSICAL THERAPY | Age: 68
Setting detail: THERAPIES SERIES
Discharge: HOME | End: 2018-08-27
Payer: COMMERCIAL

## 2018-08-27 PROCEDURE — G8988 SELF CARE GOAL STATUS: HCPCS | Performed by: PHYSICAL THERAPIST

## 2018-08-27 PROCEDURE — 97140 MANUAL THERAPY 1/> REGIONS: CPT | Performed by: PHYSICAL THERAPIST

## 2018-08-27 PROCEDURE — G8987 SELF CARE CURRENT STATUS: HCPCS | Performed by: PHYSICAL THERAPIST

## 2018-08-27 PROCEDURE — 97110 THERAPEUTIC EXERCISES: CPT | Performed by: PHYSICAL THERAPIST

## 2018-08-27 PROCEDURE — G0283 ELEC STIM OTHER THAN WOUND: HCPCS | Performed by: PHYSICAL THERAPIST

## 2018-08-27 PROCEDURE — 97112 NEUROMUSCULAR REEDUCATION: CPT | Performed by: PHYSICAL THERAPIST

## 2018-08-27 NOTE — FLOWSHEET NOTE
[facility], [address] [place]  Phone: [phone]  Fax [phone]    ATHLETIC TRAINING [address]  Date:  2018    Patient Name:  Baldemra Banda    :  1950  MRN: [id number]  Restrictions/Precautions:    Medical/Treatment Diagnosis Information:  ·  L shld s/p RC repair, SAD, biceps tenotomy  DOS 18  ·  L shld pain  Physician Information:   Dr. 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 On IH schedule for [place] (). Fatigued - worked w/ Lduy Sen for 1 hr           Plyoback      Chop                             Chest                             ER Flip         OVL R/L Pullbacks 7x       Long/Short lever  Flex. Scap.                                     ABd.                 punch  8\" 1/2 wall YTB 10x3\" 8\" 1/2 wall YTB 20x3\"  8\" 1/2 wall YTB 20x3\"           Body/Cardio Blade Flex/Ext                                        IR/ER                                        ABd/ADd                Push-up      Plus                               Wall                             Table                            Floor                Ball on Wall                    Tramp                Theraband    Rows/Ext                              IR     RTB 3x10                         ER     YTB 3x10                         No money                              Horiz.  ABd                              Biceps                              Triceps                              Punches Cable Column   Rows 20# (B) 2x10 20# 3x10 20# 3x10 IH 20# (F7) B 2x10 IH 20# (F7) B 3x10                              Ext. 15# (B) 2x10 15# 3x10 15# 3x10 IH 15# (A7) B 2x10 IH 15# (A7) B 3x10                              Lat. Pulldown  25# 3x10  IH 25# (A7) B 2x10 IH 25# (A7) B 3x10                              Biceps                                   Triceps 15# (B) 2x10 15# 3x10 15# 3x10 IH 15# (A7) B 2x10 IH 15# (A7) B 3x10                              PNF D2   (down) 5# 2x10 R/L      Modality ES/CP 15' ES/CP 15'  ES/CP 15' ES/CP 15'   Initials JLW DB EP BEHZAD DB   Time spent with PT assistant

## 2018-08-27 NOTE — PLAN OF CARE
To be achieved in: 2 weeks  1. Independent in HEP and progression per patient tolerance, in order to prevent re-injury. Met   2. Patient will have a decrease in pain to facilitate improvement in movement, function, and ADLs as indicated by Functional Deficits. met      Long Term Goals: To be achieved in: 12 weeks  1. Disability index score of 25% or less for the [facility] to assist with reaching prior level of function. Improving   2. Patient will demonstrate increased AROM to  L shoulder flex to 160°, abd to 160°, IR to 50° and ER to 70° to allow for proper joint functioning as indicated by patients Functional Deficits. Improving   3. Patient will demonstrate an increase in Strength to L shoulder to at least 4/5  to allow for proper functional mobility as indicated by patients Functional Deficits. Improving   4. Patient will return to being able to use L UE for ADL's including dressing, reaching, and lifting  without increased symptoms or restriction. Improving   5. D/c to [facility] for return to [employer]                 Rehab Potential:   []Excellent   [x] Good   [] Fair   [] Poor    Plan of Care:  [x] Continue Current Therapy Intervention    Frequency/Duration:  1-2 days per week for 4-6  Weeks:  HEP instruction: pt has written HEP and TB's   1. Therapeutic exercise including: strength training, ROM, NMR and proprioception for the scapula, core and Upper extremity  2. Manual therapy as indicated including Dry Needling/IASTM, STM, PROM, Gr I-IV mobilizations, spinal mobilization/manipulation. 3. Modalities as needed including: thermal agents, E-stim, US, iontophoresis as indicated. 4. Patient education on joint protection, activity modification, progression of HEP.        Electronically signed by:  Anabella Trinidad PT

## 2018-08-27 NOTE — FLOWSHEET NOTE
[facility], [address] [place]  Phone: [phone]  Fax [phone]      Physical Therapy Daily Treatment Note  Date:  2018    Patient Name:  Janes Reddy    :  1950  MRN: [id number]  Restrictions/Precautions:    Medical/Treatment Diagnosis Information:  Diagnosis: L shoulder s/p RTC repair, SAD and bicep tenotomy scx 18 M75.102  Treatment Diagnosis: L shoulder pain D70.463   Insurance/Certification information:  PT Insurance Information: luis m   Physician Information:  Referring Practitioner: Dr. Ada Clay of care signed (Y/N): sent; POC expires 18    Date of Patient follow up with Physician: September     G-Code (if applicable):      Date G-Code Applied:  18 quick dash 34%        Progress Note: []  Yes  [x]  No  Next due by: Visit #10      Latex Allergy:  [x]NO      []YES  Preferred Language for Healthcare:   [x]English       []other:    Visit #20 Insurance Allowable Requires auth   13 30 (used 15 already)    [x]no        []yes:     Pain level:  /10 L shoulder     SUBJECTIVE:  See POC     OBJECTIVE:see pOC   12.5  wks post op  Pt cautioned about using left UE to lift more than 5# at waist level.   Observation:   Test measurements:        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    Exercises/Interventions:   Therapeutic Ex Sets/rep comments   Arm bike  5 min warm up   Pulley flex  5\" 15     Pulley IR 5\" 10    Wall Wipes Y's 1# 4x5 MC @ scap   Prone SB in chair T's 2# 2x10    Prone SB in chair Y's 0# 2x10    Standing TB ER RTB 2x10 +hep 8-20   Standing Extension GTB 2x10 +hep 8-20   Staniding IR GTB 2x10 +hep 8-20   Standing TB SA Punch in stride BTB 3x10    Wall ER stretch  10\"x6     Trustretch  bicep stretch  15\"x5     SL shoulder abd rhythmic stab at 30° 3x20\"     SL shoulder ER  1# 3x15  Hep    Supine shoulder flex to 90 circles   3# 3x10 CW and CCW     Ball on wall   Circles 30x3 CW and CCW     See ATC sheet   Started 8/8/18    Prone shoulder abd  0# 2x6  Challenging, with asst    Supine scap retraction with TB with shoulder flex to 90° 5\" 3x5 OTB     SB wall push ups  3x10     Standing shoulder flex and scaption AROM  2x6 ea     Ball on wall crawls  4# 2x5     Manual Intervention     Jt mobs, PROM 15'                                   NMR re-education     Supine PNF man resist D1 2x6    Supine PNF man resist D2 2x6    Wall wipes PNF D1 and D2  x10 ea                                  Therapeutic Exercise and NMR EXR  [x] (78275) Provided verbal/tactile cueing for activities related to strengthening, flexibility, endurance, ROM  for improvements in scapular, scapulothoracic and UE control with self care, reaching, carrying, lifting, house/yardwork, driving/computer work.    [] (26387) Provided verbal/tactile cueing for activities related to improving balance, coordination, kinesthetic sense, posture, motor skill, proprioception  to assist with  scapular, scapulothoracic and UE control with self care, reaching, carrying, lifting, house/yardwork, driving/computer work. Therapeutic Activities:    [] (04362 or 42276) Provided verbal/tactile cueing for activities related to improving balance, coordination, kinesthetic sense, posture, motor skill, proprioception and motor activation to allow for proper function of scapular, scapulothoracic and UE control with self care, carrying, lifting, driving/computer work.      Home Exercise Program:    [x] (49481) Reviewed/Progressed HEP activities related to strengthening, flexibility, endurance, ROM of scapular, scapulothoracic and UE control with self care, reaching, carrying, lifting, house/yardwork, driving/computer work  [] (17199) Reviewed/Progressed HEP activities related to improving balance, coordination, kinesthetic sense, posture, motor skill, proprioception of scapular, scapulothoracic and UE control with self care, reaching, carrying, return to being able to use L UE for ADL's including dressing, reaching, and lifting  without increased symptoms or restriction. Improving   5. D/c to [facility] for return to [employer]      Progression Towards Functional goals:  [x] Patient is progressing as expected towards functional goals listed. [] Progression is slowed due to complexities listed. [] Progression has been slowed due to co-morbidities.   [] Plan just implemented, too soon to assess goals progression  [] Other:     ASSESSMENT: see POC     Treatment/Activity Tolerance:  [x] Patient tolerated treatment well [] Patient limited by fatique  [] Patient limited by pain  [] Patient limited by other medical complications  [] Other:     Prognosis: [x] Good [] Fair  [] Poor    Patient Requires Follow-up: [x] Yes  [] No    PLAN: Sent POC to insurance to request add'l visits   [x] Continue per plan of care [] Alter current plan (see comments)  [] Plan of care initiated [] Hold pending MD visit [] Discharge    Electronically signed by: [place]  [id number]

## 2018-08-29 ENCOUNTER — TELEPHONE (OUTPATIENT)
Dept: FAMILY MEDICINE CLINIC | Age: 68
End: 2018-08-29

## 2018-08-30 ENCOUNTER — HOSPITAL ENCOUNTER (OUTPATIENT)
Dept: PHYSICAL THERAPY | Age: 68
Setting detail: THERAPIES SERIES
Discharge: HOME | End: 2018-08-30
Payer: COMMERCIAL

## 2018-08-30 PROCEDURE — 97140 MANUAL THERAPY 1/> REGIONS: CPT | Performed by: PHYSICAL THERAPIST

## 2018-08-30 PROCEDURE — G0283 ELEC STIM OTHER THAN WOUND: HCPCS | Performed by: PHYSICAL THERAPIST

## 2018-08-30 PROCEDURE — 97110 THERAPEUTIC EXERCISES: CPT | Performed by: PHYSICAL THERAPIST

## 2018-08-30 NOTE — FLOWSHEET NOTE
flex to 90 circles   3# 3x10 CW and CCW     Behind the back IR with band  BTB 10\" x10     See ATC sheet   Started 8/8/18    Prone superman shoulder flex stretch single arm  1 min x2     Supine scap retraction with TB with shoulder flex to 90° 5\" 3x5 OTB     SB wall push ups       Standing shoulder flex and scaption AROM  2x6 ea     Ball on wall crawls  4# 2x5     Manual Intervention     Jt mobs, PROM 15'                                   NMR re-education     Supine PNF man resist D1     Supine PNF man resist D2 2x8     Wall wipes PNF D1 and D2  x10 ea                                  Therapeutic Exercise and NMR EXR  [x] (75190) Provided verbal/tactile cueing for activities related to strengthening, flexibility, endurance, ROM  for improvements in scapular, scapulothoracic and UE control with self care, reaching, carrying, lifting, house/yardwork, driving/computer work.    [] (95384) Provided verbal/tactile cueing for activities related to improving balance, coordination, kinesthetic sense, posture, motor skill, proprioception  to assist with  scapular, scapulothoracic and UE control with self care, reaching, carrying, lifting, house/yardwork, driving/computer work. Therapeutic Activities:    [] (05374 or 54480) Provided verbal/tactile cueing for activities related to improving balance, coordination, kinesthetic sense, posture, motor skill, proprioception and motor activation to allow for proper function of scapular, scapulothoracic and UE control with self care, carrying, lifting, driving/computer work.      Home Exercise Program:    [x] (67300) Reviewed/Progressed HEP activities related to strengthening, flexibility, endurance, ROM of scapular, scapulothoracic and UE control with self care, reaching, carrying, lifting, house/yardwork, driving/computer work  [] (90243) Reviewed/Progressed HEP activities related to improving balance, coordination, kinesthetic sense, posture, motor skill, proprioception of functional mobility as indicated by patients Functional Deficits. 4. Patient will return to being able to use L UE for ADL's including dressing, reaching, and lifting  without increased symptoms or restriction. Improving   5. D/c to [facility] for return to [employer]      Progression Towards Functional goals:  [x] Patient is progressing as expected towards functional goals listed. [] Progression is slowed due to complexities listed. [] Progression has been slowed due to co-morbidities.   [] Plan just implemented, too soon to assess goals progression  [] Other:     ASSESSMENT: improved ability to reach with L UE with decreased UT shrug   Treatment/Activity Tolerance:  [x] Patient tolerated treatment well [] Patient limited by fatique  [] Patient limited by pain  [] Patient limited by other medical complications  [] Other:     Prognosis: [x] Good [] Fair  [] Poor    Patient Requires Follow-up: [x] Yes  [] No    PLAN: Pt to call before next PT visit to make sure insurance has approved add'l visits   [x] Continue per plan of care [] Alter current plan (see comments)  [] Plan of care initiated [] Hold pending MD visit [] Discharge    Electronically signed by: [place]  [id number]

## 2018-08-30 NOTE — FLOWSHEET NOTE
20# (F1) 3x10                              Ext. 15# 3x10 IH 15# (A7) B 2x10 IH 15# (A7) B 3x10 IH 15# (B77) 3x10                              Lat. Pulldown  IH 25# (A7) B 2x10 IH 25# (A7) B 3x10 IH 25# A7 3x10                              Biceps                                  Triceps 15# 3x10 IH 15# (A7) B 2x10 IH 15# (A7) B 3x10 IH 15# (A7) 3x10                              PNF D2        Modality  ES/CP 13' ES/CP 15' ES/CP   Initials EP JLW DB EP   Time spent with PT assistant

## 2018-09-04 ENCOUNTER — APPOINTMENT (OUTPATIENT)
Dept: PHYSICAL THERAPY | Age: 68
End: 2018-09-04
Payer: COMMERCIAL

## 2018-09-07 ENCOUNTER — HOSPITAL ENCOUNTER (OUTPATIENT)
Dept: PHYSICAL THERAPY | Age: 68
Setting detail: THERAPIES SERIES
Discharge: HOME | End: 2018-09-07
Payer: COMMERCIAL

## 2018-09-07 NOTE — BRIEF OP NOTE
[facility], [address], [address] [place] [address]      Physical Therapy  Cancellation/No-show Note  Patient Name:  Medina Rosas  :  1950   Date:  2018  Cancelled visits to date: 1  No-shows to date: 0    For today's appointment patient:  [x]  Cancelled  []  Rescheduled appointment  []  No-show     Reason given by patient:  []  Patient ill  []  Conflicting appointment  []  No transportation    []  Conflict with work  []  No reason given  [x]  Other: Does not yet have insurance approval to continue with PT RX.     Comments:      Electronically signed by:  Dana Gillespie, [address]

## 2018-09-10 ENCOUNTER — HOSPITAL ENCOUNTER (OUTPATIENT)
Dept: PHYSICAL THERAPY | Age: 68
Setting detail: THERAPIES SERIES
Discharge: HOME | End: 2018-09-10
Payer: COMMERCIAL

## 2018-09-12 DIAGNOSIS — F60.3 BORDERLINE PERSONALITY DISORDER (HCC): Chronic | ICD-10-CM

## 2018-09-12 DIAGNOSIS — F32.1 MODERATE MAJOR DEPRESSION (HCC): Chronic | ICD-10-CM

## 2018-09-12 NOTE — TELEPHONE ENCOUNTER
Patient had a Overtone test and it showed that the Lexapro does not metabolize well in her system so the nurse practitioner in the psychiatry office changed the Lexapro to [address]. She is asking for a refill on that medication and her Seroquel.

## 2018-09-13 ENCOUNTER — HOSPITAL ENCOUNTER (OUTPATIENT)
Dept: PHYSICAL THERAPY | Age: 68
Setting detail: THERAPIES SERIES
Discharge: HOME | End: 2018-09-13
Payer: COMMERCIAL

## 2018-09-13 PROCEDURE — 97140 MANUAL THERAPY 1/> REGIONS: CPT | Performed by: PHYSICAL THERAPIST

## 2018-09-13 PROCEDURE — G0283 ELEC STIM OTHER THAN WOUND: HCPCS | Performed by: PHYSICAL THERAPIST

## 2018-09-13 PROCEDURE — 97110 THERAPEUTIC EXERCISES: CPT | Performed by: PHYSICAL THERAPIST

## 2018-09-13 RX ORDER — QUETIAPINE 150 MG/1
150 TABLET, FILM COATED, EXTENDED RELEASE ORAL NIGHTLY
Qty: 90 TABLET | Refills: 1 | OUTPATIENT
Start: 2018-09-13

## 2018-09-13 NOTE — FLOWSHEET NOTE
[facility] [facility], [address] [facility] [place]  Phone: [phone]  Fax [phone]      Physical Therapy Daily Treatment Note  Date:  2018    Patient Name:  Moe Garcia    :  1950  MRN: [id number]  Restrictions/Precautions:    Medical/Treatment Diagnosis Information:  Diagnosis: L shoulder s/p RTC repair, SAD and bicep tenotomy scx 18 M75.102  Treatment Diagnosis: L shoulder pain H20.834   Insurance/Certification information:  PT Insurance Information: luis m   Physician Information:  Referring Practitioner: Dr. Lenin Zacarias of care signed (Y/N): sent; POC expires 10/8/18     Date of Patient follow up with Physician: in 4 months      G-Code (if applicable):      Date G-Code Applied:  18 quick dash 34%        Progress Note: []  Yes  [x]  No  Next due by: Visit #10      Latex Allergy:  [x]NO      []YES  Preferred Language for Healthcare:   [x]English       []other:    Visit #20 Insurance Allowable Requires auth   15 30 (used 15 already)+7    [x]no        []yes:     Pain level:  3-4/10 L shoulder     SUBJECTIVE:    Pt reports she has been approved for 7 more visits. Pt reports she has been doing shoulder ER with a band (red band) and has been lifting her arm out to the side. Pt reports that is all she does for her HEP. Pt reports her shoulder nags all the time.      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    Exercises/Interventions:   Therapeutic Ex Sets/rep comments   Arm bike  5 min warm up   Pulley flex  5\" 15     Pulley IR 10\" 10 Hep 18    Wall Wipes Y's 1# 4x5 MC @ scap   Prone SB in chair T's 2# 2x10 HEP 18    Prone SB in chair Y's 0# 2x10    Standing TB ER RTB 2x10 +hep 8-20   Standing Extension GTB 2x10 +hep 8-20 Staniding IR GTB 2x10 +hep 8-20   TB row  GTB 2x10  Hep 9/13/18    Wall ER stretch  10\"x6     Trustretch  bicep stretch  15\"x5     SL shoulder abd rhythmic stab at 30° 3x20\"  1#   SL shoulder ER  1# 3x15  Hep    Supine shoulder flex to 90 circles   3# 3x10 CW and CCW     Behind the back IR with band  BTB 10\" x10     See ATC sheet   Started 8/8/18    Prone superman shoulder flex stretch single arm  1 min x2     Supine scap retraction with TB with shoulder flex to 90° 5\" 3x5 OTB     TB LT  GTB 2x10  9/13/18    Standing shoulder flex and scaption AROM  2x6 ea     Ball on wall crawls  4# 2x5     Manual Intervention     Jt mobs, PROM 10'                                   NMR re-education     Supine PNF man resist D1     Supine PNF man resist D2    Wall wipes PNF D1 and D2                                  Therapeutic Exercise and NMR EXR  [x] (94352) Provided verbal/tactile cueing for activities related to strengthening, flexibility, endurance, ROM  for improvements in scapular, scapulothoracic and UE control with self care, reaching, carrying, lifting, house/yardwork, driving/computer work.    [] (49731) Provided verbal/tactile cueing for activities related to improving balance, coordination, kinesthetic sense, posture, motor skill, proprioception  to assist with  scapular, scapulothoracic and UE control with self care, reaching, carrying, lifting, house/yardwork, driving/computer work. Therapeutic Activities:    [] (72514 or 33486) Provided verbal/tactile cueing for activities related to improving balance, coordination, kinesthetic sense, posture, motor skill, proprioception and motor activation to allow for proper function of scapular, scapulothoracic and UE control with self care, carrying, lifting, driving/computer work.      Home Exercise Program:    [x] (78038) Reviewed/Progressed HEP activities related to strengthening, flexibility, endurance, ROM of scapular, scapulothoracic and UE control with self care, reaching, carrying, lifting, house/yardwork, driving/computer work  [] (28713) Reviewed/Progressed HEP activities related to improving balance, coordination, kinesthetic sense, posture, motor skill, proprioception of scapular, scapulothoracic and UE control with self care, reaching, carrying, lifting, house/yardwork, driving/computer work      Manual Treatments:  PROM / STM / Oscillations-Mobs:  G-I, II, III, IV (PA's, Inf., Post.)  [x] (38191) Provided manual therapy to mobilize soft tissue/joints of cervical/CT, scapular GHJ and UE for the purpose of modulating pain, promoting relaxation,  increasing ROM, reducing/eliminating soft tissue swelling/inflammation/restriction, improving soft tissue extensibility and allowing for proper ROM for normal function with self care, reaching, carrying, lifting, house/yardwork, driving/computer work    Modalities:  ES PM with CP to L shoulder for  15 min     Charges:  Timed Code Treatment Minutes: 45   Total Treatment Minutes: 60     [] EVAL (LOW) 05325 (typically 20 minutes face-to-face)  [] EVAL (MOD) 51225 (typically 30 minutes face-to-face)  [] EVAL (HIGH) 19288 (typically 45 minutes face-to-face)  [] RE-EVAL     [x] QY(44945) x  2   [] IONTO  [] NMR (09413) x      [] VASO  [x] Manual (74339) x  1    [] Other:  [] TA x       [] Mech Traction (25316)  [] ES(attended) (08433)      [x] ES (un) (72945):     GOALS:  Therapist goals for Patient:   Short Term Goals: To be achieved in: 2 weeks  1. Independent in HEP and progression per patient tolerance, in order to prevent re-injury. Met   2. Patient will have a decrease in pain to facilitate improvement in movement, function, and ADLs as indicated by Functional Deficits. met     Long Term Goals: To be achieved in: 12 weeks  1. Disability index score of 25% or less for the [facility] to assist with reaching prior level of function.    2. Patient will demonstrate increased AROM to  L shoulder flex to 160°, abd to 160°, IR to 50° and

## 2018-09-17 ENCOUNTER — OFFICE VISIT (OUTPATIENT)
Dept: DERMATOLOGY | Age: 68
End: 2018-09-17

## 2018-09-17 DIAGNOSIS — L57.0 AK (ACTINIC KERATOSIS): Primary | ICD-10-CM

## 2018-09-17 DIAGNOSIS — L82.1 SEBORRHEIC KERATOSIS: ICD-10-CM

## 2018-09-17 DIAGNOSIS — D22.9 MULTIPLE NEVI: ICD-10-CM

## 2018-09-17 DIAGNOSIS — L81.4 LENTIGINES: ICD-10-CM

## 2018-09-17 DIAGNOSIS — L72.0 EPIDERMAL CYST: ICD-10-CM

## 2018-09-17 PROCEDURE — G8419 CALC BMI OUT NRM PARAM NOF/U: HCPCS | Performed by: DERMATOLOGY

## 2018-09-17 PROCEDURE — 1036F TOBACCO NON-USER: CPT | Performed by: DERMATOLOGY

## 2018-09-17 PROCEDURE — 99213 OFFICE O/P EST LOW 20 MIN: CPT | Performed by: DERMATOLOGY

## 2018-09-17 PROCEDURE — 3017F COLORECTAL CA SCREEN DOC REV: CPT | Performed by: DERMATOLOGY

## 2018-09-17 PROCEDURE — 4040F PNEUMOC VAC/ADMIN/RCVD: CPT | Performed by: DERMATOLOGY

## 2018-09-17 PROCEDURE — 17000 DESTRUCT PREMALG LESION: CPT | Performed by: DERMATOLOGY

## 2018-09-17 PROCEDURE — 1101F PT FALLS ASSESS-DOCD LE1/YR: CPT | Performed by: DERMATOLOGY

## 2018-09-17 PROCEDURE — 1123F ACP DISCUSS/DSCN MKR DOCD: CPT | Performed by: DERMATOLOGY

## 2018-09-17 PROCEDURE — 1090F PRES/ABSN URINE INCON ASSESS: CPT | Performed by: DERMATOLOGY

## 2018-09-17 PROCEDURE — G8400 PT W/DXA NO RESULTS DOC: HCPCS | Performed by: DERMATOLOGY

## 2018-09-17 PROCEDURE — G8427 DOCREV CUR MEDS BY ELIG CLIN: HCPCS | Performed by: DERMATOLOGY

## 2018-09-17 NOTE — PROGRESS NOTES
[facility]  Rani Tigre Vogt  1950    80 y.o. female     Date of Visit: 9/17/2018    Last seen:     Chief Complaint: lesions  Chief Complaint   Patient presents with    Skin Lesion     red patch on nose and lesion on neck and back     History of Present Illness:    1. Here for a pink area on the nasal tip x 1-2 years; asx; no bleeding, scabbing, scaling. No trx tried. 2. She has a has bump on the posteroir aspect of the neck that she would like checked. Had grown and become more painful recently and now feeling better and flatter. 3. She has a spot on the back that she would like to have checked - possibly growing. No other changing moles/lesions. Not wearing sunscreen regularly - working in the yard or taking a walk. No personal or family hx of skin cancer. She is having a shoulder replacement done this week. Review of Systems:  Gen: Feels well, good sense of health. Skin: No changing moles or lesions. Past Medical History, Family History, Surgical History, Medications and Allergies reviewed.     Outpatient Prescriptions Marked as Taking for the 9/17/18 encounter (Office Visit) with Elizabeth Herring MD   Medication Sig Dispense Refill    [employer] MELATONIN 3 MG TABS tablet TAKE 1 TABLET BY MOUTH NIGHTLY 90 tablet 1    LINZESS 145 MCG capsule TAKE 2 CAPSULES BY MOUTH EVERY MORNING (BEFORE BREAKFAST) 180 capsule 1    DULoxetine (CYMBALTA) 60 MG extended release capsule TAKE 1 CAPSULE BY MOUTH 2 TIMES DAILY 180 capsule 1    levothyroxine (SYNTHROID) 75 MCG tablet TAKE 1 TABLET BY MOUTH DAILY 90 tablet 1    spironolactone (ALDACTONE) 50 MG tablet TAKE 1 TABLET BY MOUTH DAILY 90 tablet 1    metFORMIN (GLUCOPHAGE) 500 MG tablet TAKE 1 TABLET BY MOUTH DAILY AT 4 PM 90 tablet 1    atorvastatin (LIPITOR) 20 MG tablet TAKE 1 TABLET BY MOUTH DAILY 90 tablet 1    traZODone (DESYREL) 100 MG tablet TAKE 1 TABLET BY MOUTH

## 2018-09-18 ENCOUNTER — HOSPITAL ENCOUNTER (OUTPATIENT)
Dept: PHYSICAL THERAPY | Age: 68
Setting detail: THERAPIES SERIES
Discharge: HOME | End: 2018-09-18
Payer: COMMERCIAL

## 2018-09-18 PROCEDURE — 97110 THERAPEUTIC EXERCISES: CPT | Performed by: PHYSICAL THERAPIST

## 2018-09-18 PROCEDURE — 97140 MANUAL THERAPY 1/> REGIONS: CPT | Performed by: PHYSICAL THERAPIST

## 2018-09-18 NOTE — FLOWSHEET NOTE
[facility] [facility], [address] [place]  Phone: [phone]  Fax [phone]      Physical Therapy Daily Treatment Note  Date:  2018    Patient Name:  Barrett Huddleston    :  1950  MRN: [id number]  Restrictions/Precautions:    Medical/Treatment Diagnosis Information:  Diagnosis: L shoulder s/p RTC repair, SAD and bicep tenotomy scx 18 M75.102  Treatment Diagnosis: L shoulder pain F95.082   Insurance/Certification information:  PT Insurance Information: luis m   Physician Information:  Referring Practitioner: Dr. Arpit Berman of care signed (Y/N): sent; POC expires 10/8/18     Date of Patient follow up with Physician: in 4 months      G-Code (if applicable):      Date G-Code Applied:  18 quick dash 34%        Progress Note: []  Yes  [x]  No  Next due by: Visit #10      Latex Allergy:  [x]NO      []YES  Preferred Language for Healthcare:   [x]English       []other:    Visit #20 Insurance Allowable Requires auth    (used 15 already)+7    [x]no        []yes:     Pain level:  /10 L shoulder     SUBJECTIVE:    Pt reports she started a part time job at [employer]. She has been so busy working that she has not been able to be consistent with HEP .  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    Exercises/Interventions:   Therapeutic Ex Sets/rep comments   Arm bike  5 min warm up   Pulley flex  5\" 15     Pulley IR 10\" 10 Hep 18    Wall Wipes Y's 2x10 MC @ scap   Prone SB in chair T's 2# 2x10 HEP 18    Prone SB in chair Y's 0# 2x10    Standing TB ER RTB 2x10 +hep 8-20   Standing Extension GTB 2x10 +hep 8-20   Staniding IR GTB 2x10 +hep 8-20   TB row  GTB 2x10  Hep 18    Wall ER stretch  20\"x4     Trustretch  bicep stretch  5     SL shoulder abd rhythmic stab at 30° 3x20\"  1#   SL shoulder ER  1# 3x15  Hep    Supine shoulder flex to 90 circles       Behind the back IR with band  BTB 10\" x10     See ATC sheet  DND today secondary to time  Started 8/8/18    Prone superman shoulder flex stretch single arm       Supine scap retraction with TB with shoulder flex to 90°      TB LT  GTB 2x10  9/13/18    Standing shoulder flex and scaption AROM with TB initiation  2x10 ea YTB      Ball on wall crawls  4# x10      Manual Intervention     Jt mobs, PROM 12'                                   NMR re-education     Supine PNF man resist D1     Supine PNF  D2 2x10 YTB    Wall wipes PNF D1 and D2                                  Therapeutic Exercise and NMR EXR  [x] (39694) Provided verbal/tactile cueing for activities related to strengthening, flexibility, endurance, ROM  for improvements in scapular, scapulothoracic and UE control with self care, reaching, carrying, lifting, house/yardwork, driving/computer work.    [] (37917) Provided verbal/tactile cueing for activities related to improving balance, coordination, kinesthetic sense, posture, motor skill, proprioception  to assist with  scapular, scapulothoracic and UE control with self care, reaching, carrying, lifting, house/yardwork, driving/computer work. Therapeutic Activities:    [] (18255 or 61850) Provided verbal/tactile cueing for activities related to improving balance, coordination, kinesthetic sense, posture, motor skill, proprioception and motor activation to allow for proper function of scapular, scapulothoracic and UE control with self care, carrying, lifting, driving/computer work.      Home Exercise Program:    [x] (48675) Reviewed/Progressed HEP activities related to strengthening, flexibility, endurance, ROM of scapular, scapulothoracic and UE control with self care, reaching, carrying, lifting, house/yardwork, driving/computer work  [] (25183) Reviewed/Progressed HEP activities related to improving balance, coordination, kinesthetic sense, posture, motor skill, proprioception of scapular, scapulothoracic and UE control with self care, reaching, carrying, lifting, house/yardwork, driving/computer work      Manual Treatments:  PROM / STM / Oscillations-Mobs:  G-I, II, III, IV (PA's, Inf., Post.)  [x] (13618) Provided manual therapy to mobilize soft tissue/joints of cervical/CT, scapular GHJ and UE for the purpose of modulating pain, promoting relaxation,  increasing ROM, reducing/eliminating soft tissue swelling/inflammation/restriction, improving soft tissue extensibility and allowing for proper ROM for normal function with self care, reaching, carrying, lifting, house/yardwork, driving/computer work    Modalities:   Pt declined secondary to time     Charges:  Timed Code Treatment Minutes: 45   Total Treatment Minutes: 45     [] EVAL (LOW) 42957 (typically 20 minutes face-to-face)  [] EVAL (MOD) 80320 (typically 30 minutes face-to-face)  [] EVAL (HIGH) 43923 (typically 45 minutes face-to-face)  [] RE-EVAL     [x] LW(11100) x  2   [] IONTO  [] NMR (74709) x      [] VASO  [x] Manual (35142) x  1    [] Other:  [] TA x       [] Mech Traction (07020)  [] ES(attended) (86658)      [x] ES (un) (57881):     GOALS:  Therapist goals for Patient:   Short Term Goals: To be achieved in: 2 weeks  1. Independent in HEP and progression per patient tolerance, in order to prevent re-injury. Met   2. Patient will have a decrease in pain to facilitate improvement in movement, function, and ADLs as indicated by Functional Deficits. met     Long Term Goals: To be achieved in: 12 weeks  1. Disability index score of 25% or less for the [facility] to assist with reaching prior level of function. 2. Patient will demonstrate increased AROM to  L shoulder flex to 160°, abd to 160°, IR to 50° and ER to 70° to allow for proper joint functioning as indicated by patients Functional Deficits improving . 3.  Patient will

## 2018-09-21 ENCOUNTER — HOSPITAL ENCOUNTER (OUTPATIENT)
Dept: PHYSICAL THERAPY | Age: 68
Setting detail: THERAPIES SERIES
Discharge: HOME | End: 2018-09-21
Payer: COMMERCIAL

## 2018-09-21 PROCEDURE — 97140 MANUAL THERAPY 1/> REGIONS: CPT

## 2018-09-21 PROCEDURE — 97110 THERAPEUTIC EXERCISES: CPT

## 2018-09-21 PROCEDURE — 97112 NEUROMUSCULAR REEDUCATION: CPT

## 2018-09-21 NOTE — FLOWSHEET NOTE
[facility] [facility], [address] [place] [address] [place]  Phone: [phone]  Fax [phone]      Physical Therapy Daily Treatment Note  Date:  2018    Patient Name:  Temi Echevarria    :  1950  MRN: [id number]  Restrictions/Precautions:    Medical/Treatment Diagnosis Information:  Diagnosis: L shoulder s/p RTC repair, SAD and bicep tenotomy scx 18 M75.102  Treatment Diagnosis: L shoulder pain A12.576   Insurance/Certification information:  PT Insurance Information: luis m   Physician Information:  Referring Practitioner: Dr. Swati Reed of care signed (Y/N): sent; POC expires 10/8/18     Date of Patient follow up with Physician: in 4 months      G-Code (if applicable):      Date G-Code Applied:        Progress Note: []  Yes  [x]  No  Next due by: Visit #10      Latex Allergy:  [x]NO      []YES  Preferred Language for Healthcare:   [x]English       []other:    Visit #20 Insurance Allowable Requires auth    (used 15 already)+7    [x]no        []yes:     Pain level: 1 /10 L shoulder     SUBJECTIVE:  Left shoulder feeling better, just weak getting it overhead, but slowly improving with this. OBJECTIVE:    16  wks post op  Observation: Initiated Prone PNF D1 manually resisted diagonals and RTC strengthening @ 90. Had to keep reps low for prone PNF because quick to fatigue. Progressed HEP and added Resisted SHLD Flex Wall Slides and Standing ER @ 90 Degrees to HEP. Test measurements: Flex and Scap strength above 90 degrees:4-/5 and continues to require manual cuing @ scapula for good kinesis.       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    Exercises/Interventions:   Therapeutic Ex Sets/rep comments   Arm bike  5 min warm up   Pulley flex  5\" 15     Pulley IR 10\" 10 Hep 18    Wall Wipes Y's 2x10 MC @ scap   Prone SB in chair T's 2# 2x10 HEP 18    Prone SB in chair Y's 0# 2x10    Overhead Deltoid scap 1# 4x5    Standing SA Punch BTB 3x10    Standing Overhead scap Retrac RTB 2x10 MC @ scap   Standing TB ER @ 90  3x10 +hep 9-21  MC @ scap   Standing Extension GTB 2x10 +hep 8-20   Standing TB IR @ 90 GTB 2x10    Hep 9/13/18    Wall ER stretch  20\"x4     Trustretch  bicep stretch  5     SL shoulder abd rhythmic stab at 30° 3x20\"  1#   SL shoulder ER  1# 3x15  Hep    Supine shoulder flex to 90 circles          See ATC sheet  DND today secondary to time  Started 8/8/18              TB LT  GTB 2x10  9/13/18    Standing shoulder flex and scaption AROM with TB initiation  2x10 ea YTB      Ball on wall crawls  4# x10      Manual Intervention     Jt mobs, PROM 15'                                   NMR re-education          Supine PNF  D2 man resist 2x10     Prone PNF D1 Man resist 4x5 Challenging   Standing PNF D1 YTB 3x5 MC @ scap                      Therapeutic Exercise and NMR EXR  [x] (72944) Provided verbal/tactile cueing for activities related to strengthening, flexibility, endurance, ROM  for improvements in scapular, scapulothoracic and UE control with self care, reaching, carrying, lifting, house/yardwork, driving/computer work.    [] (90561) Provided verbal/tactile cueing for activities related to improving balance, coordination, kinesthetic sense, posture, motor skill, proprioception  to assist with  scapular, scapulothoracic and UE control with self care, reaching, carrying, lifting, house/yardwork, driving/computer work. Therapeutic Activities:    [] (48973 or 97536) Provided verbal/tactile cueing for activities related to improving balance, coordination, kinesthetic sense, posture, motor skill, proprioception and motor activation to allow for proper function of scapular, scapulothoracic and UE control with self care, carrying, lifting, driving/computer work.      Home Exercise Program:    [x] (32999) Reviewed/Progressed HEP activities related to strengthening, flexibility, endurance, ROM of scapular, abd to 160°, IR to 50° and ER to 70° to allow for proper joint functioning as indicated by patients Functional Deficits improving . 3. Patient will demonstrate an increase in Strength to L shoulder to at least 4/5  to allow for proper functional mobility as indicated by patients Functional Deficits. Improving  4. Patient will return to being able to use L UE for ADL's including dressing, reaching, and lifting  without increased symptoms or restriction. Improving   5. D/c to [facility] for return to [employer]      Progression Towards Functional goals:  [x] Patient is progressing as expected towards functional goals listed. [] Progression is slowed due to complexities listed. [] Progression has been slowed due to co-morbidities. [] Plan just implemented, too soon to assess goals progression  [] Other:     ASSESSMENT: Pt requires MC @ scap for all TE above 90 degrees. Requires extra rest in between sets when working above 90 degrees.     Treatment/Activity Tolerance:  [x] Patient tolerated treatment well [] Patient limited by fatique  [] Patient limited by pain  [] Patient limited by other medical complications  [] Other:     Prognosis: [x] Good [] Fair  [] Poor    Patient Requires Follow-up: [x] Yes  [] No    PLAN:   [x] Continue per plan of care [] Alter current plan (see comments)  [] Plan of care initiated [] Hold pending MD visit [] Discharge    Electronically signed by: Yumiko Osorio, [address]

## 2018-09-24 ENCOUNTER — HOSPITAL ENCOUNTER (OUTPATIENT)
Dept: PHYSICAL THERAPY | Age: 68
Setting detail: THERAPIES SERIES
Discharge: HOME | End: 2018-09-24
Payer: COMMERCIAL

## 2018-09-24 PROCEDURE — 97140 MANUAL THERAPY 1/> REGIONS: CPT | Performed by: PHYSICAL THERAPIST

## 2018-09-24 PROCEDURE — 97110 THERAPEUTIC EXERCISES: CPT | Performed by: PHYSICAL THERAPIST

## 2018-09-24 PROCEDURE — 97112 NEUROMUSCULAR REEDUCATION: CPT | Performed by: PHYSICAL THERAPIST

## 2018-09-24 NOTE — FLOWSHEET NOTE
[facility] [facility], [address] [place]  Phone: [phone]  Fax [phone]    ATHLETIC TRAINING [address]  Date:  2018    Patient Name:  Tari Tan    :  1950  MRN: [id number]  Restrictions/Precautions:    Medical/Treatment Diagnosis Information:  ·  L shld s/p RC repair, SAD, biceps tenotomy  DOS 18  ·  L shld pain  Physician Information:   Dr. 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 Scap.                                   ABd.             punch 8\" 1/2 wll YTB 30x5\" 8\" 1/2 wall YTB 20x3\" 6\" 1/2 OVL 20x5'         Body/Cardio Blade Flex/Ext                                      IR/ER                                      ABd/ADd            Push-up      Plus                             Wall                           Table                          Floor            Ball on Wall                  Tramp            Theraband    Rows/Ext                            IR RTB 3x10 w/PT @90                          ER YTB 3x10 w/PT @90                          No money                            Horiz.  ABd                            Biceps                            Triceps                            Punches            Cable Column   Rows IH 20# 3x12 IH 20# 3x12 IH 20# 3x12                              Ext. IH 15# 3x12 IH 15# 3x12 IH 15# 3x12 Dmitri Gallardo IH 25# 3x12 IH 30# 3x12 IH 30# 3x12                              Biceps                                 Triceps IH 15# 3x12 IH 15# 3x12 IH 15# 3x12                              PNF D2       Modality PM/CP 13' CP 15' CP 15'   Initials EP JLW EP   Time spent with PT assistant

## 2018-09-24 NOTE — FLOWSHEET NOTE
2x10     Wall ER stretch  20\"x4     Trustretch  bicep stretch  5     SL shoulder abd rhythmic stab at 30° 3x20\"  1#   SL shoulder ER  2# 3x10 Hep    Supine shoulder flex to 90 circles          See ATC sheet    Started 8/8/18              TB LT  GTB 2x10  9/13/18    Standing shoulder flex and scaption AROM with TB initiation  2x10 ea YTB      Ball on wall crawls  4# x10      Manual Intervention     Jt mobs, PROM 15'                                   NMR re-education          Supine PNF  D2 man resist 2x10     Prone PNF D1 Man resist 4x5 Challenging   Standing PNF D1 YTB 3x5 MC @ scap                      Therapeutic Exercise and NMR EXR  [x] (23830) Provided verbal/tactile cueing for activities related to strengthening, flexibility, endurance, ROM  for improvements in scapular, scapulothoracic and UE control with self care, reaching, carrying, lifting, house/yardwork, driving/computer work.    [] (34041) Provided verbal/tactile cueing for activities related to improving balance, coordination, kinesthetic sense, posture, motor skill, proprioception  to assist with  scapular, scapulothoracic and UE control with self care, reaching, carrying, lifting, house/yardwork, driving/computer work. Therapeutic Activities:    [] (85128 or 81054) Provided verbal/tactile cueing for activities related to improving balance, coordination, kinesthetic sense, posture, motor skill, proprioception and motor activation to allow for proper function of scapular, scapulothoracic and UE control with self care, carrying, lifting, driving/computer work.      Home Exercise Program:    [x] (25860) Reviewed/Progressed HEP activities related to strengthening, flexibility, endurance, ROM of scapular, scapulothoracic and UE control with self care, reaching, carrying, lifting, house/yardwork, driving/computer work  [] (80249) Reviewed/Progressed HEP activities related to improving balance, coordination, kinesthetic sense, posture, motor skill, proprioception of scapular, scapulothoracic and UE control with self care, reaching, carrying, lifting, house/yardwork, driving/computer work      Manual Treatments:  PROM / STM / Oscillations-Mobs:  G-I, II, III, IV (Tulio, Inf., Post.)  [x] (97112) Provided manual therapy to mobilize soft tissue/joints of cervical/CT, scapular GHJ and UE for the purpose of modulating pain, promoting relaxation,  increasing ROM, reducing/eliminating soft tissue swelling/inflammation/restriction, improving soft tissue extensibility and allowing for proper ROM for normal function with self care, reaching, carrying, lifting, house/yardwork, driving/computer work    Modalities:CPx15 min left shoulder    Charges:  Timed Code Treatment Minutes: 45   Total Treatment Minutes: 60     [] EVAL (LOW) 49246 (typically 20 minutes face-to-face)  [] EVAL (MOD) 56940 (typically 30 minutes face-to-face)  [] EVAL (HIGH) 68772 (typically 45 minutes face-to-face)  [] RE-EVAL     [x] QQ(07329) x  1   [] IONTO  [x] NMR (87945) x  1   [] VASO  [x] Manual (27001) x  1    [] Other:  [] TA x       [] Mech Traction (81438)  [] ES(attended) (23236)      [] ES (un) (42048):     GOALS:  Therapist goals for Patient:   Short Term Goals: To be achieved in: 2 weeks  1. Independent in HEP and progression per patient tolerance, in order to prevent re-injury. Met   2. Patient will have a decrease in pain to facilitate improvement in movement, function, and ADLs as indicated by Functional Deficits. met     Long Term Goals: To be achieved in: 12 weeks  1. Disability index score of 25% or less for the [facility] to assist with reaching prior level of function. 2. Patient will demonstrate increased AROM to  L shoulder flex to 160°, abd to 160°, IR to 50° and ER to 70° to allow for proper joint functioning as indicated by patients Functional Deficits improving . 3.  Patient will demonstrate an increase in Strength to L shoulder to at least 4/5  to allow for proper functional mobility as indicated by patients Functional Deficits. Improving  4. Patient will return to being able to use L UE for ADL's including dressing, reaching, and lifting  without increased symptoms or restriction. Improving   5. D/c to [facility] for return to pickle ball      Progression Towards Functional goals:  [x] Patient is progressing as expected towards functional goals listed. [] Progression is slowed due to complexities listed. [] Progression has been slowed due to co-morbidities.   [] Plan just implemented, too soon to assess goals progression  [] Other:     ASSESSMENT: pt has increasing L shoulder flex AROM and decreasing UT shrug, but still fatigues with L shoulder strengthening exercises     Treatment/Activity Tolerance:  [x] Patient tolerated treatment well [] Patient limited by fatique  [] Patient limited by pain  [] Patient limited by other medical complications  [] Other:     Prognosis: [x] Good [] Fair  [] Poor    Patient Requires Follow-up: [x] Yes  [] No    PLAN: Cont to focus on L shoulder RTC, scap stab strengthening especialy above 90° flex   [x] Continue per plan of care [] Alter current plan (see comments)  [] Plan of care initiated [] Hold pending MD visit [] Discharge    Electronically signed [place]: [place]  [id number]

## 2018-10-01 ENCOUNTER — HOSPITAL ENCOUNTER (OUTPATIENT)
Dept: PHYSICAL THERAPY | Age: 68
Setting detail: THERAPIES SERIES
Discharge: HOME | End: 2018-10-01
Payer: COMMERCIAL

## 2018-10-01 PROCEDURE — 97112 NEUROMUSCULAR REEDUCATION: CPT | Performed by: PHYSICAL THERAPIST

## 2018-10-01 PROCEDURE — 97110 THERAPEUTIC EXERCISES: CPT | Performed by: PHYSICAL THERAPIST

## 2018-10-01 PROCEDURE — 97140 MANUAL THERAPY 1/> REGIONS: CPT | Performed by: PHYSICAL THERAPIST

## 2018-10-01 NOTE — FLOWSHEET NOTE
[facility] [address] [place]  Phone: [phone]  Fax [phone]    ATHLETIC TRAINING [address]  Date:  10/1/2018    Patient Name:  Mitchell Louis    :  1950  MRN: [id number]  Restrictions/Precautions:    Medical/Treatment Diagnosis Information:  ·  L shld s/p RC repair, SAD, biceps tenotomy  DOS 18  ·  L shld pain  Physician Information:   Dr. Deuce Rice  2wks    4 wks 10 wks 18 8 wks   3wks 6 wks 9 wks 12 wks                        Activity Log                                                          DOS/DOI: 18                                                         Date: 9/21/18 9/24/18 10/1/18   ATC Communication:  HX of R reverse TSA Dec 2016  Pickle ball - R handed Fatigued today, R arm is more limiting           Plyoback      Chop                           Chest                           ER Flip       OVL Pullbacks 3x10 OVL Pullbacks 3x10 OVL Pullbacks 3x10   Long/Short lever  Flex. Scap.                                   ABd.             punch 8\" 1/2 wall YTB 20x3\" 6\" 1/2 OVL 20x5' 1/2 wall YTB 30x5\"         Body/Cardio Blade Flex/Ext                                      IR/ER                                      ABd/ADd            Push-up      Plus                             Wall                           Table                          Floor            Ball on Wall                  Tramp            Theraband    Rows/Ext                            IR w/PT @90  YTB 3x10                         ER w/PT @90  YTB 3x10                         No money                            Horiz.  ABd                            Biceps                            Triceps                            Punches            Cable Column   Rows IH 20# 3x12 IH 20# 3x12 IH 25# 3x10                              Ext. IH 15# 3x12 IH 15# 3x12 IH 20# 3x10

## 2018-10-22 ENCOUNTER — HOSPITAL ENCOUNTER (OUTPATIENT)
Dept: PHYSICAL THERAPY | Age: 68
Setting detail: THERAPIES SERIES
Discharge: HOME | End: 2018-10-22
Payer: COMMERCIAL

## 2018-10-22 PROCEDURE — 97110 THERAPEUTIC EXERCISES: CPT | Performed by: PHYSICAL THERAPIST

## 2018-10-22 PROCEDURE — 97140 MANUAL THERAPY 1/> REGIONS: CPT | Performed by: PHYSICAL THERAPIST

## 2018-10-22 PROCEDURE — G0283 ELEC STIM OTHER THAN WOUND: HCPCS | Performed by: PHYSICAL THERAPIST

## 2018-10-22 NOTE — FLOWSHEET NOTE
[facility],  [address] [place]  Phone: [phone]  Fax [phone]      Physical Therapy Daily Treatment Note  Date:  10/22/2018    Patient Name:  Gerardo Morocho    :  1950  MRN: [id number]  Restrictions/Precautions:    Medical/Treatment Diagnosis Information:  Diagnosis: L shoulder s/p RTC repair, SAD and bicep tenotomy scx 18 M75.102  Treatment Diagnosis: L shoulder pain J38.502   Insurance/Certification information:  PT Insurance Information: luis m   Physician Information:  Referring Practitioner: Dr. Bethany Rincon of care signed (Y/N): sent; POC expires 10/8/18     Date of Patient follow up with Physician: in 4 months      G-Code (if applicable):      Date G-Code Applied:        Progress Note: []  Yes  [x]  No  Next due by: Visit #10      Latex Allergy:  [x]NO      []YES  Preferred Language for Healthcare:   [x]English       []other:    Visit #20 Insurance Allowable Requires auth    (used 15 already)+7    [x]no        []yes:     Pain level:  0-4/10 L shoulder     SUBJECTIVE: pt reports at times her shoulder feels great but she has times when she moves her shoulder a certain way that she gets pain but it quickly goes away. She is able to do most ADL's now with her L UE but still has trouble lifting with LUE and has not yet returned to pickle ball.  Pt reports she has not been doing her HEP because she has been too busy      OBJECTIVE:    19  wks post op; pt last treated 10/1/18   Observation:     Test measurements:L shoulder flex AROM 150° abd  AROM 135°  Again stressed importance of compliance with HEP for strengthening L shoulder       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    Exercises/Interventions:   Therapeutic Ex Sets/rep comments   Arm bike  5 min warm up   Pulley flex     Pulley IR Hep 18    Wall Wipes Y's 2x10 MC @ scap   Prone SB in chair T's 2# 2x10 HEP 18    Prone SB

## 2018-10-29 ENCOUNTER — HOSPITAL ENCOUNTER (OUTPATIENT)
Dept: PHYSICAL THERAPY | Age: 68
Setting detail: THERAPIES SERIES
Discharge: HOME | End: 2018-10-29
Payer: COMMERCIAL

## 2018-10-29 PROCEDURE — G8987 SELF CARE CURRENT STATUS: HCPCS | Performed by: PHYSICAL THERAPIST

## 2018-10-29 PROCEDURE — G0283 ELEC STIM OTHER THAN WOUND: HCPCS | Performed by: PHYSICAL THERAPIST

## 2018-10-29 PROCEDURE — 97110 THERAPEUTIC EXERCISES: CPT | Performed by: PHYSICAL THERAPIST

## 2018-10-29 PROCEDURE — 97140 MANUAL THERAPY 1/> REGIONS: CPT | Performed by: PHYSICAL THERAPIST

## 2018-10-29 PROCEDURE — G8989 SELF CARE D/C STATUS: HCPCS | Performed by: PHYSICAL THERAPIST

## 2018-10-29 PROCEDURE — 97112 NEUROMUSCULAR REEDUCATION: CPT | Performed by: PHYSICAL THERAPIST

## 2018-10-29 PROCEDURE — G8988 SELF CARE GOAL STATUS: HCPCS | Performed by: PHYSICAL THERAPIST

## 2018-10-29 NOTE — FLOWSHEET NOTE
[facility], [address] [place]  Phone: [phone]  Fax [phone]    ATHLETIC TRAINING [address]  Date:  10/29/2018    Patient Name:  Drea Zamudio    :  1950  MRN: [id number]  Restrictions/Precautions:    Medical/Treatment Diagnosis Information:  ·  L shld s/p RC repair, SAD, biceps tenotomy  DOS 18  ·  L shld pain  Physician Information:   Dr. Alesia Acosta  2wks    4 wks 10 wks 18 8 wks   3wks 6 wks 9 wks 12 wks                        Activity Log                                                          DOS/DOI: 18                                                         Date: 10/1/18 10/22/18 10/29/18   ATC Communication:  HX of R reverse TSA Dec 2016  Pickle ball - R handed  Unable to get above 90 ABD          Plyoback      Chop   towel slides 10x Clocks towel slides 10x 11/12/1 towel slides 10x  Clock towel slides 10x                        Chest                           ER Flip       OVL Pullbacks 3x10 YTB Pullbacks 3x10 YTB Pullbacks 2x10   Long/Short lever  Flex. Scap.                                   ABd.             punch 1/2 wall YTB 30x5\" 1/2 wall YTB 30x5\" 1/2 wall YTB 30x5\"         Body/Cardio Blade Flex/Ext                                      IR/ER                                      ABd/ADd            Push-up      Plus                             Wall                           Table                          Floor            Ball on Wall                  Tramp            Theraband    Rows/Ext                            IR YTB 3x10  YTB 3x10                         ER YTB 3x10  YTB 3x10                         No money                            Horiz.  ABd                            Biceps                            Triceps                            Punches            Cable Column   Rows IH 25# 3x10 20# 3x10

## 2018-10-29 NOTE — FLOWSHEET NOTE
[facility], [address]  Phone: [phone]  Fax [phone]      Physical Therapy Daily Treatment Note  Date:  10/29/2018    Patient Name:  Alexsandra Del Toro    :  1950  MRN: [id number]  Restrictions/Precautions:    Medical/Treatment Diagnosis Information:  Diagnosis: L shoulder s/p RTC repair, SAD and bicep tenotomy scx 18 M75.102  Treatment Diagnosis: L shoulder pain U74.580   Insurance/Certification information:  PT Insurance Information: luis m   Physician Information:  Referring Practitioner: Dr. Cammie Ballesteros of care signed (Y/N): sent; POC expires 10/8/18     Date of Patient follow up with Physician: in 4 months      G-Code (if applicable):      Date G-Code Applied:   PT G-Codes  Functional Assessment Tool Used: quick dash   Score: 34%  Functional Limitation: Self care  Self Care Current Status (): At least 20 percent but less than 40 percent impaired, limited or restricted  Self Care Goal Status (): At least 20 percent but less than 40 percent impaired, limited or restricted  Self Care Discharge Status (): At least 20 percent but less than 40 percent impaired, limited or restricted    Progress Note: [x]  Yes  []  No  Next due by: Visit #10      Latex Allergy:  [x]NO      []YES  Preferred Language for Healthcare:   [x]English       []other:    Visit #20 Insurance Allowable Requires auth    (used 15 already)+7    [x]no        []yes:     Pain level: /10 L shoulder     SUBJECTIVE: see d/c note     OBJECTIVE: see d/c note    20  wks post op;   Observation:     Test measurements  Again stressed importance of compliance with HEP for strengthening L shoulder       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    Exercises/Interventions:   Therapeutic Ex Sets/rep comments   Arm bike  5 min warm up   Pulley flex     Pulley IR Hep 18    Wall Wipes Y's 2x10 MC @ scap   Prone SB in

## 2018-11-12 ENCOUNTER — OFFICE VISIT (OUTPATIENT)
Dept: FAMILY MEDICINE CLINIC | Age: 68
End: 2018-11-12
Payer: COMMERCIAL

## 2018-11-12 VITALS
SYSTOLIC BLOOD PRESSURE: 116 MMHG | OXYGEN SATURATION: 97 % | HEART RATE: 90 BPM | HEIGHT: 65 IN | WEIGHT: 169 LBS | BODY MASS INDEX: 28.16 KG/M2 | DIASTOLIC BLOOD PRESSURE: 62 MMHG

## 2018-11-12 DIAGNOSIS — E11.9 TYPE 2 DIABETES MELLITUS WITHOUT COMPLICATION, WITHOUT LONG-TERM CURRENT USE OF INSULIN (HCC): Primary | ICD-10-CM

## 2018-11-12 DIAGNOSIS — Z78.0 POST-MENOPAUSAL: ICD-10-CM

## 2018-11-12 DIAGNOSIS — E03.9 HYPOTHYROIDISM, UNSPECIFIED TYPE: Chronic | ICD-10-CM

## 2018-11-12 DIAGNOSIS — Z23 IMMUNIZATION DUE: ICD-10-CM

## 2018-11-12 DIAGNOSIS — F60.3 BORDERLINE PERSONALITY DISORDER (HCC): Chronic | ICD-10-CM

## 2018-11-12 DIAGNOSIS — F32.1 MODERATE MAJOR DEPRESSION (HCC): Chronic | ICD-10-CM

## 2018-11-12 DIAGNOSIS — I10 ESSENTIAL HYPERTENSION: Chronic | ICD-10-CM

## 2018-11-12 LAB — HBA1C MFR BLD: 6 %

## 2018-11-12 PROCEDURE — 3044F HG A1C LEVEL LT 7.0%: CPT | Performed by: NURSE PRACTITIONER

## 2018-11-12 PROCEDURE — G8482 FLU IMMUNIZE ORDER/ADMIN: HCPCS | Performed by: NURSE PRACTITIONER

## 2018-11-12 PROCEDURE — G8419 CALC BMI OUT NRM PARAM NOF/U: HCPCS | Performed by: NURSE PRACTITIONER

## 2018-11-12 PROCEDURE — 2022F DILAT RTA XM EVC RTNOPTHY: CPT | Performed by: NURSE PRACTITIONER

## 2018-11-12 PROCEDURE — 1036F TOBACCO NON-USER: CPT | Performed by: NURSE PRACTITIONER

## 2018-11-12 PROCEDURE — 83036 HEMOGLOBIN GLYCOSYLATED A1C: CPT | Performed by: NURSE PRACTITIONER

## 2018-11-12 PROCEDURE — 1123F ACP DISCUSS/DSCN MKR DOCD: CPT | Performed by: NURSE PRACTITIONER

## 2018-11-12 PROCEDURE — 1090F PRES/ABSN URINE INCON ASSESS: CPT | Performed by: NURSE PRACTITIONER

## 2018-11-12 PROCEDURE — 90662 IIV NO PRSV INCREASED AG IM: CPT | Performed by: NURSE PRACTITIONER

## 2018-11-12 PROCEDURE — 99213 OFFICE O/P EST LOW 20 MIN: CPT | Performed by: NURSE PRACTITIONER

## 2018-11-12 PROCEDURE — G0008 ADMIN INFLUENZA VIRUS VAC: HCPCS | Performed by: NURSE PRACTITIONER

## 2018-11-12 PROCEDURE — 4040F PNEUMOC VAC/ADMIN/RCVD: CPT | Performed by: NURSE PRACTITIONER

## 2018-11-12 PROCEDURE — 1101F PT FALLS ASSESS-DOCD LE1/YR: CPT | Performed by: NURSE PRACTITIONER

## 2018-11-12 PROCEDURE — G8427 DOCREV CUR MEDS BY ELIG CLIN: HCPCS | Performed by: NURSE PRACTITIONER

## 2018-11-12 PROCEDURE — G8400 PT W/DXA NO RESULTS DOC: HCPCS | Performed by: NURSE PRACTITIONER

## 2018-11-12 PROCEDURE — 3017F COLORECTAL CA SCREEN DOC REV: CPT | Performed by: NURSE PRACTITIONER

## 2018-11-12 ASSESSMENT — ENCOUNTER SYMPTOMS
RESPIRATORY NEGATIVE: 1
GASTROINTESTINAL NEGATIVE: 1
ALLERGIC/IMMUNOLOGIC NEGATIVE: 1
EYES NEGATIVE: 1

## 2018-11-12 NOTE — PATIENT INSTRUCTIONS
Fasting blood work in [address] Monday to Friday 8-430 pm  Routine follow up in 6 months  Schedule Dexa Scan

## 2018-12-05 ENCOUNTER — TELEPHONE (OUTPATIENT)
Dept: FAMILY MEDICINE CLINIC | Age: 68
End: 2018-12-05

## 2018-12-06 DIAGNOSIS — E78.00 HYPERCHOLESTEREMIA: ICD-10-CM

## 2018-12-06 DIAGNOSIS — E11.9 TYPE 2 DIABETES MELLITUS WITHOUT COMPLICATION, WITHOUT LONG-TERM CURRENT USE OF INSULIN (HCC): ICD-10-CM

## 2018-12-06 DIAGNOSIS — G25.81 RLS (RESTLESS LEGS SYNDROME): ICD-10-CM

## 2018-12-06 DIAGNOSIS — F32.1 MODERATE MAJOR DEPRESSION (HCC): Chronic | ICD-10-CM

## 2018-12-06 DIAGNOSIS — I10 ESSENTIAL HYPERTENSION: Chronic | ICD-10-CM

## 2018-12-06 DIAGNOSIS — E03.9 HYPOTHYROIDISM, UNSPECIFIED TYPE: Chronic | ICD-10-CM

## 2018-12-06 RX ORDER — ROPINIROLE 1 MG/1
1 TABLET, FILM COATED ORAL NIGHTLY
Qty: 90 TABLET | Refills: 1 | Status: SHIPPED | OUTPATIENT
Start: 2018-12-06 | End: 2019-05-26 | Stop reason: SDUPTHER

## 2018-12-06 RX ORDER — ATORVASTATIN CALCIUM 20 MG/1
20 TABLET, FILM COATED ORAL DAILY
Qty: 90 TABLET | Refills: 1 | Status: SHIPPED | OUTPATIENT
Start: 2018-12-06 | End: 2019-05-26 | Stop reason: SDUPTHER

## 2018-12-06 RX ORDER — HYDROCHLOROTHIAZIDE 25 MG/1
25 TABLET ORAL DAILY
Qty: 90 TABLET | Refills: 1 | Status: SHIPPED | OUTPATIENT
Start: 2018-12-06 | End: 2019-05-26 | Stop reason: SDUPTHER

## 2018-12-06 RX ORDER — SPIRONOLACTONE 50 MG/1
50 TABLET, FILM COATED ORAL DAILY
Qty: 90 TABLET | Refills: 1 | Status: SHIPPED | OUTPATIENT
Start: 2018-12-06 | End: 2019-05-26 | Stop reason: SDUPTHER

## 2018-12-06 RX ORDER — LEVOTHYROXINE SODIUM 0.07 MG/1
75 TABLET ORAL DAILY
Qty: 90 TABLET | Refills: 1 | Status: SHIPPED | OUTPATIENT
Start: 2018-12-06 | End: 2019-05-26 | Stop reason: SDUPTHER

## 2018-12-06 RX ORDER — ESCITALOPRAM OXALATE 20 MG/1
20 TABLET ORAL DAILY
Qty: 90 TABLET | Refills: 1 | Status: SHIPPED | OUTPATIENT
Start: 2018-12-06 | End: 2019-05-22

## 2018-12-07 ENCOUNTER — HOSPITAL ENCOUNTER (OUTPATIENT)
Age: 68
Discharge: HOME | End: 2018-12-07
Payer: COMMERCIAL

## 2018-12-07 DIAGNOSIS — E11.9 TYPE 2 DIABETES MELLITUS WITHOUT COMPLICATION, WITHOUT LONG-TERM CURRENT USE OF INSULIN (HCC): ICD-10-CM

## 2018-12-07 DIAGNOSIS — E03.9 HYPOTHYROIDISM, UNSPECIFIED TYPE: Chronic | ICD-10-CM

## 2018-12-07 DIAGNOSIS — I10 ESSENTIAL HYPERTENSION: Chronic | ICD-10-CM

## 2018-12-07 LAB
A/G RATIO: 1.5 (ref 1.1–2.2)
ALBUMIN SERPL-MCNC: 4.3 G/DL (ref 3.4–5)
ALP BLD-CCNC: 102 U/L (ref 40–129)
ALT SERPL-CCNC: 19 U/L (ref 10–40)
ANION GAP SERPL CALCULATED.3IONS-SCNC: 12 MMOL/L (ref 3–16)
AST SERPL-CCNC: 22 U/L (ref 15–37)
BASOPHILS ABSOLUTE: 0 K/UL (ref 0–0.2)
BASOPHILS RELATIVE PERCENT: 0.7 %
BILIRUB SERPL-MCNC: 0.7 MG/DL (ref 0–1)
BUN BLDV-MCNC: 14 MG/DL (ref 7–20)
CALCIUM SERPL-MCNC: 9.3 MG/DL (ref 8.3–10.6)
CHLORIDE BLD-SCNC: 96 MMOL/L (ref 99–110)
CHOLESTEROL, TOTAL: 161 MG/DL (ref 0–199)
CO2: 30 MMOL/L (ref 21–32)
CREAT SERPL-MCNC: 0.7 MG/DL (ref 0.6–1.2)
EOSINOPHILS ABSOLUTE: 0.1 K/UL (ref 0–0.6)
EOSINOPHILS RELATIVE PERCENT: 2.1 %
GFR AFRICAN AMERICAN: >60
GFR NON-AFRICAN AMERICAN: >60
GLOBULIN: 2.8 G/DL
GLUCOSE BLD-MCNC: 105 MG/DL (ref 70–99)
HCT VFR BLD CALC: 37 % (ref 36–48)
HDLC SERPL-MCNC: 62 MG/DL (ref 40–60)
HEMOGLOBIN: 12.4 G/DL (ref 12–16)
LDL CHOLESTEROL CALCULATED: 85 MG/DL
LYMPHOCYTES ABSOLUTE: 2.3 K/UL (ref 1–5.1)
LYMPHOCYTES RELATIVE PERCENT: 34.1 %
MCH RBC QN AUTO: 31.4 PG (ref 26–34)
MCHC RBC AUTO-ENTMCNC: 33.5 G/DL (ref 31–36)
MCV RBC AUTO: 93.6 FL (ref 80–100)
MONOCYTES ABSOLUTE: 0.6 K/UL (ref 0–1.3)
MONOCYTES RELATIVE PERCENT: 9.3 %
NEUTROPHILS ABSOLUTE: 3.6 K/UL (ref 1.7–7.7)
NEUTROPHILS RELATIVE PERCENT: 53.8 %
PDW BLD-RTO: 13 % (ref 12.4–15.4)
PLATELET # BLD: 383 K/UL (ref 135–450)
PMV BLD AUTO: 7.6 FL (ref 5–10.5)
POTASSIUM SERPL-SCNC: 4.3 MMOL/L (ref 3.5–5.1)
RBC # BLD: 3.95 M/UL (ref 4–5.2)
SODIUM BLD-SCNC: 138 MMOL/L (ref 136–145)
TOTAL PROTEIN: 7.1 G/DL (ref 6.4–8.2)
TRIGL SERPL-MCNC: 68 MG/DL (ref 0–150)
TSH SERPL DL<=0.05 MIU/L-ACNC: 3.03 UIU/ML (ref 0.27–4.2)
VLDLC SERPL CALC-MCNC: 14 MG/DL
WBC # BLD: 6.7 K/UL (ref 4–11)

## 2018-12-07 PROCEDURE — 36415 COLL VENOUS BLD VENIPUNCTURE: CPT

## 2018-12-07 PROCEDURE — 84443 ASSAY THYROID STIM HORMONE: CPT

## 2018-12-07 PROCEDURE — 85025 COMPLETE CBC W/AUTO DIFF WBC: CPT

## 2018-12-07 PROCEDURE — 80053 COMPREHEN METABOLIC PANEL: CPT

## 2018-12-07 PROCEDURE — 80061 LIPID PANEL: CPT

## 2018-12-20 ENCOUNTER — HOSPITAL ENCOUNTER (OUTPATIENT)
Dept: WOMENS IMAGING | Age: 68
Discharge: HOME | End: 2018-12-20
Payer: COMMERCIAL

## 2018-12-20 DIAGNOSIS — Z78.0 POST-MENOPAUSAL: ICD-10-CM

## 2018-12-20 PROCEDURE — 77080 DXA BONE DENSITY AXIAL: CPT

## 2019-01-14 RX ORDER — CHOLECALCIFEROL (VITAMIN D3) 25 MCG
CAPSULE ORAL
Qty: 90 CAPSULE | Refills: 1 | Status: SHIPPED | OUTPATIENT
Start: 2019-01-14 | End: 2019-07-09 | Stop reason: SDUPTHER

## 2019-01-22 DIAGNOSIS — G47.00 INSOMNIA, UNSPECIFIED TYPE: ICD-10-CM

## 2019-01-22 DIAGNOSIS — K59.09 CHRONIC CONSTIPATION: ICD-10-CM

## 2019-01-22 RX ORDER — LINACLOTIDE 145 UG/1
290 CAPSULE, GELATIN COATED ORAL
Qty: 180 CAPSULE | Refills: 1 | Status: SHIPPED | OUTPATIENT
Start: 2019-01-22 | End: 2019-07-15 | Stop reason: SDUPTHER

## 2019-01-23 RX ORDER — OMEPRAZOLE MAGNESIUM 20 MG
3 CAPSULE,DELAYED RELEASE (ENTERIC COATED) ORAL NIGHTLY
Qty: 90 TABLET | Refills: 1 | Status: SHIPPED | OUTPATIENT
Start: 2019-01-23 | End: 2019-01-24 | Stop reason: SDUPTHER

## 2019-01-24 DIAGNOSIS — G47.00 INSOMNIA, UNSPECIFIED TYPE: ICD-10-CM

## 2019-01-25 RX ORDER — OMEPRAZOLE MAGNESIUM 20 MG
3 CAPSULE,DELAYED RELEASE (ENTERIC COATED) ORAL NIGHTLY
Qty: 90 TABLET | Refills: 1 | Status: SHIPPED | OUTPATIENT
Start: 2019-01-25

## 2019-03-25 ENCOUNTER — TELEPHONE (OUTPATIENT)
Dept: FAMILY MEDICINE CLINIC | Age: 69
End: 2019-03-25

## 2019-03-25 DIAGNOSIS — I51.9 HEART DISEASE: Primary | ICD-10-CM

## 2019-05-22 ENCOUNTER — OFFICE VISIT (OUTPATIENT)
Dept: FAMILY MEDICINE CLINIC | Age: 69
End: 2019-05-22
Payer: COMMERCIAL

## 2019-05-22 VITALS
WEIGHT: 177.8 LBS | SYSTOLIC BLOOD PRESSURE: 112 MMHG | HEIGHT: 65 IN | OXYGEN SATURATION: 97 % | DIASTOLIC BLOOD PRESSURE: 70 MMHG | BODY MASS INDEX: 29.62 KG/M2 | HEART RATE: 65 BPM

## 2019-05-22 DIAGNOSIS — G56.01 CARPAL TUNNEL SYNDROME OF RIGHT WRIST: ICD-10-CM

## 2019-05-22 DIAGNOSIS — Z91.81 AT HIGH RISK FOR FALLS: ICD-10-CM

## 2019-05-22 DIAGNOSIS — E11.9 TYPE 2 DIABETES MELLITUS WITHOUT COMPLICATION, WITHOUT LONG-TERM CURRENT USE OF INSULIN (HCC): Primary | ICD-10-CM

## 2019-05-22 LAB
CREATININE URINE POCT: 200
HBA1C MFR BLD: 6 %
MICROALBUMIN/CREAT 24H UR: 30 MG/G{CREAT}
MICROALBUMIN/CREAT UR-RTO: <30

## 2019-05-22 PROCEDURE — 83036 HEMOGLOBIN GLYCOSYLATED A1C: CPT | Performed by: NURSE PRACTITIONER

## 2019-05-22 PROCEDURE — 1123F ACP DISCUSS/DSCN MKR DOCD: CPT | Performed by: NURSE PRACTITIONER

## 2019-05-22 PROCEDURE — 1090F PRES/ABSN URINE INCON ASSESS: CPT | Performed by: NURSE PRACTITIONER

## 2019-05-22 PROCEDURE — 99214 OFFICE O/P EST MOD 30 MIN: CPT | Performed by: NURSE PRACTITIONER

## 2019-05-22 PROCEDURE — 3044F HG A1C LEVEL LT 7.0%: CPT | Performed by: NURSE PRACTITIONER

## 2019-05-22 PROCEDURE — 2022F DILAT RTA XM EVC RTNOPTHY: CPT | Performed by: NURSE PRACTITIONER

## 2019-05-22 PROCEDURE — G8419 CALC BMI OUT NRM PARAM NOF/U: HCPCS | Performed by: NURSE PRACTITIONER

## 2019-05-22 PROCEDURE — G8399 PT W/DXA RESULTS DOCUMENT: HCPCS | Performed by: NURSE PRACTITIONER

## 2019-05-22 PROCEDURE — 1036F TOBACCO NON-USER: CPT | Performed by: NURSE PRACTITIONER

## 2019-05-22 PROCEDURE — 3017F COLORECTAL CA SCREEN DOC REV: CPT | Performed by: NURSE PRACTITIONER

## 2019-05-22 PROCEDURE — G8428 CUR MEDS NOT DOCUMENT: HCPCS | Performed by: NURSE PRACTITIONER

## 2019-05-22 PROCEDURE — 4040F PNEUMOC VAC/ADMIN/RCVD: CPT | Performed by: NURSE PRACTITIONER

## 2019-05-22 PROCEDURE — 82044 UR ALBUMIN SEMIQUANTITATIVE: CPT | Performed by: NURSE PRACTITIONER

## 2019-05-22 RX ORDER — QUETIAPINE FUMARATE 100 MG/1
150 TABLET, FILM COATED ORAL NIGHTLY
COMMUNITY

## 2019-05-22 RX ORDER — CETIRIZINE HYDROCHLORIDE 10 MG/1
10 TABLET ORAL DAILY
COMMUNITY

## 2019-05-22 ASSESSMENT — ENCOUNTER SYMPTOMS
ABDOMINAL PAIN: 0
COUGH: 0
EYE PAIN: 0
WHEEZING: 0
PHOTOPHOBIA: 0
RECTAL PAIN: 0
EYE REDNESS: 0
ABDOMINAL DISTENTION: 0
STRIDOR: 0
EYE DISCHARGE: 0
SHORTNESS OF BREATH: 0
TROUBLE SWALLOWING: 0
APNEA: 0
CHOKING: 0
COLOR CHANGE: 0
VOICE CHANGE: 0
FACIAL SWELLING: 0
CHEST TIGHTNESS: 0

## 2019-05-22 NOTE — PROGRESS NOTES
HPI: Nubia Bai is a 77 y.o. female who presents for diabetes follow up. She is new to me in the office today as her primary provider is not available. She states she has not checked her blood sugar at home due to \"laziness\". She tries to stick to a healthy diet & takes her metformin consistantly. Her POC A1C today is 6.0. She states she is doing well. She is able to live on her own, working a [employer] store. She describes her mental health as good & states she is much better from two years ago. She denies any suicidal ideation, thought, intent, or plan. Diabetes education provided today:    Foot care: advised to wash feet daily, pat dry and apply lotion at night, avoiding between toes. Need to look at feet daily and report to a physician any signs of inflammation or skin damage. Discussed diabetes shoes and socks. Fats: Good fats and bad fats, meal planning and supplements. Physical activity: advised to exercise 5-7 days a week 30-60 mins at least. Discussed how it affects BS readings. Steroids and effects on blood sugars. Visual inspection:  Deformity/amputation: absent  Skin lesions/pre-ulcerative calluses: absent  Edema: right- negative, left- negative    Sensory exam:  Monofilament sensation: normal  (minimum of 5 random plantar locations tested, avoiding callused areas - > 1 area with absence of sensation is + for neuropathy)    Plus at least one of the following:  Pulses: normal,   Pinprick: Intact  Proprioception: Intact  Vibration (128 Hz): Intact    She declines any need of refills on her medication today. I encouraged her to call when she starts to run low. She she's her psychiatrist every 3 months. She states her psychiatrist fills her Cymbalta and Seroquel.      Today she request a referral for Dr. Yossi Hubbard as she has chronic carpal tunnel of the right hand/wrist. She states he repaired the left already and was supposed to get the right done, but has been putting it off. She now has (acute) increase signs & symptoms: It has been waking her up at night with burning and tingling. When she sits down and rest her arm she gets a burning, sharp sensation that's radiated up the arm from the right hand. She reports decreased strength in opening jars and carrying objects. She has had no injury to the hand/wrist or arm over the past few years.      Past Medical History:   Diagnosis Date    Borderline personality disorder (HCC)     Chronic pain     Colon disorder     hard time pushing stool out    Constipation     Diabetes mellitus ([address]     Diabetic neuropathy [facility] [address]     Electric shock     ECT therapy    Hyperlipidemia     Hypertension     Insomnia     [facility] depressive disorder, recurrent ([facility])     Morbid obesity ([facility])     Osteoarthrosis     RLS (restless legs syndrome)     Suicidal ideation     Unspecified hypothyroidism     Vitamin deficiency        Past Surgical History:   Procedure Laterality Date    APPENDECTOMY      BACK SURGERY      CARPAL TUNNEL RELEASE Left 2017    COLONOSCOPY      ELBOW SURGERY Right     for pinched nerve    JOINT REPLACEMENT Bilateral     knees    KNEE SURGERY Left     meniscus    SHOULDER ARTHROSCOPY Right 2016    Right Total Shoulder Arthroscopy with Reverse Ball and Socket Deta Prosthesis    SHOULDER ARTHROSCOPY Left 2018    subacromial decompression, rotator cuff repair, biceps stump debridement    TONSILLECTOMY         Social History     Tobacco Use    Smoking status: Former Smoker     Packs/day: 2.00     Years: 20.00     Pack years: 40.00     Types: Cigarettes     Start date: 1971     Last attempt to quit: 1991     Years since quittin.4    Smokeless tobacco: Never Used   Substance Use Topics    Alcohol use: No    Drug use: No       Family History   Problem Relation Age of Onset    Breast Cancer Mother     Stroke Father        Review of Systems   Constitutional: Negative for No respiratory distress. She has no rales. She exhibits no tenderness. Abdominal: Soft. Bowel sounds are normal. She exhibits no distension and no mass. There is no tenderness. There is no rebound and no guarding. No hernia. Musculoskeletal: Normal range of motion. She exhibits no edema, tenderness or deformity. Right  strength decreased compared to left on assessment. Lymphadenopathy:     She has no cervical adenopathy. Neurological: She is alert and oriented to person, place, and time. No cranial nerve deficit or sensory deficit. Coordination normal.   Skin: Skin is warm and dry. Capillary refill takes 2 to 3 seconds. No rash noted. She is not diaphoretic. No erythema. No pallor. Psychiatric: She has a normal mood and affect. Her behavior is normal. Thought content normal.   Vitals reviewed. Vitals:    05/22/19 0811   BP: 112/70   Site: Left Upper Arm   Position: Sitting   Cuff Size: Large Adult   Pulse: 65   SpO2: 97%   Weight: 177 lb 12.8 oz (80.6 kg)   Height: 5' 5\" (1.651 m)       Assessment/Plan:  1. Type 2 diabetes mellitus without complication, without long-term current use of insulin ([facility])  -continue healthy diet & good feet care including wearing supportive shoe wear. - POCT glycosylated hemoglobin (Hb A1C)  - POCT microalbumin    2. Carpal Tunnel Syndrome right Wrist  -referral to Dr. Gabriela Chávez ([phone] of patients choice)     3.  Depression:   -stable  -continue psychotherapy (every two weeks)  -Continue psychiatrist medication regimen      Outpatient Encounter Medications as of 5/22/2019   Medication Sig Dispense Refill    cetirizine (ZYRTEC ALLERGY) 10 MG tablet Take 10 mg by mouth daily      QUEtiapine (SEROQUEL) 100 MG tablet Take 100 mg by mouth daily      [facility] MELATONIN 3 MG TABS tablet TAKE 1 TABLET BY MOUTH NIGHTLY 90 tablet 1    LINZESS 145 MCG capsule TAKE 2 CAPSULES BY MOUTH EVERY MORNING (BEFORE BREAKFAST) 180 capsule 1    [facility] D3 1000 units CAPS TAKE 1 CAPSULE BY MOUTH EVERY DAY 90 capsule 1    atorvastatin (LIPITOR) 20 MG tablet TAKE 1 TABLET BY MOUTH DAILY 90 tablet 1    rOPINIRole (REQUIP) 1 MG tablet TAKE 1 TABLET BY MOUTH NIGHTLY 90 tablet 1    levothyroxine (SYNTHROID) 75 MCG tablet TAKE 1 TABLET BY MOUTH DAILY 90 tablet 1    hydrochlorothiazide (HYDRODIURIL) 25 MG tablet TAKE 1 TABLET BY MOUTH DAILY 90 tablet 1    spironolactone (ALDACTONE) 50 MG tablet TAKE 1 TABLET BY MOUTH DAILY 90 tablet 1    metFORMIN (GLUCOPHAGE) 500 MG tablet TAKE 1 TABLET BY MOUTH DAILY AT 4 PM 90 tablet 1    [facility] ASPIRIN CHILD 81 MG chewable tablet TAKE 1 TABLET BY MOUTH DAILY 90 tablet 1    DULoxetine (CYMBALTA) 60 MG extended release capsule TAKE 1 CAPSULE BY MOUTH 2 TIMES DAILY 180 capsule 1    B Complex Vitamins (VITAMIN B COMPLEX) TABS Take 1 tablet by mouth 2 times daily 180 tablet 1    bisacodyl (DULCOLAX) 5 MG EC tablet Take 10 mg by mouth 2 times daily       [DISCONTINUED] escitalopram (LEXAPRO) 20 MG tablet TAKE 1 TABLET BY MOUTH DAILY 90 tablet 1    [DISCONTINUED] brexpiprazole (REXULTI) 1 MG TABS tablet Take 1 mg by mouth Daily      [DISCONTINUED] brexpiprazole (REXULTI) 1 MG TABS tablet Take 1 mg by mouth daily      [DISCONTINUED] Vitamins-Lipotropics ([facility] BALANCED B50) TABS TAKE 1 TABLET BY MOUTH TWICE A DAY (Patient taking differently: TAKE 1 TABLET BY MOUTH DAILY) 180 tablet 1    [DISCONTINUED] QUEtiapine (SEROQUEL XR) 150 MG TB24 extended release tablet Take 1 tablet by mouth nightly 90 tablet 1    [DISCONTINUED] acetaminophen (TYLENOL) 325 MG tablet Take 650 mg by mouth every 6 hours as needed for Pain. No facility-administered encounter medications on file as of 5/22/2019. Argelia Buchanan CNP      On the basis of positive falls risk screening, assessment and plan is as follows: home safety tips provided.

## 2019-05-22 NOTE — PATIENT INSTRUCTIONS
Patient Education        Learning About Foot and Toenail Care  Foot and toenail care: Overview  Checking your loved one's feet and keeping them clean and soft can help prevent cracks and infection in the skin. This is especially important for people who have diabetes. Keeping toenails trimmed--and polished if that's what the person likes--also helps the person feel well-groomed. If the person you care for has diabetes or has foot problems, such as bad bunions and corns, think about taking them to see a podiatrist. This is a doctor who specializes in the care of the feet. Sometimes a podiatrist will come to the home if the person can't go out for visits. Try to take the person for salon pedicures if that is what they want. It's a chance to get out and see people and continue a favorite activity. You can do basic nail care at home. Usually all you need to do is keep the nails clean and at a safe length. How do you trim someone's toenails? Try to trim the person's nails every week. Or check the nails each week to see if they need to be trimmed. It's easiest to trim nails after the person has had a shower or foot bath. It makes the nails softer and easier to trim. Start by gathering your supplies. You will need toenail clippers and a nail file. You may also need nail polish and nail polish remover. To trim the nails:  1. Wash and dry your hands. You don't need to wear gloves. 2. Use nail polish remover to take off any polish. 3. Hold the person's foot and toe steady with one hand while you trim the nail with your other hand. Trim the nails straight across. Leave the nails a little longer at the corners so that the sharp ends don't cut into the skin. 4. Keep the nails no longer than the tip of the toes. 5. Let the nails dry if they are still damp and soft. 6. Use a nail file to gently smooth the edges of the nails, especially at the corners. They may be sharp after the nails are cut straight.   7. Apply nail

## 2019-05-26 DIAGNOSIS — E78.00 HYPERCHOLESTEREMIA: ICD-10-CM

## 2019-05-26 DIAGNOSIS — F32.1 MODERATE MAJOR DEPRESSION (HCC): Chronic | ICD-10-CM

## 2019-05-26 DIAGNOSIS — E03.9 HYPOTHYROIDISM, UNSPECIFIED TYPE: Chronic | ICD-10-CM

## 2019-05-26 DIAGNOSIS — E11.9 TYPE 2 DIABETES MELLITUS WITHOUT COMPLICATION, WITHOUT LONG-TERM CURRENT USE OF INSULIN (HCC): ICD-10-CM

## 2019-05-26 DIAGNOSIS — G25.81 RLS (RESTLESS LEGS SYNDROME): ICD-10-CM

## 2019-05-26 DIAGNOSIS — I10 ESSENTIAL HYPERTENSION: Chronic | ICD-10-CM

## 2019-05-28 RX ORDER — HYDROCHLOROTHIAZIDE 25 MG/1
25 TABLET ORAL DAILY
Qty: 90 TABLET | Refills: 1 | Status: SHIPPED | OUTPATIENT
Start: 2019-05-28 | End: 2019-11-18 | Stop reason: SDUPTHER

## 2019-05-28 RX ORDER — LORATADINE 10 MG
TABLET ORAL
Qty: 90 TABLET | Refills: 1 | Status: SHIPPED | OUTPATIENT
Start: 2019-05-28 | End: 2019-11-18 | Stop reason: SDUPTHER

## 2019-05-28 RX ORDER — ESCITALOPRAM OXALATE 20 MG/1
20 TABLET ORAL DAILY
Qty: 90 TABLET | Refills: 1 | Status: SHIPPED | OUTPATIENT
Start: 2019-05-28 | End: 2019-05-31 | Stop reason: ALTCHOICE

## 2019-05-28 RX ORDER — ROPINIROLE 1 MG/1
1 TABLET, FILM COATED ORAL NIGHTLY
Qty: 90 TABLET | Refills: 1 | Status: SHIPPED | OUTPATIENT
Start: 2019-05-28 | End: 2019-08-08

## 2019-05-28 RX ORDER — SPIRONOLACTONE 50 MG/1
50 TABLET, FILM COATED ORAL DAILY
Qty: 90 TABLET | Refills: 1 | Status: SHIPPED | OUTPATIENT
Start: 2019-05-28 | End: 2019-11-18 | Stop reason: SDUPTHER

## 2019-05-28 RX ORDER — LEVOTHYROXINE SODIUM 0.07 MG/1
75 TABLET ORAL DAILY
Qty: 90 TABLET | Refills: 1 | Status: SHIPPED | OUTPATIENT
Start: 2019-05-28 | End: 2019-11-18 | Stop reason: SDUPTHER

## 2019-05-28 RX ORDER — ATORVASTATIN CALCIUM 20 MG/1
20 TABLET, FILM COATED ORAL DAILY
Qty: 90 TABLET | Refills: 1 | Status: SHIPPED | OUTPATIENT
Start: 2019-05-28 | End: 2019-11-04 | Stop reason: SDUPTHER

## 2019-05-29 ENCOUNTER — OFFICE VISIT (OUTPATIENT)
Dept: ORTHOPEDIC SURGERY | Age: 69
End: 2019-05-29
Payer: COMMERCIAL

## 2019-05-29 VITALS — WEIGHT: 177.69 LBS | HEIGHT: 65 IN | RESPIRATION RATE: 16 BRPM | BODY MASS INDEX: 29.61 KG/M2

## 2019-05-29 DIAGNOSIS — M79.641 RIGHT HAND PAIN: Primary | ICD-10-CM

## 2019-05-29 PROCEDURE — 1090F PRES/ABSN URINE INCON ASSESS: CPT | Performed by: ORTHOPAEDIC SURGERY

## 2019-05-29 PROCEDURE — G8419 CALC BMI OUT NRM PARAM NOF/U: HCPCS | Performed by: ORTHOPAEDIC SURGERY

## 2019-05-29 PROCEDURE — 1123F ACP DISCUSS/DSCN MKR DOCD: CPT | Performed by: ORTHOPAEDIC SURGERY

## 2019-05-29 PROCEDURE — G8427 DOCREV CUR MEDS BY ELIG CLIN: HCPCS | Performed by: ORTHOPAEDIC SURGERY

## 2019-05-29 PROCEDURE — 4040F PNEUMOC VAC/ADMIN/RCVD: CPT | Performed by: ORTHOPAEDIC SURGERY

## 2019-05-29 PROCEDURE — 3017F COLORECTAL CA SCREEN DOC REV: CPT | Performed by: ORTHOPAEDIC SURGERY

## 2019-05-29 PROCEDURE — 1036F TOBACCO NON-USER: CPT | Performed by: ORTHOPAEDIC SURGERY

## 2019-05-29 PROCEDURE — G8399 PT W/DXA RESULTS DOCUMENT: HCPCS | Performed by: ORTHOPAEDIC SURGERY

## 2019-05-29 PROCEDURE — 99213 OFFICE O/P EST LOW 20 MIN: CPT | Performed by: ORTHOPAEDIC SURGERY

## 2019-05-29 NOTE — PROGRESS NOTES
Assessment: EMG documented severe right carpal tunnel syndrome. We performed a left carpal tunnel release on her last year with excellent result. Treatment Plan: We discussed this and benefits of doing a right carpal tunnel release and scheduled her for surgery    Return for post op. Chief Complaint:  Hand Pain (Had Left CTR 2018, she was scheduled for right side and had to postpone)      History of Present Illness  Danie Lynn is a 77 y.o. female.   Location: right hand Severity: burning pain for many years Duration: years Modifying factors: hanging hand down sometimes helps with symptoms  Associated symptoms: numbness and tingling    Contributory History  Around 10 year history of diabetes, takes Metformin    Medical History    Current Outpatient Medications on File Prior to Visit   Medication Sig Dispense Refill    rOPINIRole (REQUIP) 1 MG tablet TAKE 1 TABLET BY MOUTH NIGHTLY 90 tablet 1    hydrochlorothiazide (HYDRODIURIL) 25 MG tablet TAKE 1 TABLET BY MOUTH DAILY 90 tablet 1    metFORMIN (GLUCOPHAGE) 500 MG tablet TAKE 1 TABLET BY MOUTH DAILY AT 4 PM 90 tablet 1    atorvastatin (LIPITOR) 20 MG tablet TAKE 1 TABLET BY MOUTH DAILY 90 tablet 1    spironolactone (ALDACTONE) 50 MG tablet TAKE 1 TABLET BY MOUTH DAILY 90 tablet 1    levothyroxine (SYNTHROID) 75 MCG tablet TAKE 1 TABLET BY MOUTH DAILY 90 tablet 1    escitalopram (LEXAPRO) 20 MG tablet TAKE 1 TABLET BY MOUTH DAILY 90 tablet 1    CVS ASPIRIN ADULT LOW DOSE 81 MG chewable tablet TAKE 1 TABLET BY MOUTH DAILY 90 tablet 1    cetirizine (ZYRTEC ALLERGY) 10 MG tablet Take 10 mg by mouth daily      QUEtiapine (SEROQUEL) 100 MG tablet Take 100 mg by mouth daily      CVS MELATONIN 3 MG TABS tablet TAKE 1 TABLET BY MOUTH NIGHTLY 90 tablet 1    LINZESS 145 MCG capsule TAKE 2 CAPSULES BY MOUTH EVERY MORNING (BEFORE BREAKFAST) 180 capsule 1    CVS D3 1000 units CAPS TAKE 1 CAPSULE BY MOUTH EVERY DAY 90 capsule 1    DULoxetine (CYMBALTA) 60 MG extended release capsule TAKE 1 CAPSULE BY MOUTH 2 TIMES DAILY 180 capsule 1    B Complex Vitamins (VITAMIN B COMPLEX) TABS Take 1 tablet by mouth 2 times daily 180 tablet 1    bisacodyl (DULCOLAX) 5 MG EC tablet Take 10 mg by mouth 2 times daily        No current facility-administered medications on file prior to visit. Past Medical History:   Diagnosis Date    [facility]     Chronic pain     Colon disorder     hard time pushing stool out    Constipation     Diabetes mellitus ([facility]     Diabetic neuropathy ([facility]     Electric shock     ECT therapy    Hyperlipidemia     Hypertension     Insomnia     Major depressive disorder, recurrent (HCC)     Morbid obesity (HCC)     Osteoarthrosis     RLS (restless legs syndrome)     Suicidal ideation     Unspecified hypothyroidism     Vitamin deficiency      Allergies   Allergen Reactions    Levaquin [Levofloxacin In D5w]      Unknown reaction    Levofloxacin     Lidocaine Hives     Tested at allergist for marcaine and procaine - tested negative for these allergies . Reaction was noted after dental procedure when she was a child-probable Novacaine    Pcn [Penicillins] Hives     Social History     Socioeconomic History    Marital status:      Spouse name: Not on file    Number of children: Not on file    Years of education: Not on file    Highest education level: Not on file   Occupational History    Not on file   Social Needs    Financial resource strain: Not on file    Food insecurity:     Worry: Not on file     Inability: Not on file    Transportation needs:     Medical: Not on file     Non-medical: Not on file   Tobacco Use    Smoking status: Former Smoker     Packs/day: 2.00     Years: 20.00     Pack years: 40.00     Types: Cigarettes     Start date: 1971     Last attempt to quit: 1991     Years since quittin.5    Smokeless tobacco: Never Used   Substance and Sexual Activity    Alcohol use: No    Drug use: No    Sexual activity: Not on file   Lifestyle    Physical activity:     Days per week: Not on file     Minutes per session: Not on file    Stress: Not on file   Relationships    Social connections:     Talks on phone: Not on file     Gets together: Not on file     Attends Confucianist service: Not on file     Active member of club or organization: Not on file     Attends meetings of clubs or organizations: Not on file     Relationship status: Not on file    Intimate partner violence:     Fear of current or ex partner: Not on file     Emotionally abused: Not on file     Physically abused: Not on file     Forced sexual activity: Not on file   Other Topics Concern    Not on file   Social History Narrative    Not on file     Family History   Problem Relation Age of Onset    Breast Cancer Mother     Stroke Father        Patient's medications, allergies, past medical, surgical, social and family histories were reviewed and updated as appropriate. Review of Systems  Pertinent items are noted in HPI  Denies fever chills, confusion and bowel and bladder active change  Complete Review of Systems reviewed from patient history form dated 5/29/19 and available in the patients chart under the media tab. Vital Signs  Vitals:    05/29/19 0911   Resp: 16   Weight: 177 lb 11.1 oz (80.6 kg)   Height: 5' 5\" (1.651 m)     Body mass index is 29.57 kg/m². Physical Exam  Constitutional:  Normal nutrition  Mental Status:  Normal mental status  Skin:  No rashes or erythema  Lymphatic: No lymphadenopathy    Right Hand Examination:  Inspection:  No intrinsic atrophy or swelling  Finger Range of Motion:  Full  Wrist Range of Motion:  Normal  Vascular Exam:  Normal rate arterial pulses normal capillary refill  Neurologic Exam: Tinel sign is negative in the supraclavicular fossa, mid brachium, cubital tunnel, pronator teres regions.   It is positive in the carpal tunnel and Phalen's test is positive  Intrinsic Muscle Strength:  Normal median and ulnar  Extrinsic Muscle Strength: Normal  Special Tests:  Negative Riddle's maneuver    Left Hand Examination:  Inspection:  Healed carpal tunnel release scar no atrophy  Finger Range of Motion:  Full  Wrist Range of Motion:  Normal  Vascular Exam:  Normal capillary refill  Neurologic Exam: Negative Tinel's and Phalen's  Intrinsic Muscle Strength:  Normal  Extrinsic Muscle Strength: Normal  Special Tests:      Neck Exam:  No paraspinous tenderness    Additional Comments:     Additional Examinations:  X-Ray Findings:  PA lateral and carpal tunnel view x-rays of the right hand show early grade 2 osseous arthritis of the 1st carpometacarpal joint but no other significant pathology  Additional Diagnostic Test Findings: Previous EMGs documented severe right carpal tunnel syndrome    Office Procedures:      I spent 15 minutes, face to face, and greater than 50% was spent counseling with the patient discussing treatment options and answering questions regarding risk and benefits of carpal tunnel release including the different expected outcomes from doing the same procedure on both sides    This dictation was performed with a verbal recognition program. It is possible that there are still dictated errors within this office note. All efforts were made to ensure that this office note is accurate. Orders Placed This Encounter   Procedures    XR WRIST RIGHT (MIN 3 VIEWS)     Standing Status:   Future     Number of Occurrences:   1     Standing Expiration Date:   5/29/2020       Attestation: I have reviewed the chief complaint and history of present illness (including ROS and PFSH) and vital documentation by my staff and I agree with their documentation and have added where applicable.

## 2019-05-31 ENCOUNTER — OFFICE VISIT (OUTPATIENT)
Dept: FAMILY MEDICINE CLINIC | Age: 69
End: 2019-05-31
Payer: COMMERCIAL

## 2019-05-31 VITALS
SYSTOLIC BLOOD PRESSURE: 128 MMHG | DIASTOLIC BLOOD PRESSURE: 82 MMHG | WEIGHT: 179 LBS | HEIGHT: 65 IN | OXYGEN SATURATION: 95 % | BODY MASS INDEX: 29.82 KG/M2 | TEMPERATURE: 98.1 F | HEART RATE: 73 BPM

## 2019-05-31 DIAGNOSIS — Z01.811 PRE-OP CHEST EXAM: Primary | ICD-10-CM

## 2019-05-31 PROCEDURE — 93000 ELECTROCARDIOGRAM COMPLETE: CPT | Performed by: FAMILY MEDICINE

## 2019-05-31 PROCEDURE — 99214 OFFICE O/P EST MOD 30 MIN: CPT | Performed by: FAMILY MEDICINE

## 2019-05-31 PROCEDURE — 1090F PRES/ABSN URINE INCON ASSESS: CPT | Performed by: FAMILY MEDICINE

## 2019-05-31 PROCEDURE — G8419 CALC BMI OUT NRM PARAM NOF/U: HCPCS | Performed by: FAMILY MEDICINE

## 2019-05-31 PROCEDURE — G8427 DOCREV CUR MEDS BY ELIG CLIN: HCPCS | Performed by: FAMILY MEDICINE

## 2019-05-31 ASSESSMENT — ENCOUNTER SYMPTOMS
COLOR CHANGE: 0
NAUSEA: 0
ABDOMINAL PAIN: 0
EYE DISCHARGE: 0
SHORTNESS OF BREATH: 0
EYE REDNESS: 0

## 2019-05-31 NOTE — PROGRESS NOTES
Preoperative Consultation      Angelia Blanco  YOB: 1950    Date of Service:  5/31/2019  This patient presents to the office today for a preoperative consultation at the request of surgeon, Dr. Woodrow Chaparro for carpal tunnel release 6//  EMG showed severe carpal tunnel syndrome in Right hand  For years   getting worse with time. Worse with hanging hand downward. She has hx of left carpal tunnel release which she tolerated well and has improvement in.  , Conservative therapy of right hand, did not resolve condition. Complicated by DM and htn. bp controlled today, DM at goal with A1C of 6  No hx of allergy to anesthesia in her or her family  Lab Results   Component Value Date    LABA1C 6.0 05/22/2019     Lab Results   Component Value Date    .8 07/06/2017       Vitals:    05/31/19 0800 05/31/19 0806   BP: (!) 148/96 128/82   Site: Left Upper Arm Right Upper Arm   Position: Sitting Sitting   Cuff Size: Medium Adult Medium Adult   Pulse: 73    Temp: 98.1 °F (36.7 °C)    SpO2: 95%    Weight: 179 lb (81.2 kg)    Height: 5' 5\" (1.651 m)       Wt Readings from Last 2 Encounters:   05/31/19 179 lb (81.2 kg)   05/29/19 177 lb 11.1 oz (80.6 kg)     BP Readings from Last 3 Encounters:   05/31/19 128/82   05/22/19 112/70   11/09/18 116/62        Chief Complaint   Patient presents with   [facility] Pre-op Exam     Right hand Carpal tunnel/ Dr. Olive Shah/ 6/11/19w     Allergies   Allergen Reactions    Levaquin [Levofloxacin In D5w]      Unknown reaction    Levofloxacin     Lidocaine Hives     Tested at allergist for marcaine and procaine - tested negative for these allergies . Reaction was noted after dental procedure when she was a child-probable Novacaine    Pcn [Penicillins] Hives     Outpatient Medications Marked as Taking for the 5/31/19 encounter (Office Visit) with Natalia Scanlonr, DO   Medication Sig Dispense Refill    rOPINIRole (REQUIP) 1 MG tablet TAKE 1 TABLET BY MOUTH NIGHTLY 90 tablet 1  hydrochlorothiazide (HYDRODIURIL) 25 MG tablet TAKE 1 TABLET BY MOUTH DAILY 90 tablet 1    metFORMIN (GLUCOPHAGE) 500 MG tablet TAKE 1 TABLET BY MOUTH DAILY AT 4 PM 90 tablet 1    atorvastatin (LIPITOR) 20 MG tablet TAKE 1 TABLET BY MOUTH DAILY 90 tablet 1    spironolactone (ALDACTONE) 50 MG tablet TAKE 1 TABLET BY MOUTH DAILY 90 tablet 1    levothyroxine (SYNTHROID) 75 MCG tablet TAKE 1 TABLET BY MOUTH DAILY 90 tablet 1    CVS ASPIRIN ADULT LOW DOSE 81 MG chewable tablet TAKE 1 TABLET BY MOUTH DAILY 90 tablet 1    cetirizine (ZYRTEC ALLERGY) 10 MG tablet Take 10 mg by mouth daily      QUEtiapine (SEROQUEL) 100 MG tablet Take 100 mg by mouth daily      CVS MELATONIN 3 MG TABS tablet TAKE 1 TABLET BY MOUTH NIGHTLY 90 tablet 1    LINZESS 145 MCG capsule TAKE 2 CAPSULES BY MOUTH EVERY MORNING (BEFORE BREAKFAST) 180 capsule 1    CVS D3 1000 units CAPS TAKE 1 CAPSULE BY MOUTH EVERY DAY 90 capsule 1    DULoxetine (CYMBALTA) 60 MG extended release capsule TAKE 1 CAPSULE BY MOUTH 2 TIMES DAILY 180 capsule 1    B Complex Vitamins (VITAMIN B COMPLEX) TABS Take 1 tablet by mouth 2 times daily 180 tablet 1    bisacodyl (DULCOLAX) 5 MG EC tablet Take 10 mg by mouth 2 times daily                  Patient Active Problem List   Diagnosis    CMC arthritis, thumb, degenerative    Complete rotator cuff tear of left shoulder    Vertigo    Encephalopathy    Polypharmacy    Borderline personality disorder (HCC)    Moderate major depression (HCC)    HTN (hypertension)    Hypothyroid    Osteoarthritis of glenohumeral joint    Rotator cuff arthropathy    Right shoulder pain    Status post total shoulder arthroplasty    Chronic constipation    Bilateral carpal tunnel syndrome    Right wrist pain    Left wrist pain    Type 2 diabetes mellitus without complication, without long-term current use of insulin ([facility])    S/P rotator cuff repair       Past Medical History:   Diagnosis Date    [facility]     Chronic pain     Colon disorder     hard time pushing stool out    Constipation     Diabetes mellitus [facility]     Diabetic neuropathy (HCC)     Electric shock     ECT therapy    Hyperlipidemia     Hypertension     Insomnia     Major depressive disorder, recurrent (HCC)     Morbid obesity (HCC)     Osteoarthrosis     RLS (restless legs syndrome)     Suicidal ideation     Unspecified hypothyroidism     Vitamin deficiency      Past Surgical History:   Procedure Laterality Date    APPENDECTOMY      BACK SURGERY      CARPAL TUNNEL RELEASE Left 2017    COLONOSCOPY      ELBOW SURGERY Right     for pinched nerve    JOINT REPLACEMENT Bilateral     knees    KNEE SURGERY Left     meniscus    SHOULDER ARTHROSCOPY Right 2016    Right Total Shoulder Arthroscopy with Reverse Ball and Socket Deta Prosthesis    SHOULDER ARTHROSCOPY Left 2018    subacromial decompression, rotator cuff repair, biceps stump debridement    TONSILLECTOMY       Family History   Problem Relation Age of Onset    Breast Cancer Mother     Stroke Father      Social History     Socioeconomic History    Marital status:      Spouse name: Not on file    Number of children: Not on file    Years of education: Not on file    Highest education level: Not on file   Occupational History    Not on file   Social Needs    Financial resource strain: Not on file    Food insecurity:     Worry: Not on file     Inability: Not on file    Transportation needs:     Medical: Not on file     Non-medical: Not on file   Tobacco Use    Smoking status: Former Smoker     Packs/day: 2.00     Years: 20.00     Pack years: 40.00     Types: Cigarettes     Start date: 1971     Last attempt to quit: 1991     Years since quittin.5    Smokeless tobacco: Never Used   Substance and Sexual Activity    Alcohol use: No    Drug use: No    Sexual activity: Yes   Lifestyle    Physical activity:     Days friction rub. No murmur heard. Pulmonary/Chest: Effort normal and breath sounds normal. No stridor. No respiratory distress. She has no wheezes. She has no rales. Abdominal: Soft. Bowel sounds are normal. She exhibits no mass. There is no tenderness. Musculoskeletal: She exhibits no edema. Right shoulder: She exhibits normal pulse. Right wrist: She exhibits tenderness. + tinnels:Right hand pain and tingling   Lymphadenopathy:     She has no cervical adenopathy. Neurological: She is alert and oriented to person, place, and time. She has normal reflexes. Skin: Skin is warm and dry. No rash noted. She is not diaphoretic. No erythema. No pallor. Psychiatric: She has a normal mood and affect. Her behavior is normal. Judgment and thought content normal.   Nursing note and vitals reviewed. Assessment/Plan:       77 y.o. patient with planned surgery as above. Diagnosis Orders   1. Pre-op chest exam  EKG 12 Lead     Hector Gandhi was seen today for pre-op exam.    Diagnoses and all orders for this visit:    Pre-op chest exam  -     EKG 12 Lead        Pre op completed,   Cleared for surgery  Copy of this pre-op consultationwas sent to the surgeon via [employer], when in the [address] [place] [address] Rd system, as well as faxed to surgical pre op dept., and given to the patient to take with them on the day of surgery. Filled out form,see form.

## 2019-06-11 NOTE — PROGRESS NOTES
Rowena Cleburne    Age 77 y.o.    female    1950    MRN [id number]    Date___________   Arrival Time_____________  OR Time____________Duration____     Procedure(s):  RIGHT OPEN CARPAL TUNNEL RELEASE    Surgeon  ________________________________  Goldstein Linen   General   [place]       Phone [phone] (home)       [address]  Cell Work  ______________________________________________________________________________________________________________________________________________________________________________________________________________________________________________________________________________________________________________________________________________________________    PCP__________________________Phone__________________________________      H&P__________________Bringing      Chart              Epic    DOS           Called_______  EKG__________________Bringing      Chart              Epic    DOS           Called_______  LAB__________________ Bringing      Chart              Epic    DOS           Called_______  [facility] _______Bringing      Chart              Epic    DOS           Called_______      Cardiologist________________________ Phone___________________________      ? Catholic concerns / Waiver on Chart            [employer]________________  ? Pre-op Instructions Given [place]          _________________________________  ? Directions to Surgery Center                          _________________________________  ? Transportation Home_______________      _________________________________  ?  Crutches/Walker__________________        _________________________________      ________Pre-op Orders   _______Transcribed    _______Wt.  ________Pharmacy          _______SCD  ______VTE     ______[employer]  ________Consent

## 2019-06-12 NOTE — PROGRESS NOTES
Obstructive Sleep Apnea (SUE) Screening     Patient:  Jack Donovan    YOB: 1950      Medical Record #:  [id number]                     Date:  6/12/2019     1. Are you a loud and/or regular snorer? []  Yes       [x] No    2. Have you been observed to gasp or stop breathing during sleep? []  Yes       [x] No    3. Do you feel tired or groggy upon awakening or do you awaken with a headache?           []  Yes       [x] No    4. Are you often tired or fatigued during the wake time hours? []  Yes       [x] No    5. Do you fall asleep sitting, reading, watching TV or driving? []  Yes       [x] No    6. Do you often have problems with memory or concentration? []  Yes       [x] No    **If patient's score is ? 3 they are considered high risk for SUE. Notify the anesthesiologist of the high risk and document in focus note. Note:  If the patient's BMI is more than 35 kg m¯² , has neck circumference > 40 cm, and/or high blood pressure the risk is greater (© American Sleep Apnea Association, 2006).

## 2019-06-13 ENCOUNTER — TELEPHONE (OUTPATIENT)
Dept: ORTHOPEDIC SURGERY | Age: 69
End: 2019-06-13

## 2019-06-17 ENCOUNTER — ANESTHESIA EVENT (OUTPATIENT)
Dept: OPERATING ROOM | Age: 69
End: 2019-06-17
Payer: COMMERCIAL

## 2019-06-17 NOTE — ANESTHESIA PRE PROCEDURE
Department of Anesthesiology  Preprocedure Note       Name:  David Adams   Age:  77 y.o.  :  1950                                          MRN:  [id number]         Date:  2019      Surgeon:  Emmy Garcia MD    Procedure: RIGHT OPEN CARPAL TUNNEL RELEASE (Right Hand)    HPI:  David Adams is a 77 y.o. female with right hand burning pain for many years. It gets a little better with hanging hand down sometimes helps with symptoms. She also reports numbness and tingling. MG documented severe right carpal tunnel syndrome. She underwent a left carpal tunnel release on her last year with excellent result. Medications prior to admission:    rOPINIRole (REQUIP) 1 MG tablet TAKE 1 TABLET BY MOUTH NIGHTLY   hydrochlorothiazide (HYDRODIURIL) 25 MG tablet TAKE 1 TABLET BY MOUTH DAILY   metFORMIN (GLUCOPHAGE) 500 MG tablet TAKE 1 TABLET BY MOUTH DAILY AT 4 PM   atorvastatin (LIPITOR) 20 MG tablet TAKE 1 TABLET BY MOUTH DAILY   spironolactone (ALDACTONE) 50 MG tablet TAKE 1 TABLET BY MOUTH DAILY   levothyroxine (SYNTHROID) 75 MCG tablet TAKE 1 TABLET BY MOUTH DAILY   CVS ASPIRIN ADULT LOW DOSE 81 MG   TAKE 1 TABLET BY MOUTH DAILY   cetirizine (ZYRTEC ALLERGY) 10 MG tablet Take 10 mg by mouth daily   QUEtiapine (SEROQUEL) 100 MG tablet Take 100 mg by mouth daily   CVS MELATONIN 3 MG TABS tablet TAKE 1 TABLET BY MOUTH NIGHTLY   LINZESS 145 MCG capsule TAKE 2 CAPSULES BY MOUTH EVERY MORNING (BEFORE BREAKFAST)   CVS D3 1000 units CAPS TAKE 1 CAPSULE BY MOUTH EVERY DAY   DULoxetine (CYMBALTA) 60 MG ER TAKE 1 CAPSULE BY MOUTH 2 TIMES DAILY   B Complex Vitamins (VITAMIN B COMPLEX) TABS Take 1 tablet by mouth 2 times daily   bisacodyl (DULCOLAX) 5 MG EC tablet Take 10 mg by mouth 2 times daily      Allergies:     Lidocaine Hives     Tested at allergist for marcaine and procaine - tested negative for these allergies .   Reaction was noted after dental procedure when she was a child- probable Novacaine [NB: Novacaine is a trade name for Procaine]    Levaquin [Levofloxacin In D5w]      Unknown reaction    Levofloxacin      Pt.  Unaware of drug or reaction    Pcn [Penicillins] Hives     Problem List:     CMC arthritis, thumb, degenerative    Complete rotator cuff tear of left shoulder    Vertigo    Encephalopathy    Polypharmacy    Borderline personality disorder (HCC)    Moderate major depression (HCC)    HTN (hypertension)    Hypothyroid    Osteoarthritis of glenohumeral joint    Rotator cuff arthropathy    Right shoulder pain    Status post total shoulder arthroplasty    Chronic constipation    Bilateral carpal tunnel syndrome    Right wrist pain    Left wrist pain    Type 2 diabetes mellitus without complication, without long-term current use of insulin ([facility])    S/P rotator cuff repair     Past Medical History:     Borderline personality disorder ([facility])     Chronic pain     Colon disorder     hard time pushing stool out    Constipation     Diabetes mellitus ([address]     Diabetic neuropathy ([address]     Electric shock     ECT therapy    Hyperlipidemia     Hypertension     Insomnia     Major depressive disorder, recurrent ([address]     Morbid obesity (HCC)     Osteoarthrosis     RLS (restless legs syndrome)     Suicidal ideation     Unspecified hypothyroidism     Vitamin deficiency      Past Surgical History:     APPENDECTOMY      BACK SURGERY      CARPAL TUNNEL RELEASE Left 12/19/2017    COLONOSCOPY      ELBOW SURGERY Right     for pinched nerve    JOINT REPLACEMENT Bilateral     knees    KNEE SURGERY Left     meniscus    SHOULDER ARTHROSCOPY Right 12/02/2016    Right Total Shoulder Arthroscopy with Reverse Ball and Socket Deta Prosthesis    SHOULDER ARTHROSCOPY Left 05/30/2018    subacromial decompression, rotator cuff repair, biceps stump debridement    TONSILLECTOMY       Social History:     Smoking status: Former Smoker     Packs/day: 2.00     Years: 20.00     Pack years: 40.00     Types: Cigarettes     Start date: 1971     Last attempt to quit: 1991     Years since quittin.5    Smokeless tobacco: Never Used   Substance Use Topics    Alcohol use: Yes     Comment: rarely     Vital Signs (Current):         BP: 125/72 Pulse: 68   Resp: 16 SpO2: 99   Temp: 98.2 °F (36.8 °C)   Height: 5' 6\" (1.676 m)  (19) Weight: 170 lb (77.1 kg)  (19)   BMI: 27.5                           BP Readings from Last 3 Encounters:   19 128/82   19 112/70   18 116/62     NPO Status: > 8 hrs    CBC:    HGB 12.4 2018    HCT 37.0 2018     2018     CMP:     2018    K 4.3 2018    CL 96 2018    CO2 30 2018    BUN 14 2018    CREATININE 0.7 2018    GFRAA >60 2018    AGRATIO 1.5 2018    LABGLOM >60 2018    GLUCOSE 105 2018    PROT 7.1 2018    CALCIUM 9.3 2018    BILITOT 0.7 2018    ALKPHOS 102 2018    AST 22 2018    ALT 19 2018     Anesthesia Evaluation  Patient summary reviewed and Nursing notes reviewed  Airway: Mallampati: I  TM distance: >3 FB   Neck ROM: full  Mouth opening: > = 3 FB Dental:          Pulmonary:       (-) COPD, asthma and sleep apnea                           Cardiovascular:  Exercise tolerance: good (>4 METS),   (+) hypertension:,     (-)  angina and  KONG             ROS comment: EKG: NSR 70; nl axis; no acute ischemic changes     Neuro/Psych:   (+) psychiatric history:depression/anxiety    (-) seizures, TIA and CVA            ROS comment: H/O ECT-> some memory issues  GI/Hepatic/Renal:   (+) morbid obesity     (-) GERD and no renal disease       Endo/Other:    (+) DiabetesType II DM, , hypothyroidism::., .                 Abdominal:           Vascular:     - DVT and PE. Anesthesia Plan      general     ASA 2       Induction: intravenous. MIPS: Prophylactic antiemetics administered.   Anesthetic plan and risks discussed with patient and spouse. Plan discussed with CRNA.         Rudi Park MD

## 2019-06-18 ENCOUNTER — ANESTHESIA (OUTPATIENT)
Dept: OPERATING ROOM | Age: 69
End: 2019-06-18
Payer: COMMERCIAL

## 2019-06-18 ENCOUNTER — HOSPITAL ENCOUNTER (OUTPATIENT)
Age: 69
Setting detail: OUTPATIENT SURGERY
Discharge: HOME | End: 2019-06-18
Attending: ORTHOPAEDIC SURGERY | Admitting: ORTHOPAEDIC SURGERY
Payer: COMMERCIAL

## 2019-06-18 VITALS
HEART RATE: 61 BPM | DIASTOLIC BLOOD PRESSURE: 65 MMHG | HEIGHT: 66 IN | RESPIRATION RATE: 18 BRPM | OXYGEN SATURATION: 95 % | BODY MASS INDEX: 27.32 KG/M2 | TEMPERATURE: 97.9 F | WEIGHT: 170 LBS | SYSTOLIC BLOOD PRESSURE: 116 MMHG

## 2019-06-18 VITALS
SYSTOLIC BLOOD PRESSURE: 107 MMHG | OXYGEN SATURATION: 98 % | RESPIRATION RATE: 2 BRPM | DIASTOLIC BLOOD PRESSURE: 56 MMHG

## 2019-06-18 LAB
GLUCOSE BLD-MCNC: 124 MG/DL (ref 70–99)
PERFORMED ON: ABNORMAL

## 2019-06-18 PROCEDURE — 7100000010 HC PHASE II RECOVERY - FIRST 15 MIN: Performed by: ORTHOPAEDIC SURGERY

## 2019-06-18 PROCEDURE — 3700000001 HC ADD 15 MINUTES (ANESTHESIA): Performed by: ORTHOPAEDIC SURGERY

## 2019-06-18 PROCEDURE — 3700000000 HC ANESTHESIA ATTENDED CARE: Performed by: ORTHOPAEDIC SURGERY

## 2019-06-18 PROCEDURE — 7100000001 HC PACU RECOVERY - ADDTL 15 MIN: Performed by: ORTHOPAEDIC SURGERY

## 2019-06-18 PROCEDURE — 3600000004 HC SURGERY LEVEL 4 BASE: Performed by: ORTHOPAEDIC SURGERY

## 2019-06-18 PROCEDURE — 2709999900 HC NON-CHARGEABLE SUPPLY: Performed by: ORTHOPAEDIC SURGERY

## 2019-06-18 PROCEDURE — 6360000002 HC RX W HCPCS: Performed by: NURSE ANESTHETIST, CERTIFIED REGISTERED

## 2019-06-18 PROCEDURE — 7100000011 HC PHASE II RECOVERY - ADDTL 15 MIN: Performed by: ORTHOPAEDIC SURGERY

## 2019-06-18 PROCEDURE — 2580000003 HC RX 258: Performed by: ANESTHESIOLOGY

## 2019-06-18 PROCEDURE — 2500000003 HC RX 250 WO HCPCS: Performed by: ORTHOPAEDIC SURGERY

## 2019-06-18 PROCEDURE — 3600000014 HC SURGERY LEVEL 4 ADDTL 15MIN: Performed by: ORTHOPAEDIC SURGERY

## 2019-06-18 PROCEDURE — 7100000000 HC PACU RECOVERY - FIRST 15 MIN: Performed by: ORTHOPAEDIC SURGERY

## 2019-06-18 RX ORDER — PROMETHAZINE HYDROCHLORIDE 25 MG/ML
6.25 INJECTION, SOLUTION INTRAMUSCULAR; INTRAVENOUS
Status: DISCONTINUED | OUTPATIENT
Start: 2019-06-18 | End: 2019-06-18 | Stop reason: HOSPADM

## 2019-06-18 RX ORDER — SODIUM CHLORIDE, SODIUM LACTATE, POTASSIUM CHLORIDE, CALCIUM CHLORIDE 600; 310; 30; 20 MG/100ML; MG/100ML; MG/100ML; MG/100ML
INJECTION, SOLUTION INTRAVENOUS CONTINUOUS
Status: DISCONTINUED | OUTPATIENT
Start: 2019-06-18 | End: 2019-06-18 | Stop reason: HOSPADM

## 2019-06-18 RX ORDER — ONDANSETRON 2 MG/ML
INJECTION INTRAMUSCULAR; INTRAVENOUS PRN
Status: DISCONTINUED | OUTPATIENT
Start: 2019-06-18 | End: 2019-06-18 | Stop reason: SDUPTHER

## 2019-06-18 RX ORDER — BUPIVACAINE HYDROCHLORIDE 5 MG/ML
INJECTION, SOLUTION EPIDURAL; INTRACAUDAL PRN
Status: DISCONTINUED | OUTPATIENT
Start: 2019-06-18 | End: 2019-06-18 | Stop reason: ALTCHOICE

## 2019-06-18 RX ORDER — FENTANYL CITRATE 50 UG/ML
INJECTION, SOLUTION INTRAMUSCULAR; INTRAVENOUS PRN
Status: DISCONTINUED | OUTPATIENT
Start: 2019-06-18 | End: 2019-06-18 | Stop reason: SDUPTHER

## 2019-06-18 RX ORDER — ONDANSETRON 2 MG/ML
4 INJECTION INTRAMUSCULAR; INTRAVENOUS
Status: DISCONTINUED | OUTPATIENT
Start: 2019-06-18 | End: 2019-06-18 | Stop reason: HOSPADM

## 2019-06-18 RX ORDER — SODIUM CHLORIDE 0.9 % (FLUSH) 0.9 %
10 SYRINGE (ML) INJECTION EVERY 12 HOURS SCHEDULED
Status: DISCONTINUED | OUTPATIENT
Start: 2019-06-18 | End: 2019-06-18 | Stop reason: HOSPADM

## 2019-06-18 RX ORDER — OXYCODONE HYDROCHLORIDE AND ACETAMINOPHEN 5; 325 MG/1; MG/1
2 TABLET ORAL PRN
Status: DISCONTINUED | OUTPATIENT
Start: 2019-06-18 | End: 2019-06-18 | Stop reason: HOSPADM

## 2019-06-18 RX ORDER — MIDAZOLAM HYDROCHLORIDE 1 MG/ML
INJECTION INTRAMUSCULAR; INTRAVENOUS PRN
Status: DISCONTINUED | OUTPATIENT
Start: 2019-06-18 | End: 2019-06-18 | Stop reason: SDUPTHER

## 2019-06-18 RX ORDER — PROPOFOL 10 MG/ML
INJECTION, EMULSION INTRAVENOUS PRN
Status: DISCONTINUED | OUTPATIENT
Start: 2019-06-18 | End: 2019-06-18 | Stop reason: SDUPTHER

## 2019-06-18 RX ORDER — HYDRALAZINE HYDROCHLORIDE 20 MG/ML
5 INJECTION INTRAMUSCULAR; INTRAVENOUS EVERY 10 MIN PRN
Status: DISCONTINUED | OUTPATIENT
Start: 2019-06-18 | End: 2019-06-18 | Stop reason: HOSPADM

## 2019-06-18 RX ORDER — FENTANYL CITRATE 50 UG/ML
25 INJECTION, SOLUTION INTRAMUSCULAR; INTRAVENOUS EVERY 5 MIN PRN
Status: DISCONTINUED | OUTPATIENT
Start: 2019-06-18 | End: 2019-06-18 | Stop reason: HOSPADM

## 2019-06-18 RX ORDER — MEPERIDINE HYDROCHLORIDE 50 MG/ML
12.5 INJECTION INTRAMUSCULAR; INTRAVENOUS; SUBCUTANEOUS EVERY 5 MIN PRN
Status: DISCONTINUED | OUTPATIENT
Start: 2019-06-18 | End: 2019-06-18 | Stop reason: HOSPADM

## 2019-06-18 RX ORDER — SODIUM CHLORIDE 0.9 % (FLUSH) 0.9 %
10 SYRINGE (ML) INJECTION PRN
Status: DISCONTINUED | OUTPATIENT
Start: 2019-06-18 | End: 2019-06-18 | Stop reason: HOSPADM

## 2019-06-18 RX ORDER — OXYCODONE HYDROCHLORIDE AND ACETAMINOPHEN 5; 325 MG/1; MG/1
1 TABLET ORAL PRN
Status: DISCONTINUED | OUTPATIENT
Start: 2019-06-18 | End: 2019-06-18 | Stop reason: HOSPADM

## 2019-06-18 RX ADMIN — SODIUM CHLORIDE, POTASSIUM CHLORIDE, SODIUM LACTATE AND CALCIUM CHLORIDE: 600; 310; 30; 20 INJECTION, SOLUTION INTRAVENOUS at 08:12

## 2019-06-18 RX ADMIN — SODIUM CHLORIDE, POTASSIUM CHLORIDE, SODIUM LACTATE AND CALCIUM CHLORIDE: 600; 310; 30; 20 INJECTION, SOLUTION INTRAVENOUS at 06:58

## 2019-06-18 RX ADMIN — PROPOFOL 150 MG: 10 INJECTION, EMULSION INTRAVENOUS at 08:14

## 2019-06-18 RX ADMIN — FENTANYL CITRATE 50 MCG: 50 INJECTION INTRAMUSCULAR; INTRAVENOUS at 08:12

## 2019-06-18 RX ADMIN — MIDAZOLAM HYDROCHLORIDE 2 MG: 2 INJECTION, SOLUTION INTRAMUSCULAR; INTRAVENOUS at 08:12

## 2019-06-18 RX ADMIN — ONDANSETRON 4 MG: 2 INJECTION INTRAMUSCULAR; INTRAVENOUS at 08:24

## 2019-06-18 RX ADMIN — Medication 900 MG: at 08:24

## 2019-06-18 ASSESSMENT — PULMONARY FUNCTION TESTS
PIF_VALUE: 0
PIF_VALUE: 4
PIF_VALUE: 0
PIF_VALUE: 7
PIF_VALUE: 0
PIF_VALUE: 16
PIF_VALUE: 4
PIF_VALUE: 0
PIF_VALUE: 3
PIF_VALUE: 4
PIF_VALUE: 16
PIF_VALUE: 16
PIF_VALUE: 5
PIF_VALUE: 6
PIF_VALUE: 7
PIF_VALUE: 3
PIF_VALUE: 16
PIF_VALUE: 6
PIF_VALUE: 17
PIF_VALUE: 7

## 2019-06-18 ASSESSMENT — PAIN - FUNCTIONAL ASSESSMENT: PAIN_FUNCTIONAL_ASSESSMENT: 0-10

## 2019-06-18 ASSESSMENT — PAIN SCALES - GENERAL: PAINLEVEL_OUTOF10: 0

## 2019-06-18 NOTE — OP NOTE
Operative Report  Patient:  Lisa Brooks Record #: [id number]  Date: 6/18/2019  Surgeon:  Veta Osgood. Deette Madura, M.D. PREOPERATIVE DIAGNOSIS:  1. right carpal tunnel syndrome. POSTOPERATIVE DIAGNOSIS:  1. right carpal tunnel syndrome. PROCEDURE PERFORMED:  1. right open carpal tunnel release. SURGEON:  Veta Osgood. Deette Madura, M.D. ANESTHESIA: General    ESTIMATED BLOOD LOSS: Minimal    SPECIMENS: were not obtained and sent to pathology and/or micro    COMPLICATIONS:  None    DETAILS OF PROCEDURE:  The patient was placed on the operating table in the supine position and General anesthesia was placed. The hand was scrubbed with ChloraPrep, and draped in a sterile fashion. The arm was exsanguinated using an Ace bandage and a well padded tourniquet was inflated to 250 mmHg. A curvilinear incision was made paralleling the thenar crease but ulnar to the palmaris longus tendon. Dissection was carried down through the palmar aponeurosis to expose the transverse carpal ligament. The transverse carpal ligament was first divided in its mid portion using the scalpel, and the median nerve was identified. The distal-most portion of the transverse carpal ligament was completely divided taking care to visualize and avoid injury to the motor branch of the median nerve. The proximal subcutaneous tissues were then elevated from the proximal portion of the transverse carpal ligament and distal antebrachial fascia, and this area was completely divided ensuring complete release. The floor of the carpal tunnel was palpated. There were no significant bony prominences present within the carpal tunnel. The tourniquet was then deflated. Hemostasis was achieved using the bipolar electrocautery. The wound edges were infiltrated with 0.5% Marcaine, and the wound was closed with 5-0 nylon vertical mattress sutures. A dry sterile dressing with Polysporin, adaptic and a volar splint were applied.  The patient tolerated the procedure well.

## 2019-07-01 ENCOUNTER — OFFICE VISIT (OUTPATIENT)
Dept: ORTHOPEDIC SURGERY | Age: 69
End: 2019-07-01

## 2019-07-01 DIAGNOSIS — G56.01 RIGHT CARPAL TUNNEL SYNDROME: Primary | ICD-10-CM

## 2019-07-01 PROCEDURE — 99024 POSTOP FOLLOW-UP VISIT: CPT | Performed by: ORTHOPAEDIC SURGERY

## 2019-07-09 RX ORDER — CHOLECALCIFEROL (VITAMIN D3) 25 MCG
CAPSULE ORAL
Qty: 90 CAPSULE | Refills: 1 | Status: SHIPPED | OUTPATIENT
Start: 2019-07-09 | End: 2020-01-06

## 2019-07-15 DIAGNOSIS — K59.09 CHRONIC CONSTIPATION: ICD-10-CM

## 2019-08-08 ENCOUNTER — OFFICE VISIT (OUTPATIENT)
Dept: FAMILY MEDICINE CLINIC | Age: 69
End: 2019-08-08
Payer: COMMERCIAL

## 2019-08-08 VITALS
WEIGHT: 167 LBS | HEIGHT: 66 IN | SYSTOLIC BLOOD PRESSURE: 112 MMHG | BODY MASS INDEX: 26.84 KG/M2 | HEART RATE: 84 BPM | OXYGEN SATURATION: 97 % | DIASTOLIC BLOOD PRESSURE: 64 MMHG

## 2019-08-08 DIAGNOSIS — G25.81 RLS (RESTLESS LEGS SYNDROME): ICD-10-CM

## 2019-08-08 PROCEDURE — G8427 DOCREV CUR MEDS BY ELIG CLIN: HCPCS | Performed by: NURSE PRACTITIONER

## 2019-08-08 PROCEDURE — 4040F PNEUMOC VAC/ADMIN/RCVD: CPT | Performed by: NURSE PRACTITIONER

## 2019-08-08 PROCEDURE — 1090F PRES/ABSN URINE INCON ASSESS: CPT | Performed by: NURSE PRACTITIONER

## 2019-08-08 PROCEDURE — 1036F TOBACCO NON-USER: CPT | Performed by: NURSE PRACTITIONER

## 2019-08-08 PROCEDURE — G8419 CALC BMI OUT NRM PARAM NOF/U: HCPCS | Performed by: NURSE PRACTITIONER

## 2019-08-08 PROCEDURE — 3017F COLORECTAL CA SCREEN DOC REV: CPT | Performed by: NURSE PRACTITIONER

## 2019-08-08 PROCEDURE — 1123F ACP DISCUSS/DSCN MKR DOCD: CPT | Performed by: NURSE PRACTITIONER

## 2019-08-08 PROCEDURE — 99213 OFFICE O/P EST LOW 20 MIN: CPT | Performed by: NURSE PRACTITIONER

## 2019-08-08 PROCEDURE — G8399 PT W/DXA RESULTS DOCUMENT: HCPCS | Performed by: NURSE PRACTITIONER

## 2019-08-08 RX ORDER — ROPINIROLE 2 MG/1
2 TABLET, FILM COATED ORAL NIGHTLY
Qty: 90 TABLET | Refills: 0 | Status: SHIPPED | OUTPATIENT
Start: 2019-08-08 | End: 2019-09-06 | Stop reason: SDUPTHER

## 2019-08-08 ASSESSMENT — ENCOUNTER SYMPTOMS
BACK PAIN: 0
COUGH: 0
SHORTNESS OF BREATH: 0
STRIDOR: 0

## 2019-08-08 NOTE — PROGRESS NOTES
08/08/2019    Chief Complaint   Patient presents with    Leg Pain     getting worse,      HPI: Severa Guarneri is a 77 y.o. female who presents with complaints of restless legs. Restless Legs: current treatment Requip 1 mg. Pt states more than half the nights of the week symptoms are not controlled. Her lower extremities start \"jumping\" and feels like she constantly has to move them. These symptoms interrupt her sleep. Often she will take another milligram of Requip and that will helps. She also complains of cramps in her feet, ankles and joints at night time. She has been trying to stay hydrated and has increased her water intake. If she is more physically active, for example she worked in her yard for 4 hours the other day, he legs symptoms seem worse.      Past Medical History:   Diagnosis Date    Borderline personality disorder ([address]     Chronic pain     Colon disorder     hard time pushing stool out    Constipation     Diabetes mellitus ([address]     Diabetic neuropathy (HCC)     Electric shock     ECT therapy    Hyperlipidemia     Hypertension     Insomnia     Major depressive disorder, recurrent (HCC)     Morbid obesity ([address]     Osteoarthrosis     RLS (restless legs syndrome)     Suicidal ideation     Unspecified hypothyroidism     Vitamin deficiency        Past Surgical History:   Procedure Laterality Date    APPENDECTOMY      BACK SURGERY      CARPAL TUNNEL RELEASE Left 12/19/2017    CARPAL TUNNEL RELEASE Right 6/18/2019    RIGHT OPEN CARPAL TUNNEL RELEASE performed by Jack Winters MD at [address] [place] [address] Right     for pinched nerve    JOINT REPLACEMENT Bilateral     knees    KNEE SURGERY Left     meniscus    SHOULDER ARTHROSCOPY Right 12/02/2016    Right Total Shoulder Arthroscopy with Reverse Ball and Socket Deta Prosthesis    SHOULDER ARTHROSCOPY Left 05/30/2018    subacromial decompression, rotator cuff repair, biceps stump debridement    TONSILLECTOMY         Social History     Tobacco Use    Smoking status: Former Smoker     Packs/day: 2.00     Years: 20.00     Pack years: 40.00     Types: Cigarettes     Start date: 1971     Last attempt to quit: 1991     Years since quittin.7    Smokeless tobacco: Never Used   Substance Use Topics    Alcohol use: Yes     Comment: rarely    Drug use: No       Family History   Problem Relation Age of Onset   Donato Khan Breast Cancer Mother     Stroke Father        Review of Systems   Respiratory: Negative for cough, shortness of breath and stridor. Cardiovascular: Negative for chest pain and leg swelling. Musculoskeletal: Positive for myalgias (muscle cramps LE at night). Negative for arthralgias, back pain, gait problem, joint swelling and neck pain. Neurological:        Restless leg symptoms at night       Physical Exam   Constitutional: She appears well-developed and well-nourished. No distress. HENT:   Head: Normocephalic and atraumatic. Cardiovascular: Normal rate, regular rhythm and normal heart sounds. Exam reveals no gallop and no friction rub. No murmur heard. Pulmonary/Chest: Effort normal and breath sounds normal. No stridor. No respiratory distress. She has no wheezes. She has no rales. She exhibits no tenderness. Neurological: She is alert. No cranial nerve deficit. Skin: Skin is warm and dry. She is not diaphoretic. Nursing note and vitals reviewed. Vitals:    19 0739   BP: 112/64   Site: Left Upper Arm   Position: Sitting   Pulse: 84   SpO2: 97%   Weight: 167 lb (75.8 kg)   Height: 5' 6\" (1.676 m)     Assessment/Plan:  1. RLS (restless legs syndrome)  - rOPINIRole (REQUIP) 2 MG tablet; Take 1 tablet by mouth nightly  Dispense: 90 tablet; Refill: 0    Increase Requip 2 mg nightly  Will consider muscle relaxer at night for leg cramping. For now, I have recommended that she increase water intake and stretch legs before bed.    Patient Instructions   Increase water

## 2019-09-06 DIAGNOSIS — G25.81 RLS (RESTLESS LEGS SYNDROME): ICD-10-CM

## 2019-09-09 RX ORDER — ROPINIROLE 2 MG/1
TABLET, FILM COATED ORAL
Qty: 90 TABLET | Refills: 0 | Status: SHIPPED | OUTPATIENT
Start: 2019-09-09 | End: 2019-11-27 | Stop reason: SDUPTHER

## 2019-09-16 ENCOUNTER — ANESTHESIA EVENT (OUTPATIENT)
Dept: OPERATING ROOM | Age: 69
DRG: 330 | End: 2019-09-16
Payer: COMMERCIAL

## 2019-09-16 ENCOUNTER — HOSPITAL ENCOUNTER (INPATIENT)
Age: 69
LOS: 7 days | Discharge: HOME | DRG: 330 | End: 2019-09-23
Attending: EMERGENCY MEDICINE | Admitting: SURGERY
Payer: COMMERCIAL

## 2019-09-16 ENCOUNTER — APPOINTMENT (OUTPATIENT)
Dept: GENERAL RADIOLOGY | Age: 69
DRG: 330 | End: 2019-09-16
Payer: COMMERCIAL

## 2019-09-16 ENCOUNTER — ANESTHESIA (OUTPATIENT)
Dept: OPERATING ROOM | Age: 69
DRG: 330 | End: 2019-09-16
Payer: COMMERCIAL

## 2019-09-16 ENCOUNTER — APPOINTMENT (OUTPATIENT)
Dept: CT IMAGING | Age: 69
DRG: 330 | End: 2019-09-16
Payer: COMMERCIAL

## 2019-09-16 VITALS
TEMPERATURE: 96.7 F | OXYGEN SATURATION: 80 % | RESPIRATION RATE: 15 BRPM | SYSTOLIC BLOOD PRESSURE: 135 MMHG | DIASTOLIC BLOOD PRESSURE: 64 MMHG

## 2019-09-16 DIAGNOSIS — R11.0 NAUSEA: ICD-10-CM

## 2019-09-16 DIAGNOSIS — K56.609 SMALL BOWEL OBSTRUCTION (HCC): Primary | ICD-10-CM

## 2019-09-16 DIAGNOSIS — R74.8 ELEVATED LIPASE: ICD-10-CM

## 2019-09-16 DIAGNOSIS — D72.829 LEUKOCYTOSIS, UNSPECIFIED TYPE: ICD-10-CM

## 2019-09-16 DIAGNOSIS — Z90.49 S/P PARTIAL COLECTOMY: ICD-10-CM

## 2019-09-16 DIAGNOSIS — N39.0 URINARY TRACT INFECTION WITHOUT HEMATURIA, SITE UNSPECIFIED: ICD-10-CM

## 2019-09-16 PROBLEM — K56.2 VOLVULUS OF SMALL INTESTINE (HCC): Status: ACTIVE | Noted: 2019-09-16

## 2019-09-16 LAB
A/G RATIO: 1.5 (ref 1.1–2.2)
ALBUMIN SERPL-MCNC: 4.7 G/DL (ref 3.4–5)
ALP BLD-CCNC: 93 U/L (ref 40–129)
ALT SERPL-CCNC: 17 U/L (ref 10–40)
ANION GAP SERPL CALCULATED.3IONS-SCNC: 14 MMOL/L (ref 3–16)
AST SERPL-CCNC: 25 U/L (ref 15–37)
BASOPHILS ABSOLUTE: 0.1 K/UL (ref 0–0.2)
BASOPHILS RELATIVE PERCENT: 0.4 %
BILIRUB SERPL-MCNC: 1.1 MG/DL (ref 0–1)
BILIRUBIN URINE: NEGATIVE
BLOOD, URINE: NEGATIVE
BUN BLDV-MCNC: 14 MG/DL (ref 7–20)
CALCIUM SERPL-MCNC: 10.3 MG/DL (ref 8.3–10.6)
CHLORIDE BLD-SCNC: 93 MMOL/L (ref 99–110)
CLARITY: CLEAR
CO2: 28 MMOL/L (ref 21–32)
COLOR: YELLOW
CREAT SERPL-MCNC: 0.6 MG/DL (ref 0.6–1.2)
EOSINOPHILS ABSOLUTE: 0.1 K/UL (ref 0–0.6)
EOSINOPHILS RELATIVE PERCENT: 0.6 %
EPITHELIAL CELLS, UA: ABNORMAL /HPF
GFR AFRICAN AMERICAN: >60
GFR NON-AFRICAN AMERICAN: >60
GLOBULIN: 3.2 G/DL
GLUCOSE BLD-MCNC: 117 MG/DL (ref 70–99)
GLUCOSE BLD-MCNC: 135 MG/DL (ref 70–99)
GLUCOSE BLD-MCNC: 140 MG/DL (ref 70–99)
GLUCOSE BLD-MCNC: 148 MG/DL (ref 70–99)
GLUCOSE BLD-MCNC: 168 MG/DL (ref 70–99)
GLUCOSE URINE: NEGATIVE MG/DL
HCT VFR BLD CALC: 41 % (ref 36–48)
HEMOGLOBIN: 13.9 G/DL (ref 12–16)
KETONES, URINE: 15 MG/DL
LACTIC ACID: 1.4 MMOL/L (ref 0.4–2)
LEUKOCYTE ESTERASE, URINE: ABNORMAL
LIPASE: 183 U/L (ref 13–60)
LYMPHOCYTES ABSOLUTE: 2 K/UL (ref 1–5.1)
LYMPHOCYTES RELATIVE PERCENT: 11.3 %
MCH RBC QN AUTO: 31.3 PG (ref 26–34)
MCHC RBC AUTO-ENTMCNC: 34 G/DL (ref 31–36)
MCV RBC AUTO: 92 FL (ref 80–100)
MICROSCOPIC EXAMINATION: YES
MONOCYTES ABSOLUTE: 0.6 K/UL (ref 0–1.3)
MONOCYTES RELATIVE PERCENT: 3.7 %
NEUTROPHILS ABSOLUTE: 14.5 K/UL (ref 1.7–7.7)
NEUTROPHILS RELATIVE PERCENT: 84 %
NITRITE, URINE: NEGATIVE
PDW BLD-RTO: 13.1 % (ref 12.4–15.4)
PERFORMED ON: ABNORMAL
PH UA: 6 (ref 5–8)
PLATELET # BLD: 471 K/UL (ref 135–450)
PMV BLD AUTO: 7 FL (ref 5–10.5)
POTASSIUM SERPL-SCNC: 4.3 MMOL/L (ref 3.5–5.1)
PROTEIN UA: NEGATIVE MG/DL
RBC # BLD: 4.46 M/UL (ref 4–5.2)
RBC UA: ABNORMAL /HPF (ref 0–2)
SODIUM BLD-SCNC: 135 MMOL/L (ref 136–145)
SPECIFIC GRAVITY UA: 1.01 (ref 1–1.03)
TOTAL PROTEIN: 7.9 G/DL (ref 6.4–8.2)
URINE TYPE: ABNORMAL
UROBILINOGEN, URINE: 0.2 E.U./DL
WBC # BLD: 17.3 K/UL (ref 4–11)
WBC UA: ABNORMAL /HPF (ref 0–5)

## 2019-09-16 PROCEDURE — 44050 REDUCE BOWEL OBSTRUCTION: CPT | Performed by: SURGERY

## 2019-09-16 PROCEDURE — 2500000003 HC RX 250 WO HCPCS: Performed by: SURGERY

## 2019-09-16 PROCEDURE — 2709999900 HC NON-CHARGEABLE SUPPLY: Performed by: SURGERY

## 2019-09-16 PROCEDURE — 2580000003 HC RX 258: Performed by: SURGERY

## 2019-09-16 PROCEDURE — 6370000000 HC RX 637 (ALT 250 FOR IP)

## 2019-09-16 PROCEDURE — 3600000005 HC SURGERY LEVEL 5 BASE: Performed by: SURGERY

## 2019-09-16 PROCEDURE — 3700000000 HC ANESTHESIA ATTENDED CARE: Performed by: SURGERY

## 2019-09-16 PROCEDURE — 85025 COMPLETE CBC W/AUTO DIFF WBC: CPT

## 2019-09-16 PROCEDURE — 7100000001 HC PACU RECOVERY - ADDTL 15 MIN: Performed by: SURGERY

## 2019-09-16 PROCEDURE — 83605 ASSAY OF LACTIC ACID: CPT

## 2019-09-16 PROCEDURE — 2720000010 HC SURG SUPPLY STERILE: Performed by: SURGERY

## 2019-09-16 PROCEDURE — 96375 TX/PRO/DX INJ NEW DRUG ADDON: CPT

## 2019-09-16 PROCEDURE — 0DTH0ZZ RESECTION OF CECUM, OPEN APPROACH: ICD-10-PCS | Performed by: SURGERY

## 2019-09-16 PROCEDURE — 2700000000 HC OXYGEN THERAPY PER DAY

## 2019-09-16 PROCEDURE — 94761 N-INVAS EAR/PLS OXIMETRY MLT: CPT

## 2019-09-16 PROCEDURE — 6360000002 HC RX W HCPCS: Performed by: SURGERY

## 2019-09-16 PROCEDURE — 99223 1ST HOSP IP/OBS HIGH 75: CPT | Performed by: SURGERY

## 2019-09-16 PROCEDURE — 6360000002 HC RX W HCPCS: Performed by: ANESTHESIOLOGY

## 2019-09-16 PROCEDURE — 6360000002 HC RX W HCPCS: Performed by: NURSE PRACTITIONER

## 2019-09-16 PROCEDURE — 3600000015 HC SURGERY LEVEL 5 ADDTL 15MIN: Performed by: SURGERY

## 2019-09-16 PROCEDURE — 6370000000 HC RX 637 (ALT 250 FOR IP): Performed by: SURGERY

## 2019-09-16 PROCEDURE — 3700000001 HC ADD 15 MINUTES (ANESTHESIA): Performed by: SURGERY

## 2019-09-16 PROCEDURE — 87086 URINE CULTURE/COLONY COUNT: CPT

## 2019-09-16 PROCEDURE — 96374 THER/PROPH/DIAG INJ IV PUSH: CPT

## 2019-09-16 PROCEDURE — 6360000002 HC RX W HCPCS: Performed by: NURSE ANESTHETIST, CERTIFIED REGISTERED

## 2019-09-16 PROCEDURE — 44160 REMOVAL OF COLON: CPT | Performed by: SURGERY

## 2019-09-16 PROCEDURE — 74177 CT ABD & PELVIS W/CONTRAST: CPT

## 2019-09-16 PROCEDURE — 99285 EMERGENCY DEPT VISIT HI MDM: CPT

## 2019-09-16 PROCEDURE — 94770 HC ETCO2 MONITOR DAILY: CPT

## 2019-09-16 PROCEDURE — 1200000000 HC SEMI PRIVATE

## 2019-09-16 PROCEDURE — 87040 BLOOD CULTURE FOR BACTERIA: CPT

## 2019-09-16 PROCEDURE — 81001 URINALYSIS AUTO W/SCOPE: CPT

## 2019-09-16 PROCEDURE — 2580000003 HC RX 258: Performed by: NURSE ANESTHETIST, CERTIFIED REGISTERED

## 2019-09-16 PROCEDURE — 0DJD4ZZ INSPECTION OF LOWER INTESTINAL TRACT, PERCUTANEOUS ENDOSCOPIC APPROACH: ICD-10-PCS | Performed by: SURGERY

## 2019-09-16 PROCEDURE — 2580000003 HC RX 258: Performed by: NURSE PRACTITIONER

## 2019-09-16 PROCEDURE — 80053 COMPREHEN METABOLIC PANEL: CPT

## 2019-09-16 PROCEDURE — 88307 TISSUE EXAM BY PATHOLOGIST: CPT

## 2019-09-16 PROCEDURE — 83690 ASSAY OF LIPASE: CPT

## 2019-09-16 PROCEDURE — 6360000002 HC RX W HCPCS: Performed by: EMERGENCY MEDICINE

## 2019-09-16 PROCEDURE — 7100000000 HC PACU RECOVERY - FIRST 15 MIN: Performed by: SURGERY

## 2019-09-16 PROCEDURE — 74018 RADEX ABDOMEN 1 VIEW: CPT

## 2019-09-16 PROCEDURE — 2500000003 HC RX 250 WO HCPCS: Performed by: NURSE ANESTHETIST, CERTIFIED REGISTERED

## 2019-09-16 PROCEDURE — 6360000004 HC RX CONTRAST MEDICATION: Performed by: NURSE PRACTITIONER

## 2019-09-16 PROCEDURE — 96361 HYDRATE IV INFUSION ADD-ON: CPT

## 2019-09-16 RX ORDER — MAGNESIUM HYDROXIDE 1200 MG/15ML
LIQUID ORAL CONTINUOUS PRN
Status: COMPLETED | OUTPATIENT
Start: 2019-09-16 | End: 2019-09-16

## 2019-09-16 RX ORDER — SODIUM CHLORIDE 9 MG/ML
INJECTION, SOLUTION INTRAVENOUS CONTINUOUS
Status: DISCONTINUED | OUTPATIENT
Start: 2019-09-16 | End: 2019-09-18

## 2019-09-16 RX ORDER — ONDANSETRON 2 MG/ML
4 INJECTION INTRAMUSCULAR; INTRAVENOUS EVERY 6 HOURS PRN
Status: DISCONTINUED | OUTPATIENT
Start: 2019-09-16 | End: 2019-09-23 | Stop reason: HOSPADM

## 2019-09-16 RX ORDER — ROPINIROLE 0.5 MG/1
2 TABLET, FILM COATED ORAL EVERY EVENING
Status: DISCONTINUED | OUTPATIENT
Start: 2019-09-16 | End: 2019-09-23 | Stop reason: HOSPADM

## 2019-09-16 RX ORDER — QUETIAPINE FUMARATE 100 MG/1
100 TABLET, FILM COATED ORAL DAILY
Status: DISCONTINUED | OUTPATIENT
Start: 2019-09-16 | End: 2019-09-23 | Stop reason: HOSPADM

## 2019-09-16 RX ORDER — ONDANSETRON 2 MG/ML
4 INJECTION INTRAMUSCULAR; INTRAVENOUS EVERY 10 MIN PRN
Status: DISCONTINUED | OUTPATIENT
Start: 2019-09-16 | End: 2019-09-16 | Stop reason: HOSPADM

## 2019-09-16 RX ORDER — LORAZEPAM 2 MG/ML
0.5 INJECTION INTRAMUSCULAR EVERY 6 HOURS PRN
Status: DISCONTINUED | OUTPATIENT
Start: 2019-09-16 | End: 2019-09-23 | Stop reason: HOSPADM

## 2019-09-16 RX ORDER — OXYCODONE HYDROCHLORIDE AND ACETAMINOPHEN 5; 325 MG/1; MG/1
2 TABLET ORAL PRN
Status: DISCONTINUED | OUTPATIENT
Start: 2019-09-16 | End: 2019-09-16 | Stop reason: HOSPADM

## 2019-09-16 RX ORDER — ONDANSETRON 2 MG/ML
INJECTION INTRAMUSCULAR; INTRAVENOUS PRN
Status: DISCONTINUED | OUTPATIENT
Start: 2019-09-16 | End: 2019-09-16 | Stop reason: SDUPTHER

## 2019-09-16 RX ORDER — LORAZEPAM 2 MG/ML
1 INJECTION INTRAMUSCULAR ONCE
Status: COMPLETED | OUTPATIENT
Start: 2019-09-16 | End: 2019-09-16

## 2019-09-16 RX ORDER — HYDROMORPHONE HCL 110MG/55ML
0.25 PATIENT CONTROLLED ANALGESIA SYRINGE INTRAVENOUS EVERY 5 MIN PRN
Status: DISCONTINUED | OUTPATIENT
Start: 2019-09-16 | End: 2019-09-16 | Stop reason: HOSPADM

## 2019-09-16 RX ORDER — PROPOFOL 10 MG/ML
INJECTION, EMULSION INTRAVENOUS PRN
Status: DISCONTINUED | OUTPATIENT
Start: 2019-09-16 | End: 2019-09-16 | Stop reason: SDUPTHER

## 2019-09-16 RX ORDER — DULOXETIN HYDROCHLORIDE 60 MG/1
60 CAPSULE, DELAYED RELEASE ORAL 2 TIMES DAILY
Status: DISCONTINUED | OUTPATIENT
Start: 2019-09-16 | End: 2019-09-23 | Stop reason: HOSPADM

## 2019-09-16 RX ORDER — BUPIVACAINE HYDROCHLORIDE AND EPINEPHRINE 5; 5 MG/ML; UG/ML
INJECTION, SOLUTION PERINEURAL PRN
Status: DISCONTINUED | OUTPATIENT
Start: 2019-09-16 | End: 2019-09-16 | Stop reason: ALTCHOICE

## 2019-09-16 RX ORDER — MIDAZOLAM HYDROCHLORIDE 1 MG/ML
INJECTION INTRAMUSCULAR; INTRAVENOUS PRN
Status: DISCONTINUED | OUTPATIENT
Start: 2019-09-16 | End: 2019-09-16 | Stop reason: SDUPTHER

## 2019-09-16 RX ORDER — SODIUM CHLORIDE 0.9 % (FLUSH) 0.9 %
10 SYRINGE (ML) INJECTION PRN
Status: DISCONTINUED | OUTPATIENT
Start: 2019-09-16 | End: 2019-09-23 | Stop reason: HOSPADM

## 2019-09-16 RX ORDER — NALOXONE HYDROCHLORIDE 0.4 MG/ML
0.4 INJECTION, SOLUTION INTRAMUSCULAR; INTRAVENOUS; SUBCUTANEOUS PRN
Status: DISCONTINUED | OUTPATIENT
Start: 2019-09-16 | End: 2019-09-18

## 2019-09-16 RX ORDER — SODIUM CHLORIDE, SODIUM LACTATE, POTASSIUM CHLORIDE, CALCIUM CHLORIDE 600; 310; 30; 20 MG/100ML; MG/100ML; MG/100ML; MG/100ML
INJECTION, SOLUTION INTRAVENOUS CONTINUOUS PRN
Status: DISCONTINUED | OUTPATIENT
Start: 2019-09-16 | End: 2019-09-16 | Stop reason: SDUPTHER

## 2019-09-16 RX ORDER — HYDROMORPHONE HCL 110MG/55ML
0.5 PATIENT CONTROLLED ANALGESIA SYRINGE INTRAVENOUS EVERY 5 MIN PRN
Status: DISCONTINUED | OUTPATIENT
Start: 2019-09-16 | End: 2019-09-16 | Stop reason: HOSPADM

## 2019-09-16 RX ORDER — LEVOTHYROXINE SODIUM 0.05 MG/1
50 TABLET ORAL DAILY
Status: DISCONTINUED | OUTPATIENT
Start: 2019-09-16 | End: 2019-09-23 | Stop reason: HOSPADM

## 2019-09-16 RX ORDER — FENTANYL CITRATE 50 UG/ML
INJECTION, SOLUTION INTRAMUSCULAR; INTRAVENOUS PRN
Status: DISCONTINUED | OUTPATIENT
Start: 2019-09-16 | End: 2019-09-16 | Stop reason: SDUPTHER

## 2019-09-16 RX ORDER — ROCURONIUM BROMIDE 10 MG/ML
INJECTION, SOLUTION INTRAVENOUS PRN
Status: DISCONTINUED | OUTPATIENT
Start: 2019-09-16 | End: 2019-09-16 | Stop reason: SDUPTHER

## 2019-09-16 RX ORDER — MEPERIDINE HYDROCHLORIDE 50 MG/ML
12.5 INJECTION INTRAMUSCULAR; INTRAVENOUS; SUBCUTANEOUS EVERY 5 MIN PRN
Status: DISCONTINUED | OUTPATIENT
Start: 2019-09-16 | End: 2019-09-16 | Stop reason: HOSPADM

## 2019-09-16 RX ORDER — HYDRALAZINE HYDROCHLORIDE 20 MG/ML
5 INJECTION INTRAMUSCULAR; INTRAVENOUS EVERY 10 MIN PRN
Status: DISCONTINUED | OUTPATIENT
Start: 2019-09-16 | End: 2019-09-16 | Stop reason: HOSPADM

## 2019-09-16 RX ORDER — OXYCODONE HYDROCHLORIDE AND ACETAMINOPHEN 5; 325 MG/1; MG/1
1 TABLET ORAL PRN
Status: DISCONTINUED | OUTPATIENT
Start: 2019-09-16 | End: 2019-09-16 | Stop reason: HOSPADM

## 2019-09-16 RX ORDER — 0.9 % SODIUM CHLORIDE 0.9 %
1000 INTRAVENOUS SOLUTION INTRAVENOUS ONCE
Status: COMPLETED | OUTPATIENT
Start: 2019-09-16 | End: 2019-09-16

## 2019-09-16 RX ORDER — LABETALOL HYDROCHLORIDE 5 MG/ML
5 INJECTION, SOLUTION INTRAVENOUS EVERY 10 MIN PRN
Status: DISCONTINUED | OUTPATIENT
Start: 2019-09-16 | End: 2019-09-16 | Stop reason: HOSPADM

## 2019-09-16 RX ORDER — ONDANSETRON 2 MG/ML
4 INJECTION INTRAMUSCULAR; INTRAVENOUS ONCE
Status: COMPLETED | OUTPATIENT
Start: 2019-09-16 | End: 2019-09-16

## 2019-09-16 RX ORDER — DEXAMETHASONE SODIUM PHOSPHATE 4 MG/ML
INJECTION, SOLUTION INTRA-ARTICULAR; INTRALESIONAL; INTRAMUSCULAR; INTRAVENOUS; SOFT TISSUE PRN
Status: DISCONTINUED | OUTPATIENT
Start: 2019-09-16 | End: 2019-09-16 | Stop reason: SDUPTHER

## 2019-09-16 RX ORDER — HYDROMORPHONE HCL 110MG/55ML
1 PATIENT CONTROLLED ANALGESIA SYRINGE INTRAVENOUS
Status: DISCONTINUED | OUTPATIENT
Start: 2019-09-16 | End: 2019-09-16

## 2019-09-16 RX ORDER — SODIUM CHLORIDE 0.9 % (FLUSH) 0.9 %
10 SYRINGE (ML) INJECTION EVERY 12 HOURS SCHEDULED
Status: DISCONTINUED | OUTPATIENT
Start: 2019-09-16 | End: 2019-09-23 | Stop reason: HOSPADM

## 2019-09-16 RX ADMIN — SODIUM CHLORIDE, POTASSIUM CHLORIDE, SODIUM LACTATE AND CALCIUM CHLORIDE: 600; 310; 30; 20 INJECTION, SOLUTION INTRAVENOUS at 11:35

## 2019-09-16 RX ADMIN — HYDROMORPHONE HYDROCHLORIDE 0.25 MG: 2 INJECTION, SOLUTION INTRAMUSCULAR; INTRAVENOUS; SUBCUTANEOUS at 14:33

## 2019-09-16 RX ADMIN — SODIUM CHLORIDE: 9 INJECTION, SOLUTION INTRAVENOUS at 05:30

## 2019-09-16 RX ADMIN — IOPAMIDOL 75 ML: 755 INJECTION, SOLUTION INTRAVENOUS at 02:07

## 2019-09-16 RX ADMIN — LORAZEPAM 0.5 MG: 2 INJECTION, SOLUTION INTRAMUSCULAR; INTRAVENOUS at 08:32

## 2019-09-16 RX ADMIN — ONDANSETRON 4 MG: 2 INJECTION INTRAMUSCULAR; INTRAVENOUS at 11:48

## 2019-09-16 RX ADMIN — ROCURONIUM BROMIDE 20 MG: 10 SOLUTION INTRAVENOUS at 12:25

## 2019-09-16 RX ADMIN — FAMOTIDINE 20 MG: 10 INJECTION, SOLUTION INTRAVENOUS at 09:00

## 2019-09-16 RX ADMIN — SODIUM CHLORIDE: 9 INJECTION, SOLUTION INTRAVENOUS at 20:14

## 2019-09-16 RX ADMIN — FENTANYL CITRATE 50 MCG: 50 INJECTION INTRAMUSCULAR; INTRAVENOUS at 11:59

## 2019-09-16 RX ADMIN — DEXAMETHASONE SODIUM PHOSPHATE 8 MG: 4 INJECTION, SOLUTION INTRAMUSCULAR; INTRAVENOUS at 12:19

## 2019-09-16 RX ADMIN — Medication: at 16:15

## 2019-09-16 RX ADMIN — SODIUM CHLORIDE, POTASSIUM CHLORIDE, SODIUM LACTATE AND CALCIUM CHLORIDE: 600; 310; 30; 20 INJECTION, SOLUTION INTRAVENOUS at 13:32

## 2019-09-16 RX ADMIN — ROCURONIUM BROMIDE 70 MG: 10 SOLUTION INTRAVENOUS at 11:48

## 2019-09-16 RX ADMIN — CEFTRIAXONE SODIUM 1 G: 1 INJECTION, POWDER, FOR SOLUTION INTRAMUSCULAR; INTRAVENOUS at 07:21

## 2019-09-16 RX ADMIN — Medication 2 G: at 11:56

## 2019-09-16 RX ADMIN — LORAZEPAM 1 MG: 2 INJECTION, SOLUTION INTRAMUSCULAR; INTRAVENOUS at 03:39

## 2019-09-16 RX ADMIN — Medication 10 ML: at 20:05

## 2019-09-16 RX ADMIN — FAMOTIDINE 20 MG: 10 INJECTION, SOLUTION INTRAVENOUS at 20:05

## 2019-09-16 RX ADMIN — Medication 10 MG: at 14:48

## 2019-09-16 RX ADMIN — SODIUM CHLORIDE, POTASSIUM CHLORIDE, SODIUM LACTATE AND CALCIUM CHLORIDE: 600; 310; 30; 20 INJECTION, SOLUTION INTRAVENOUS at 12:19

## 2019-09-16 RX ADMIN — PHENYLEPHRINE HYDROCHLORIDE 50 MCG: 10 INJECTION INTRAVENOUS at 12:03

## 2019-09-16 RX ADMIN — MIDAZOLAM HYDROCHLORIDE 1 MG: 2 INJECTION, SOLUTION INTRAMUSCULAR; INTRAVENOUS at 11:35

## 2019-09-16 RX ADMIN — ONDANSETRON 4 MG: 2 INJECTION INTRAMUSCULAR; INTRAVENOUS at 01:48

## 2019-09-16 RX ADMIN — PHENYLEPHRINE HYDROCHLORIDE 50 MCG: 10 INJECTION INTRAVENOUS at 12:19

## 2019-09-16 RX ADMIN — SUGAMMADEX 200 MG: 100 INJECTION, SOLUTION INTRAVENOUS at 13:16

## 2019-09-16 RX ADMIN — SODIUM CHLORIDE 1000 ML: 9 INJECTION, SOLUTION INTRAVENOUS at 01:48

## 2019-09-16 RX ADMIN — INSULIN LISPRO 1 UNITS: 100 INJECTION, SOLUTION INTRAVENOUS; SUBCUTANEOUS at 20:15

## 2019-09-16 RX ADMIN — HYDROMORPHONE HYDROCHLORIDE 0.5 MG: 1 INJECTION, SOLUTION INTRAMUSCULAR; INTRAVENOUS; SUBCUTANEOUS at 12:25

## 2019-09-16 RX ADMIN — MIDAZOLAM HYDROCHLORIDE 1 MG: 2 INJECTION, SOLUTION INTRAMUSCULAR; INTRAVENOUS at 11:38

## 2019-09-16 RX ADMIN — PROPOFOL 120 MG: 10 INJECTION, EMULSION INTRAVENOUS at 11:48

## 2019-09-16 RX ADMIN — FENTANYL CITRATE 50 MCG: 50 INJECTION INTRAMUSCULAR; INTRAVENOUS at 12:19

## 2019-09-16 RX ADMIN — LORAZEPAM 0.5 MG: 2 INJECTION, SOLUTION INTRAMUSCULAR; INTRAVENOUS at 22:08

## 2019-09-16 ASSESSMENT — PULMONARY FUNCTION TESTS
PIF_VALUE: 15
PIF_VALUE: 23
PIF_VALUE: 15
PIF_VALUE: 1
PIF_VALUE: 23
PIF_VALUE: 16
PIF_VALUE: 2
PIF_VALUE: 23
PIF_VALUE: 4
PIF_VALUE: 22
PIF_VALUE: 15
PIF_VALUE: 16
PIF_VALUE: 21
PIF_VALUE: 16
PIF_VALUE: 23
PIF_VALUE: 0
PIF_VALUE: 14
PIF_VALUE: 4
PIF_VALUE: 23
PIF_VALUE: 15
PIF_VALUE: 23
PIF_VALUE: 3
PIF_VALUE: 18
PIF_VALUE: 16
PIF_VALUE: 15
PIF_VALUE: 1
PIF_VALUE: 17
PIF_VALUE: 16
PIF_VALUE: 15
PIF_VALUE: 16
PIF_VALUE: 19
PIF_VALUE: 7
PIF_VALUE: 21
PIF_VALUE: 22
PIF_VALUE: 16
PIF_VALUE: 22
PIF_VALUE: 23
PIF_VALUE: 15
PIF_VALUE: 16
PIF_VALUE: 16
PIF_VALUE: 15
PIF_VALUE: 21
PIF_VALUE: 15
PIF_VALUE: 21
PIF_VALUE: 22
PIF_VALUE: 15
PIF_VALUE: 22
PIF_VALUE: 0
PIF_VALUE: 21
PIF_VALUE: 23
PIF_VALUE: 14
PIF_VALUE: 15
PIF_VALUE: 17
PIF_VALUE: 15
PIF_VALUE: 22
PIF_VALUE: 15
PIF_VALUE: 22
PIF_VALUE: 15
PIF_VALUE: 16
PIF_VALUE: 23
PIF_VALUE: 1
PIF_VALUE: 16
PIF_VALUE: 15
PIF_VALUE: 15
PIF_VALUE: 16
PIF_VALUE: 1
PIF_VALUE: 12
PIF_VALUE: 30
PIF_VALUE: 16
PIF_VALUE: 16
PIF_VALUE: 14
PIF_VALUE: 16
PIF_VALUE: 14
PIF_VALUE: 4
PIF_VALUE: 22
PIF_VALUE: 0
PIF_VALUE: 11
PIF_VALUE: 16
PIF_VALUE: 2
PIF_VALUE: 23
PIF_VALUE: 21
PIF_VALUE: 15
PIF_VALUE: 17
PIF_VALUE: 1
PIF_VALUE: 21
PIF_VALUE: 23
PIF_VALUE: 15
PIF_VALUE: 22
PIF_VALUE: 15
PIF_VALUE: 24
PIF_VALUE: 16
PIF_VALUE: 0
PIF_VALUE: 15
PIF_VALUE: 14
PIF_VALUE: 16
PIF_VALUE: 2
PIF_VALUE: 30
PIF_VALUE: 15
PIF_VALUE: 10
PIF_VALUE: 15
PIF_VALUE: 21
PIF_VALUE: 14
PIF_VALUE: 15
PIF_VALUE: 22
PIF_VALUE: 2
PIF_VALUE: 16
PIF_VALUE: 23

## 2019-09-16 ASSESSMENT — PAIN SCALES - GENERAL
PAINLEVEL_OUTOF10: 3
PAINLEVEL_OUTOF10: 3
PAINLEVEL_OUTOF10: 4
PAINLEVEL_OUTOF10: 4

## 2019-09-16 ASSESSMENT — PAIN DESCRIPTION - LOCATION: LOCATION: ABDOMEN

## 2019-09-16 ASSESSMENT — PAIN SCALES - WONG BAKER: WONGBAKER_NUMERICALRESPONSE: 0

## 2019-09-16 ASSESSMENT — PAIN DESCRIPTION - PAIN TYPE: TYPE: SURGICAL PAIN

## 2019-09-16 ASSESSMENT — PAIN DESCRIPTION - DESCRIPTORS: DESCRIPTORS: DISCOMFORT

## 2019-09-17 LAB
ANION GAP SERPL CALCULATED.3IONS-SCNC: 7 MMOL/L (ref 3–16)
BASOPHILS ABSOLUTE: 0 K/UL (ref 0–0.2)
BASOPHILS RELATIVE PERCENT: 0.2 %
BUN BLDV-MCNC: 10 MG/DL (ref 7–20)
CALCIUM SERPL-MCNC: 8.4 MG/DL (ref 8.3–10.6)
CHLORIDE BLD-SCNC: 103 MMOL/L (ref 99–110)
CO2: 29 MMOL/L (ref 21–32)
CREAT SERPL-MCNC: <0.5 MG/DL (ref 0.6–1.2)
EOSINOPHILS ABSOLUTE: 0 K/UL (ref 0–0.6)
EOSINOPHILS RELATIVE PERCENT: 0.1 %
GFR AFRICAN AMERICAN: >60
GFR NON-AFRICAN AMERICAN: >60
GLUCOSE BLD-MCNC: 102 MG/DL (ref 70–99)
GLUCOSE BLD-MCNC: 116 MG/DL (ref 70–99)
GLUCOSE BLD-MCNC: 129 MG/DL (ref 70–99)
GLUCOSE BLD-MCNC: 85 MG/DL (ref 70–99)
GLUCOSE BLD-MCNC: 97 MG/DL (ref 70–99)
HCT VFR BLD CALC: 34.4 % (ref 36–48)
HEMOGLOBIN: 11.7 G/DL (ref 12–16)
LYMPHOCYTES ABSOLUTE: 0.9 K/UL (ref 1–5.1)
LYMPHOCYTES RELATIVE PERCENT: 7.9 %
MCH RBC QN AUTO: 31.6 PG (ref 26–34)
MCHC RBC AUTO-ENTMCNC: 34.1 G/DL (ref 31–36)
MCV RBC AUTO: 92.5 FL (ref 80–100)
MONOCYTES ABSOLUTE: 0.7 K/UL (ref 0–1.3)
MONOCYTES RELATIVE PERCENT: 5.8 %
NEUTROPHILS ABSOLUTE: 10 K/UL (ref 1.7–7.7)
NEUTROPHILS RELATIVE PERCENT: 86 %
PDW BLD-RTO: 13.5 % (ref 12.4–15.4)
PERFORMED ON: ABNORMAL
PERFORMED ON: ABNORMAL
PERFORMED ON: NORMAL
PERFORMED ON: NORMAL
PLATELET # BLD: 312 K/UL (ref 135–450)
PMV BLD AUTO: 7.3 FL (ref 5–10.5)
POTASSIUM SERPL-SCNC: 3.3 MMOL/L (ref 3.5–5.1)
RBC # BLD: 3.72 M/UL (ref 4–5.2)
SODIUM BLD-SCNC: 139 MMOL/L (ref 136–145)
URINE CULTURE, ROUTINE: NORMAL
WBC # BLD: 11.7 K/UL (ref 4–11)

## 2019-09-17 PROCEDURE — 94770 HC ETCO2 MONITOR DAILY: CPT

## 2019-09-17 PROCEDURE — 1200000000 HC SEMI PRIVATE

## 2019-09-17 PROCEDURE — 97110 THERAPEUTIC EXERCISES: CPT

## 2019-09-17 PROCEDURE — 97166 OT EVAL MOD COMPLEX 45 MIN: CPT

## 2019-09-17 PROCEDURE — 99024 POSTOP FOLLOW-UP VISIT: CPT | Performed by: SURGERY

## 2019-09-17 PROCEDURE — 2500000003 HC RX 250 WO HCPCS: Performed by: SURGERY

## 2019-09-17 PROCEDURE — 36415 COLL VENOUS BLD VENIPUNCTURE: CPT

## 2019-09-17 PROCEDURE — 80048 BASIC METABOLIC PNL TOTAL CA: CPT

## 2019-09-17 PROCEDURE — 2580000003 HC RX 258: Performed by: SURGERY

## 2019-09-17 PROCEDURE — 6370000000 HC RX 637 (ALT 250 FOR IP): Performed by: SURGERY

## 2019-09-17 PROCEDURE — 2700000000 HC OXYGEN THERAPY PER DAY

## 2019-09-17 PROCEDURE — 94761 N-INVAS EAR/PLS OXIMETRY MLT: CPT

## 2019-09-17 PROCEDURE — 85025 COMPLETE CBC W/AUTO DIFF WBC: CPT

## 2019-09-17 PROCEDURE — 6360000002 HC RX W HCPCS: Performed by: SURGERY

## 2019-09-17 RX ORDER — POTASSIUM CHLORIDE 7.45 MG/ML
10 INJECTION INTRAVENOUS
Status: COMPLETED | OUTPATIENT
Start: 2019-09-17 | End: 2019-09-17

## 2019-09-17 RX ORDER — DEXTROSE MONOHYDRATE 25 G/50ML
12.5 INJECTION, SOLUTION INTRAVENOUS PRN
Status: DISCONTINUED | OUTPATIENT
Start: 2019-09-17 | End: 2019-09-23 | Stop reason: HOSPADM

## 2019-09-17 RX ORDER — DEXTROSE MONOHYDRATE 50 MG/ML
100 INJECTION, SOLUTION INTRAVENOUS PRN
Status: DISCONTINUED | OUTPATIENT
Start: 2019-09-17 | End: 2019-09-23 | Stop reason: HOSPADM

## 2019-09-17 RX ORDER — NICOTINE POLACRILEX 4 MG
15 LOZENGE BUCCAL PRN
Status: DISCONTINUED | OUTPATIENT
Start: 2019-09-17 | End: 2019-09-23 | Stop reason: HOSPADM

## 2019-09-17 RX ADMIN — QUETIAPINE FUMARATE 100 MG: 100 TABLET ORAL at 08:17

## 2019-09-17 RX ADMIN — SODIUM CHLORIDE: 9 INJECTION, SOLUTION INTRAVENOUS at 08:18

## 2019-09-17 RX ADMIN — FAMOTIDINE 20 MG: 10 INJECTION, SOLUTION INTRAVENOUS at 20:14

## 2019-09-17 RX ADMIN — SODIUM CHLORIDE: 9 INJECTION, SOLUTION INTRAVENOUS at 16:29

## 2019-09-17 RX ADMIN — FAMOTIDINE 20 MG: 10 INJECTION, SOLUTION INTRAVENOUS at 08:17

## 2019-09-17 RX ADMIN — LORAZEPAM 0.5 MG: 2 INJECTION, SOLUTION INTRAMUSCULAR; INTRAVENOUS at 08:18

## 2019-09-17 RX ADMIN — ROPINIROLE HYDROCHLORIDE 2 MG: 0.5 TABLET, FILM COATED ORAL at 17:39

## 2019-09-17 RX ADMIN — POTASSIUM CHLORIDE 10 MEQ: 7.46 INJECTION, SOLUTION INTRAVENOUS at 10:58

## 2019-09-17 RX ADMIN — POTASSIUM CHLORIDE 10 MEQ: 7.46 INJECTION, SOLUTION INTRAVENOUS at 09:55

## 2019-09-17 RX ADMIN — DULOXETINE 60 MG: 60 CAPSULE, DELAYED RELEASE ORAL at 20:14

## 2019-09-17 RX ADMIN — DULOXETINE 60 MG: 60 CAPSULE, DELAYED RELEASE ORAL at 08:17

## 2019-09-17 RX ADMIN — Medication 10 ML: at 20:14

## 2019-09-17 ASSESSMENT — PAIN DESCRIPTION - LOCATION: LOCATION: ABDOMEN

## 2019-09-17 ASSESSMENT — PAIN SCALES - GENERAL: PAINLEVEL_OUTOF10: 8

## 2019-09-17 ASSESSMENT — PAIN DESCRIPTION - PAIN TYPE: TYPE: ACUTE PAIN;CHRONIC PAIN

## 2019-09-18 LAB
ANION GAP SERPL CALCULATED.3IONS-SCNC: 8 MMOL/L (ref 3–16)
BUN BLDV-MCNC: 8 MG/DL (ref 7–20)
CALCIUM SERPL-MCNC: 8.3 MG/DL (ref 8.3–10.6)
CHLORIDE BLD-SCNC: 98 MMOL/L (ref 99–110)
CO2: 29 MMOL/L (ref 21–32)
CREAT SERPL-MCNC: <0.5 MG/DL (ref 0.6–1.2)
GFR AFRICAN AMERICAN: >60
GFR NON-AFRICAN AMERICAN: >60
GLUCOSE BLD-MCNC: 103 MG/DL (ref 70–99)
GLUCOSE BLD-MCNC: 111 MG/DL (ref 70–99)
GLUCOSE BLD-MCNC: 168 MG/DL (ref 70–99)
GLUCOSE BLD-MCNC: 80 MG/DL (ref 70–99)
GLUCOSE BLD-MCNC: 84 MG/DL (ref 70–99)
PERFORMED ON: ABNORMAL
PERFORMED ON: NORMAL
POTASSIUM SERPL-SCNC: 3.2 MMOL/L (ref 3.5–5.1)
SODIUM BLD-SCNC: 135 MMOL/L (ref 136–145)

## 2019-09-18 PROCEDURE — 97116 GAIT TRAINING THERAPY: CPT

## 2019-09-18 PROCEDURE — 6360000002 HC RX W HCPCS: Performed by: SURGERY

## 2019-09-18 PROCEDURE — 1200000000 HC SEMI PRIVATE

## 2019-09-18 PROCEDURE — 2500000003 HC RX 250 WO HCPCS: Performed by: SURGERY

## 2019-09-18 PROCEDURE — 36415 COLL VENOUS BLD VENIPUNCTURE: CPT

## 2019-09-18 PROCEDURE — 97530 THERAPEUTIC ACTIVITIES: CPT

## 2019-09-18 PROCEDURE — 80048 BASIC METABOLIC PNL TOTAL CA: CPT

## 2019-09-18 PROCEDURE — 97162 PT EVAL MOD COMPLEX 30 MIN: CPT

## 2019-09-18 PROCEDURE — 2580000003 HC RX 258: Performed by: SURGERY

## 2019-09-18 PROCEDURE — 6370000000 HC RX 637 (ALT 250 FOR IP): Performed by: SURGERY

## 2019-09-18 PROCEDURE — 94770 HC ETCO2 MONITOR DAILY: CPT

## 2019-09-18 PROCEDURE — 99024 POSTOP FOLLOW-UP VISIT: CPT | Performed by: SURGERY

## 2019-09-18 PROCEDURE — 2700000000 HC OXYGEN THERAPY PER DAY

## 2019-09-18 PROCEDURE — 94761 N-INVAS EAR/PLS OXIMETRY MLT: CPT

## 2019-09-18 RX ORDER — HYDROMORPHONE HCL 110MG/55ML
0.5 PATIENT CONTROLLED ANALGESIA SYRINGE INTRAVENOUS
Status: DISCONTINUED | OUTPATIENT
Start: 2019-09-18 | End: 2019-09-22

## 2019-09-18 RX ORDER — POTASSIUM CHLORIDE AND SODIUM CHLORIDE 900; 300 MG/100ML; MG/100ML
INJECTION, SOLUTION INTRAVENOUS CONTINUOUS
Status: DISCONTINUED | OUTPATIENT
Start: 2019-09-18 | End: 2019-09-21

## 2019-09-18 RX ORDER — HYDROMORPHONE HCL 110MG/55ML
1 PATIENT CONTROLLED ANALGESIA SYRINGE INTRAVENOUS
Status: DISCONTINUED | OUTPATIENT
Start: 2019-09-18 | End: 2019-09-22

## 2019-09-18 RX ADMIN — ENOXAPARIN SODIUM 40 MG: 40 INJECTION SUBCUTANEOUS at 08:20

## 2019-09-18 RX ADMIN — QUETIAPINE FUMARATE 100 MG: 100 TABLET ORAL at 20:35

## 2019-09-18 RX ADMIN — POTASSIUM CHLORIDE AND SODIUM CHLORIDE: 900; 300 INJECTION, SOLUTION INTRAVENOUS at 10:57

## 2019-09-18 RX ADMIN — FAMOTIDINE 20 MG: 10 INJECTION, SOLUTION INTRAVENOUS at 08:18

## 2019-09-18 RX ADMIN — ROPINIROLE HYDROCHLORIDE 2 MG: 0.5 TABLET, FILM COATED ORAL at 18:27

## 2019-09-18 RX ADMIN — DULOXETINE 60 MG: 60 CAPSULE, DELAYED RELEASE ORAL at 20:35

## 2019-09-18 RX ADMIN — DULOXETINE 60 MG: 60 CAPSULE, DELAYED RELEASE ORAL at 08:18

## 2019-09-18 RX ADMIN — INSULIN LISPRO 1 UNITS: 100 INJECTION, SOLUTION INTRAVENOUS; SUBCUTANEOUS at 20:37

## 2019-09-18 RX ADMIN — SODIUM CHLORIDE: 9 INJECTION, SOLUTION INTRAVENOUS at 08:11

## 2019-09-18 RX ADMIN — LEVOTHYROXINE SODIUM 50 MCG: 50 TABLET ORAL at 06:07

## 2019-09-18 RX ADMIN — FAMOTIDINE 20 MG: 10 INJECTION, SOLUTION INTRAVENOUS at 20:35

## 2019-09-18 ASSESSMENT — PAIN DESCRIPTION - LOCATION: LOCATION: ABDOMEN

## 2019-09-18 ASSESSMENT — PAIN SCALES - GENERAL
PAINLEVEL_OUTOF10: 7
PAINLEVEL_OUTOF10: 7

## 2019-09-18 ASSESSMENT — PAIN - FUNCTIONAL ASSESSMENT: PAIN_FUNCTIONAL_ASSESSMENT: PREVENTS OR INTERFERES SOME ACTIVE ACTIVITIES AND ADLS

## 2019-09-18 ASSESSMENT — PAIN DESCRIPTION - FREQUENCY: FREQUENCY: INTERMITTENT

## 2019-09-18 ASSESSMENT — PAIN DESCRIPTION - ORIENTATION: ORIENTATION: MID

## 2019-09-18 ASSESSMENT — PAIN DESCRIPTION - PAIN TYPE: TYPE: ACUTE PAIN;SURGICAL PAIN

## 2019-09-18 ASSESSMENT — PAIN DESCRIPTION - DESCRIPTORS: DESCRIPTORS: DISCOMFORT;ACHING

## 2019-09-19 LAB
ANION GAP SERPL CALCULATED.3IONS-SCNC: 11 MMOL/L (ref 3–16)
BUN BLDV-MCNC: 4 MG/DL (ref 7–20)
CALCIUM SERPL-MCNC: 8.4 MG/DL (ref 8.3–10.6)
CHLORIDE BLD-SCNC: 102 MMOL/L (ref 99–110)
CO2: 29 MMOL/L (ref 21–32)
CREAT SERPL-MCNC: <0.5 MG/DL (ref 0.6–1.2)
GFR AFRICAN AMERICAN: >60
GFR NON-AFRICAN AMERICAN: >60
GLUCOSE BLD-MCNC: 108 MG/DL (ref 70–99)
GLUCOSE BLD-MCNC: 122 MG/DL (ref 70–99)
GLUCOSE BLD-MCNC: 168 MG/DL (ref 70–99)
GLUCOSE BLD-MCNC: 90 MG/DL (ref 70–99)
GLUCOSE BLD-MCNC: 93 MG/DL (ref 70–99)
PERFORMED ON: ABNORMAL
PERFORMED ON: NORMAL
POTASSIUM SERPL-SCNC: 3.2 MMOL/L (ref 3.5–5.1)
SODIUM BLD-SCNC: 142 MMOL/L (ref 136–145)

## 2019-09-19 PROCEDURE — 2580000003 HC RX 258: Performed by: SURGERY

## 2019-09-19 PROCEDURE — 6360000002 HC RX W HCPCS: Performed by: SURGERY

## 2019-09-19 PROCEDURE — 1200000000 HC SEMI PRIVATE

## 2019-09-19 PROCEDURE — 99024 POSTOP FOLLOW-UP VISIT: CPT | Performed by: SURGERY

## 2019-09-19 PROCEDURE — 36415 COLL VENOUS BLD VENIPUNCTURE: CPT

## 2019-09-19 PROCEDURE — 97116 GAIT TRAINING THERAPY: CPT

## 2019-09-19 PROCEDURE — 2500000003 HC RX 250 WO HCPCS: Performed by: SURGERY

## 2019-09-19 PROCEDURE — 97530 THERAPEUTIC ACTIVITIES: CPT

## 2019-09-19 PROCEDURE — 80048 BASIC METABOLIC PNL TOTAL CA: CPT

## 2019-09-19 PROCEDURE — 6370000000 HC RX 637 (ALT 250 FOR IP): Performed by: SURGERY

## 2019-09-19 PROCEDURE — 6360000002 HC RX W HCPCS: Performed by: CLINICAL NURSE SPECIALIST

## 2019-09-19 PROCEDURE — 97535 SELF CARE MNGMENT TRAINING: CPT

## 2019-09-19 PROCEDURE — APPSS45 APP SPLIT SHARED TIME 31-45 MINUTES: Performed by: CLINICAL NURSE SPECIALIST

## 2019-09-19 RX ORDER — POTASSIUM CHLORIDE 7.45 MG/ML
10 INJECTION INTRAVENOUS
Status: COMPLETED | OUTPATIENT
Start: 2019-09-19 | End: 2019-09-19

## 2019-09-19 RX ADMIN — FAMOTIDINE 20 MG: 10 INJECTION, SOLUTION INTRAVENOUS at 21:31

## 2019-09-19 RX ADMIN — FAMOTIDINE 20 MG: 10 INJECTION, SOLUTION INTRAVENOUS at 09:06

## 2019-09-19 RX ADMIN — INSULIN LISPRO 1 UNITS: 100 INJECTION, SOLUTION INTRAVENOUS; SUBCUTANEOUS at 21:32

## 2019-09-19 RX ADMIN — POTASSIUM CHLORIDE 10 MEQ: 10 INJECTION, SOLUTION INTRAVENOUS at 13:01

## 2019-09-19 RX ADMIN — DULOXETINE 60 MG: 60 CAPSULE, DELAYED RELEASE ORAL at 21:30

## 2019-09-19 RX ADMIN — HYDROMORPHONE HYDROCHLORIDE 1 MG: 2 INJECTION, SOLUTION INTRAMUSCULAR; INTRAVENOUS; SUBCUTANEOUS at 00:12

## 2019-09-19 RX ADMIN — POTASSIUM CHLORIDE 10 MEQ: 10 INJECTION, SOLUTION INTRAVENOUS at 12:56

## 2019-09-19 RX ADMIN — Medication 10 ML: at 09:06

## 2019-09-19 RX ADMIN — DULOXETINE 60 MG: 60 CAPSULE, DELAYED RELEASE ORAL at 09:06

## 2019-09-19 RX ADMIN — ENOXAPARIN SODIUM 40 MG: 40 INJECTION SUBCUTANEOUS at 09:06

## 2019-09-19 RX ADMIN — ROPINIROLE HYDROCHLORIDE 2 MG: 0.5 TABLET, FILM COATED ORAL at 19:49

## 2019-09-19 RX ADMIN — POTASSIUM CHLORIDE 10 MEQ: 10 INJECTION, SOLUTION INTRAVENOUS at 11:08

## 2019-09-19 RX ADMIN — POTASSIUM CHLORIDE AND SODIUM CHLORIDE: 900; 300 INJECTION, SOLUTION INTRAVENOUS at 14:20

## 2019-09-19 RX ADMIN — QUETIAPINE FUMARATE 100 MG: 100 TABLET ORAL at 21:30

## 2019-09-19 RX ADMIN — HYDROMORPHONE HYDROCHLORIDE 1 MG: 2 INJECTION, SOLUTION INTRAMUSCULAR; INTRAVENOUS; SUBCUTANEOUS at 14:20

## 2019-09-19 RX ADMIN — LEVOTHYROXINE SODIUM 50 MCG: 50 TABLET ORAL at 06:27

## 2019-09-19 ASSESSMENT — PAIN SCALES - GENERAL
PAINLEVEL_OUTOF10: 7
PAINLEVEL_OUTOF10: 7
PAINLEVEL_OUTOF10: 6

## 2019-09-19 ASSESSMENT — PAIN DESCRIPTION - LOCATION
LOCATION: ABDOMEN
LOCATION: ABDOMEN

## 2019-09-19 ASSESSMENT — PAIN DESCRIPTION - ORIENTATION
ORIENTATION: MID
ORIENTATION: MID

## 2019-09-19 ASSESSMENT — PAIN DESCRIPTION - PAIN TYPE
TYPE: ACUTE PAIN;SURGICAL PAIN
TYPE: ACUTE PAIN;SURGICAL PAIN

## 2019-09-19 ASSESSMENT — PAIN SCALES - WONG BAKER: WONGBAKER_NUMERICALRESPONSE: 4

## 2019-09-19 ASSESSMENT — PAIN DESCRIPTION - DESCRIPTORS: DESCRIPTORS: ACHING;DISCOMFORT

## 2019-09-20 LAB
ANION GAP SERPL CALCULATED.3IONS-SCNC: 10 MMOL/L (ref 3–16)
BUN BLDV-MCNC: <2 MG/DL (ref 7–20)
CALCIUM SERPL-MCNC: 8.9 MG/DL (ref 8.3–10.6)
CHLORIDE BLD-SCNC: 101 MMOL/L (ref 99–110)
CO2: 28 MMOL/L (ref 21–32)
CREAT SERPL-MCNC: <0.5 MG/DL (ref 0.6–1.2)
GFR AFRICAN AMERICAN: >60
GFR NON-AFRICAN AMERICAN: >60
GLUCOSE BLD-MCNC: 102 MG/DL (ref 70–99)
GLUCOSE BLD-MCNC: 110 MG/DL (ref 70–99)
GLUCOSE BLD-MCNC: 111 MG/DL (ref 70–99)
GLUCOSE BLD-MCNC: 111 MG/DL (ref 70–99)
GLUCOSE BLD-MCNC: 126 MG/DL (ref 70–99)
MAGNESIUM: 2 MG/DL (ref 1.8–2.4)
PERFORMED ON: ABNORMAL
POTASSIUM SERPL-SCNC: 3.5 MMOL/L (ref 3.5–5.1)
SODIUM BLD-SCNC: 139 MMOL/L (ref 136–145)

## 2019-09-20 PROCEDURE — 2500000003 HC RX 250 WO HCPCS: Performed by: SURGERY

## 2019-09-20 PROCEDURE — APPSS45 APP SPLIT SHARED TIME 31-45 MINUTES: Performed by: CLINICAL NURSE SPECIALIST

## 2019-09-20 PROCEDURE — 1200000000 HC SEMI PRIVATE

## 2019-09-20 PROCEDURE — 6360000002 HC RX W HCPCS: Performed by: SURGERY

## 2019-09-20 PROCEDURE — 80048 BASIC METABOLIC PNL TOTAL CA: CPT

## 2019-09-20 PROCEDURE — 2580000003 HC RX 258: Performed by: SURGERY

## 2019-09-20 PROCEDURE — 83735 ASSAY OF MAGNESIUM: CPT

## 2019-09-20 PROCEDURE — 36415 COLL VENOUS BLD VENIPUNCTURE: CPT

## 2019-09-20 PROCEDURE — 6370000000 HC RX 637 (ALT 250 FOR IP): Performed by: SURGERY

## 2019-09-20 PROCEDURE — 99024 POSTOP FOLLOW-UP VISIT: CPT | Performed by: SURGERY

## 2019-09-20 RX ADMIN — Medication 10 ML: at 19:39

## 2019-09-20 RX ADMIN — LEVOTHYROXINE SODIUM 50 MCG: 50 TABLET ORAL at 05:48

## 2019-09-20 RX ADMIN — FAMOTIDINE 20 MG: 10 INJECTION, SOLUTION INTRAVENOUS at 19:38

## 2019-09-20 RX ADMIN — DULOXETINE 60 MG: 60 CAPSULE, DELAYED RELEASE ORAL at 09:22

## 2019-09-20 RX ADMIN — POTASSIUM CHLORIDE AND SODIUM CHLORIDE: 900; 300 INJECTION, SOLUTION INTRAVENOUS at 17:50

## 2019-09-20 RX ADMIN — ENOXAPARIN SODIUM 40 MG: 40 INJECTION SUBCUTANEOUS at 09:23

## 2019-09-20 RX ADMIN — Medication 10 ML: at 09:23

## 2019-09-20 RX ADMIN — DULOXETINE 60 MG: 60 CAPSULE, DELAYED RELEASE ORAL at 19:38

## 2019-09-20 RX ADMIN — QUETIAPINE FUMARATE 100 MG: 100 TABLET ORAL at 19:38

## 2019-09-20 RX ADMIN — FAMOTIDINE 20 MG: 10 INJECTION, SOLUTION INTRAVENOUS at 09:23

## 2019-09-20 RX ADMIN — ROPINIROLE HYDROCHLORIDE 2 MG: 0.5 TABLET, FILM COATED ORAL at 17:50

## 2019-09-21 LAB
BLOOD CULTURE, ROUTINE: NORMAL
CULTURE, BLOOD 2: NORMAL
GLUCOSE BLD-MCNC: 100 MG/DL (ref 70–99)
GLUCOSE BLD-MCNC: 88 MG/DL (ref 70–99)
GLUCOSE BLD-MCNC: 92 MG/DL (ref 70–99)
GLUCOSE BLD-MCNC: 96 MG/DL (ref 70–99)
PERFORMED ON: ABNORMAL
PERFORMED ON: NORMAL

## 2019-09-21 PROCEDURE — 6360000002 HC RX W HCPCS: Performed by: SURGERY

## 2019-09-21 PROCEDURE — 99024 POSTOP FOLLOW-UP VISIT: CPT | Performed by: SURGERY

## 2019-09-21 PROCEDURE — 2580000003 HC RX 258: Performed by: SURGERY

## 2019-09-21 PROCEDURE — 1200000000 HC SEMI PRIVATE

## 2019-09-21 PROCEDURE — 6370000000 HC RX 637 (ALT 250 FOR IP): Performed by: SURGERY

## 2019-09-21 PROCEDURE — 2500000003 HC RX 250 WO HCPCS: Performed by: SURGERY

## 2019-09-21 RX ORDER — IBUPROFEN 400 MG/1
400 TABLET ORAL EVERY 8 HOURS PRN
Status: DISCONTINUED | OUTPATIENT
Start: 2019-09-21 | End: 2019-09-23 | Stop reason: HOSPADM

## 2019-09-21 RX ADMIN — ENOXAPARIN SODIUM 40 MG: 40 INJECTION SUBCUTANEOUS at 08:45

## 2019-09-21 RX ADMIN — IBUPROFEN 400 MG: 400 TABLET, FILM COATED ORAL at 06:23

## 2019-09-21 RX ADMIN — Medication 10 ML: at 21:28

## 2019-09-21 RX ADMIN — DULOXETINE 60 MG: 60 CAPSULE, DELAYED RELEASE ORAL at 21:28

## 2019-09-21 RX ADMIN — FAMOTIDINE 20 MG: 10 INJECTION, SOLUTION INTRAVENOUS at 21:28

## 2019-09-21 RX ADMIN — LEVOTHYROXINE SODIUM 50 MCG: 50 TABLET ORAL at 05:45

## 2019-09-21 RX ADMIN — ROPINIROLE HYDROCHLORIDE 2 MG: 0.5 TABLET, FILM COATED ORAL at 18:31

## 2019-09-21 RX ADMIN — QUETIAPINE FUMARATE 100 MG: 100 TABLET ORAL at 21:28

## 2019-09-21 RX ADMIN — DULOXETINE 60 MG: 60 CAPSULE, DELAYED RELEASE ORAL at 08:46

## 2019-09-21 RX ADMIN — FAMOTIDINE 20 MG: 10 INJECTION, SOLUTION INTRAVENOUS at 08:45

## 2019-09-21 RX ADMIN — POTASSIUM CHLORIDE AND SODIUM CHLORIDE: 900; 300 INJECTION, SOLUTION INTRAVENOUS at 08:46

## 2019-09-21 ASSESSMENT — PAIN SCALES - GENERAL
PAINLEVEL_OUTOF10: 5
PAINLEVEL_OUTOF10: 0

## 2019-09-22 LAB
GLUCOSE BLD-MCNC: 107 MG/DL (ref 70–99)
GLUCOSE BLD-MCNC: 107 MG/DL (ref 70–99)
GLUCOSE BLD-MCNC: 82 MG/DL (ref 70–99)
GLUCOSE BLD-MCNC: 89 MG/DL (ref 70–99)
PERFORMED ON: ABNORMAL
PERFORMED ON: ABNORMAL
PERFORMED ON: NORMAL
PERFORMED ON: NORMAL

## 2019-09-22 PROCEDURE — 1200000000 HC SEMI PRIVATE

## 2019-09-22 PROCEDURE — 2580000003 HC RX 258: Performed by: SURGERY

## 2019-09-22 PROCEDURE — 6360000002 HC RX W HCPCS: Performed by: SURGERY

## 2019-09-22 PROCEDURE — 99024 POSTOP FOLLOW-UP VISIT: CPT | Performed by: SURGERY

## 2019-09-22 PROCEDURE — 2500000003 HC RX 250 WO HCPCS: Performed by: SURGERY

## 2019-09-22 PROCEDURE — 6370000000 HC RX 637 (ALT 250 FOR IP): Performed by: SURGERY

## 2019-09-22 RX ORDER — OXYCODONE HYDROCHLORIDE AND ACETAMINOPHEN 5; 325 MG/1; MG/1
2 TABLET ORAL EVERY 4 HOURS PRN
Status: DISCONTINUED | OUTPATIENT
Start: 2019-09-22 | End: 2019-09-23 | Stop reason: HOSPADM

## 2019-09-22 RX ORDER — OXYCODONE HYDROCHLORIDE AND ACETAMINOPHEN 5; 325 MG/1; MG/1
1 TABLET ORAL EVERY 4 HOURS PRN
Status: DISCONTINUED | OUTPATIENT
Start: 2019-09-22 | End: 2019-09-23 | Stop reason: HOSPADM

## 2019-09-22 RX ADMIN — FAMOTIDINE 20 MG: 10 INJECTION, SOLUTION INTRAVENOUS at 07:58

## 2019-09-22 RX ADMIN — DULOXETINE 60 MG: 60 CAPSULE, DELAYED RELEASE ORAL at 08:00

## 2019-09-22 RX ADMIN — DULOXETINE 60 MG: 60 CAPSULE, DELAYED RELEASE ORAL at 21:15

## 2019-09-22 RX ADMIN — LEVOTHYROXINE SODIUM 50 MCG: 50 TABLET ORAL at 06:52

## 2019-09-22 RX ADMIN — Medication 10 ML: at 07:59

## 2019-09-22 RX ADMIN — QUETIAPINE FUMARATE 100 MG: 100 TABLET ORAL at 21:15

## 2019-09-22 RX ADMIN — ENOXAPARIN SODIUM 40 MG: 40 INJECTION SUBCUTANEOUS at 07:59

## 2019-09-22 RX ADMIN — ROPINIROLE HYDROCHLORIDE 2 MG: 0.5 TABLET, FILM COATED ORAL at 18:38

## 2019-09-22 ASSESSMENT — PAIN SCALES - GENERAL: PAINLEVEL_OUTOF10: 0

## 2019-09-23 VITALS
DIASTOLIC BLOOD PRESSURE: 78 MMHG | HEART RATE: 81 BPM | RESPIRATION RATE: 20 BRPM | TEMPERATURE: 97.5 F | OXYGEN SATURATION: 93 % | WEIGHT: 156 LBS | SYSTOLIC BLOOD PRESSURE: 126 MMHG | BODY MASS INDEX: 25.07 KG/M2 | HEIGHT: 66 IN

## 2019-09-23 LAB
GLUCOSE BLD-MCNC: 105 MG/DL (ref 70–99)
GLUCOSE BLD-MCNC: 99 MG/DL (ref 70–99)
PERFORMED ON: ABNORMAL
PERFORMED ON: NORMAL

## 2019-09-23 PROCEDURE — 97530 THERAPEUTIC ACTIVITIES: CPT

## 2019-09-23 PROCEDURE — 97110 THERAPEUTIC EXERCISES: CPT

## 2019-09-23 PROCEDURE — 99024 POSTOP FOLLOW-UP VISIT: CPT | Performed by: SURGERY

## 2019-09-23 PROCEDURE — 6370000000 HC RX 637 (ALT 250 FOR IP): Performed by: SURGERY

## 2019-09-23 PROCEDURE — 97116 GAIT TRAINING THERAPY: CPT

## 2019-09-23 RX ORDER — OXYCODONE HYDROCHLORIDE AND ACETAMINOPHEN 5; 325 MG/1; MG/1
1-2 TABLET ORAL EVERY 6 HOURS PRN
Qty: 20 TABLET | Refills: 0 | Status: SHIPPED | OUTPATIENT
Start: 2019-09-23 | End: 2019-09-28

## 2019-09-23 RX ADMIN — LEVOTHYROXINE SODIUM 50 MCG: 50 TABLET ORAL at 06:42

## 2019-09-23 RX ADMIN — DULOXETINE 60 MG: 60 CAPSULE, DELAYED RELEASE ORAL at 08:13

## 2019-09-23 ASSESSMENT — PAIN DESCRIPTION - PAIN TYPE
TYPE: SURGICAL PAIN

## 2019-09-23 ASSESSMENT — PAIN SCALES - GENERAL
PAINLEVEL_OUTOF10: 1
PAINLEVEL_OUTOF10: 1
PAINLEVEL_OUTOF10: 2

## 2019-09-23 ASSESSMENT — PAIN DESCRIPTION - LOCATION
LOCATION: ABDOMEN

## 2019-09-23 ASSESSMENT — PAIN DESCRIPTION - ORIENTATION
ORIENTATION: MID

## 2019-09-23 ASSESSMENT — PAIN DESCRIPTION - DESCRIPTORS: DESCRIPTORS: ACHING

## 2019-09-27 ENCOUNTER — TELEPHONE (OUTPATIENT)
Dept: SURGERY | Age: 69
End: 2019-09-27

## 2019-09-27 NOTE — TELEPHONE ENCOUNTER
Lily Parikh from [facility] called - stated she received the orders for Occupational Therapy and Physical Therapy for pt - wanted Dr Ariana Tompkins to know she visited pt today and pt does not need either    Sent perfect serve mess to Dr Ariana Tompkins

## 2019-10-03 ENCOUNTER — OFFICE VISIT (OUTPATIENT)
Dept: SURGERY | Age: 69
End: 2019-10-03

## 2019-10-03 VITALS
SYSTOLIC BLOOD PRESSURE: 110 MMHG | WEIGHT: 154 LBS | DIASTOLIC BLOOD PRESSURE: 68 MMHG | HEIGHT: 66 IN | OXYGEN SATURATION: 99 % | BODY MASS INDEX: 24.75 KG/M2 | HEART RATE: 98 BPM

## 2019-10-03 DIAGNOSIS — Z90.49 S/P PARTIAL COLECTOMY: Primary | ICD-10-CM

## 2019-10-03 PROCEDURE — 99024 POSTOP FOLLOW-UP VISIT: CPT | Performed by: SURGERY

## 2019-10-28 ENCOUNTER — OFFICE VISIT (OUTPATIENT)
Dept: DERMATOLOGY | Age: 69
End: 2019-10-28
Payer: COMMERCIAL

## 2019-10-28 DIAGNOSIS — D22.9 MULTIPLE NEVI: ICD-10-CM

## 2019-10-28 DIAGNOSIS — L81.4 LENTIGINES: ICD-10-CM

## 2019-10-28 DIAGNOSIS — L82.1 SEBORRHEIC KERATOSIS: ICD-10-CM

## 2019-10-28 DIAGNOSIS — Z87.2 HISTORY OF ACTINIC KERATOSES: Primary | ICD-10-CM

## 2019-10-28 PROCEDURE — 1123F ACP DISCUSS/DSCN MKR DOCD: CPT | Performed by: DERMATOLOGY

## 2019-10-28 PROCEDURE — G8420 CALC BMI NORM PARAMETERS: HCPCS | Performed by: DERMATOLOGY

## 2019-10-28 PROCEDURE — 4040F PNEUMOC VAC/ADMIN/RCVD: CPT | Performed by: DERMATOLOGY

## 2019-10-28 PROCEDURE — 99213 OFFICE O/P EST LOW 20 MIN: CPT | Performed by: DERMATOLOGY

## 2019-10-28 PROCEDURE — 1036F TOBACCO NON-USER: CPT | Performed by: DERMATOLOGY

## 2019-10-28 PROCEDURE — 1090F PRES/ABSN URINE INCON ASSESS: CPT | Performed by: DERMATOLOGY

## 2019-10-28 PROCEDURE — G8427 DOCREV CUR MEDS BY ELIG CLIN: HCPCS | Performed by: DERMATOLOGY

## 2019-10-28 PROCEDURE — 3017F COLORECTAL CA SCREEN DOC REV: CPT | Performed by: DERMATOLOGY

## 2019-10-28 PROCEDURE — G8399 PT W/DXA RESULTS DOCUMENT: HCPCS | Performed by: DERMATOLOGY

## 2019-10-28 PROCEDURE — G8484 FLU IMMUNIZE NO ADMIN: HCPCS | Performed by: DERMATOLOGY

## 2019-11-04 DIAGNOSIS — E78.00 HYPERCHOLESTEREMIA: ICD-10-CM

## 2019-11-04 RX ORDER — ATORVASTATIN CALCIUM 20 MG/1
20 TABLET, FILM COATED ORAL DAILY
Qty: 90 TABLET | Refills: 1 | Status: SHIPPED | OUTPATIENT
Start: 2019-11-04 | End: 2020-05-19

## 2019-11-13 ENCOUNTER — OFFICE VISIT (OUTPATIENT)
Dept: FAMILY MEDICINE CLINIC | Age: 69
End: 2019-11-13
Payer: COMMERCIAL

## 2019-11-13 VITALS
TEMPERATURE: 98.2 F | OXYGEN SATURATION: 95 % | BODY MASS INDEX: 24.91 KG/M2 | DIASTOLIC BLOOD PRESSURE: 78 MMHG | HEART RATE: 84 BPM | WEIGHT: 155 LBS | SYSTOLIC BLOOD PRESSURE: 126 MMHG | HEIGHT: 66 IN

## 2019-11-13 DIAGNOSIS — Z72.89 OTHER PROBLEMS RELATED TO LIFESTYLE: ICD-10-CM

## 2019-11-13 DIAGNOSIS — Z23 IMMUNIZATION DUE: ICD-10-CM

## 2019-11-13 DIAGNOSIS — E03.9 HYPOTHYROIDISM, UNSPECIFIED TYPE: ICD-10-CM

## 2019-11-13 DIAGNOSIS — E78.00 HYPERCHOLESTEREMIA: ICD-10-CM

## 2019-11-13 DIAGNOSIS — J06.9 VIRAL URI: ICD-10-CM

## 2019-11-13 DIAGNOSIS — E11.9 TYPE 2 DIABETES MELLITUS WITHOUT COMPLICATION, WITHOUT LONG-TERM CURRENT USE OF INSULIN (HCC): Primary | ICD-10-CM

## 2019-11-13 DIAGNOSIS — G25.81 RLS (RESTLESS LEGS SYNDROME): ICD-10-CM

## 2019-11-13 DIAGNOSIS — Z12.31 ENCOUNTER FOR SCREENING MAMMOGRAM FOR MALIGNANT NEOPLASM OF BREAST: ICD-10-CM

## 2019-11-13 DIAGNOSIS — K59.09 CHRONIC CONSTIPATION: ICD-10-CM

## 2019-11-13 DIAGNOSIS — E55.9 VITAMIN D DEFICIENCY: ICD-10-CM

## 2019-11-13 LAB — HBA1C MFR BLD: 5.5 %

## 2019-11-13 PROCEDURE — 90653 IIV ADJUVANT VACCINE IM: CPT | Performed by: NURSE PRACTITIONER

## 2019-11-13 PROCEDURE — 3044F HG A1C LEVEL LT 7.0%: CPT | Performed by: NURSE PRACTITIONER

## 2019-11-13 PROCEDURE — 36415 COLL VENOUS BLD VENIPUNCTURE: CPT | Performed by: NURSE PRACTITIONER

## 2019-11-13 PROCEDURE — G8427 DOCREV CUR MEDS BY ELIG CLIN: HCPCS | Performed by: NURSE PRACTITIONER

## 2019-11-13 PROCEDURE — 1123F ACP DISCUSS/DSCN MKR DOCD: CPT | Performed by: NURSE PRACTITIONER

## 2019-11-13 PROCEDURE — 1036F TOBACCO NON-USER: CPT | Performed by: NURSE PRACTITIONER

## 2019-11-13 PROCEDURE — 3017F COLORECTAL CA SCREEN DOC REV: CPT | Performed by: NURSE PRACTITIONER

## 2019-11-13 PROCEDURE — 83036 HEMOGLOBIN GLYCOSYLATED A1C: CPT | Performed by: NURSE PRACTITIONER

## 2019-11-13 PROCEDURE — 4040F PNEUMOC VAC/ADMIN/RCVD: CPT | Performed by: NURSE PRACTITIONER

## 2019-11-13 PROCEDURE — 99214 OFFICE O/P EST MOD 30 MIN: CPT | Performed by: NURSE PRACTITIONER

## 2019-11-13 PROCEDURE — G0008 ADMIN INFLUENZA VIRUS VAC: HCPCS | Performed by: NURSE PRACTITIONER

## 2019-11-13 PROCEDURE — G8417 CALC BMI ABV UP PARAM F/U: HCPCS | Performed by: NURSE PRACTITIONER

## 2019-11-13 PROCEDURE — 1090F PRES/ABSN URINE INCON ASSESS: CPT | Performed by: NURSE PRACTITIONER

## 2019-11-13 PROCEDURE — G8399 PT W/DXA RESULTS DOCUMENT: HCPCS | Performed by: NURSE PRACTITIONER

## 2019-11-13 PROCEDURE — 2022F DILAT RTA XM EVC RTNOPTHY: CPT | Performed by: NURSE PRACTITIONER

## 2019-11-13 PROCEDURE — G8482 FLU IMMUNIZE ORDER/ADMIN: HCPCS | Performed by: NURSE PRACTITIONER

## 2019-11-13 RX ORDER — BENZONATATE 100 MG/1
100 CAPSULE ORAL 3 TIMES DAILY PRN
Qty: 30 CAPSULE | Refills: 0 | Status: SHIPPED | OUTPATIENT
Start: 2019-11-13 | End: 2019-11-23

## 2019-11-13 ASSESSMENT — ENCOUNTER SYMPTOMS
APNEA: 0
CHOKING: 0
SHORTNESS OF BREATH: 0
WHEEZING: 0
SINUS PAIN: 0
RHINORRHEA: 1
SORE THROAT: 1
COUGH: 1
BACK PAIN: 0
SINUS PRESSURE: 0
CHEST TIGHTNESS: 0
STRIDOR: 0

## 2019-11-14 LAB
CHOLESTEROL, FASTING: 165 MG/DL (ref 0–199)
HDLC SERPL-MCNC: 77 MG/DL (ref 40–60)
HEPATITIS C ANTIBODY INTERPRETATION: NORMAL
LDL CHOLESTEROL CALCULATED: 73 MG/DL
TRIGLYCERIDE, FASTING: 76 MG/DL (ref 0–150)
TSH REFLEX: 1.32 UIU/ML (ref 0.27–4.2)
VITAMIN D 25-HYDROXY: 44 NG/ML
VLDLC SERPL CALC-MCNC: 15 MG/DL

## 2019-11-18 ENCOUNTER — TELEPHONE (OUTPATIENT)
Dept: FAMILY MEDICINE CLINIC | Age: 69
End: 2019-11-18

## 2019-11-18 DIAGNOSIS — E11.9 TYPE 2 DIABETES MELLITUS WITHOUT COMPLICATION, WITHOUT LONG-TERM CURRENT USE OF INSULIN (HCC): ICD-10-CM

## 2019-11-18 DIAGNOSIS — E03.9 HYPOTHYROIDISM, UNSPECIFIED TYPE: Chronic | ICD-10-CM

## 2019-11-18 DIAGNOSIS — I10 ESSENTIAL HYPERTENSION: Chronic | ICD-10-CM

## 2019-11-18 RX ORDER — LEVOTHYROXINE SODIUM 0.07 MG/1
75 TABLET ORAL DAILY
Qty: 90 TABLET | Refills: 1 | Status: SHIPPED | OUTPATIENT
Start: 2019-11-18 | End: 2020-05-19

## 2019-11-18 RX ORDER — ASPIRIN 81 MG/1
81 TABLET, CHEWABLE ORAL DAILY
Qty: 90 TABLET | Refills: 1 | Status: SHIPPED | OUTPATIENT
Start: 2019-11-18 | End: 2020-05-13

## 2019-11-18 RX ORDER — SPIRONOLACTONE 50 MG/1
50 TABLET, FILM COATED ORAL DAILY
Qty: 90 TABLET | Refills: 1 | Status: SHIPPED | OUTPATIENT
Start: 2019-11-18 | End: 2020-05-19

## 2019-11-18 RX ORDER — HYDROCHLOROTHIAZIDE 25 MG/1
25 TABLET ORAL DAILY
Qty: 90 TABLET | Refills: 1 | Status: SHIPPED | OUTPATIENT
Start: 2019-11-18 | End: 2020-05-19

## 2019-11-19 ENCOUNTER — APPOINTMENT (OUTPATIENT)
Dept: CT IMAGING | Age: 69
DRG: 390 | End: 2019-11-19
Payer: COMMERCIAL

## 2019-11-19 ENCOUNTER — APPOINTMENT (OUTPATIENT)
Dept: GENERAL RADIOLOGY | Age: 69
DRG: 390 | End: 2019-11-19
Payer: COMMERCIAL

## 2019-11-19 ENCOUNTER — HOSPITAL ENCOUNTER (INPATIENT)
Age: 69
LOS: 3 days | Discharge: HOME | DRG: 390 | End: 2019-11-22
Attending: EMERGENCY MEDICINE | Admitting: SURGERY
Payer: COMMERCIAL

## 2019-11-19 DIAGNOSIS — K56.609 SMALL BOWEL OBSTRUCTION (HCC): Primary | ICD-10-CM

## 2019-11-19 LAB
A/G RATIO: 1.4 (ref 1.1–2.2)
ALBUMIN SERPL-MCNC: 4.2 G/DL (ref 3.4–5)
ALP BLD-CCNC: 90 U/L (ref 40–129)
ALT SERPL-CCNC: 16 U/L (ref 10–40)
ANION GAP SERPL CALCULATED.3IONS-SCNC: 12 MMOL/L (ref 3–16)
AST SERPL-CCNC: 22 U/L (ref 15–37)
BASOPHILS ABSOLUTE: 0.1 K/UL (ref 0–0.2)
BASOPHILS RELATIVE PERCENT: 0.9 %
BILIRUB SERPL-MCNC: 0.8 MG/DL (ref 0–1)
BILIRUBIN URINE: NEGATIVE
BLOOD, URINE: NEGATIVE
BUN BLDV-MCNC: 14 MG/DL (ref 7–20)
CALCIUM SERPL-MCNC: 9.4 MG/DL (ref 8.3–10.6)
CHLORIDE BLD-SCNC: 89 MMOL/L (ref 99–110)
CLARITY: CLEAR
CO2: 30 MMOL/L (ref 21–32)
COLOR: YELLOW
CREAT SERPL-MCNC: 0.7 MG/DL (ref 0.6–1.2)
EOSINOPHILS ABSOLUTE: 0.1 K/UL (ref 0–0.6)
EOSINOPHILS RELATIVE PERCENT: 0.8 %
GFR AFRICAN AMERICAN: >60
GFR NON-AFRICAN AMERICAN: >60
GLOBULIN: 3.1 G/DL
GLUCOSE BLD-MCNC: 122 MG/DL (ref 70–99)
GLUCOSE BLD-MCNC: 129 MG/DL (ref 70–99)
GLUCOSE BLD-MCNC: 136 MG/DL (ref 70–99)
GLUCOSE BLD-MCNC: 81 MG/DL (ref 70–99)
GLUCOSE BLD-MCNC: 92 MG/DL (ref 70–99)
GLUCOSE URINE: NEGATIVE MG/DL
HCT VFR BLD CALC: 37.7 % (ref 36–48)
HEMOGLOBIN: 12.8 G/DL (ref 12–16)
KETONES, URINE: 15 MG/DL
LEUKOCYTE ESTERASE, URINE: NEGATIVE
LIPASE: 23 U/L (ref 13–60)
LYMPHOCYTES ABSOLUTE: 1.4 K/UL (ref 1–5.1)
LYMPHOCYTES RELATIVE PERCENT: 12.9 %
MCH RBC QN AUTO: 31.3 PG (ref 26–34)
MCHC RBC AUTO-ENTMCNC: 34 G/DL (ref 31–36)
MCV RBC AUTO: 92.2 FL (ref 80–100)
MICROSCOPIC EXAMINATION: ABNORMAL
MONOCYTES ABSOLUTE: 0.5 K/UL (ref 0–1.3)
MONOCYTES RELATIVE PERCENT: 5.1 %
NEUTROPHILS ABSOLUTE: 8.6 K/UL (ref 1.7–7.7)
NEUTROPHILS RELATIVE PERCENT: 80.3 %
NITRITE, URINE: NEGATIVE
PDW BLD-RTO: 13.3 % (ref 12.4–15.4)
PERFORMED ON: ABNORMAL
PERFORMED ON: ABNORMAL
PERFORMED ON: NORMAL
PERFORMED ON: NORMAL
PH UA: 8 (ref 5–8)
PLATELET # BLD: 419 K/UL (ref 135–450)
PMV BLD AUTO: 6.7 FL (ref 5–10.5)
POTASSIUM SERPL-SCNC: 3.8 MMOL/L (ref 3.5–5.1)
PROTEIN UA: NEGATIVE MG/DL
RBC # BLD: 4.09 M/UL (ref 4–5.2)
SODIUM BLD-SCNC: 131 MMOL/L (ref 136–145)
SPECIFIC GRAVITY UA: 1.01 (ref 1–1.03)
TOTAL PROTEIN: 7.3 G/DL (ref 6.4–8.2)
URINE REFLEX TO CULTURE: ABNORMAL
URINE TYPE: ABNORMAL
UROBILINOGEN, URINE: 0.2 E.U./DL
WBC # BLD: 10.7 K/UL (ref 4–11)

## 2019-11-19 PROCEDURE — 2580000003 HC RX 258: Performed by: EMERGENCY MEDICINE

## 2019-11-19 PROCEDURE — 85025 COMPLETE CBC W/AUTO DIFF WBC: CPT

## 2019-11-19 PROCEDURE — 6360000002 HC RX W HCPCS: Performed by: EMERGENCY MEDICINE

## 2019-11-19 PROCEDURE — 83690 ASSAY OF LIPASE: CPT

## 2019-11-19 PROCEDURE — 6360000002 HC RX W HCPCS: Performed by: SURGERY

## 2019-11-19 PROCEDURE — 99285 EMERGENCY DEPT VISIT HI MDM: CPT

## 2019-11-19 PROCEDURE — 74018 RADEX ABDOMEN 1 VIEW: CPT

## 2019-11-19 PROCEDURE — APPSS60 APP SPLIT SHARED TIME 46-60 MINUTES: Performed by: CLINICAL NURSE SPECIALIST

## 2019-11-19 PROCEDURE — 1200000000 HC SEMI PRIVATE

## 2019-11-19 PROCEDURE — 6360000004 HC RX CONTRAST MEDICATION: Performed by: EMERGENCY MEDICINE

## 2019-11-19 PROCEDURE — 96361 HYDRATE IV INFUSION ADD-ON: CPT

## 2019-11-19 PROCEDURE — 81003 URINALYSIS AUTO W/O SCOPE: CPT

## 2019-11-19 PROCEDURE — 99222 1ST HOSP IP/OBS MODERATE 55: CPT | Performed by: SURGERY

## 2019-11-19 PROCEDURE — 2500000003 HC RX 250 WO HCPCS: Performed by: SURGERY

## 2019-11-19 PROCEDURE — 74177 CT ABD & PELVIS W/CONTRAST: CPT

## 2019-11-19 PROCEDURE — 96374 THER/PROPH/DIAG INJ IV PUSH: CPT

## 2019-11-19 PROCEDURE — 96376 TX/PRO/DX INJ SAME DRUG ADON: CPT

## 2019-11-19 PROCEDURE — 80053 COMPREHEN METABOLIC PANEL: CPT

## 2019-11-19 PROCEDURE — 6360000002 HC RX W HCPCS

## 2019-11-19 PROCEDURE — 96375 TX/PRO/DX INJ NEW DRUG ADDON: CPT

## 2019-11-19 PROCEDURE — 2580000003 HC RX 258: Performed by: SURGERY

## 2019-11-19 RX ORDER — ACETAMINOPHEN 650 MG/1
650 SUPPOSITORY RECTAL EVERY 6 HOURS PRN
Status: DISCONTINUED | OUTPATIENT
Start: 2019-11-19 | End: 2019-11-22 | Stop reason: HOSPADM

## 2019-11-19 RX ORDER — PROMETHAZINE HYDROCHLORIDE 25 MG/ML
6.25 INJECTION, SOLUTION INTRAMUSCULAR; INTRAVENOUS EVERY 6 HOURS PRN
Status: DISCONTINUED | OUTPATIENT
Start: 2019-11-19 | End: 2019-11-22 | Stop reason: HOSPADM

## 2019-11-19 RX ORDER — SODIUM CHLORIDE 9 MG/ML
INJECTION, SOLUTION INTRAVENOUS CONTINUOUS
Status: DISCONTINUED | OUTPATIENT
Start: 2019-11-19 | End: 2019-11-20

## 2019-11-19 RX ORDER — ONDANSETRON 2 MG/ML
4 INJECTION INTRAMUSCULAR; INTRAVENOUS ONCE
Status: COMPLETED | OUTPATIENT
Start: 2019-11-19 | End: 2019-11-19

## 2019-11-19 RX ORDER — ONDANSETRON 2 MG/ML
INJECTION INTRAMUSCULAR; INTRAVENOUS
Status: COMPLETED
Start: 2019-11-19 | End: 2019-11-19

## 2019-11-19 RX ORDER — ONDANSETRON 2 MG/ML
INJECTION INTRAMUSCULAR; INTRAVENOUS
Status: DISPENSED
Start: 2019-11-19 | End: 2019-11-19

## 2019-11-19 RX ORDER — FENTANYL CITRATE 50 UG/ML
50 INJECTION, SOLUTION INTRAMUSCULAR; INTRAVENOUS EVERY 30 MIN PRN
Status: COMPLETED | OUTPATIENT
Start: 2019-11-19 | End: 2019-11-19

## 2019-11-19 RX ORDER — HYDROMORPHONE HCL 110MG/55ML
0.5 PATIENT CONTROLLED ANALGESIA SYRINGE INTRAVENOUS EVERY 4 HOURS PRN
Status: DISCONTINUED | OUTPATIENT
Start: 2019-11-19 | End: 2019-11-22 | Stop reason: HOSPADM

## 2019-11-19 RX ORDER — ONDANSETRON 2 MG/ML
4 INJECTION INTRAMUSCULAR; INTRAVENOUS EVERY 6 HOURS PRN
Status: DISCONTINUED | OUTPATIENT
Start: 2019-11-19 | End: 2019-11-22 | Stop reason: HOSPADM

## 2019-11-19 RX ORDER — 0.9 % SODIUM CHLORIDE 0.9 %
1000 INTRAVENOUS SOLUTION INTRAVENOUS ONCE
Status: COMPLETED | OUTPATIENT
Start: 2019-11-19 | End: 2019-11-19

## 2019-11-19 RX ADMIN — ONDANSETRON 4 MG: 2 INJECTION INTRAMUSCULAR; INTRAVENOUS at 05:08

## 2019-11-19 RX ADMIN — ENOXAPARIN SODIUM 40 MG: 40 INJECTION SUBCUTANEOUS at 10:15

## 2019-11-19 RX ADMIN — HYDROMORPHONE HYDROCHLORIDE 0.5 MG: 2 INJECTION, SOLUTION INTRAMUSCULAR; INTRAVENOUS; SUBCUTANEOUS at 06:39

## 2019-11-19 RX ADMIN — PROMETHAZINE HYDROCHLORIDE 6.25 MG: 25 INJECTION INTRAMUSCULAR; INTRAVENOUS at 06:39

## 2019-11-19 RX ADMIN — SODIUM CHLORIDE: 9 INJECTION, SOLUTION INTRAVENOUS at 06:39

## 2019-11-19 RX ADMIN — HYDROMORPHONE HYDROCHLORIDE 0.5 MG: 2 INJECTION, SOLUTION INTRAMUSCULAR; INTRAVENOUS; SUBCUTANEOUS at 11:40

## 2019-11-19 RX ADMIN — ONDANSETRON 4 MG: 2 INJECTION INTRAMUSCULAR; INTRAVENOUS at 02:54

## 2019-11-19 RX ADMIN — FENTANYL CITRATE 50 MCG: 50 INJECTION, SOLUTION INTRAMUSCULAR; INTRAVENOUS at 02:54

## 2019-11-19 RX ADMIN — IOPAMIDOL 75 ML: 755 INJECTION, SOLUTION INTRAVENOUS at 03:10

## 2019-11-19 RX ADMIN — HYDROMORPHONE HYDROCHLORIDE 0.5 MG: 2 INJECTION, SOLUTION INTRAMUSCULAR; INTRAVENOUS; SUBCUTANEOUS at 19:31

## 2019-11-19 RX ADMIN — FAMOTIDINE 20 MG: 10 INJECTION, SOLUTION INTRAVENOUS at 10:16

## 2019-11-19 RX ADMIN — FAMOTIDINE 20 MG: 10 INJECTION, SOLUTION INTRAVENOUS at 18:33

## 2019-11-19 RX ADMIN — FENTANYL CITRATE 50 MCG: 50 INJECTION, SOLUTION INTRAMUSCULAR; INTRAVENOUS at 04:42

## 2019-11-19 RX ADMIN — SODIUM CHLORIDE 1000 ML: 9 INJECTION, SOLUTION INTRAVENOUS at 02:54

## 2019-11-19 ASSESSMENT — ENCOUNTER SYMPTOMS
CONSTIPATION: 1
BACK PAIN: 0
SHORTNESS OF BREATH: 0
NAUSEA: 1
VOMITING: 0
DIARRHEA: 0
SORE THROAT: 0
ABDOMINAL PAIN: 1
COUGH: 0

## 2019-11-19 ASSESSMENT — PAIN SCALES - GENERAL
PAINLEVEL_OUTOF10: 10
PAINLEVEL_OUTOF10: 4
PAINLEVEL_OUTOF10: 7
PAINLEVEL_OUTOF10: 0
PAINLEVEL_OUTOF10: 10
PAINLEVEL_OUTOF10: 7
PAINLEVEL_OUTOF10: 2

## 2019-11-19 ASSESSMENT — PAIN DESCRIPTION - LOCATION: LOCATION: ABDOMEN

## 2019-11-19 ASSESSMENT — PAIN DESCRIPTION - ORIENTATION: ORIENTATION: LOWER

## 2019-11-19 ASSESSMENT — PAIN DESCRIPTION - DESCRIPTORS: DESCRIPTORS: ACHING

## 2019-11-19 ASSESSMENT — PAIN DESCRIPTION - PAIN TYPE
TYPE: ACUTE PAIN
TYPE: ACUTE PAIN

## 2019-11-19 ASSESSMENT — PAIN DESCRIPTION - FREQUENCY: FREQUENCY: CONTINUOUS

## 2019-11-20 ENCOUNTER — APPOINTMENT (OUTPATIENT)
Dept: GENERAL RADIOLOGY | Age: 69
DRG: 390 | End: 2019-11-20
Payer: COMMERCIAL

## 2019-11-20 LAB
GLUCOSE BLD-MCNC: 113 MG/DL (ref 70–99)
GLUCOSE BLD-MCNC: 119 MG/DL (ref 70–99)
GLUCOSE BLD-MCNC: 130 MG/DL (ref 70–99)
GLUCOSE BLD-MCNC: 71 MG/DL (ref 70–99)
GLUCOSE BLD-MCNC: 82 MG/DL (ref 70–99)
PERFORMED ON: ABNORMAL
PERFORMED ON: NORMAL
PERFORMED ON: NORMAL

## 2019-11-20 PROCEDURE — 74022 RADEX COMPL AQT ABD SERIES: CPT

## 2019-11-20 PROCEDURE — 1200000000 HC SEMI PRIVATE

## 2019-11-20 PROCEDURE — 99232 SBSQ HOSP IP/OBS MODERATE 35: CPT | Performed by: SURGERY

## 2019-11-20 PROCEDURE — 2500000003 HC RX 250 WO HCPCS: Performed by: SURGERY

## 2019-11-20 PROCEDURE — 6370000000 HC RX 637 (ALT 250 FOR IP): Performed by: SURGERY

## 2019-11-20 PROCEDURE — APPSS45 APP SPLIT SHARED TIME 31-45 MINUTES: Performed by: CLINICAL NURSE SPECIALIST

## 2019-11-20 PROCEDURE — 6360000002 HC RX W HCPCS: Performed by: SURGERY

## 2019-11-20 RX ORDER — BISACODYL 10 MG
10 SUPPOSITORY, RECTAL RECTAL 2 TIMES DAILY
Status: COMPLETED | OUTPATIENT
Start: 2019-11-20 | End: 2019-11-20

## 2019-11-20 RX ORDER — NICOTINE POLACRILEX 4 MG
15 LOZENGE BUCCAL PRN
Status: DISCONTINUED | OUTPATIENT
Start: 2019-11-20 | End: 2019-11-22 | Stop reason: HOSPADM

## 2019-11-20 RX ORDER — DEXTROSE, SODIUM CHLORIDE, AND POTASSIUM CHLORIDE 5; .45; .15 G/100ML; G/100ML; G/100ML
INJECTION INTRAVENOUS CONTINUOUS
Status: DISCONTINUED | OUTPATIENT
Start: 2019-11-20 | End: 2019-11-22 | Stop reason: HOSPADM

## 2019-11-20 RX ORDER — DEXTROSE MONOHYDRATE 25 G/50ML
12.5 INJECTION, SOLUTION INTRAVENOUS PRN
Status: DISCONTINUED | OUTPATIENT
Start: 2019-11-20 | End: 2019-11-22 | Stop reason: HOSPADM

## 2019-11-20 RX ORDER — DEXTROSE MONOHYDRATE 50 MG/ML
100 INJECTION, SOLUTION INTRAVENOUS PRN
Status: DISCONTINUED | OUTPATIENT
Start: 2019-11-20 | End: 2019-11-22 | Stop reason: HOSPADM

## 2019-11-20 RX ORDER — ROPINIROLE 2 MG/1
2 TABLET, FILM COATED ORAL NIGHTLY
Status: DISCONTINUED | OUTPATIENT
Start: 2019-11-20 | End: 2019-11-22 | Stop reason: HOSPADM

## 2019-11-20 RX ADMIN — FAMOTIDINE 20 MG: 10 INJECTION, SOLUTION INTRAVENOUS at 21:54

## 2019-11-20 RX ADMIN — ROPINIROLE HYDROCHLORIDE 2 MG: 2 TABLET, FILM COATED ORAL at 21:42

## 2019-11-20 RX ADMIN — ONDANSETRON 4 MG: 2 INJECTION INTRAMUSCULAR; INTRAVENOUS at 07:44

## 2019-11-20 RX ADMIN — ONDANSETRON 4 MG: 2 INJECTION INTRAMUSCULAR; INTRAVENOUS at 16:36

## 2019-11-20 RX ADMIN — BISACODYL 10 MG: 10 SUPPOSITORY RECTAL at 15:34

## 2019-11-20 RX ADMIN — HYDROMORPHONE HYDROCHLORIDE 0.5 MG: 2 INJECTION, SOLUTION INTRAMUSCULAR; INTRAVENOUS; SUBCUTANEOUS at 07:43

## 2019-11-20 RX ADMIN — PROMETHAZINE HYDROCHLORIDE 6.25 MG: 25 INJECTION INTRAMUSCULAR; INTRAVENOUS at 20:40

## 2019-11-20 RX ADMIN — FAMOTIDINE 20 MG: 10 INJECTION, SOLUTION INTRAVENOUS at 08:45

## 2019-11-20 RX ADMIN — POTASSIUM CHLORIDE, DEXTROSE MONOHYDRATE AND SODIUM CHLORIDE: 150; 5; 450 INJECTION, SOLUTION INTRAVENOUS at 09:20

## 2019-11-20 RX ADMIN — BISACODYL 10 MG: 10 SUPPOSITORY RECTAL at 21:54

## 2019-11-20 RX ADMIN — HYDROMORPHONE HYDROCHLORIDE 0.5 MG: 2 INJECTION, SOLUTION INTRAMUSCULAR; INTRAVENOUS; SUBCUTANEOUS at 16:36

## 2019-11-20 RX ADMIN — POTASSIUM CHLORIDE, DEXTROSE MONOHYDRATE AND SODIUM CHLORIDE: 150; 5; 450 INJECTION, SOLUTION INTRAVENOUS at 20:54

## 2019-11-20 RX ADMIN — HYDROMORPHONE HYDROCHLORIDE 0.5 MG: 2 INJECTION, SOLUTION INTRAMUSCULAR; INTRAVENOUS; SUBCUTANEOUS at 01:06

## 2019-11-20 RX ADMIN — HYDROMORPHONE HYDROCHLORIDE 0.5 MG: 2 INJECTION, SOLUTION INTRAMUSCULAR; INTRAVENOUS; SUBCUTANEOUS at 12:35

## 2019-11-20 RX ADMIN — HYDROMORPHONE HYDROCHLORIDE 0.5 MG: 2 INJECTION, SOLUTION INTRAMUSCULAR; INTRAVENOUS; SUBCUTANEOUS at 20:39

## 2019-11-20 RX ADMIN — PROMETHAZINE HYDROCHLORIDE 6.25 MG: 25 INJECTION INTRAMUSCULAR; INTRAVENOUS at 01:04

## 2019-11-20 ASSESSMENT — PAIN SCALES - GENERAL
PAINLEVEL_OUTOF10: 10
PAINLEVEL_OUTOF10: 0
PAINLEVEL_OUTOF10: 7
PAINLEVEL_OUTOF10: 6
PAINLEVEL_OUTOF10: 7
PAINLEVEL_OUTOF10: 0
PAINLEVEL_OUTOF10: 6

## 2019-11-21 LAB
GLUCOSE BLD-MCNC: 101 MG/DL (ref 70–99)
GLUCOSE BLD-MCNC: 121 MG/DL (ref 70–99)
GLUCOSE BLD-MCNC: 144 MG/DL (ref 70–99)
GLUCOSE BLD-MCNC: 179 MG/DL (ref 70–99)
GLUCOSE BLD-MCNC: 62 MG/DL (ref 70–99)
PERFORMED ON: ABNORMAL

## 2019-11-21 PROCEDURE — 6370000000 HC RX 637 (ALT 250 FOR IP): Performed by: SURGERY

## 2019-11-21 PROCEDURE — 2500000003 HC RX 250 WO HCPCS: Performed by: SURGERY

## 2019-11-21 PROCEDURE — 99232 SBSQ HOSP IP/OBS MODERATE 35: CPT | Performed by: SURGERY

## 2019-11-21 PROCEDURE — 6370000000 HC RX 637 (ALT 250 FOR IP): Performed by: CLINICAL NURSE SPECIALIST

## 2019-11-21 PROCEDURE — 1200000000 HC SEMI PRIVATE

## 2019-11-21 PROCEDURE — APPSS45 APP SPLIT SHARED TIME 31-45 MINUTES: Performed by: CLINICAL NURSE SPECIALIST

## 2019-11-21 RX ORDER — SPIRONOLACTONE 25 MG/1
50 TABLET ORAL DAILY
Status: DISCONTINUED | OUTPATIENT
Start: 2019-11-21 | End: 2019-11-22 | Stop reason: HOSPADM

## 2019-11-21 RX ORDER — ATORVASTATIN CALCIUM 10 MG/1
20 TABLET, FILM COATED ORAL NIGHTLY
Status: DISCONTINUED | OUTPATIENT
Start: 2019-11-21 | End: 2019-11-22 | Stop reason: HOSPADM

## 2019-11-21 RX ORDER — DULOXETIN HYDROCHLORIDE 60 MG/1
60 CAPSULE, DELAYED RELEASE ORAL DAILY
Status: DISCONTINUED | OUTPATIENT
Start: 2019-11-21 | End: 2019-11-22 | Stop reason: HOSPADM

## 2019-11-21 RX ORDER — HYDROCHLOROTHIAZIDE 25 MG/1
25 TABLET ORAL DAILY
Status: DISCONTINUED | OUTPATIENT
Start: 2019-11-21 | End: 2019-11-22 | Stop reason: HOSPADM

## 2019-11-21 RX ORDER — ROPINIROLE 2 MG/1
2 TABLET, FILM COATED ORAL ONCE
Status: COMPLETED | OUTPATIENT
Start: 2019-11-21 | End: 2019-11-21

## 2019-11-21 RX ORDER — QUETIAPINE FUMARATE 25 MG/1
100 TABLET, FILM COATED ORAL 2 TIMES DAILY
Status: DISCONTINUED | OUTPATIENT
Start: 2019-11-21 | End: 2019-11-22 | Stop reason: HOSPADM

## 2019-11-21 RX ORDER — BISACODYL 10 MG
10 SUPPOSITORY, RECTAL RECTAL ONCE
Status: DISCONTINUED | OUTPATIENT
Start: 2019-11-21 | End: 2019-11-22 | Stop reason: HOSPADM

## 2019-11-21 RX ADMIN — ATORVASTATIN CALCIUM 20 MG: 10 TABLET, FILM COATED ORAL at 21:21

## 2019-11-21 RX ADMIN — ROPINIROLE HYDROCHLORIDE 2 MG: 2 TABLET, FILM COATED ORAL at 21:21

## 2019-11-21 RX ADMIN — SPIRONOLACTONE 50 MG: 25 TABLET ORAL at 13:26

## 2019-11-21 RX ADMIN — LEVOTHYROXINE SODIUM 75 MCG: 50 TABLET ORAL at 13:26

## 2019-11-21 RX ADMIN — FAMOTIDINE 20 MG: 10 INJECTION, SOLUTION INTRAVENOUS at 21:22

## 2019-11-21 RX ADMIN — QUETIAPINE FUMARATE 100 MG: 25 TABLET ORAL at 21:21

## 2019-11-21 RX ADMIN — ROPINIROLE HYDROCHLORIDE 2 MG: 2 TABLET, FILM COATED ORAL at 21:22

## 2019-11-21 RX ADMIN — HYDROCHLOROTHIAZIDE 25 MG: 25 TABLET ORAL at 13:26

## 2019-11-21 RX ADMIN — DULOXETINE HYDROCHLORIDE 60 MG: 60 CAPSULE, DELAYED RELEASE ORAL at 13:26

## 2019-11-21 RX ADMIN — FAMOTIDINE 20 MG: 10 INJECTION, SOLUTION INTRAVENOUS at 10:37

## 2019-11-21 ASSESSMENT — PAIN SCALES - GENERAL
PAINLEVEL_OUTOF10: 0
PAINLEVEL_OUTOF10: 0

## 2019-11-22 VITALS
RESPIRATION RATE: 18 BRPM | HEIGHT: 66 IN | HEART RATE: 61 BPM | DIASTOLIC BLOOD PRESSURE: 70 MMHG | TEMPERATURE: 98.8 F | OXYGEN SATURATION: 96 % | WEIGHT: 148 LBS | BODY MASS INDEX: 23.78 KG/M2 | SYSTOLIC BLOOD PRESSURE: 116 MMHG

## 2019-11-22 LAB
GLUCOSE BLD-MCNC: 157 MG/DL (ref 70–99)
GLUCOSE BLD-MCNC: 96 MG/DL (ref 70–99)
PERFORMED ON: ABNORMAL
PERFORMED ON: NORMAL

## 2019-11-22 PROCEDURE — 6360000002 HC RX W HCPCS: Performed by: SURGERY

## 2019-11-22 PROCEDURE — 6370000000 HC RX 637 (ALT 250 FOR IP): Performed by: CLINICAL NURSE SPECIALIST

## 2019-11-22 PROCEDURE — 99238 HOSP IP/OBS DSCHRG MGMT 30/<: CPT | Performed by: SURGERY

## 2019-11-22 PROCEDURE — APPSS45 APP SPLIT SHARED TIME 31-45 MINUTES: Performed by: CLINICAL NURSE SPECIALIST

## 2019-11-22 PROCEDURE — 2500000003 HC RX 250 WO HCPCS: Performed by: SURGERY

## 2019-11-22 RX ADMIN — FAMOTIDINE 20 MG: 10 INJECTION, SOLUTION INTRAVENOUS at 08:46

## 2019-11-22 RX ADMIN — ENOXAPARIN SODIUM 40 MG: 40 INJECTION SUBCUTANEOUS at 08:46

## 2019-11-22 RX ADMIN — SPIRONOLACTONE 50 MG: 25 TABLET ORAL at 08:46

## 2019-11-22 RX ADMIN — HYDROCHLOROTHIAZIDE 25 MG: 25 TABLET ORAL at 08:46

## 2019-11-22 RX ADMIN — LEVOTHYROXINE SODIUM 75 MCG: 50 TABLET ORAL at 06:48

## 2019-11-22 RX ADMIN — DULOXETINE HYDROCHLORIDE 60 MG: 60 CAPSULE, DELAYED RELEASE ORAL at 08:46

## 2019-11-22 ASSESSMENT — PAIN SCALES - GENERAL
PAINLEVEL_OUTOF10: 0

## 2019-11-27 DIAGNOSIS — G25.81 RLS (RESTLESS LEGS SYNDROME): ICD-10-CM

## 2019-11-27 RX ORDER — ROPINIROLE 2 MG/1
TABLET, FILM COATED ORAL
Qty: 90 TABLET | Refills: 0 | Status: SHIPPED | OUTPATIENT
Start: 2019-11-27 | End: 2020-01-24

## 2019-12-05 ENCOUNTER — OFFICE VISIT (OUTPATIENT)
Dept: FAMILY MEDICINE CLINIC | Age: 69
End: 2019-12-05
Payer: COMMERCIAL

## 2019-12-05 VITALS
DIASTOLIC BLOOD PRESSURE: 80 MMHG | BODY MASS INDEX: 24.21 KG/M2 | WEIGHT: 150 LBS | SYSTOLIC BLOOD PRESSURE: 134 MMHG | HEART RATE: 78 BPM | OXYGEN SATURATION: 98 %

## 2019-12-05 DIAGNOSIS — K56.609 SMALL BOWEL OBSTRUCTION (HCC): Primary | ICD-10-CM

## 2019-12-05 PROCEDURE — 99214 OFFICE O/P EST MOD 30 MIN: CPT | Performed by: NURSE PRACTITIONER

## 2019-12-05 PROCEDURE — 1111F DSCHRG MED/CURRENT MED MERGE: CPT | Performed by: NURSE PRACTITIONER

## 2019-12-05 ASSESSMENT — ENCOUNTER SYMPTOMS
DIARRHEA: 1
CONSTIPATION: 0
NAUSEA: 0
VOMITING: 0
COUGH: 0
RECTAL PAIN: 0
ABDOMINAL DISTENTION: 0
ANAL BLEEDING: 0
SHORTNESS OF BREATH: 0
ABDOMINAL PAIN: 0
BLOOD IN STOOL: 0

## 2019-12-16 ENCOUNTER — HOSPITAL ENCOUNTER (OUTPATIENT)
Dept: WOMENS IMAGING | Age: 69
Discharge: HOME | End: 2019-12-16
Payer: COMMERCIAL

## 2019-12-16 DIAGNOSIS — Z12.31 ENCOUNTER FOR SCREENING MAMMOGRAM FOR MALIGNANT NEOPLASM OF BREAST: ICD-10-CM

## 2019-12-16 PROCEDURE — 77067 SCR MAMMO BI INCL CAD: CPT

## 2020-01-06 RX ORDER — CHOLECALCIFEROL (VITAMIN D3) 25 MCG
CAPSULE ORAL
Qty: 90 CAPSULE | Refills: 1 | Status: SHIPPED | OUTPATIENT
Start: 2020-01-06

## 2020-01-06 NOTE — TELEPHONE ENCOUNTER
Last ov 12/05/2019   Future Appointments   Date Time Provider Anastasiia Alvarez   5/13/2020  7:00 AM LUIS Clayton - CNP [facility]

## 2020-01-14 ENCOUNTER — TELEPHONE (OUTPATIENT)
Dept: FAMILY MEDICINE CLINIC | Age: 70
End: 2020-01-14

## 2020-01-14 NOTE — TELEPHONE ENCOUNTER
Patient states that she has a dental appt tomorrow and she is suppose to take antibiotics prior to those. She said she was unsure why but she has had a shoulder replacement and bilateral knee replacements.

## 2020-01-16 RX ORDER — CLINDAMYCIN HYDROCHLORIDE 300 MG/1
CAPSULE ORAL
Qty: 12 CAPSULE | Refills: 0 | Status: SHIPPED | OUTPATIENT
Start: 2020-01-16 | End: 2020-10-28 | Stop reason: ALTCHOICE

## 2020-01-24 ENCOUNTER — TELEPHONE (OUTPATIENT)
Dept: FAMILY MEDICINE CLINIC | Age: 70
End: 2020-01-24

## 2020-01-24 RX ORDER — ROPINIROLE 2 MG/1
TABLET, FILM COATED ORAL
Qty: 180 TABLET | Refills: 0 | Status: SHIPPED | OUTPATIENT
Start: 2020-01-24 | End: 2020-04-16

## 2020-03-10 ENCOUNTER — TELEPHONE (OUTPATIENT)
Dept: FAMILY MEDICINE CLINIC | Age: 70
End: 2020-03-10

## 2020-04-16 RX ORDER — ROPINIROLE 2 MG/1
TABLET, FILM COATED ORAL
Qty: 180 TABLET | Refills: 0 | Status: SHIPPED | OUTPATIENT
Start: 2020-04-16 | End: 2020-07-07

## 2020-04-16 NOTE — TELEPHONE ENCOUNTER
Last ov 12/05/2019   Future Appointments   Date Time Provider Anastasiia Alvarez   5/13/2020 10:00 AM LUIS Avery - CNP [facility]

## 2020-05-13 ENCOUNTER — VIRTUAL VISIT (OUTPATIENT)
Dept: FAMILY MEDICINE CLINIC | Age: 70
End: 2020-05-13
Payer: COMMERCIAL

## 2020-05-13 PROCEDURE — 3046F HEMOGLOBIN A1C LEVEL >9.0%: CPT | Performed by: NURSE PRACTITIONER

## 2020-05-13 PROCEDURE — 4040F PNEUMOC VAC/ADMIN/RCVD: CPT | Performed by: NURSE PRACTITIONER

## 2020-05-13 PROCEDURE — 3017F COLORECTAL CA SCREEN DOC REV: CPT | Performed by: NURSE PRACTITIONER

## 2020-05-13 PROCEDURE — 1123F ACP DISCUSS/DSCN MKR DOCD: CPT | Performed by: NURSE PRACTITIONER

## 2020-05-13 PROCEDURE — G0438 PPPS, INITIAL VISIT: HCPCS | Performed by: NURSE PRACTITIONER

## 2020-05-13 RX ORDER — MELOXICAM 15 MG/1
15 TABLET ORAL DAILY PRN
Qty: 30 TABLET | Refills: 5 | Status: SHIPPED | OUTPATIENT
Start: 2020-05-13 | End: 2020-07-30 | Stop reason: ALTCHOICE

## 2020-05-13 RX ORDER — TIZANIDINE 2 MG/1
2 TABLET ORAL 3 TIMES DAILY PRN
Qty: 30 TABLET | Refills: 0 | Status: SHIPPED | OUTPATIENT
Start: 2020-05-13 | End: 2020-07-30 | Stop reason: ALTCHOICE

## 2020-05-13 ASSESSMENT — PATIENT HEALTH QUESTIONNAIRE - PHQ9
SUM OF ALL RESPONSES TO PHQ QUESTIONS 1-9: 0
SUM OF ALL RESPONSES TO PHQ QUESTIONS 1-9: 0

## 2020-05-13 ASSESSMENT — LIFESTYLE VARIABLES: HOW OFTEN DO YOU HAVE A DRINK CONTAINING ALCOHOL: 0

## 2020-05-13 NOTE — PROGRESS NOTES
Medicare Annual Wellness Visit  Name: Halima Rodriguez Date: 2020   MRN: [id number] Sex: Female   Age: 77 y.o. Ethnicity: Non-/Non    : 1950 Race: Nighat Bishop is here for Medicare AWV    Screenings for behavioral, psychosocial and functional/safety risks, and cognitive dysfunction are all negative except as indicated below. These results, as well as other patient data from the [address] form, are documented in Flowsheets linked to this Encounter. Complains of right-sided neck pain intermittent ongoing for several months now. Taking ibuprofen 600 mg 1-2 times daily. Ibuprofen does seem to help with her pain to some degree. Pain is worse in the morning when she first wakes up. Occasionally will radiate to the right shoulder and right arm. She is not doing any neck exercises at home. Denies known injury. Is a very active lifestyle and her job requires physical lifting. Having some lower blood glucose levels. It occurs several days a month his specially if she goes through some time fasting. Does her best to eat regularly but she states she is busy most days and often will have to push back meals. She typically will drink orange juice and that brings her glucose up right away. Symptoms when hypoglycemic are sweating and shaking. Last hemoglobin A1c was 5.5. Treatment is metformin 500 mg daily  Allergies   Allergen Reactions    Levaquin [Levofloxacin In D5w]      Unknown reaction    Levofloxacin      Pt. Unaware of drug or reaction    Pcn [Penicillins] Hives         Prior to Visit Medications    Medication Sig Taking?  Authorizing Provider   meloxicam (MOBIC) 15 MG tablet Take 1 tablet by mouth daily as needed for Pain Yes LUIS Edouard - CNP   tiZANidine (ZANAFLEX) 2 MG tablet Take 1 tablet by mouth 3 times daily as needed (Neck pain) Yes LUIS Edouard - CNP   rOPINIRole (REQUIP) 2 MG tablet TAKE 1-2 TABLETS BY MOUTH EVERY DAY AT NIGHT FOR REST LEG SYNDROME Yes LUIS Cooney CNP   [facility] D3 25 MCG (1000 UT) CAPS TAKE 1 CAPSULE BY MOUTH EVERY DAY Yes LUIS Cooney CNP   hydrochlorothiazide (HYDRODIURIL) 25 MG tablet Take 1 tablet by mouth daily Yes LUIS Cooney CNP   levothyroxine (SYNTHROID) 75 MCG tablet Take 1 tablet by mouth Daily Yes LUIS Cooney CNP   spironolactone (ALDACTONE) 50 MG tablet Take 1 tablet by mouth daily Yes LUIS Cooney CNP   linaCLOtide (LINZESS) 72 MCG CAPS capsule Take 145 mcg by mouth every morning (before breakfast)  Yes Historical Provider, MD   atorvastatin (LIPITOR) 20 MG tablet Take 1 tablet by mouth daily Yes LUIS Cooney CNP   cetirizine (ZYRTEC ALLERGY) 10 MG tablet Take 10 mg by mouth daily Yes Historical Provider, MD   QUEtiapine (SEROQUEL) 100 MG tablet Take 100 mg by mouth nightly  Yes Historical Provider, MD   [facility] MELATONIN 3 MG TABS tablet TAKE 1 TABLET BY MOUTH NIGHTLY Yes LUIS Cooney CNP   DULoxetine (CYMBALTA) 60 MG extended release capsule TAKE 1 CAPSULE BY MOUTH 2 TIMES DAILY Yes LUIS Cooney CNP   B Complex Vitamins (VITAMIN B COMPLEX) TABS Take 1 tablet by mouth 2 times daily  Patient taking differently: Take 1 tablet by mouth daily  Yes LUIS Cooney CNP   bisacodyl (DULCOLAX) 5 MG EC tablet Take 10 mg by mouth 2 times daily  Yes Historical Provider, MD   clindamycin (CLEOCIN) 300 MG capsule Start today Take 2 BID for 3 days  Wanda Fay MD         Past Medical History:   Diagnosis Date    [facility]     Chronic pain     Colon disorder     hard time pushing stool out    Constipation     Diabetes mellitus ([facility]     Diabetic neuropathy ([facility]     Electric shock     ECT therapy    Hyperlipidemia     Hypertension     Insomnia     Major depressive disorder, recurrent ([facility]     Morbid obesity ([facility]  Osteoarthrosis     RLS (restless legs syndrome)     Suicidal ideation     Unspecified hypothyroidism     Vitamin deficiency        Past Surgical History:   Procedure Laterality Date    APPENDECTOMY      BACK SURGERY      CARPAL TUNNEL RELEASE Left 12/19/2017    CARPAL TUNNEL RELEASE Right 6/18/2019    RIGHT OPEN CARPAL TUNNEL RELEASE performed by Quang Mohr MD at [address] Right     for pinched nerve    JOINT REPLACEMENT Bilateral     knees    KNEE SURGERY Left     meniscus    SHOULDER ARTHROSCOPY Right 12/02/2016    Right Total Shoulder Arthroscopy with Reverse Ball and Socket Deta Prosthesis    SHOULDER ARTHROSCOPY Left 05/30/2018    subacromial decompression, rotator cuff repair, biceps stump debridement    SMALL INTESTINE SURGERY N/A 9/16/2019    DIAGNOSTIC LAPAROSCOPY CONVERTED TO OPEN BOWEL RESECTION performed by Viktoria Soto MD at [address] [place] [address]           Family History   Problem Relation Age of Onset    Breast Cancer Mother     Stroke Father        CareTeam (Including outside providers/suppliers regularly involved in providing care):   Patient Care Team:  LUIS Sharma CNP as PCP - General (Nurse Practitioner Family)  LUIS Sharma CNP as PCP - [facility] Empaneled Provider    Wt Readings from Last 3 Encounters:   12/05/19 150 lb (68 kg)   11/19/19 148 lb (67.1 kg)   11/13/19 155 lb (70.3 kg)     There were no vitals filed for this visit. There is no height or weight on file to calculate BMI. Based upon direct observation of the patient, evaluation of cognition reveals recent and remote memory intact. Patient's complete Health Risk Assessment and screening values have been reviewed and are found in Flowsheets. The following problems were reviewed today and where indicated follow up appointments were made and/or referrals ordered.     Positive Risk Factor

## 2020-05-13 NOTE — PATIENT INSTRUCTIONS
Start meloxicam daily (this is a prescription anti-inflammatory and you should avoid taking other over-the-counter anti-inflammatory such as Advil while you are on this medication.)  Tizanidine as a muscle relaxer you can use up to 3 times daily it may cause you to feel tired to be careful with driving  Use warm compresses on the neck  Neck exercises are listed below  Next appointment is August 11 at 7 AM and we will do a hemoglobin A1c check at that time  You can stop metformin and the baby aspirin    Personalized Preventive Plan for Marco Mote - 5/13/2020  Medicare offers a range of preventive health benefits. Some of the tests and screenings are paid in full while other may be subject to a deductible, co-insurance, and/or copay. Some of these benefits include a comprehensive review of your medical history including lifestyle, illnesses that may run in your family, and various assessments and screenings as appropriate. After reviewing your medical record and screening and assessments performed today your provider may have ordered immunizations, labs, imaging, and/or referrals for you. A list of these orders (if applicable) as well as your Preventive Care list are included within your After Visit Summary for your review. Other Preventive Recommendations:    · A preventive eye exam performed by an eye specialist is recommended every 1-2 years to screen for glaucoma; cataracts, macular degeneration, and other eye disorders. · A preventive dental visit is recommended every 6 months. · Try to get at least 150 minutes of exercise per week or 10,000 steps per day on a pedometer . · Order or download the FREE \"Exercise & Physical Activity: Your Everyday Guide\" from [employer]. Call 9-516.436.5968 or search [employer] online. · You need 0731-8738 mg of calcium and 0188-6388 IU of vitamin D per day.  It is possible to meet your calcium requirement with diet alone, but a vitamin D supplement is usually necessary to meet this goal.  · When exposed to the sun, use a sunscreen that protects against both UVA and UVB radiation with an SPF of 30 or greater. Reapply every 2 to 3 hours or after sweating, drying off with a towel, or swimming. · Always wear a seat belt when traveling in a car. Always wear a helmet when riding a bicycle or motorcycle. Patient Education        Neck Strain or Sprain: Rehab Exercises  Introduction  Here are some examples of exercises for you to try. The exercises may be suggested for a condition or for rehabilitation. Start each exercise slowly. Ease off the exercises if you start to have pain. You will be told when to start these exercises and which ones will work best for you. How to do the exercises  Neck rotation   1. Sit in a firm chair, or stand up straight. 2. Keeping your chin level, turn your head to the right, and hold for 15 to 30 seconds. 3. Turn your head to the left and hold for 15 to 30 seconds. 4. Repeat 2 to 4 times to each side. Neck stretches   1. Look straight ahead, and tip your right ear to your right shoulder. Do not let your left shoulder rise up as you tip your head to the right. 2. Hold for 15 to 30 seconds. 3. Tilt your head to the left. Do not let your right shoulder rise up as you tip your head to the left. 4. Hold for 15 to 30 seconds. 5. Repeat 2 to 4 times to each side. Forward neck flexion   1. Sit in a firm chair, or stand up straight. 2. Bend your head forward. 3. Hold for 15 to 30 seconds. 4. Repeat 2 to 4 times. Lateral (side) bend strengthening   1. With your right hand, place your first two fingers on your right temple. 2. Start to bend your head to the side while using gentle pressure from your fingers to keep your head from bending. 3. Hold for about 6 seconds. 4. Repeat 8 to 12 times. 5. Switch hands and repeat the same exercise on your left side.     Forward bend strengthening

## 2020-06-17 NOTE — FLOWSHEET NOTE
June 18, 2020       Jason Roberson MD  [address]  [facility] [address]  VIA Facsimile: [phone]      Patient: Breana Ballard   YOB: 2009   Date of Visit: 6/17/2020       Dear Dr. Roberson:    I saw your patient, Breana Ballard, for an evaluation. Below are my notes for this visit with her.    If you have questions, please do not hesitate to call me.      Sincerely,        Libby Ruiz CNP        CC: No Recipients  Libby Ruiz CNP  6/18/2020 10:29 AM  Sign when Signing Visit  LCV: 11-14-19    HPI: 10 year old female presents for follow up of ichthyosis vulgaris.  Plan after last visit: reviewed dry skin care and emollients at least bid, keratolytic qd to thickest skin.  Today parent states they tried many of the samples I had provided.  Some areas of the skin have improved with keratolytic but there is still significant dryness and flaking.  Wondering about any other alternative treatment options.  Skin care:  Shower qod, x few minutes in luke warm water  Soap: Dove white bar  Family history: no atopy  Pets: no  Smokers: no  Past Treatment  otc lotions and creams - aquaphor and cetaphil helps temporarily. And hc 1% also helped a little.      ROS: besides positives in HPI all other systems negative    The allergy and medication lists were reviewed in the electronic medical record today. Patient reports that she has never smoked. She has never used smokeless tobacco. Patient's family history is not on file.    PHYSICAL EXAMINATION:   General: well appearing  Orientation and Mood: alert and oriented  Skin:   A general skin exam including scalp, face, eylids, lips, neck, chest, abdomen, back, arms, legs, digits and nails was performed today.   -diffusely covered with plate like scaling; thickened hyperpigmented scale on lateral hips, posterior neck and upper lateral back, upper thighs extending onto popliteal and knees > remainder of trunk and extremities  -mid abdomen and lower  15# (A7) B 2x10                              Lat. Pulldown  25# 3x10  IH 25# (A7) B 2x10                              Biceps                                  Triceps 15# (B) 2x10 15# 3x10 15# 3x10 IH 15# (A7) B 2x10                              PNF D2   (down) 5# 2x10 R/L     Modality ES/CP 15' ES/CP 15'  ES/CP 15'   Initials JLW DB EP JLW   Time spent with PT assistant legs with xerosis but less scaling   - face is clear    Assessment /Plan:  1. Ichthyosis vulgaris  - We reviewed ichthyosis vulgaris.  They have the more common type that will persist throughout their lifetime  - This is a common condition that is often associated with other atopic conditions such as atopic dermatitis, food and environmental allergies, allergic rhinitis, conjunctivitis, and asthma.    - Treatment options include topical emollients and alpha-hydroxy-containing moisturizers.    -start tretinoin 0.025% cream to thicker areas  Website for information: http://www.firstskinfoundation.org/wmpebyquct-fj-kkqymlip  - I am going to check into other treatment options and will contact parent.    Return if symptoms worsen or fail to improve.    Collaborating physician:  Dr Thomas Ridley  Electronically Signed by:  Libby Ruiz CNP, 6/18/2020

## 2020-07-30 ENCOUNTER — OFFICE VISIT (OUTPATIENT)
Dept: FAMILY MEDICINE CLINIC | Age: 70
End: 2020-07-30
Payer: COMMERCIAL

## 2020-07-30 VITALS
OXYGEN SATURATION: 97 % | BODY MASS INDEX: 23.95 KG/M2 | DIASTOLIC BLOOD PRESSURE: 76 MMHG | HEIGHT: 66 IN | TEMPERATURE: 98.5 F | HEART RATE: 65 BPM | SYSTOLIC BLOOD PRESSURE: 124 MMHG | WEIGHT: 149 LBS

## 2020-07-30 PROBLEM — S16.1XXA CERVICAL STRAIN: Status: ACTIVE | Noted: 2020-07-30

## 2020-07-30 PROBLEM — M25.511 PAIN RADIATING TO RIGHT SHOULDER: Status: ACTIVE | Noted: 2020-07-30

## 2020-07-30 LAB
CREATININE URINE POCT: 100
HBA1C MFR BLD: 5.9 %
MICROALBUMIN/CREAT 24H UR: 10 MG/G{CREAT}
MICROALBUMIN/CREAT UR-RTO: 30

## 2020-07-30 PROCEDURE — G8399 PT W/DXA RESULTS DOCUMENT: HCPCS | Performed by: NURSE PRACTITIONER

## 2020-07-30 PROCEDURE — 1123F ACP DISCUSS/DSCN MKR DOCD: CPT | Performed by: NURSE PRACTITIONER

## 2020-07-30 PROCEDURE — G8420 CALC BMI NORM PARAMETERS: HCPCS | Performed by: NURSE PRACTITIONER

## 2020-07-30 PROCEDURE — 3044F HG A1C LEVEL LT 7.0%: CPT | Performed by: NURSE PRACTITIONER

## 2020-07-30 PROCEDURE — 1036F TOBACCO NON-USER: CPT | Performed by: NURSE PRACTITIONER

## 2020-07-30 PROCEDURE — 82044 UR ALBUMIN SEMIQUANTITATIVE: CPT | Performed by: NURSE PRACTITIONER

## 2020-07-30 PROCEDURE — 4040F PNEUMOC VAC/ADMIN/RCVD: CPT | Performed by: NURSE PRACTITIONER

## 2020-07-30 PROCEDURE — 99213 OFFICE O/P EST LOW 20 MIN: CPT | Performed by: NURSE PRACTITIONER

## 2020-07-30 PROCEDURE — 2022F DILAT RTA XM EVC RTNOPTHY: CPT | Performed by: NURSE PRACTITIONER

## 2020-07-30 PROCEDURE — G8427 DOCREV CUR MEDS BY ELIG CLIN: HCPCS | Performed by: NURSE PRACTITIONER

## 2020-07-30 PROCEDURE — 1090F PRES/ABSN URINE INCON ASSESS: CPT | Performed by: NURSE PRACTITIONER

## 2020-07-30 PROCEDURE — 3017F COLORECTAL CA SCREEN DOC REV: CPT | Performed by: NURSE PRACTITIONER

## 2020-07-30 PROCEDURE — 83036 HEMOGLOBIN GLYCOSYLATED A1C: CPT | Performed by: NURSE PRACTITIONER

## 2020-07-30 RX ORDER — MELOXICAM 15 MG/1
15 TABLET ORAL DAILY PRN
Qty: 30 TABLET | Refills: 5 | Status: SHIPPED | OUTPATIENT
Start: 2020-07-30 | End: 2021-01-26 | Stop reason: ALTCHOICE

## 2020-07-30 RX ORDER — TIZANIDINE 2 MG/1
2 TABLET ORAL 3 TIMES DAILY PRN
Qty: 90 TABLET | Refills: 1 | Status: SHIPPED | OUTPATIENT
Start: 2020-07-30 | End: 2020-09-21

## 2020-07-30 RX ORDER — METHYLPREDNISOLONE 4 MG/1
TABLET ORAL
Qty: 1 KIT | Refills: 0 | Status: SHIPPED | OUTPATIENT
Start: 2020-07-30 | End: 2020-10-28 | Stop reason: ALTCHOICE

## 2020-07-30 RX ORDER — GABAPENTIN 300 MG/1
CAPSULE ORAL
COMMUNITY
Start: 2020-07-02 | End: 2020-11-17 | Stop reason: ALTCHOICE

## 2020-07-30 ASSESSMENT — ENCOUNTER SYMPTOMS
NAUSEA: 0
CHEST TIGHTNESS: 0
COUGH: 0
VOMITING: 0
STRIDOR: 0
WHEEZING: 0
DIARRHEA: 0
SHORTNESS OF BREATH: 0

## 2020-07-30 NOTE — PROGRESS NOTES
7/30/20    Chief Complaint   Patient presents with    Neck Pain     started 1 month ago then stopped/ then started hurting again        HPI: Christiano Verdugo is a 72 y.o. female who presents for complaints of right-sided posterior neck pain for one month. She was seen in a virtual visit on 05/13/2020 for the same issue. She was prescribed meloxicam and tizanadine for the pain at that time. She states that those medications did improve her pain level, but she recently started to feel worsening of the same pain again. She describes the pain as stabbing, and it starts on her right-sided posterior neck and radiates to her right shoulder and to the right side of her forehead. She has pain in the posterior neck moving her head forward.     Past Medical History:   Diagnosis Date    Borderline personality disorder ([address]     Chronic pain     Colon disorder     hard time pushing stool out    Constipation     Diabetes mellitus ([address]     Diabetic neuropathy (HCC)     Electric shock     ECT therapy    Hyperlipidemia     Hypertension     Insomnia     Major depressive disorder, recurrent (HCC)     Morbid obesity ([address]     Osteoarthrosis     RLS (restless legs syndrome)     Suicidal ideation     Unspecified hypothyroidism     Vitamin deficiency        Past Surgical History:   Procedure Laterality Date    APPENDECTOMY      BACK SURGERY      CARPAL TUNNEL RELEASE Left 12/19/2017    CARPAL TUNNEL RELEASE Right 6/18/2019    RIGHT OPEN CARPAL TUNNEL RELEASE performed by Harika Hardwick MD at [address] [place] [address] Right     for pinched nerve    JOINT REPLACEMENT Bilateral     knees    KNEE SURGERY Left     meniscus    SHOULDER ARTHROSCOPY Right 12/02/2016    Right Total Shoulder Arthroscopy with Reverse Ball and Socket Deta Prosthesis    SHOULDER ARTHROSCOPY Left 05/30/2018    subacromial decompression, rotator cuff repair, biceps stump debridement    SMALL INTESTINE SURGERY N/A 2019    DIAGNOSTIC LAPAROSCOPY CONVERTED TO OPEN BOWEL RESECTION performed by Karon Palencia MD at [address]         Social History     Tobacco Use    Smoking status: Former Smoker     Packs/day: 2.00     Years: 20.00     Pack years: 40.00     Types: Cigarettes     Start date: 1971     Last attempt to quit: 1991     Years since quittin.6    Smokeless tobacco: Never Used   Substance Use Topics    Alcohol use: Yes     Comment: rarely    Drug use: No       Family History   Problem Relation Age of Onset   [facility] Breast Cancer Mother     Stroke Father        Review of Systems   Constitutional: Negative for chills, fatigue and fever. Respiratory: Negative for cough, chest tightness, shortness of breath, wheezing and stridor. Cardiovascular: Negative for chest pain and leg swelling. Gastrointestinal: Negative for diarrhea, nausea and vomiting. Musculoskeletal: Positive for arthralgias, myalgias and neck pain. Negative for gait problem and neck stiffness. Neurological: Negative for dizziness and headaches. All other systems reviewed and are negative. Physical Exam  Vitals signs and nursing note reviewed. Constitutional:       General: She is not in acute distress. Appearance: Normal appearance. She is well-developed and normal weight. She is not ill-appearing, toxic-appearing or diaphoretic. HENT:      Head: Normocephalic and atraumatic. Mouth/Throat:      Pharynx: Uvula midline. Neck:      Musculoskeletal: Decreased range of motion. Neck rigidity and pain with movement present. No edema, erythema or muscular tenderness. Thyroid: No thyromegaly. Cardiovascular:      Rate and Rhythm: Normal rate and regular rhythm. Heart sounds: Normal heart sounds, S1 normal and S2 normal. No murmur. No friction rub. No gallop. Pulmonary:      Effort: Pulmonary effort is normal. No respiratory distress.       Breath sounds: Normal breath sounds. No stridor. No wheezing, rhonchi or rales. Musculoskeletal:         General: Tenderness present. No swelling, deformity or signs of injury. Right shoulder: She exhibits pain. She exhibits normal range of motion, no swelling and normal strength. Comments: Pain when trying to touch her chin-to-chest   Skin:     General: Skin is warm and dry. Coloration: Skin is not jaundiced or pale. Findings: No bruising, erythema, lesion or rash. Nails: There is no clubbing. Neurological:      General: No focal deficit present. Mental Status: She is alert and oriented to person, place, and time. Mental status is at baseline. Cranial Nerves: No cranial nerve deficit. Sensory: No sensory deficit. Psychiatric:         Mood and Affect: Mood normal.         Speech: Speech normal.         Behavior: Behavior normal.         Thought Content: Thought content normal.         Judgment: Judgment normal.         Vitals:    07/30/20 1244   BP: 124/76   Site: Left Upper Arm   Position: Sitting   Cuff Size: Medium Adult   Pulse: 65   Temp: 98.5 °F (36.9 °C)   SpO2: 97%   Weight: 149 lb (67.6 kg)   Height: 5' 6\" (1.676 m)       Assessment/Plan:  1. Acute strain of neck muscle, subsequent encounter: instructed to start taking meloxicam after she finishes the medrol dose pack  - gabapentin (NEURONTIN) 300 MG capsule; TAKE 1 CAPSULE BY MOUTH 3 TIMES A DAY  - methylPREDNISolone (MEDROL DOSEPACK) 4 MG tablet; Take by mouth. Dispense: 1 kit; Refill: 0  - tiZANidine (ZANAFLEX) 2 MG tablet; Take 1 tablet by mouth 3 times daily as needed (Neck pain)  Dispense: 90 tablet; Refill: 1  - meloxicam (MOBIC) 15 MG tablet; Take 1 tablet by mouth daily as needed for Pain  Dispense: 30 tablet; Refill: 5    2. Pain radiating to right shoulder  - gabapentin (NEURONTIN) 300 MG capsule; TAKE 1 CAPSULE BY MOUTH 3 TIMES A DAY  - methylPREDNISolone (MEDROL DOSEPACK) 4 MG tablet; Take by mouth. Dispense: 1 kit; Refill: 0    3. Type 2 diabetes mellitus without complication, without long-term current use of insulin ([facility])- diet controlled, A1C 5.9. normal Microalbumin  - POCT microalbumin  - POCT glycosylated hemoglobin (Hb A1C)        Patient Instructions   · Medrol dose pack (oral steroid taper)- you should avoid taking NSAIDs while on this medication (ex: ibuprofen, aleve, aspirin). Tylenol is OK to take. You are taking this for inflammation and pain. · You can start the meloxicam once you finish the steroid taper. · Zanaflex is a muscle relaxer. This may make you sleepy      Outpatient Encounter Medications as of 7/30/2020   Medication Sig Dispense Refill    gabapentin (NEURONTIN) 300 MG capsule TAKE 1 CAPSULE BY MOUTH 3 TIMES A DAY      methylPREDNISolone (MEDROL DOSEPACK) 4 MG tablet Take by mouth.  1 kit 0    tiZANidine (ZANAFLEX) 2 MG tablet Take 1 tablet by mouth 3 times daily as needed (Neck pain) 90 tablet 1    meloxicam (MOBIC) 15 MG tablet Take 1 tablet by mouth daily as needed for Pain 30 tablet 5    rOPINIRole (REQUIP) 2 MG tablet TAKE 1-2 TABLETS BY MOUTH EVERY DAY AT NIGHT FOR REST LEG SYNDROME 180 tablet 3    spironolactone (ALDACTONE) 50 MG tablet TAKE 1 TABLET BY MOUTH EVERY DAY 90 tablet 3    atorvastatin (LIPITOR) 20 MG tablet TAKE 1 TABLET BY MOUTH EVERY DAY 90 tablet 3    hydroCHLOROthiazide (HYDRODIURIL) 25 MG tablet TAKE 1 TABLET BY MOUTH EVERY DAY 90 tablet 3    levothyroxine (SYNTHROID) 75 MCG tablet TAKE 1 TABLET BY MOUTH EVERY DAY 90 tablet 3    clindamycin (CLEOCIN) 300 MG capsule Start today Take 2 BID for 3 days 12 capsule 0    [facility] D3 25 MCG (1000 UT) CAPS TAKE 1 CAPSULE BY MOUTH EVERY DAY 90 capsule 1    linaCLOtide (LINZESS) 72 MCG CAPS capsule Take 145 mcg by mouth every morning (before breakfast)       cetirizine (ZYRTEC ALLERGY) 10 MG tablet Take 10 mg by mouth daily      QUEtiapine (SEROQUEL) 100 MG tablet Take 100 mg by mouth nightly       [facility] MELATONIN 3 MG TABS tablet TAKE 1 TABLET BY MOUTH NIGHTLY 90 tablet 1    DULoxetine (CYMBALTA) 60 MG extended release capsule TAKE 1 CAPSULE BY MOUTH 2 TIMES DAILY 180 capsule 1    B Complex Vitamins (VITAMIN B COMPLEX) TABS Take 1 tablet by mouth 2 times daily (Patient taking differently: Take 1 tablet by mouth daily ) 180 tablet 1    bisacodyl (DULCOLAX) 5 MG EC tablet Take 10 mg by mouth 2 times daily       [DISCONTINUED] meloxicam (MOBIC) 15 MG tablet Take 1 tablet by mouth daily as needed for Pain 30 tablet 5    [DISCONTINUED] tiZANidine (ZANAFLEX) 2 MG tablet Take 1 tablet by mouth 3 times daily as needed (Neck pain) 30 tablet 0     No facility-administered encounter medications on file as of 7/30/2020. Kristin Cantu CNP     On the basis of positive falls risk screening, assessment and plan is as follows: home safety tips provided.

## 2020-10-27 ENCOUNTER — TELEPHONE (OUTPATIENT)
Dept: FAMILY MEDICINE CLINIC | Age: 70
End: 2020-10-27

## 2020-10-27 NOTE — TELEPHONE ENCOUNTER
----- Message from Lisa Kali sent at 10/27/2020  8:15 AM EDT -----  Subject: Message to Provider    QUESTIONS  Information for Provider? Patient stated that she is supposed to take   vitamin B supplements and she is confused on whether it is the B12 or the   B complex she is supposed to get.   ---------------------------------------------------------------------------  --------------  CALL BACK INFO  What is the best way for the office to contact you? OK to leave message on   voicemail  Preferred Call Back Phone Number? [phone]  ---------------------------------------------------------------------------  --------------  SCRIPT ANSWERS  Relationship to Patient?  Self

## 2020-10-28 ENCOUNTER — OFFICE VISIT (OUTPATIENT)
Dept: FAMILY MEDICINE CLINIC | Age: 70
End: 2020-10-28
Payer: COMMERCIAL

## 2020-10-28 VITALS
TEMPERATURE: 96.9 F | OXYGEN SATURATION: 97 % | HEART RATE: 71 BPM | DIASTOLIC BLOOD PRESSURE: 78 MMHG | HEIGHT: 66 IN | BODY MASS INDEX: 26.36 KG/M2 | SYSTOLIC BLOOD PRESSURE: 136 MMHG | WEIGHT: 164 LBS

## 2020-10-28 PROBLEM — F33.1 MODERATE EPISODE OF RECURRENT MAJOR DEPRESSIVE DISORDER (HCC): Status: ACTIVE | Noted: 2020-10-28

## 2020-10-28 LAB — HBA1C MFR BLD: 5.6 %

## 2020-10-28 PROCEDURE — 1036F TOBACCO NON-USER: CPT | Performed by: NURSE PRACTITIONER

## 2020-10-28 PROCEDURE — 1090F PRES/ABSN URINE INCON ASSESS: CPT | Performed by: NURSE PRACTITIONER

## 2020-10-28 PROCEDURE — G8399 PT W/DXA RESULTS DOCUMENT: HCPCS | Performed by: NURSE PRACTITIONER

## 2020-10-28 PROCEDURE — 83036 HEMOGLOBIN GLYCOSYLATED A1C: CPT | Performed by: NURSE PRACTITIONER

## 2020-10-28 PROCEDURE — 99214 OFFICE O/P EST MOD 30 MIN: CPT | Performed by: NURSE PRACTITIONER

## 2020-10-28 PROCEDURE — G8427 DOCREV CUR MEDS BY ELIG CLIN: HCPCS | Performed by: NURSE PRACTITIONER

## 2020-10-28 PROCEDURE — 4040F PNEUMOC VAC/ADMIN/RCVD: CPT | Performed by: NURSE PRACTITIONER

## 2020-10-28 PROCEDURE — G8484 FLU IMMUNIZE NO ADMIN: HCPCS | Performed by: NURSE PRACTITIONER

## 2020-10-28 PROCEDURE — G0008 ADMIN INFLUENZA VIRUS VAC: HCPCS | Performed by: NURSE PRACTITIONER

## 2020-10-28 PROCEDURE — G8417 CALC BMI ABV UP PARAM F/U: HCPCS | Performed by: NURSE PRACTITIONER

## 2020-10-28 PROCEDURE — 2022F DILAT RTA XM EVC RTNOPTHY: CPT | Performed by: NURSE PRACTITIONER

## 2020-10-28 PROCEDURE — 1123F ACP DISCUSS/DSCN MKR DOCD: CPT | Performed by: NURSE PRACTITIONER

## 2020-10-28 PROCEDURE — 3017F COLORECTAL CA SCREEN DOC REV: CPT | Performed by: NURSE PRACTITIONER

## 2020-10-28 PROCEDURE — 90694 VACC AIIV4 NO PRSRV 0.5ML IM: CPT | Performed by: NURSE PRACTITIONER

## 2020-10-28 PROCEDURE — 3044F HG A1C LEVEL LT 7.0%: CPT | Performed by: NURSE PRACTITIONER

## 2020-10-28 RX ORDER — BUSPIRONE HYDROCHLORIDE 10 MG/1
TABLET ORAL
COMMUNITY
Start: 2020-09-23 | End: 2020-11-17 | Stop reason: ALTCHOICE

## 2020-10-28 RX ORDER — ROSUVASTATIN CALCIUM 5 MG/1
5 TABLET, COATED ORAL DAILY
COMMUNITY
End: 2020-11-17 | Stop reason: ALTCHOICE

## 2020-10-28 RX ORDER — ZOSTER VACCINE RECOMBINANT, ADJUVANTED 50 MCG/0.5
0.5 KIT INTRAMUSCULAR SEE ADMIN INSTRUCTIONS
Qty: 0.5 ML | Refills: 0 | Status: SHIPPED | OUTPATIENT
Start: 2020-10-28 | End: 2021-04-26

## 2020-10-28 ASSESSMENT — ENCOUNTER SYMPTOMS
COUGH: 0
DIARRHEA: 0
WHEEZING: 0
SHORTNESS OF BREATH: 0
CHEST TIGHTNESS: 0
NAUSEA: 0
STRIDOR: 0
VOMITING: 0

## 2020-10-28 NOTE — PATIENT INSTRUCTIONS
Shingles vaccine at pharmacy  I will reach out to your psychiatrist about stopping the gabapentin so I can put you on Lyrica for neuropathy   Schedule nurse visit for blood draw-- be sure to fast   Diabetes is well controlled  F/u 3 months   Voltaren gel over the counter-- try on hands before bed for arthritis

## 2020-10-28 NOTE — PROGRESS NOTES
10/28/2020     Chief Complaint   Patient presents with    Diabetes     Janeen Landeros (:  1950) is a 72 y.o. female, here for evaluation of the following medical concerns:    HPI  Treatment Adherence:   Medication compliance:  compliant all of the time  Diet compliance:  compliant most of the time  Weight trend: stable    Diabetes Mellitus Type 2: Current symptoms/problems include neuropathy- has been on gabapentin 300 mg TID through her psychiatrist for anxiety but states it is not helping with anxiety or neuropathy. Will consider switching to Lyrica after discussing with psychiatry. Controlled A1C 5.6  On Metformin }    Hypertension:  Controlled, on HCTZ 25 mg, spironolactone 50    Hyperlipidemia:  Was switched from Lipitor to Crestor by cardiology due to myalgia. Not tolerating Crestor either, muscle cramps were better off of statin. She is on 2.5 mg crestor right now. Lab Results   Component Value Date    LABA1C 5.6 10/28/2020    LABA1C 5.9 2020    LABA1C 5.5 2019     Lab Results   Component Value Date    LABMICR Not Indicated 2019    CREATININE 0.7 2019     Lab Results   Component Value Date    ALT 16 2019    AST 22 2019     Lab Results   Component Value Date    CHOL 161 2018    TRIG 68 2018    HDL 77 (H) 2019    LDLCALC 73 2019        Hypothyroidism: Recent symptoms: none. She denies none. Patient is  taking her medication consistently on an empty stomach. Lab Results   Component Value Date    TSHREFLEX 1.32 2019    TSHREFLEX 1.92 2017    TSHREFLEX 2.06 2016     Lab Results   Component Value Date    TSH 3.03 2018     Mood Disorder:  BPD, MDD, anxiety- Dr. Tj Edmondson at [facility]. Has next appointment 2020 virtually. Does not feel gabapentin or Buspar are helping with her anxiety.  States has been doing [address] recently however back in July as she went to visit her family and her new grandson and [facility] when she came back home she was really lonely depressed. She states she did something that she should not have done, when I asked her to elaborate she said I took more pills than he should. I asked her if she was intentionally try to kill or hurt herself and she said no she just did not want to feel anything right then. She denies current SI, HI. States things have been better for the past few months. Does see a therapist in [address] Review of Systems   Constitutional: Negative for chills, fatigue and fever. Respiratory: Negative for cough, chest tightness, shortness of breath, wheezing and stridor. Cardiovascular: Negative for chest pain and leg swelling. Gastrointestinal: Negative for diarrhea, nausea and vomiting. Musculoskeletal: Positive for arthralgias, myalgias and neck pain. Negative for gait problem and neck stiffness. Neurological: Positive for numbness (bilateral toes and forefoot). Negative for dizziness and headaches. RLS    Psychiatric/Behavioral: Positive for dysphoric mood. The patient is nervous/anxious. All other systems reviewed and are negative. Prior to Visit Medications    Medication Sig Taking?  Authorizing Provider   busPIRone (BUSPAR) 10 MG tablet TAKE 1 TABLET BY MOUTH TWICE A DAY AM AND AFTERNOON Yes Historical Provider, MD   rosuvastatin (CRESTOR) 5 MG tablet Take 5 mg by mouth daily Yes Historical Provider, MD   zoster recombinant adjuvanted vaccine (SHINGRIX) 50 MCG/0.5ML SUSR injection Inject 0.5 mLs into the muscle See Admin Instructions 1 dose now and repeat in 2-6 months Yes LUIS De La Garza CNP   tiZANidine (ZANAFLEX) 2 MG tablet TAKE 1 TABLET BY MOUTH 3 TIMES DAILY AS NEEDED (NECK PAIN) Yes LUIS De La Garza CNP   gabapentin (NEURONTIN) 300 MG capsule TAKE 1 CAPSULE BY MOUTH 3 TIMES A DAY Yes Historical Provider, MD   meloxicam (MOBIC) 15 MG tablet Take 1 tablet by mouth daily as needed for Pain Yes Acacia Priest LUIS Parr CNP   rOPINIRole (REQUIP) 2 MG tablet TAKE 1-2 TABLETS BY MOUTH EVERY DAY AT NIGHT FOR REST LEG SYNDROME Yes LUIS Carmona CNP   spironolactone (ALDACTONE) 50 MG tablet TAKE 1 TABLET BY MOUTH EVERY DAY Yes LUIS Carmona CNP   hydroCHLOROthiazide (HYDRODIURIL) 25 MG tablet TAKE 1 TABLET BY MOUTH EVERY DAY Yes LUIS Carmona CNP   levothyroxine (SYNTHROID) 75 MCG tablet TAKE 1 TABLET BY MOUTH EVERY DAY Yes LUIS Carmona CNP   [facility] D3 25 MCG (1000 UT) CAPS TAKE 1 CAPSULE BY MOUTH EVERY DAY Yes LUIS Caromna CNP   linaCLOtide (LINZESS) 72 MCG CAPS capsule Take 145 mcg by mouth every morning (before breakfast)  Yes Historical Provider, MD   cetirizine (ZYRTEC ALLERGY) 10 MG tablet Take 10 mg by mouth daily Yes Historical Provider, MD   QUEtiapine (SEROQUEL) 100 MG tablet Take 100 mg by mouth nightly  Yes Historical Provider, MD   [facility]S MELATONIN 3 MG TABS tablet TAKE 1 TABLET BY MOUTH NIGHTLY Yes LUIS Carmona CNP   DULoxetine (CYMBALTA) 60 MG extended release capsule TAKE 1 CAPSULE BY MOUTH 2 TIMES DAILY Yes LUIS Carmona CNP   B Complex Vitamins (VITAMIN B COMPLEX) TABS Take 1 tablet by mouth 2 times daily  Patient taking differently: Take 1 tablet by mouth daily  Yes LUIS Carmona CNP   bisacodyl (DULCOLAX) 5 MG EC tablet Take 10 mg by mouth 2 times daily  Yes Historical Provider, MD      Social History     Tobacco Use    Smoking status: Former Smoker     Packs/day: 2.00     Years: 20.00     Pack years: 40.00     Types: Cigarettes     Start date: 1971     Last attempt to quit: 1991     Years since quittin.9    Smokeless tobacco: Never Used   Substance Use Topics    Alcohol use: Yes     Comment: rarely        Vitals:    10/28/20 0933   BP: 136/78   Site: Right Upper Arm   Position: Sitting   Cuff Size: Medium Adult   Pulse: 71   Temp: 96.9 °F (36.1 °C)   SpO2: 97% Weight: 164 lb (74.4 kg)   Height: 5' 6\" (1.676 m)     Estimated body mass index is 26.47 kg/m² as calculated from the following:    Height as of this encounter: 5' 6\" (1.676 m). Weight as of this encounter: 164 lb (74.4 kg). Physical Exam  Vitals signs and nursing note reviewed. Constitutional:       General: She is not in acute distress. Appearance: Normal appearance. She is well-developed and normal weight. She is not ill-appearing, toxic-appearing or diaphoretic. HENT:      Head: Normocephalic and atraumatic. Mouth/Throat:      Pharynx: Uvula midline. Eyes:      Extraocular Movements: Extraocular movements intact. Conjunctiva/sclera: Conjunctivae normal.      Pupils: Pupils are equal, round, and reactive to light. Neck:      Musculoskeletal: Full passive range of motion without pain, normal range of motion and neck supple. No neck rigidity. Thyroid: No thyromegaly. Cardiovascular:      Rate and Rhythm: Normal rate and regular rhythm. Pulses:           Dorsalis pedis pulses are 2+ on the right side and 2+ on the left side. Heart sounds: Normal heart sounds, S1 normal and S2 normal. No murmur. No friction rub. No gallop. Pulmonary:      Effort: Pulmonary effort is normal. No respiratory distress. Breath sounds: Normal breath sounds. No stridor. No wheezing, rhonchi or rales. Musculoskeletal:      Right foot: Normal range of motion. Left foot: Normal range of motion. Feet:      Right foot:      Protective Sensation: 5 sites tested. 2 sites sensed. Skin integrity: Skin integrity normal.      Left foot:      Protective Sensation: 5 sites tested. 3 sites sensed. Skin integrity: Skin integrity normal.   Lymphadenopathy:      Cervical: No cervical adenopathy. Skin:     General: Skin is warm and dry. Nails: There is no clubbing. Neurological:      General: No focal deficit present.       Mental Status: She is alert and oriented to person, place, and time. Mental status is at baseline. Cranial Nerves: No cranial nerve deficit. Sensory: No sensory deficit. Psychiatric:         Speech: Speech normal.         ASSESSMENT/PLAN:  1. Type 2 diabetes mellitus without complication, without long-term current use of insulin (HCC)- controlled, A1C 5.6  - POCT glycosylated hemoglobin (Hb A1C)  - CBC Auto Differential; Future  - Comprehensive Metabolic Panel; Future    2. Polyneuropathy associated with underlying disease ([facility]- on gabapentin, Cymbalta. Symptoms are really bothersome and occasionally disrupting sleep. Will reach out to psychiatrist about stopping gabapentin so we can try Lyrica for neuropathy. She does not feel gabapentin is helping with her anxiety. 3. Essential hypertension- controlled  - CBC Auto Differential; Future  - Comprehensive Metabolic Panel; Future    4. Muscle spasm- still having muscle pain with low dose crestor, will check labs and fax those to her cardiologist for review. - CK; Future    5. Hypercholesteremia  - Lipid Panel; Future  - CK; Future    6. Vitamin D deficiency  - VITAMIN D 25 HYDROXY; Future    7. Hypothyroidism, unspecified type  - TSH without Reflex; Future    8. Borderline personality disorder ([facility]- per psychiatry    9. Moderate episode of recurrent major depressive disorder ([facility]- per psychiatry   10. Anxiety    11. Immunization due  - zoster recombinant adjuvanted vaccine [facility]) 50 MCG/0.5ML SUSR injection; Inject 0.5 mLs into the muscle See Admin Instructions 1 dose now and repeat in 2-6 months  Dispense: 0.5 mL; Refill: 0  - INFLUENZA, QUADV, ADJUVANTED, 65 YRS =, IM, PF, PREFILL SYR, 0.5ML (FLUAD)      Return in about 3 months (around 1/28/2021). An electronic signature was used to authenticate this note.     --LUIS Adam - CNP on 10/28/2020 at 12:58 PM

## 2020-10-28 NOTE — PROGRESS NOTES
Vaccine Information Sheet, \"Influenza - Inactivated\"  given to Reena Santana, or parent/legal guardian of  Reena Santana and verbalized understanding. Patient responses:    Have you ever had a reaction to a flu vaccine? No  Do you have any current illness? No  Have you ever had Guillian Brule Syndrome? No  Do you have a serious allergy to any of the follow: Neomycin, Polymyxin, Thimerosal, eggs or egg products? No    Flu vaccine given per order. Please see immunization tab. Risks and benefits explained. Current VIS given.

## 2020-11-02 ENCOUNTER — TELEPHONE (OUTPATIENT)
Dept: FAMILY MEDICINE CLINIC | Age: 70
End: 2020-11-02

## 2020-11-02 NOTE — TELEPHONE ENCOUNTER
Allan Phipps said her cardiologist needed her to get blood work done and she has an appointment on 11/6/20 here for blood work. Patient said [facility] was going to call her cardiologist to see what kind of labs was needed. I informed Allan Phipps what labs were ordered from [facility]. Allan Phipps said she will call her cardiologist to see if he needed labs.

## 2020-11-02 NOTE — TELEPHONE ENCOUNTER
Charisma Sierra called back. She said Dr. Dario Dooley her cardiologist wanted her to stop taking Crestor for 6 weeks and he would want her to get Lipid Panel and CMP at the time. Charisma Sierra is scheduled for blood work here on 11/6/20. She is asking if she should wait until 6 weeks to get blood work done.

## 2020-11-17 ENCOUNTER — OFFICE VISIT (OUTPATIENT)
Dept: FAMILY MEDICINE CLINIC | Age: 70
End: 2020-11-17
Payer: COMMERCIAL

## 2020-11-17 VITALS
TEMPERATURE: 96.8 F | SYSTOLIC BLOOD PRESSURE: 118 MMHG | DIASTOLIC BLOOD PRESSURE: 66 MMHG | HEART RATE: 74 BPM | WEIGHT: 164 LBS | OXYGEN SATURATION: 98 % | HEIGHT: 66 IN | BODY MASS INDEX: 26.36 KG/M2

## 2020-11-17 PROCEDURE — G8484 FLU IMMUNIZE NO ADMIN: HCPCS | Performed by: NURSE PRACTITIONER

## 2020-11-17 PROCEDURE — G8399 PT W/DXA RESULTS DOCUMENT: HCPCS | Performed by: NURSE PRACTITIONER

## 2020-11-17 PROCEDURE — 3017F COLORECTAL CA SCREEN DOC REV: CPT | Performed by: NURSE PRACTITIONER

## 2020-11-17 PROCEDURE — 99213 OFFICE O/P EST LOW 20 MIN: CPT | Performed by: NURSE PRACTITIONER

## 2020-11-17 PROCEDURE — 1090F PRES/ABSN URINE INCON ASSESS: CPT | Performed by: NURSE PRACTITIONER

## 2020-11-17 PROCEDURE — 4040F PNEUMOC VAC/ADMIN/RCVD: CPT | Performed by: NURSE PRACTITIONER

## 2020-11-17 PROCEDURE — G8417 CALC BMI ABV UP PARAM F/U: HCPCS | Performed by: NURSE PRACTITIONER

## 2020-11-17 PROCEDURE — G8427 DOCREV CUR MEDS BY ELIG CLIN: HCPCS | Performed by: NURSE PRACTITIONER

## 2020-11-17 PROCEDURE — 1036F TOBACCO NON-USER: CPT | Performed by: NURSE PRACTITIONER

## 2020-11-17 PROCEDURE — 1123F ACP DISCUSS/DSCN MKR DOCD: CPT | Performed by: NURSE PRACTITIONER

## 2020-11-17 RX ORDER — ATORVASTATIN CALCIUM 20 MG/1
TABLET, FILM COATED ORAL
COMMUNITY
Start: 2020-11-14 | End: 2021-01-26 | Stop reason: ALTCHOICE

## 2020-11-17 ASSESSMENT — ENCOUNTER SYMPTOMS
DIARRHEA: 0
NAUSEA: 0
VOMITING: 0
SHORTNESS OF BREATH: 0
COUGH: 0

## 2020-11-17 NOTE — PROGRESS NOTES
[facility]   Telephone: [phone]  Fax: [phone]  Preoperative History & Physical        DIAGNOSIS:  CATARACT    PROCEDURE:  PHACOEMULSIFICATION WITH INTRAOCULAR LENS IMPLANT      History Obtained From:  patient    HISTORY OF PRESENT ILLNESS:    The patient is a 72 y.o. female with significant past medical history of DM, HTN, HLD, borderline personalisty, depression, RLS, OA, insomnia, neuropathy who presents for preoperative examination for above procedure. Surgery is scheduled for 11/23/2020 with Dr. Benita Valencia.       Psych BC in [place]- will be starting there next [facility]    Past Medical History:   Diagnosis Date    Borderline personality disorder ([address]     Chronic pain     Colon disorder     hard time pushing stool out    Constipation     Diabetes mellitus ([address]     Diabetic neuropathy ([address]     Electric shock     ECT therapy    Hyperlipidemia     Hypertension     Insomnia     Major depressive disorder, recurrent ([address]     Morbid obesity ([address]     Osteoarthrosis     RLS (restless legs syndrome)     Suicidal ideation     Unspecified hypothyroidism     Vitamin deficiency      Past Surgical History:   Procedure Laterality Date    APPENDECTOMY      BACK SURGERY      CARPAL TUNNEL RELEASE Left 12/19/2017    CARPAL TUNNEL RELEASE Right 6/18/2019    RIGHT OPEN CARPAL TUNNEL RELEASE performed by Rika Orellana MD at [address] [place] [address] Right     for pinched nerve    JOINT REPLACEMENT Bilateral     knees    KNEE SURGERY Left     meniscus    SHOULDER ARTHROSCOPY Right 12/02/2016    Right Total Shoulder Arthroscopy with Reverse Ball and Socket Deta Prosthesis    SHOULDER ARTHROSCOPY Left 05/30/2018    subacromial decompression, rotator cuff repair, biceps stump debridement    SMALL INTESTINE SURGERY N/A 9/16/2019    DIAGNOSTIC LAPAROSCOPY CONVERTED TO OPEN BOWEL RESECTION performed by Virgilio Dixon MD at [place] [address] TONSILLECTOMY       Current Outpatient Medications   Medication Sig Dispense Refill    atorvastatin (LIPITOR) 20 MG tablet       zoster recombinant adjuvanted vaccine (SHINGRIX) 50 MCG/0.5ML SUSR injection Inject 0.5 mLs into the muscle See Admin Instructions 1 dose now and repeat in 2-6 months 0.5 mL 0    tiZANidine (ZANAFLEX) 2 MG tablet TAKE 1 TABLET BY MOUTH 3 TIMES DAILY AS NEEDED (NECK PAIN) 90 tablet 3    meloxicam (MOBIC) 15 MG tablet Take 1 tablet by mouth daily as needed for Pain 30 tablet 5    rOPINIRole (REQUIP) 2 MG tablet TAKE 1-2 TABLETS BY MOUTH EVERY DAY AT NIGHT FOR REST LEG SYNDROME 180 tablet 3    spironolactone (ALDACTONE) 50 MG tablet TAKE 1 TABLET BY MOUTH EVERY DAY 90 tablet 3    hydroCHLOROthiazide (HYDRODIURIL) 25 MG tablet TAKE 1 TABLET BY MOUTH EVERY DAY 90 tablet 3    levothyroxine (SYNTHROID) 75 MCG tablet TAKE 1 TABLET BY MOUTH EVERY DAY 90 tablet 3    CVS D3 25 MCG (1000 UT) CAPS TAKE 1 CAPSULE BY MOUTH EVERY DAY 90 capsule 1    linaCLOtide (LINZESS) 72 MCG CAPS capsule Take 145 mcg by mouth every morning (before breakfast)       cetirizine (ZYRTEC ALLERGY) 10 MG tablet Take 10 mg by mouth daily      QUEtiapine (SEROQUEL) 100 MG tablet Take 100 mg by mouth nightly       CVS MELATONIN 3 MG TABS tablet TAKE 1 TABLET BY MOUTH NIGHTLY 90 tablet 1    DULoxetine (CYMBALTA) 60 MG extended release capsule TAKE 1 CAPSULE BY MOUTH 2 TIMES DAILY 180 capsule 1    B Complex Vitamins (VITAMIN B COMPLEX) TABS Take 1 tablet by mouth 2 times daily (Patient taking differently: Take 1 tablet by mouth daily ) 180 tablet 1    bisacodyl (DULCOLAX) 5 MG EC tablet Take 10 mg by mouth 2 times daily        No current facility-administered medications for this visit. Allergies:  Levaquin [levofloxacin in d5w];  Levofloxacin; and Pcn [penicillins]  History of allergic reaction to anesthesia:  No  Planned anesthesia: Local, MAC   Known anesthesia problems: None   Bleeding risk: No recent or remote history of abnormal bleeding  Personal or FH of DVT/PE: No    Patient objection to receiving blood products: No    Social History     Tobacco Use   Smoking Status Former Smoker    Packs/day: 2.00    Years: 20.00    Pack years: 40.00    Types: Cigarettes    Start date: 1971    Last attempt to quit: 1991    Years since quittin.9   Smokeless Tobacco Never Used     The patient states she drinks 0 per week. Family History   Problem Relation Age of Onset    Breast Cancer Mother     Stroke Father        REVIEW OF SYSTEMS:    Review of Systems   Constitutional: Negative for chills, fatigue and fever. Eyes: Positive for visual disturbance. Respiratory: Negative for cough and shortness of breath. Cardiovascular: Negative for chest pain and leg swelling. Gastrointestinal: Negative for diarrhea, nausea and vomiting. Neurological: Negative for dizziness and headaches. All other systems reviewed and are negative. PHYSICAL EXAM:      /66 (Site: Left Upper Arm, Position: Sitting, Cuff Size: Medium Adult)   Pulse 74   Temp 96.8 °F (36 °C)   Ht 5' 6\" (1.676 m)   Wt 164 lb (74.4 kg)   SpO2 98%   BMI 26.47 kg/m²     Physical Exam   Constitutional: She is oriented to person, place, and time. She appears well-developed and well-nourished. No distress. HENT:   Head: Normocephalic and atraumatic. Cardiovascular: Normal rate, regular rhythm and normal heart sounds. Exam reveals no gallop and no friction rub. No murmur heard. Pulmonary/Chest: Effort normal and breath sounds normal. No respiratory distress. She has no wheezes. She has no rales. Neurological: She is alert and oriented to person, place, and time. No cranial nerve deficit. Skin: Skin is warm and dry. She is not diaphoretic. Nursing note and vitals reviewed. DATA:  EKG: NONE    ASSESSMENT AND PLAN:    1. Preoperative workup as follows: none  2. Change in medication regimen before surgery: None  3. Prophylaxis for cardiac events with perioperative beta-blockers: Not indicated  ACC/AHA indications for pre-operative beta-blocker use:    · Vascular surgery with history of postitive stress test  · Intermediate or high risk surgery with history of CAD   · Intermediate or high risk surgery with multiple clinical predictors of CAD- 2 of the following: history of compensated or prior heart failure, history of cerebrovascular disease, DM, or renal insufficiency    Routine administration of higher-dose, long-acting metoprolol in beta-blocker-naïve patients on the day of surgery, and in the absence of dose titration is associated with an overall increase in mortality. Beta-blockers should be started days to weeks prior to surgery and titrated to pulse < 70.  4. Deep vein thrombosis prophylaxis: regimen to be chosen by surgical team  5. No contraindications to planned surgery      YUDI Celeste.  [address]  [phone]

## 2020-12-16 RX ORDER — TIZANIDINE 2 MG/1
TABLET ORAL
Qty: 270 TABLET | Refills: 1 | Status: SHIPPED | OUTPATIENT
Start: 2020-12-16 | End: 2022-04-21

## 2020-12-16 NOTE — TELEPHONE ENCOUNTER
Last ov 11/17/2020   Future Appointments   Date Time Provider Anastasiia Alvarez   12/17/2020  7:20 AM VINAYA CARLOS Zhang [facility]   1/26/2021  8:00 AM Varun Marmolejo, APRN - CNP [facility]

## 2020-12-17 ENCOUNTER — HOSPITAL ENCOUNTER (OUTPATIENT)
Dept: WOMENS IMAGING | Age: 70
Discharge: HOME | End: 2020-12-17
Payer: COMMERCIAL

## 2020-12-17 PROCEDURE — 77067 SCR MAMMO BI INCL CAD: CPT

## 2020-12-23 ENCOUNTER — TELEPHONE (OUTPATIENT)
Dept: FAMILY MEDICINE CLINIC | Age: 70
End: 2020-12-23

## 2020-12-23 NOTE — TELEPHONE ENCOUNTER
I do not see any lab results in Care Everywhere except from February 2020. If she wants follow up for severe muscle spasms and cramping she would need an appt, I haven't seen her for this so I can't make any recommendations.

## 2020-12-23 NOTE — TELEPHONE ENCOUNTER
Lab results are in [place].    She is still having severe muscle spasms and cramping    Also she wanted us to know that Dr. Reema Singh will be starting her on injectable cholesterol medicaiton

## 2020-12-23 NOTE — TELEPHONE ENCOUNTER
Patient called for lab results  She had lab work done at her specialist that [facility] ordered in Oct.  She is asking them to fax us a copy. Can we please review and call her with the results.   If we do not get the fax please let patient know

## 2020-12-29 ENCOUNTER — VIRTUAL VISIT (OUTPATIENT)
Dept: FAMILY MEDICINE CLINIC | Age: 70
End: 2020-12-29
Payer: COMMERCIAL

## 2020-12-29 PROCEDURE — G8399 PT W/DXA RESULTS DOCUMENT: HCPCS | Performed by: FAMILY MEDICINE

## 2020-12-29 PROCEDURE — 4040F PNEUMOC VAC/ADMIN/RCVD: CPT | Performed by: FAMILY MEDICINE

## 2020-12-29 PROCEDURE — 1036F TOBACCO NON-USER: CPT | Performed by: FAMILY MEDICINE

## 2020-12-29 PROCEDURE — G8484 FLU IMMUNIZE NO ADMIN: HCPCS | Performed by: FAMILY MEDICINE

## 2020-12-29 PROCEDURE — 1123F ACP DISCUSS/DSCN MKR DOCD: CPT | Performed by: FAMILY MEDICINE

## 2020-12-29 PROCEDURE — 99214 OFFICE O/P EST MOD 30 MIN: CPT | Performed by: FAMILY MEDICINE

## 2020-12-29 PROCEDURE — G8417 CALC BMI ABV UP PARAM F/U: HCPCS | Performed by: FAMILY MEDICINE

## 2020-12-29 PROCEDURE — G8427 DOCREV CUR MEDS BY ELIG CLIN: HCPCS | Performed by: FAMILY MEDICINE

## 2020-12-29 PROCEDURE — 3017F COLORECTAL CA SCREEN DOC REV: CPT | Performed by: FAMILY MEDICINE

## 2020-12-29 PROCEDURE — 1090F PRES/ABSN URINE INCON ASSESS: CPT | Performed by: FAMILY MEDICINE

## 2020-12-29 RX ORDER — BROMFENAC 0.76 MG/ML
SOLUTION/ DROPS OPHTHALMIC
COMMUNITY
Start: 2020-12-09 | End: 2021-01-26 | Stop reason: ALTCHOICE

## 2020-12-29 RX ORDER — LOTEPREDNOL ETABONATE 10 MG/ML
SUSPENSION TOPICAL
COMMUNITY
Start: 2020-11-03 | End: 2021-01-26 | Stop reason: ALTCHOICE

## 2020-12-29 RX ORDER — QUETIAPINE FUMARATE 25 MG/1
TABLET, FILM COATED ORAL
COMMUNITY
Start: 2020-12-15 | End: 2021-01-26 | Stop reason: SDUPTHER

## 2020-12-29 RX ORDER — QUETIAPINE FUMARATE 50 MG/1
TABLET, EXTENDED RELEASE ORAL
COMMUNITY
End: 2021-01-26 | Stop reason: SDUPTHER

## 2020-12-29 RX ORDER — ERYTHROMYCIN 5 MG/G
OINTMENT OPHTHALMIC
COMMUNITY
Start: 2020-11-03 | End: 2021-01-26

## 2020-12-29 RX ORDER — DESVENLAFAXINE 50 MG/1
TABLET, EXTENDED RELEASE ORAL
COMMUNITY
Start: 2020-12-22

## 2020-12-29 NOTE — PROGRESS NOTES
TELEHEALTH EVALUATION -- Audio/Visual (During RDELQ-16 public health emergency)    HPI:  The patient has requested an audio/video evaluation for the following concern(s):  Chief Complaint   Patient presents with    Spasms     x 2 months   currently on tizanidine, 2mg getting 270 for 3 month period, takes it at night only. that original rx was for neck spasm. Spasms are \"Throughout my body\"  Legs, abdomin  Stomach chest hand. When she is working with hands she gets cramps and they are annoying for her. Sees a cardiologist, who stopped her atorvastatin in August due to muscle spasms,started her on Crestor instead, had cholesterol checked by her cardiologist,recently and was told her cholesterol is higher than recommended, and the plan now is to add an injectable. Takes a requip for restless legs, takes it nightly and sometime during day. .   Does not take a coenzyme q10  Has a psychiatrist, wants to start lithium, but she is on hctz and wants to not give it to her on hctz. She has been on it for years and is currenlty taking both hctz and spironolactone, both given to her before having [facility] doctors. Blood pressures and blood sugars both controlled.   Wt Readings from Last 3 Encounters:   11/17/20 164 lb (74.4 kg)   10/28/20 164 lb (74.4 kg)   07/30/20 149 lb (67.6 kg)     BP Readings from Last 3 Encounters:   11/17/20 118/66   10/28/20 136/78   07/30/20 124/76        Lab Results   Component Value Date    LABA1C 5.6 10/28/2020     Lab Results   Component Value Date    .8 07/06/2017       Past Medical History:   Diagnosis Date    Borderline personality disorder ([address]     Chronic pain     Colon disorder     hard time pushing stool out    Constipation     Diabetes mellitus ([address]     Diabetic neuropathy ([address]     Electric shock     ECT therapy    Hyperlipidemia     Hypertension     Insomnia     Major depressive disorder, recurrent ([address]     Morbid obesity ([address]     Osteoarthrosis Endocrine: Negative. Genitourinary: Negative. Musculoskeletal: + spasms   Allergic/Immunologic: Negative for immunocompromised state. Neurological: Negative for dizziness. Psychiatric/Behavioral: Negative for agitation, behavioral problems and confusion. All other systems reviewed and are negative. .      Constitutional: [x] Appears well-developed and well-nourished [x] No apparent distress      [] Abnormal-   Mental status  [x] Alert and awake  [x] Oriented to person/place/time [x]Able to follow commands      Eyes:  EOM    [x]  Normal  [] Abnormal-  Sclera  [x]  Normal  [] Abnormal -         Discharge [x]  None visible  [] Abnormal -    HENT:   [x] Normocephalic, atraumatic.   [] Abnormal   [] Mouth/Throat: Mucous membranes are moist.     External Ears [x] Normal  [] Abnormal-     Neck: [x] No visualized mass     Pulmonary/Chest: [x] Respiratory effort normal.  [x] No visualized signs of difficulty breathing or respiratory distress        [] Abnormal-    Able to speak in full sentences without difficulty  Musculoskeletal:   [] Normal gait with no signs of ataxia         [x] Normal range of motion of neck        [] Abnormal-       Neurological:        [x] No Facial Asymmetry (Cranial nerve 7 motor function) (limited exam to video visit)          [x] No gaze palsy        [] Abnormal-         Skin:        [x] No significant exanthematous lesions or discoloration noted on facial skin         [] Abnormal-            Psychiatric:       [x] Normal Affect [] No Hallucinations        [] Abnormal-   Judgment, behavior, thought and mood are normal. (During EDZAW-94 public health emergency), evaluation of the following organ systems was limited: Vitals/Constitutional/EENT/Resp/CV/GI//MS/Neuro/Skin/Heme-Lymph-Imm. Pursuant to the emergency declaration under the [address], [address] [place] [address] and the [employer] Act, this Virtual Visit was conducted with patient's (and/or legal guardian's) consent, to reduce the patient's risk of exposure to COVID-19 and provide necessary medical care. The patient (and/or legal guardian) has also been advised to contact this office for worsening conditions or problems, and seek emergency medical treatment and/or call 911 if deemed necessary. Patient initiated the encounter and gave consent for the encounter. Services were provided through a video synchronous discussion virtually to substitute for in-person clinic visit. Patient and provider were located at their individual homes. Diagnosis Orders   1. Other muscle spasm       Spasms both upper and lower extremity and chest and abdomin. Complicated by medication needed for cholesterol and depression and hx of diabetes  Plan:    Stop hctz  Start coenzyme Q10, 100mg over the counter.   Start tonic water, 6 oz nightly  Add multivitamin  Follow up in 4 weeks  30 minute time spent

## 2021-01-26 ENCOUNTER — OFFICE VISIT (OUTPATIENT)
Dept: FAMILY MEDICINE CLINIC | Age: 71
End: 2021-01-26
Payer: COMMERCIAL

## 2021-01-26 VITALS
WEIGHT: 168 LBS | TEMPERATURE: 97.1 F | OXYGEN SATURATION: 97 % | HEART RATE: 74 BPM | DIASTOLIC BLOOD PRESSURE: 68 MMHG | SYSTOLIC BLOOD PRESSURE: 112 MMHG | BODY MASS INDEX: 27 KG/M2 | HEIGHT: 66 IN

## 2021-01-26 DIAGNOSIS — Z23 IMMUNIZATION DUE: Primary | ICD-10-CM

## 2021-01-26 DIAGNOSIS — R25.2 MUSCLE CRAMP, NOCTURNAL: ICD-10-CM

## 2021-01-26 DIAGNOSIS — E11.9 TYPE 2 DIABETES MELLITUS WITHOUT COMPLICATION, WITHOUT LONG-TERM CURRENT USE OF INSULIN (HCC): ICD-10-CM

## 2021-01-26 LAB — HBA1C MFR BLD: 5.6 %

## 2021-01-26 PROCEDURE — G8399 PT W/DXA RESULTS DOCUMENT: HCPCS | Performed by: NURSE PRACTITIONER

## 2021-01-26 PROCEDURE — 83036 HEMOGLOBIN GLYCOSYLATED A1C: CPT | Performed by: NURSE PRACTITIONER

## 2021-01-26 PROCEDURE — G8427 DOCREV CUR MEDS BY ELIG CLIN: HCPCS | Performed by: NURSE PRACTITIONER

## 2021-01-26 PROCEDURE — 90750 HZV VACC RECOMBINANT IM: CPT | Performed by: NURSE PRACTITIONER

## 2021-01-26 PROCEDURE — 1123F ACP DISCUSS/DSCN MKR DOCD: CPT | Performed by: NURSE PRACTITIONER

## 2021-01-26 PROCEDURE — 4040F PNEUMOC VAC/ADMIN/RCVD: CPT | Performed by: NURSE PRACTITIONER

## 2021-01-26 PROCEDURE — 1090F PRES/ABSN URINE INCON ASSESS: CPT | Performed by: NURSE PRACTITIONER

## 2021-01-26 PROCEDURE — G8484 FLU IMMUNIZE NO ADMIN: HCPCS | Performed by: NURSE PRACTITIONER

## 2021-01-26 PROCEDURE — 90471 IMMUNIZATION ADMIN: CPT | Performed by: NURSE PRACTITIONER

## 2021-01-26 PROCEDURE — 99213 OFFICE O/P EST LOW 20 MIN: CPT | Performed by: NURSE PRACTITIONER

## 2021-01-26 PROCEDURE — 2022F DILAT RTA XM EVC RTNOPTHY: CPT | Performed by: NURSE PRACTITIONER

## 2021-01-26 PROCEDURE — 1036F TOBACCO NON-USER: CPT | Performed by: NURSE PRACTITIONER

## 2021-01-26 PROCEDURE — 3017F COLORECTAL CA SCREEN DOC REV: CPT | Performed by: NURSE PRACTITIONER

## 2021-01-26 PROCEDURE — G8417 CALC BMI ABV UP PARAM F/U: HCPCS | Performed by: NURSE PRACTITIONER

## 2021-01-26 PROCEDURE — 3044F HG A1C LEVEL LT 7.0%: CPT | Performed by: NURSE PRACTITIONER

## 2021-01-26 RX ORDER — ALIROCUMAB 75 MG/ML
INJECTION, SOLUTION SUBCUTANEOUS
COMMUNITY
Start: 2021-01-15 | End: 2021-01-26 | Stop reason: SDUPTHER

## 2021-01-26 RX ORDER — LITHIUM CARBONATE 300 MG/1
CAPSULE ORAL
COMMUNITY
Start: 2021-01-19

## 2021-01-26 RX ORDER — SENNOSIDES 8.6 MG
TABLET ORAL
COMMUNITY

## 2021-01-26 ASSESSMENT — ENCOUNTER SYMPTOMS
VOICE CHANGE: 0
TROUBLE SWALLOWING: 0
ABDOMINAL PAIN: 0
CHEST TIGHTNESS: 0
COUGH: 0
EYE DISCHARGE: 0
CHOKING: 0
ABDOMINAL DISTENTION: 0
FACIAL SWELLING: 0
APNEA: 0
STRIDOR: 0
SHORTNESS OF BREATH: 0
RECTAL PAIN: 0
WHEEZING: 0
PHOTOPHOBIA: 0
COLOR CHANGE: 0
EYE PAIN: 0
EYE REDNESS: 0

## 2021-01-26 NOTE — PATIENT INSTRUCTIONS
good, quick way to make sure that you have a balanced meal. It also helps you spread carbs throughout the day. ? Divide your plate by types of foods. Put non-starchy vegetables on half the plate, meat or other protein food on one-quarter of the plate, and a grain or starchy vegetable in the final quarter of the plate. To this you can add a small piece of fruit and 1 cup of milk or yogurt, depending on how many carbs you are supposed to eat at a meal.  · Try to eat about the same amount of carbs at each meal. Do not \"save up\" your daily allowance of carbs to eat at one meal.  · Proteins have very little or no carbs per serving. Examples of proteins are beef, chicken, turkey, fish, eggs, tofu, cheese, cottage cheese, and peanut butter. A serving size of meat is 3 ounces, which is about the size of a deck of cards. Examples of meat substitute serving sizes (equal to 1 ounce of meat) are 1/4 cup of cottage cheese, 1 egg, 1 tablespoon of peanut butter, and ½ cup of tofu. How can you eat out and still eat healthy? · Learn to estimate the serving sizes of foods that have carbohydrate. If you measure food at home, it will be easier to estimate the amount in a serving of restaurant food. · If the meal you order has too much carbohydrate (such as potatoes, corn, or baked beans), ask to have a low-carbohydrate food instead. Ask for a salad or green vegetables. · If you use insulin, check your blood sugar before and after eating out to help you plan how much to eat in the future. · If you eat more carbohydrate at a meal than you had planned, take a walk or do other exercise. This will help lower your blood sugar. What else should you know? · Limit saturated fat, such as the fat from meat and dairy products. This is a healthy choice because people who have diabetes are at higher risk of heart disease. So choose lean cuts of meat and nonfat or low-fat dairy products.  Use olive or canola oil instead of butter or shortening when cooking. · Don't skip meals. Your blood sugar may drop too low if you skip meals and take insulin or certain medicines for diabetes. · Check with your doctor before you drink alcohol. Alcohol can cause your blood sugar to drop too low. Alcohol can also cause a bad reaction if you take certain diabetes medicines. Follow-up care is a key part of your treatment and safety. Be sure to make and go to all appointments, and call your doctor if you are having problems. It's also a good idea to know your test results and keep a list of the medicines you take. Where can you learn more? Go to https://[employer] [place] [employer]. org and sign in to your [employer] account. Enter W727 in the [employer] box to learn more about \"Learning About Diabetes Food Guidelines. \"     If you do not have an account, please click on the \"Sign Up Now\" link. Current as of: December 20, 2019               Content Version: 12.6  © 7456-4451 [employer]. Care instructions adapted under license by [facility] ([facility]). If you have questions about a medical condition or this instruction, always ask your healthcare professional. [place] [address] any warranty or liability for your use of this information.

## 2021-01-26 NOTE — PROGRESS NOTES
HPI: Zoltan Zaragoza is a 72 y.o. female who presents for diabetic follow up. Diabetes: POC A1C today is 5.6. She states she is 100% complaint with her medication regimen. She states she tries her best to stick to healthy eating. She reports she has noticed her feet sensation has gotten worse but overall not painful or causing her trouble. She has on good supportive shoes today in office. Muscle spasms and cramps: She stated the periodic cramps started in August and she thought it was due to working in the yard. She describes the cramps that are in her legs all the way down. She states it's more like a muscle spasm not quite like a \"onel horse\". She has tried taking a multi vitamin and coenzyme Q10. She is taking prescribed zanafex at bedtime to help with muscle cramps. Hyperlipidemia: She just started Praluent 1 week agot: She reports no adverse effects.          Past Medical History:   Diagnosis Date    Borderline personality disorder ([address]     Chronic pain     Colon disorder     hard time pushing stool out    Constipation     Diabetes mellitus ([address]     Diabetic neuropathy (HCC)     Electric shock     ECT therapy    Hyperlipidemia     Hypertension     Insomnia     Major depressive disorder, recurrent (HCC)     Morbid obesity ([address]     Osteoarthrosis     RLS (restless legs syndrome)     Suicidal ideation     Unspecified hypothyroidism     Vitamin deficiency        Past Surgical History:   Procedure Laterality Date    APPENDECTOMY      BACK SURGERY      CARPAL TUNNEL RELEASE Left 12/19/2017    CARPAL TUNNEL RELEASE Right 6/18/2019    RIGHT OPEN CARPAL TUNNEL RELEASE performed by Shay Lopez MD at [address] [place] [address] Right     for pinched nerve    JOINT REPLACEMENT Bilateral     knees    KNEE SURGERY Left     meniscus    SHOULDER ARTHROSCOPY Right 12/02/2016    Right Total Shoulder Arthroscopy with Reverse Ball and Socket Deta Prosthesis    SHOULDER ARTHROSCOPY Left 2018    subacromial decompression, rotator cuff repair, biceps stump debridement    SMALL INTESTINE SURGERY N/A 2019    DIAGNOSTIC LAPAROSCOPY CONVERTED TO OPEN BOWEL RESECTION performed by Solange Trevino MD at [address]      TONSILLECTOMY         Social History     Tobacco Use    Smoking status: Former Smoker     Packs/day: 2.00     Years: 20.00     Pack years: 40.00     Types: Cigarettes     Start date: 1971     Quit date: 1991     Years since quittin.1    Smokeless tobacco: Never Used   Substance Use Topics    Alcohol use: Yes     Comment: rarely    Drug use: No       Family History   Problem Relation Age of Onset   Rad Arizmendi Breast Cancer Mother     Stroke Father        Review of Systems   Constitutional: Negative for activity change, appetite change, chills, diaphoresis, fever and unexpected weight change. HENT: Negative for congestion, drooling, ear discharge, ear pain, facial swelling, mouth sores, nosebleeds, sneezing, trouble swallowing and voice change. Eyes: Negative for photophobia, pain, discharge, redness and visual disturbance. Respiratory: Negative for apnea, cough, choking, chest tightness, shortness of breath, wheezing and stridor. Cardiovascular: Positive for leg swelling. Negative for chest pain and palpitations. LLE she states is chronically edematous due to varicose veins    Gastrointestinal: Negative for abdominal distention, abdominal pain and rectal pain. Endocrine: Negative for polydipsia, polyphagia and polyuria. Genitourinary: Negative for difficulty urinating, flank pain and hematuria. Musculoskeletal: Positive for arthralgias, myalgias and neck pain. Negative for gait problem. Multiple areas of discomfort pain due to arthritis. She reports she stopped taking the mobic \"because it didn't help\". Skin: Negative for color change, rash and wound.    Allergic/Immunologic: Negative for environmental allergies and immunocompromised state. Neurological: Negative for tremors, seizures, syncope, facial asymmetry, speech difficulty, weakness, light-headedness and numbness. Hematological: Does not bruise/bleed easily. Psychiatric/Behavioral: Negative for agitation, confusion, hallucinations, self-injury and suicidal ideas. Physical Exam  Vitals signs reviewed. Constitutional:       General: She is not in acute distress. Appearance: Normal appearance. She is well-developed. She is not diaphoretic. HENT:      Head: Normocephalic and atraumatic. Right Ear: External ear normal.      Left Ear: External ear normal.      Nose: Nose normal.      Mouth/Throat:      Mouth: Mucous membranes are moist.   Eyes:      General:         Right eye: No discharge. Left eye: No discharge. Conjunctiva/sclera: Conjunctivae normal.      Pupils: Pupils are equal, round, and reactive to light. Neck:      Musculoskeletal: Normal range of motion. Vascular: No JVD. Trachea: No tracheal deviation. Comments: C/o neck chronic neck tightness along the right side of neck to shoulder area  Cardiovascular:      Rate and Rhythm: Normal rate. Heart sounds: Normal heart sounds. No murmur. No friction rub. No gallop. Pulmonary:      Effort: Pulmonary effort is normal. No respiratory distress. Breath sounds: Normal breath sounds. No stridor. No rales. Chest:      Chest wall: No tenderness. Abdominal:      General: Bowel sounds are normal. There is no distension. Palpations: Abdomen is soft. There is no mass. Tenderness: There is no abdominal tenderness. There is no guarding or rebound. Hernia: No hernia is present. Musculoskeletal: Normal range of motion. General: Deformity present. No tenderness. Comments: + arthritis of hands    Lymphadenopathy:      Cervical: No cervical adenopathy. Skin:     General: Skin is warm and dry. Capillary Refill: Capillary refill takes 2 to 3 seconds. Coloration: Skin is not pale. Findings: No erythema or rash. Neurological:      Mental Status: She is alert and oriented to person, place, and time. Cranial Nerves: No cranial nerve deficit. Sensory: No sensory deficit. Coordination: Coordination normal.   Psychiatric:         Behavior: Behavior normal.         Thought Content: Thought content normal.      Comments: Overactive hand movement and wringing of hands             Vitals:    01/26/21 0802   BP: 112/68   Site: Right Upper Arm   Position: Sitting   Cuff Size: Medium Adult   Pulse: 74   Temp: 97.1 °F (36.2 °C)   SpO2: 97%   Weight: 168 lb (76.2 kg)   Height: 5' 6\" (1.676 m)       Assessment/Plan:  1. Immunization due  - Zoster recombinant [facility])    2. Type 2 diabetes mellitus without complication, without long-term current use of insulin (HCC)  - POCT glycosylated hemoglobin (Hb A1C)  -continue medication regimen   -continue to adhere to diabetic diet    3.  Muscle Cramping:   -May continue to take Zanaflex at bedtime.   -Incorporate a serving of food that has potassium daily     Outpatient Encounter Medications as of 1/26/2021   Medication Sig Dispense Refill    lithium 300 MG capsule       alirocumab (PRALUENT) 75 MG/ML SOAJ injection pen Inject 75 mg into the skin every 14 days      Coenzyme Q10 (COQ-10) 400 MG CAPS Take by mouth      desvenlafaxine succinate (PRISTIQ) 50 MG TB24 extended release tablet TAKE 1 TABLET BY MOUTH EVERY EVENING      tiZANidine (ZANAFLEX) 2 MG tablet TAKE 1 TABLET BY MOUTH 3 TIMES DAILY AS NEEDED 270 tablet 1    zoster recombinant adjuvanted vaccine (SHINGRIX) 50 MCG/0.5ML SUSR injection Inject 0.5 mLs into the muscle See Admin Instructions 1 dose now and repeat in 2-6 months 0.5 mL 0    rOPINIRole (REQUIP) 2 MG tablet TAKE 1-2 TABLETS BY MOUTH EVERY DAY AT NIGHT FOR REST LEG SYNDROME 180 tablet 3    spironolactone (ALDACTONE) 50 MG tablet TAKE 1 TABLET BY MOUTH EVERY DAY 90 tablet 3    levothyroxine (SYNTHROID) 75 MCG tablet TAKE 1 TABLET BY MOUTH EVERY DAY 90 tablet 3    [facility] D3 25 MCG (1000 UT) CAPS TAKE 1 CAPSULE BY MOUTH EVERY DAY 90 capsule 1    linaCLOtide (LINZESS) 72 MCG CAPS capsule Take 145 mcg by mouth every morning (before breakfast)       cetirizine (ZYRTEC ALLERGY) 10 MG tablet Take 10 mg by mouth daily      QUEtiapine (SEROQUEL) 100 MG tablet Take 100 mg by mouth nightly       [facility] MELATONIN 3 MG TABS tablet TAKE 1 TABLET BY MOUTH NIGHTLY 90 tablet 1    B Complex Vitamins (VITAMIN B COMPLEX) TABS Take 1 tablet by mouth 2 times daily (Patient taking differently: Take 1 tablet by mouth daily ) 180 tablet 1    bisacodyl (DULCOLAX) 5 MG EC tablet Take 10 mg by mouth 2 times daily       [DISCONTINUED] PRALUENT 75 MG/ML SOAJ injection pen USE 75 MG EVERY 14 (FOURTEEN) DAYS.       [DISCONTINUED] QUEtiapine (SEROQUEL) 25 MG tablet TAKE ONE TABLET BY MOUTH AT NOON AND 1 AT BEDTIME      [DISCONTINUED] QUEtiapine (SEROQUEL XR) 50 MG extended release tablet Take by mouth      [DISCONTINUED] Loteprednol Etabonate (INVELTYS) 1 % SUSP instill 1 drop into operative eye QID for 1 week after surgery, then BID for 3 more weeks      [DISCONTINUED] erythromycin (ROMYCIN) 5 MG/GM ophthalmic ointment apply 1cm strip  to top and bottom eyelashes of operative eye before bed the night before surgery      [DISCONTINUED] BROMSITE 0.075 % SOLN PLACE ONE DROP IN OPERATIVE EYE TWICE DAILY BEGINNING ONE DAY BEFORE SURGERY AND MORNING OF SURGERY THEN CONTINUE X 4 WEEKS      [DISCONTINUED] atorvastatin (LIPITOR) 20 MG tablet       [DISCONTINUED] meloxicam (MOBIC) 15 MG tablet Take 1 tablet by mouth daily as needed for Pain 30 tablet 5    [DISCONTINUED] hydroCHLOROthiazide (HYDRODIURIL) 25 MG tablet TAKE 1 TABLET BY MOUTH EVERY DAY 90 tablet 3    [DISCONTINUED] DULoxetine (CYMBALTA) 60 MG extended release capsule TAKE 1 CAPSULE BY MOUTH 2 TIMES DAILY (Patient not taking: Reported on 12/29/2020) 180 capsule 1     No facility-administered encounter medications on file as of 1/26/2021.           Caio Tran, CNP

## 2021-01-30 NOTE — ANESTHESIA POSTPROCEDURE EVALUATION
Department of Anesthesiology  Postprocedure Note    Patient: Cori Ponce  MRN: [id number]  YOB: 1950  Date of evaluation: 6/18/2019    Procedure Summary     Date:  06/18/19 Room / Location:  [address] [place] [address] / [facility] OR    Anesthesia Start:  5064 Anesthesia Stop:  1435    Procedure:  RIGHT OPEN CARPAL TUNNEL RELEASE (Right Hand) Diagnosis:       Carpal tunnel syndrome, right      (Carpal tunnel syndrome, right [G56.01])    Surgeon:  Rip Sandoval MD Responsible Provider:  Jackelin Briceño MD    Anesthesia Type:  general ASA Status:  2     Anesthesia Type: general    Eliud Phase I: Eliud Score: 8    Eliud Phase II: Eliud Score: 10    Last vitals: Reviewed and per EMR flowsheets.        Anesthesia Post Evaluation   Anesthetic Problems: no   Cardiovascular System Stable: yes  Respiratory Function: Airway Patent yes  ETT no  Ventilator no  Level of consciousness: awake, alert and oriented  Post-op pain: adequate analgesia  Hydration Adequate: yes  Nausea/Vomiting:no  Other Issues:     Karlie Mccracken MD
98

## 2021-03-17 ENCOUNTER — TELEPHONE (OUTPATIENT)
Dept: FAMILY MEDICINE CLINIC | Age: 71
End: 2021-03-17

## 2021-03-17 DIAGNOSIS — G50.0 TRIGEMINAL NEURALGIA: Primary | ICD-10-CM

## 2021-03-17 NOTE — TELEPHONE ENCOUNTER
Patient is requesting a referral for a Trigeminal nerve issue to Dr. Nina Mckee who is with [facility].     Fax: [phone]

## 2021-03-23 ENCOUNTER — TELEPHONE (OUTPATIENT)
Dept: FAMILY MEDICINE CLINIC | Age: 71
End: 2021-03-23

## 2021-03-23 RX ORDER — CLINDAMYCIN HYDROCHLORIDE 300 MG/1
300 CAPSULE ORAL 2 TIMES DAILY
Qty: 6 CAPSULE | Refills: 0 | Status: SHIPPED | OUTPATIENT
Start: 2021-03-23 | End: 2021-03-29 | Stop reason: SDUPTHER

## 2021-03-23 NOTE — TELEPHONE ENCOUNTER
Patient called said she has to have antibiotics prior to any dental visits.   She has a cleaning scheduled tomorrow and needs to start an antibiotic today  [facility] [address]

## 2021-03-29 RX ORDER — CLINDAMYCIN HYDROCHLORIDE 300 MG/1
300 CAPSULE ORAL 3 TIMES DAILY
Qty: 30 CAPSULE | Refills: 0 | OUTPATIENT
Start: 2021-03-29 | End: 2021-04-08

## 2021-03-29 RX ORDER — CLINDAMYCIN HYDROCHLORIDE 300 MG/1
300 CAPSULE ORAL 2 TIMES DAILY
Qty: 6 CAPSULE | Refills: 0 | Status: SHIPPED | OUTPATIENT
Start: 2021-03-29 | End: 2022-03-29

## 2021-03-29 NOTE — TELEPHONE ENCOUNTER
I dont see this on the med list, I will pend    ----- Message from Aurora Soto sent at 3/26/2021  3:54 PM EDT -----  Subject: Refill Request    QUESTIONS  Name of Medication? clindamycin (CLEOCIN) 300 MG capsule  Patient-reported dosage and instructions? total 3 days  How many days do you have left? 0  [id number]  Pharmacy phone number (if available)? [phone]  Additional Information for Provider? Patient would like refill as she is   going to see oral surgeon on Tuesday  ---------------------------------------------------------------------------  --------------  [address]  What is the best way for the office to contact you? OK to leave message on   voicemail  Preferred Call Back Phone Number?  [phone]

## 2021-05-10 DIAGNOSIS — E03.9 HYPOTHYROIDISM, UNSPECIFIED TYPE: Chronic | ICD-10-CM

## 2021-05-10 DIAGNOSIS — I10 ESSENTIAL HYPERTENSION: Chronic | ICD-10-CM

## 2021-05-11 RX ORDER — SPIRONOLACTONE 50 MG/1
TABLET, FILM COATED ORAL
Qty: 90 TABLET | Refills: 1 | Status: SHIPPED | OUTPATIENT
Start: 2021-05-11 | End: 2021-11-02

## 2021-05-11 RX ORDER — LEVOTHYROXINE SODIUM 0.07 MG/1
TABLET ORAL
Qty: 90 TABLET | Refills: 1 | Status: SHIPPED | OUTPATIENT
Start: 2021-05-11 | End: 2021-11-02

## 2021-05-20 DIAGNOSIS — E78.00 HYPERCHOLESTEREMIA: ICD-10-CM

## 2021-05-20 DIAGNOSIS — I10 ESSENTIAL HYPERTENSION: ICD-10-CM

## 2021-05-20 DIAGNOSIS — M62.838 MUSCLE SPASM: ICD-10-CM

## 2021-05-20 DIAGNOSIS — E55.9 VITAMIN D DEFICIENCY: ICD-10-CM

## 2021-05-20 DIAGNOSIS — E03.9 HYPOTHYROIDISM, UNSPECIFIED TYPE: ICD-10-CM

## 2021-05-20 DIAGNOSIS — E11.9 TYPE 2 DIABETES MELLITUS WITHOUT COMPLICATION, WITHOUT LONG-TERM CURRENT USE OF INSULIN (HCC): ICD-10-CM

## 2021-05-20 LAB
A/G RATIO: 2.2 (ref 1.1–2.2)
ALBUMIN SERPL-MCNC: 4.1 G/DL (ref 3.4–5)
ALBUMIN SERPL-MCNC: 4.2 G/DL (ref 3.4–5)
ALP BLD-CCNC: 90 U/L (ref 40–129)
ALP BLD-CCNC: 92 U/L (ref 40–129)
ALT SERPL-CCNC: 21 U/L (ref 10–40)
ALT SERPL-CCNC: 21 U/L (ref 10–40)
ANION GAP SERPL CALCULATED.3IONS-SCNC: 10 MMOL/L (ref 3–16)
ANION GAP SERPL CALCULATED.3IONS-SCNC: 14 MMOL/L (ref 3–16)
AST SERPL-CCNC: 24 U/L (ref 15–37)
AST SERPL-CCNC: 24 U/L (ref 15–37)
BILIRUB SERPL-MCNC: 0.4 MG/DL (ref 0–1)
BILIRUB SERPL-MCNC: 0.4 MG/DL (ref 0–1)
BILIRUBIN DIRECT: <0.2 MG/DL (ref 0–0.3)
BILIRUBIN, INDIRECT: ABNORMAL MG/DL (ref 0–1)
BUN BLDV-MCNC: 21 MG/DL (ref 7–20)
BUN BLDV-MCNC: 21 MG/DL (ref 7–20)
CALCIUM SERPL-MCNC: 9 MG/DL (ref 8.3–10.6)
CALCIUM SERPL-MCNC: 9.1 MG/DL (ref 8.3–10.6)
CARBAMAZEPINE DOSE: NORMAL
CARBAMAZEPINE LEVEL: 4.2 UG/ML (ref 4–12)
CHLORIDE BLD-SCNC: 101 MMOL/L (ref 99–110)
CHLORIDE BLD-SCNC: 102 MMOL/L (ref 99–110)
CHOLESTEROL, TOTAL: 180 MG/DL (ref 0–199)
CO2: 27 MMOL/L (ref 21–32)
CO2: 29 MMOL/L (ref 21–32)
CREAT SERPL-MCNC: 0.6 MG/DL (ref 0.6–1.2)
CREAT SERPL-MCNC: 0.6 MG/DL (ref 0.6–1.2)
GFR AFRICAN AMERICAN: >60
GFR AFRICAN AMERICAN: >60
GFR NON-AFRICAN AMERICAN: >60
GFR NON-AFRICAN AMERICAN: >60
GLOBULIN: 1.9 G/DL
GLUCOSE BLD-MCNC: 79 MG/DL (ref 70–99)
GLUCOSE BLD-MCNC: 81 MG/DL (ref 70–99)
HCT VFR BLD CALC: 32.3 % (ref 36–48)
HDLC SERPL-MCNC: 84 MG/DL (ref 40–60)
HEMOGLOBIN: 10.9 G/DL (ref 12–16)
LDL CHOLESTEROL CALCULATED: 84 MG/DL
MCH RBC QN AUTO: 32 PG (ref 26–34)
MCHC RBC AUTO-ENTMCNC: 33.8 G/DL (ref 31–36)
MCV RBC AUTO: 94.8 FL (ref 80–100)
PDW BLD-RTO: 13.1 % (ref 12.4–15.4)
PHOSPHORUS: 4.4 MG/DL (ref 2.5–4.9)
PLATELET # BLD: 288 K/UL (ref 135–450)
PMV BLD AUTO: 7.3 FL (ref 5–10.5)
POTASSIUM SERPL-SCNC: 4.1 MMOL/L (ref 3.5–5.1)
POTASSIUM SERPL-SCNC: 4.2 MMOL/L (ref 3.5–5.1)
RBC # BLD: 3.41 M/UL (ref 4–5.2)
SODIUM BLD-SCNC: 141 MMOL/L (ref 136–145)
SODIUM BLD-SCNC: 142 MMOL/L (ref 136–145)
TOTAL CK: 171 U/L (ref 26–192)
TOTAL PROTEIN: 6 G/DL (ref 6.4–8.2)
TOTAL PROTEIN: 6.1 G/DL (ref 6.4–8.2)
TRIGL SERPL-MCNC: 58 MG/DL (ref 0–150)
TSH SERPL DL<=0.05 MIU/L-ACNC: 2.47 UIU/ML (ref 0.27–4.2)
VITAMIN D 25-HYDROXY: 40.4 NG/ML
VLDLC SERPL CALC-MCNC: 12 MG/DL
WBC # BLD: 5.3 K/UL (ref 4–11)

## 2021-06-22 DIAGNOSIS — G25.81 RLS (RESTLESS LEGS SYNDROME): ICD-10-CM

## 2021-06-22 RX ORDER — ROPINIROLE 2 MG/1
TABLET, FILM COATED ORAL
Qty: 180 TABLET | Refills: 3 | Status: SHIPPED | OUTPATIENT
Start: 2021-06-22 | End: 2022-03-29

## 2021-06-22 NOTE — TELEPHONE ENCOUNTER
LOV:  1/26/21    Future Appointments   Date Time Provider Anastasiia Alvarez   7/28/2021  7:00 AM LUIS Marie - YUDI VALDOVINOS

## 2021-07-28 ENCOUNTER — OFFICE VISIT (OUTPATIENT)
Dept: FAMILY MEDICINE CLINIC | Age: 71
End: 2021-07-28
Payer: COMMERCIAL

## 2021-07-28 VITALS
BODY MASS INDEX: 27 KG/M2 | HEART RATE: 70 BPM | RESPIRATION RATE: 16 BRPM | OXYGEN SATURATION: 99 % | HEIGHT: 66 IN | SYSTOLIC BLOOD PRESSURE: 138 MMHG | WEIGHT: 168 LBS | DIASTOLIC BLOOD PRESSURE: 82 MMHG

## 2021-07-28 DIAGNOSIS — E11.9 TYPE 2 DIABETES MELLITUS WITHOUT COMPLICATION, WITHOUT LONG-TERM CURRENT USE OF INSULIN (HCC): Primary | ICD-10-CM

## 2021-07-28 DIAGNOSIS — G89.29 CHRONIC NECK PAIN: ICD-10-CM

## 2021-07-28 DIAGNOSIS — M79.89 LEG SWELLING: ICD-10-CM

## 2021-07-28 DIAGNOSIS — M54.2 CHRONIC NECK PAIN: ICD-10-CM

## 2021-07-28 DIAGNOSIS — M79.644 PAIN OF RIGHT MIDDLE FINGER: ICD-10-CM

## 2021-07-28 DIAGNOSIS — M79.645 PAIN OF LEFT MIDDLE FINGER: ICD-10-CM

## 2021-07-28 DIAGNOSIS — G50.0 TRIGEMINAL NEURALGIA: ICD-10-CM

## 2021-07-28 LAB
CREATININE URINE POCT: NORMAL
MICROALBUMIN/CREAT 24H UR: NORMAL MG/G{CREAT}
MICROALBUMIN/CREAT UR-RTO: NORMAL

## 2021-07-28 PROCEDURE — 99214 OFFICE O/P EST MOD 30 MIN: CPT | Performed by: NURSE PRACTITIONER

## 2021-07-28 PROCEDURE — 1036F TOBACCO NON-USER: CPT | Performed by: NURSE PRACTITIONER

## 2021-07-28 PROCEDURE — 82044 UR ALBUMIN SEMIQUANTITATIVE: CPT | Performed by: NURSE PRACTITIONER

## 2021-07-28 PROCEDURE — 1090F PRES/ABSN URINE INCON ASSESS: CPT | Performed by: NURSE PRACTITIONER

## 2021-07-28 PROCEDURE — 1123F ACP DISCUSS/DSCN MKR DOCD: CPT | Performed by: NURSE PRACTITIONER

## 2021-07-28 PROCEDURE — 2022F DILAT RTA XM EVC RTNOPTHY: CPT | Performed by: NURSE PRACTITIONER

## 2021-07-28 PROCEDURE — G8417 CALC BMI ABV UP PARAM F/U: HCPCS | Performed by: NURSE PRACTITIONER

## 2021-07-28 PROCEDURE — 3044F HG A1C LEVEL LT 7.0%: CPT | Performed by: NURSE PRACTITIONER

## 2021-07-28 PROCEDURE — 3017F COLORECTAL CA SCREEN DOC REV: CPT | Performed by: NURSE PRACTITIONER

## 2021-07-28 PROCEDURE — G8399 PT W/DXA RESULTS DOCUMENT: HCPCS | Performed by: NURSE PRACTITIONER

## 2021-07-28 PROCEDURE — G8427 DOCREV CUR MEDS BY ELIG CLIN: HCPCS | Performed by: NURSE PRACTITIONER

## 2021-07-28 PROCEDURE — 4040F PNEUMOC VAC/ADMIN/RCVD: CPT | Performed by: NURSE PRACTITIONER

## 2021-07-28 RX ORDER — CARBAMAZEPINE 200 MG/1
200 TABLET ORAL 3 TIMES DAILY
COMMUNITY
Start: 2021-06-26

## 2021-07-28 ASSESSMENT — ENCOUNTER SYMPTOMS
DIARRHEA: 0
NAUSEA: 0
BACK PAIN: 0
VOMITING: 0
SHORTNESS OF BREATH: 0
COUGH: 0

## 2021-07-28 NOTE — PROGRESS NOTES
2021     Chief Complaint   Patient presents with    Diabetes    Neck Pain    Joint Swelling     Nunu Martinez (:  1950) is a 80 y.o. female, here for evaluation of the following medical concerns:    HPI  Diabetes: Diet controlled. Denies polyuria, polydipsia or polyphagia. Complains of left ankle swelling that has been present for the past 8 to 10 months. Seems to be worsening. .  Swelling is somewhat better in the morning and worsens as she has been standing on her feet throughout the day. Has not tried any treatment at home. Sometimes the left ankle feels numb she does have peripheral neuropathy. Neck pain chronic pain for several years. was taking tizanidine and anti-inflammatory. Stopped the anti-inflammatory and tizanidine does not seem to help much. Pain in the posterior neck bilaterally and will radiate up the back of the head. Sometimes will cause her to have headaches. The pain radiates into the posterior shoulder blades. There is no arm symptoms. Bilateral hand pain middle digit- trouble with joint stiffness and losing  strength. Right hand is worse than the left hand. Has tried Aspercreme and Voltaren gel without improvement in her symptoms. She was recently diagnosed with trigeminal neuralgia she is on Tegretol 200 mg 3 times daily. She seen Dr. Alpha Cheadle at [address] [place] [address] for this problem. She has a follow-up with him tomorrow. Reports her symptoms have improved with the medication. Review of Systems   Constitutional: Negative for chills, fatigue and fever. Respiratory: Negative for cough and shortness of breath. Cardiovascular: Positive for leg swelling (chronic swelling left lower extremity worsening). Negative for chest pain. Gastrointestinal: Negative for diarrhea, nausea and vomiting. Endocrine: Negative for polydipsia, polyphagia and polyuria. Musculoskeletal: Positive for arthralgias, joint swelling, myalgias and neck pain.  Negative for back pain, gait problem and neck stiffness. Neurological: Negative for dizziness and headaches. All other systems reviewed and are negative. Prior to Visit Medications    Medication Sig Taking?  Authorizing Provider   carBAMazepine (TEGRETOL) 200 MG tablet Take 200 mg by mouth 3 times daily Yes Historical Provider, MD   rOPINIRole (REQUIP) 2 MG tablet TAKE 1-2 TABLETS BY MOUTH EVERY DAY AT NIGHT FOR REST LEG SYNDROME Yes LUIS Fields CNP   levothyroxine (SYNTHROID) 75 MCG tablet TAKE 1 TABLET BY MOUTH EVERY DAY Yes LUIS Fields CNP   spironolactone (ALDACTONE) 50 MG tablet TAKE 1 TABLET BY MOUTH EVERY DAY Yes LUIS Fields CNP   lithium 300 MG capsule  Yes Historical Provider, MD   alirocumab (PRALUENT) 75 MG/ML SOAJ injection pen Inject 75 mg into the skin every 14 days Yes Historical Provider, MD   Coenzyme Q10 (COQ-10) 400 MG CAPS Take by mouth Yes Historical Provider, MD   desvenlafaxine succinate (PRISTIQ) 50 MG TB24 extended release tablet TAKE 1 TABLET BY MOUTH EVERY EVENING Yes Historical Provider, MD   tiZANidine (ZANAFLEX) 2 MG tablet TAKE 1 TABLET BY MOUTH 3 TIMES DAILY AS NEEDED Yes Maninder Chapman,    [facility] D3 25 MCG (1000 UT) CAPS TAKE 1 CAPSULE BY MOUTH EVERY DAY Yes LUIS Fields CNP   linaCLOtide (LINZESS) 72 MCG CAPS capsule Take 145 mcg by mouth every morning (before breakfast)  Yes Historical Provider, MD   cetirizine (ZYRTEC ALLERGY) 10 MG tablet Take 10 mg by mouth daily Yes Historical Provider, MD   QUEtiapine (SEROQUEL) 100 MG tablet Take 150 mg by mouth nightly  Yes Historical Provider, MD   [facility] MELATONIN 3 MG TABS tablet TAKE 1 TABLET BY MOUTH NIGHTLY Yes LUIS Fields CNP   B Complex Vitamins (VITAMIN B COMPLEX) TABS Take 1 tablet by mouth 2 times daily  Patient taking differently: Take 1 tablet by mouth daily  Yes LUIS Fields CNP   bisacodyl (DULCOLAX) 5 MG EC tablet Take 10 mg by mouth 2 times daily  Yes Historical Provider, MD        Social History     Tobacco Use    Smoking status: Former Smoker     Packs/day: 2.00     Years: 20.00     Pack years: 40.00     Types: Cigarettes     Start date: 1971     Quit date: 1991     Years since quittin.6    Smokeless tobacco: Never Used   Substance Use Topics    Alcohol use: Yes     Comment: rarely        Vitals:    21 0658   BP: 138/82   Pulse: 70   Resp: 16   SpO2: 99%   Weight: 168 lb (76.2 kg)   Height: 5' 6\" (1.676 m)     Estimated body mass index is 27.12 kg/m² as calculated from the following:    Height as of this encounter: 5' 6\" (1.676 m). Weight as of this encounter: 168 lb (76.2 kg). Physical Exam  Vitals and nursing note reviewed. Constitutional:       General: She is not in acute distress. Appearance: Normal appearance. She is well-developed. She is not ill-appearing, toxic-appearing or diaphoretic. HENT:      Head: Normocephalic and atraumatic. Right Ear: External ear normal.      Left Ear: External ear normal.   Eyes:      General: No scleral icterus. Right eye: No discharge. Left eye: No discharge. Extraocular Movements: Extraocular movements intact. Conjunctiva/sclera: Conjunctivae normal.      Pupils: Pupils are equal, round, and reactive to light. Cardiovascular:      Rate and Rhythm: Normal rate and regular rhythm. Pulses:           Dorsalis pedis pulses are 2+ on the right side and 2+ on the left side. Heart sounds: Normal heart sounds, S1 normal and S2 normal. No murmur heard. No friction rub. No gallop. Pulmonary:      Effort: Pulmonary effort is normal. No respiratory distress. Breath sounds: Normal breath sounds. No stridor. No wheezing, rhonchi or rales. Musculoskeletal:      Right foot: Normal range of motion. Left foot: Normal range of motion. Feet:      Right foot:      Protective Sensation: 5 sites tested. 3 sites sensed. Skin integrity: Skin integrity normal.      Left foot:      Protective Sensation: 5 sites tested. 3 sites sensed. Skin integrity: Skin integrity normal.   Neurological:      General: No focal deficit present. Mental Status: She is alert and oriented to person, place, and time. Mental status is at baseline. Cranial Nerves: No cranial nerve deficit. Psychiatric:         Speech: Speech normal.       Results for POC orders placed in visit on 07/28/21   POCT microalbumin   Result Value Ref Range    Microalb, Ur 30mg/L     Creatinine Ur POCT 300mg/dL     Microalbumin Creatinine Ratio <30mg/g          ASSESSMENT/PLAN:  1. Type 2 diabetes mellitus without complication, without long-term current use of insulin (HCC)-diet controlled. Normal microalbumin.  - Diabetic Foot Exam  - POCT microalbumin    2. Leg swelling-knee-high compression socks, low-sodium diet and leg elevation discussed and recommended    3. Chronic neck pain-cervical x-ray ordered today  - XR CERVICAL SPINE (2-3 VIEWS); Future    4. Pain of right middle finger-referral to [address] [place] [address] and Sports Medicine    5. Pain of left middle finger-referral to or [address] [place] [address] and Sports Medicine    6. Trigeminal neuralgia-on Tegretol now, follows with Dr. Nikunj Jackson has appointment with them tomorrow      Return in about 6 months (around 1/28/2022). An electronic signature was used to authenticate this note.     --LUIS Harrell - CNP on 7/28/2021 at 7:49 AM

## 2021-07-29 ENCOUNTER — HOSPITAL ENCOUNTER (OUTPATIENT)
Dept: GENERAL RADIOLOGY | Age: 71
Discharge: HOME | End: 2021-07-29
Payer: COMMERCIAL

## 2021-07-29 DIAGNOSIS — G89.29 CHRONIC NECK PAIN: ICD-10-CM

## 2021-07-29 DIAGNOSIS — M54.2 CHRONIC NECK PAIN: ICD-10-CM

## 2021-07-29 PROCEDURE — 72040 X-RAY EXAM NECK SPINE 2-3 VW: CPT

## 2021-07-30 ENCOUNTER — TELEPHONE (OUTPATIENT)
Dept: FAMILY MEDICINE CLINIC | Age: 71
End: 2021-07-30

## 2021-07-30 DIAGNOSIS — M54.2 CHRONIC NECK PAIN: Primary | ICD-10-CM

## 2021-07-30 DIAGNOSIS — G89.29 CHRONIC NECK PAIN: Primary | ICD-10-CM

## 2021-07-30 NOTE — TELEPHONE ENCOUNTER
The pt called in because she stated she got a call but not sure who is was that called she asked about her xray results I did give those to her.  I did give her the referral information as well

## 2021-08-06 ENCOUNTER — TELEPHONE (OUTPATIENT)
Dept: FAMILY MEDICINE CLINIC | Age: 71
End: 2021-08-06

## 2021-08-06 DIAGNOSIS — G89.29 CHRONIC NECK PAIN: Primary | ICD-10-CM

## 2021-08-06 DIAGNOSIS — M50.30 DDD (DEGENERATIVE DISC DISEASE), CERVICAL: ICD-10-CM

## 2021-08-06 DIAGNOSIS — M47.812 FACET ARTHRITIS OF CERVICAL REGION: ICD-10-CM

## 2021-08-06 DIAGNOSIS — M54.2 CHRONIC NECK PAIN: Primary | ICD-10-CM

## 2021-08-06 NOTE — TELEPHONE ENCOUNTER
Patient called. She had a referral for [address] [place] [address] for Cervical Spine.   She goes to [facility] for Trigeminal meralgia  She would like to go to [facility] for the Cervical spine,  Can we please do a referral  Fax to [facility] [phone]

## 2021-08-11 ENCOUNTER — OFFICE VISIT (OUTPATIENT)
Dept: ORTHOPEDIC SURGERY | Age: 71
End: 2021-08-11
Payer: COMMERCIAL

## 2021-08-11 VITALS — WEIGHT: 168 LBS | HEIGHT: 66 IN | BODY MASS INDEX: 27 KG/M2

## 2021-08-11 DIAGNOSIS — M47.812 SPONDYLOSIS OF CERVICAL REGION WITHOUT MYELOPATHY OR RADICULOPATHY: ICD-10-CM

## 2021-08-11 DIAGNOSIS — M54.2 CERVICAL PAIN: Primary | ICD-10-CM

## 2021-08-11 PROCEDURE — 3017F COLORECTAL CA SCREEN DOC REV: CPT | Performed by: ORTHOPAEDIC SURGERY

## 2021-08-11 PROCEDURE — 99204 OFFICE O/P NEW MOD 45 MIN: CPT | Performed by: ORTHOPAEDIC SURGERY

## 2021-08-11 PROCEDURE — 1123F ACP DISCUSS/DSCN MKR DOCD: CPT | Performed by: ORTHOPAEDIC SURGERY

## 2021-08-11 PROCEDURE — G8399 PT W/DXA RESULTS DOCUMENT: HCPCS | Performed by: ORTHOPAEDIC SURGERY

## 2021-08-11 PROCEDURE — 1090F PRES/ABSN URINE INCON ASSESS: CPT | Performed by: ORTHOPAEDIC SURGERY

## 2021-08-11 PROCEDURE — G8417 CALC BMI ABV UP PARAM F/U: HCPCS | Performed by: ORTHOPAEDIC SURGERY

## 2021-08-11 PROCEDURE — 4040F PNEUMOC VAC/ADMIN/RCVD: CPT | Performed by: ORTHOPAEDIC SURGERY

## 2021-08-11 PROCEDURE — G8427 DOCREV CUR MEDS BY ELIG CLIN: HCPCS | Performed by: ORTHOPAEDIC SURGERY

## 2021-08-11 PROCEDURE — 1036F TOBACCO NON-USER: CPT | Performed by: ORTHOPAEDIC SURGERY

## 2021-08-11 NOTE — PROGRESS NOTES
New Patient: CERVICAL SPINE    Referring Provider:  Ana English    CHIEF COMPLAINT:    Chief Complaint   Patient presents with    Neck Pain     cervical, 8/10, meds       HISTORY OF PRESENT ILLNESS:   Ms. Phillip Garcia is a pleasant 80 y.o. old female kindly referred by LUIS Majano for the evaluation of neck pain. She notes her symptoms began about 6 months ago. They have steadily increased since that time. She rates the intensity of her neck pain 8/10. She denies radicular symptoms, but notes mild loss of fine motor control and balance abnormality.     Current/Past Treatment:   · Physical Therapy: None  · Chiropractic: None  · Injection: None  · Medications: NSAIDs and muscle relaxers    Past Medical History:   Past Medical History:   Diagnosis Date    Borderline personality disorder ([facility]     Chronic pain     Colon disorder     hard time pushing stool out    Constipation     Diabetes mellitus ([facility]     Diabetic neuropathy (HCC)     Electric shock     ECT therapy    Hyperlipidemia     Hypertension     Insomnia     Major depressive disorder, recurrent (HCC)     Morbid obesity (HCC)     Osteoarthrosis     RLS (restless legs syndrome)     Suicidal ideation     Unspecified hypothyroidism     Vitamin deficiency       Past Surgical History:     Past Surgical History:   Procedure Laterality Date    APPENDECTOMY      BACK SURGERY      CARPAL TUNNEL RELEASE Left 12/19/2017    CARPAL TUNNEL RELEASE Right 6/18/2019    RIGHT OPEN CARPAL TUNNEL RELEASE performed by Mumtaz Noe MD at [address] [place] [address] Right     for pinched nerve    JOINT REPLACEMENT Bilateral     knees    KNEE SURGERY Left     meniscus    SHOULDER ARTHROSCOPY Right 12/02/2016    Right Total Shoulder Arthroscopy with Reverse Ball and Socket Deta Prosthesis    SHOULDER ARTHROSCOPY Left 05/30/2018    subacromial decompression, rotator cuff repair, biceps stump debridement    SMALL INTESTINE SURGERY N/A 9/16/2019    DIAGNOSTIC LAPAROSCOPY CONVERTED TO OPEN BOWEL RESECTION performed by Bisi Mccartney MD at [address] [facility]      TONSILLECTOMY       Current Medications:     Current Outpatient Medications:     carBAMazepine (TEGRETOL) 200 MG tablet, Take 200 mg by mouth 3 times daily, Disp: , Rfl:     rOPINIRole (REQUIP) 2 MG tablet, TAKE 1-2 TABLETS BY MOUTH EVERY DAY AT NIGHT FOR REST LEG SYNDROME, Disp: 180 tablet, Rfl: 3    levothyroxine (SYNTHROID) 75 MCG tablet, TAKE 1 TABLET BY MOUTH EVERY DAY, Disp: 90 tablet, Rfl: 1    spironolactone (ALDACTONE) 50 MG tablet, TAKE 1 TABLET BY MOUTH EVERY DAY, Disp: 90 tablet, Rfl: 1    lithium 300 MG capsule, , Disp: , Rfl:     alirocumab (PRALUENT) 75 MG/ML SOAJ injection pen, Inject 75 mg into the skin every 14 days, Disp: , Rfl:     Coenzyme Q10 (COQ-10) 400 MG CAPS, Take by mouth, Disp: , Rfl:     desvenlafaxine succinate (PRISTIQ) 50 MG TB24 extended release tablet, TAKE 1 TABLET BY MOUTH EVERY EVENING, Disp: , Rfl:     tiZANidine (ZANAFLEX) 2 MG tablet, TAKE 1 TABLET BY MOUTH 3 TIMES DAILY AS NEEDED, Disp: 270 tablet, Rfl: 1    [employer] D3 25 MCG (1000 UT) CAPS, TAKE 1 CAPSULE BY MOUTH EVERY DAY, Disp: 90 capsule, Rfl: 1    linaCLOtide (LINZESS) 72 MCG CAPS capsule, Take 145 mcg by mouth every morning (before breakfast) , Disp: , Rfl:     cetirizine (ZYRTEC ALLERGY) 10 MG tablet, Take 10 mg by mouth daily, Disp: , Rfl:     QUEtiapine (SEROQUEL) 100 MG tablet, Take 150 mg by mouth nightly , Disp: , Rfl:     CVS MELATONIN 3 MG TABS tablet, TAKE 1 TABLET BY MOUTH NIGHTLY, Disp: 90 tablet, Rfl: 1    B Complex Vitamins (VITAMIN B COMPLEX) TABS, Take 1 tablet by mouth 2 times daily (Patient taking differently: Take 1 tablet by mouth daily ), Disp: 180 tablet, Rfl: 1    bisacodyl (DULCOLAX) 5 MG EC tablet, Take 10 mg by mouth 2 times daily , Disp: , Rfl:   Allergies:  Levaquin [levofloxacin in d5w], Levofloxacin, and Pcn [penicillins]  Social History:    reports that she quit smoking about 29 years ago. Her smoking use included cigarettes. She started smoking about 49 years ago. She has a 40.00 pack-year smoking history. She has never used smokeless tobacco. She reports current alcohol use. She reports that she does not use drugs. Family History:   Family History   Problem Relation Age of Onset    Breast Cancer Mother     Stroke Father        REVIEW OF SYSTEMS: Full ROS noted & scanned   CONSTITUTIONAL: Denies unexplained weight loss, fevers, chills or fatigue  NEUROLOGICAL: Denies unsteady gait or progressive weakness  MUSCULOSKELETAL: Denies joint swelling or redness  PSYCHOLOGICAL: Denies anxiety, depression   SKIN: Denies skin changes, delayed healing, rash, itching   HEMATOLOGIC: Denies easy bleeding or bruising  ENDOCRINE: Denies excessive thirst, urination, heat/cold  RESPIRATORY: Denies current dyspnea, cough  GI: Denies nausea, vomiting, diarrhea   : Denies bowel or bladder issues       PHYSICAL EXAM:    Vitals: Height 5' 5.98\" (1.676 m), weight 168 lb (76.2 kg), not currently breastfeeding. GENERAL EXAM:  · General Apparence: Patient is adequately groomed with no evidence of malnutrition. · Orientation: The patient is oriented to time, place and person. · Mood & Affect:The patient's mood and affect are appropriate   · Vascular: Examination reveals no swelling tenderness in upper or lower extremities. Good capillary refill  · Lymphatic: The lymphatic examination bilaterally reveals all areas to be without enlargement or induration  · Sensation: Sensation is intact without deficit  · Coordination/Balance: Good coordination     CERVICAL EXAMINATION:  · Inspection: Local inspection shows no step-off or bruising. Cervical alignment is normal.     · Palpation: No evidence of tenderness at the midline, and trapezius. Paraspinal tenderness is present. There is no step-off or paraspinal spasm. · Range of Motion: Cervical flexion, extension, and rotation are mildly reduced with pain. · Strength: 5/5 bilateral upper extremities   · Special Tests:    ·  Johnson's negative bilaterally. ·      Cubital and Carpal tunnel Tinel's negative bilaterally. · Skin:There are no rashes, ulcerations or lesions in right & left upper extremities. · Reflexes: Bilaterally triceps, biceps and brachioradialis are 2+. Clonus absent bilaterally at the feet. · Gait & station: normal, patient ambulates without assistance, difficulty tandem walking     · Additional Examinations:       · RIGHT UPPER EXTREMITY:  Inspection/examination of the right upper extremity does not show any tenderness, deformity or injury. Range of motion is unremarkable. There is no gross instability. There are no rashes, ulcerations or lesions. Strength and tone are normal.  · LEFT UPPER EXTREMITY: Inspection/examination of the left upper extremity does not show any tenderness, deformity or injury. Range of motion is unremarkable. There is no gross instability. There are no rashes, ulcerations or lesions. Strength and tone are normal.    Diagnostic Testing:  I reviewed flexion-extension lateral x-rays of her cervical spine that were obtained the office today and AP and lateral x-rays of her cervical spine from July 29, 2021. . Those show 5 mm largely stable anterior listhesis C4-C5    Impression:   Cervical spondylosis with degenerative spondylolisthesis C4-C5    Plan:    Discussed treatment options including observation, physical therapy, medications, epidural injections and additional imaging. She would like to proceed with therapy, a home traction unit and an MRI of her cervical spine. I cautioned her to avoid any activity that could cause vigorous flexion or extension of her cervical spine.

## 2021-08-12 ENCOUNTER — TELEPHONE (OUTPATIENT)
Dept: ORTHOPEDIC SURGERY | Age: 71
End: 2021-08-12

## 2021-08-12 ENCOUNTER — HOSPITAL ENCOUNTER (OUTPATIENT)
Dept: PHYSICAL THERAPY | Age: 71
Setting detail: THERAPIES SERIES
Discharge: HOME | End: 2021-08-12
Payer: COMMERCIAL

## 2021-08-12 PROCEDURE — 97140 MANUAL THERAPY 1/> REGIONS: CPT | Performed by: PHYSICAL THERAPIST

## 2021-08-12 PROCEDURE — 97161 PT EVAL LOW COMPLEX 20 MIN: CPT | Performed by: PHYSICAL THERAPIST

## 2021-08-12 PROCEDURE — 97110 THERAPEUTIC EXERCISES: CPT | Performed by: PHYSICAL THERAPIST

## 2021-08-12 NOTE — PLAN OF CARE
complaint: Pt reports onset of neck pain with HA's nearly daily without known JOSE G approx 6 months ago. She denies radicular pain or numbness in the hands. She notes recent decrease in balance, though states this due to her neuropathy and medication for trigeminal neuralgia. She has tried medication with minimal relief and notes she usually just \"pushes through the pain\".     Relevant Medical History:lumbar surgery--unsure what was done, R rTSA, L RCR, B TKR, anxiety, depression, OA, DM w/ neuropahty, htn, hyperlipidemia, thyroid  Functional Disability Index: NDI 36%    Height: 5;6\" Weight: 161  Pain Scale: 7/10  Easing factors: rest, ice  Provocative factors: sleeping, driving, turning head, overhead movement, looking down, lifting     Type: [x]Constant   []Intermittent  []Radiating []Localized []other:     Numbness/Tingling: denies in UE, neuropathy in feet    Occupation/School:retired     Living Status/Prior Level of Function: Independent with ADLs and IADLs, pt lives alone, enjoys [employer], walking, woodworking, is active at her [employer], recently helped install [employer]    OBJECTIVE:     CERV ROM LEFT RIGHT    Cervical Flexion 65    Cervical Extension 40    Cervical SB 25 25   Cervical rotation 28 31        ROM Left Right   Shoulder Flex [facility] WFL   Shoulder Abd [facility]   Shoulder ER     Shoulder IR               Strength  Left Right   shrug 4+ pn 4+ pn   Shoulder abd 4- 4   biceps 5 5   triceps 5 5   thumb 4 4+   hand intrinsics 4+ 4+        Reflexes Left Right   C5-6 Biceps 2+ 2+   C5-6 Brachioradialis 2+ 2+   C7-8 Triceps 2+ 2+     Reflexes/Sensation:    [x]Dermatomes/Myotomes intact    [x]Reflexes equal and normal bilaterally 2+ upper, 1+ lower   [x]Other: (-) Johnson's, clonus B    Joint mobility:    []Normal    [x]Hypo--upper cervical lateral glides, avoiding C5-6   []Hyper    Palpation: tenderness R>L UT, B SO, LS, scalene origins, +HA improvement with SOR and gentle man txn    Functional Mobility/Transfers: indep with increased pain, cues for form with log roll    Posture: fwd head, U UT elevation and rounded shoulders    Bandages/Dressings/Incisions: NA    Gait: (include devices/WB status): WNL     Orthopedic Special Tests: (-)vert artery, alar lig, +relief w/ traction and SOR                       [x] Patient history, allergies, meds reviewed. Medical chart reviewed. See intake form. Review Of Systems (ROS):  [x]Performed Review of systems (Integumentary, CardioPulmonary, Neurological) by intake and observation. Intake form has been scanned into medical record. Patient has been instructed to contact their primary care physician regarding ROS issues if not already being addressed at this time.       Co-morbidities/Complexities (which will affect course of rehabilitation):   []None           Arthritic conditions   []Rheumatoid arthritis (M05.9)  []Osteoarthritis (M19.91)   Cardiovascular conditions   [x]Hypertension (I10)  [x]Hyperlipidemia (E78.5)  []Angina pectoris (I20)  []Atherosclerosis (I70)  []CVA Musculoskeletal conditions   []Disc pathology   []Congenital spine pathologies   [x]Prior surgical intervention  []Osteoporosis (M81.8)  []Osteopenia (M85.8)   Endocrine conditions   []Hypothyroid (E03.9)  []Hyperthyroid Gastrointestinal conditions   []Constipation (R12.88)   Metabolic conditions   []Morbid obesity (E66.01)  [x]Diabetes 2 (E11.65)   [x]Neuropathy (G60.9)     Pulmonary conditions   []Asthma (J45)  []Coughing   []COPD (J44.9)   Psychological Disorders  [x]Anxiety (F41.9)  [x]Depression (F32.9)   []Other:   [x]Other:     Previous lumbar surgery (unknown proc), R rTSA, L RCR, B TKR     Barriers to/and or personal factors that will affect rehab potential:              []Age  []Sex   []Smoker              []Motivation/Lack of Motivation                        [x]Co-Morbidities              []Cognitive Function, education/learning barriers              []Environmental, home barriers []profession/work barriers  []past PT/medical experience  []other:  Justification:     Falls Risk Assessment (30 days):   [x] Falls Risk assessed and no intervention required. [] Falls Risk assessed and Patient requires intervention due to being higher risk   TUG score (>12s at risk):     [] Falls education provided, including         ASSESSMENT:    Functional Impairments:     [x]Noted cervical/thoracic/GHJ joint hypomobility   []Noted cervical/thoracic/GHJ joint hypermobility   [x]Decreased cervical/UE functional ROM   [x]Noted Headache pain aggravated by neck movements with/without dizziness   []Abnormal reflexes/sensation/myotomal/dermatomal deficits   [x]Decreased DCF control or ability to hold head up   [x]Decreased RC/scapular/core strength and neuromuscular control    [x]Decreased UE functional strength   []other:      Functional Activity Limitations (from functional questionnaire and intake)   [x]Reduced ability to tolerate prolonged functional positions   [x]Reduced ability or difficulty with changes of positions or transfers between positions   [x]Reduced ability to maintain good posture and demonstrate good body mechanics with sitting, bending, and lifting   [x] Reduced ability or tolerance with driving and/or computer work   [x]Reduced ability to perform lifting, reaching, carrying tasks   []Reduced ability to concentrate   [x]Reduced ability to sleep    [x]Reduced ability to tolerate any impact through UE or spine   []Reduced ability to ambulate prolonged functional periods/distances   []other:    Participation Restrictions   [x]Reduced participation in self care activities   [x]Reduced participation in home management activities   []Reduced participation in work activities   [x]Reduced participation in social activities. []Reduced participation in sport/recreational activities.     Classification/Subgrouping:   [x]signs/symptoms consistent with neck pain with mobility deficits []signs/symptoms consistent with neck pain with movement coordinated impairments    []signs/symptoms consistent with neck pain with radiating pain    [x]signs/symptoms consistent with neck pain with headaches (cervicogenic)    []Signs/symptoms consistent with nerve root involvement including myotome & dermatome dysfunction   []sign/symptoms consistent with facet dysfunction of cervical and thoracic spine    []signs/symptoms consistent suggesting central cord compression/UMN syndromes   []signs/symptoms consistent with discogenic cervical pain   []signs/symptoms consistent with rib dysfunction   [x]signs/symptoms consistent with postural dysfunction   []signs/symptoms consistent with shoulder pathology    []signs/symptoms consistent with post-surgical status including decreased ROM, strength and function.    []signs/symptoms consistent with pathology which may benefit from Dry Needling   [x]signs/symptoms which may limit the use of advanced manual therapy techniques: (Elevated CV risk profile, recent trauma, intolerance to end range positions, prior TIA, visual issues, UE neurological compromise ) 5mm anterolisthesis    Prognosis/Rehab Potential:      []Excellent   []Good    [x]Fair   []Poor    Tolerance of evaluation/treatment:    []Excellent   []Good    [x]Fair   []Poor    Physical Therapy Evaluation Complexity Justification  [x] A history of present problem with:  [] no personal factors and/or comorbidities that impact the plan of care;  []1-2 personal factors and/or comorbidities that impact the plan of care  [x]3 personal factors and/or comorbidities that impact the plan of care  [x] An examination of body systems using standardized tests and measures addressing any of the following: body structures and functions (impairments), activity limitations, and/or participation restrictions;:  [x] a total of 1-2 or more elements   [] a total of 3 or more elements   [] a total of 4 or more elements   [x] A clinical presentation with:  [x] stable and/or uncomplicated characteristics   [] evolving clinical presentation with changing characteristics  [] unstable and unpredictable characteristics;   [x] Clinical decision making of [x] low, [] moderate, [] high complexity using standardized patient assessment instrument and/or measurable assessment of functional outcome. [x] EVAL (LOW) 55624 (typically 20 minutes face-to-face)  [] EVAL (MOD) 68145 (typically 30 minutes face-to-face)  [] EVAL (HIGH) 88813 (typically 45 minutes face-to-face)  [] RE-EVAL     PLAN:   Frequency/Duration:  1-2 days per week for 8 Weeks:  Interventions:  [x]  Therapeutic exercise including: strength training, ROM, for cervical spine,scapula, core and Upper extremity, including postural re-education. [x]  NMR activation and proprioception for Deep cervical flexors, periscapular and RC muscles and Core, including postural re-education. [x]  Manual therapy as indicated for C/T spine, ribs, Soft tissue to include: Dry Needling/IASTM, STM, PROM, Gr I-IV mobilizations, .   [x] Modalities as needed that may include: thermal agents, E-stim, Biofeedback, US, iontophoresis as indicated  [] Patient education on joint protection, postural re-education, activity modification, progression of HEP. HEP instruction: (see below)    GOALS:  Patient stated goal: reduce pain with driving and daily activities  [] Progressing: [] Met: [] Not Met: [] Adjusted    Therapist goals for Patient:   Short Term Goals: To be achieved in: 2 weeks  1. Independent in HEP and progression per patient tolerance, in order to prevent re-injury. [] Progressing: [] Met: [] Not Met: [] Adjusted  2. Patient will have a decrease in pain to facilitate improvement in movement, function, and ADLs as indicated by Functional Deficits. [] Progressing: [] Met: [] Not Met: [] Adjusted    Long Term Goals: To be achieved in: 8 weeks  1.  Disability index score of 20% or less for the NDI to assist with reaching prior level of function. [] Progressing: [] Met: [] Not Met: [] Adjusted  2. Patient will demonstrate increased AROM to at least 45* rotation of cervical spine to allow for turning head for safe driving. [] Progressing: [] Met: [] Not Met: [] Adjusted  3. Patient will demonstrate an increase in postural awareness and control and activation of  Deep cervical stabilizers to allow for proper functional mobility as indicated by patients Functional Deficits. [] Progressing: [] Met: [] Not Met: [] Adjusted   4. Patient will return to unloading  and putting dishes in upper cabinets (looking up and down) without increased symptoms or restriction. [] Progressing: [] Met: [] Not Met: [] Adjusted  5. Pt will report at least 50% decrease in HA frequency and severity. [] Progressing: [] Met: [] Not Met: [] Adjusted      Electronically signed by:  Candy Young, PT    Access Code: [email or web address]  URL: [email or web address]  Date: 08/12/2021  Prepared by: Candy Young    Exercises  Supine Chin Tuck - 2 x daily - 7 x weekly - 1-2 sets - 5-10 reps - 2-3 seconds hold  Seated Scapular Retraction - 2 x daily - 7 x weekly - 2-3 sets - 5-10 reps - 2-30 seconds hold  Supine Suboccipital Release with Tennis Balls - 1-2 x daily - 7 x weekly

## 2021-08-12 NOTE — FLOWSHEET NOTE
form cues   Seated scap set 2\" x10 HEP   Seated UT S X10\" R/L Started to incr HA, stopped   Supine tennis ball self SOR reviewed HEP                                      Pt ed: eval findings, POC, HEP, tennis ball self SOR, home traction set up and use (pt ordered per MD evans), posture, head position for computer use and reading x12'    Manual Intervention (25079) x15' total    Supine SOR  Supine gentle man traction   STM to B UT/SO                              NMR re-education (34678)  CUES NEEDED                                                Therapeutic Activity (21476)                                         Therapeutic Exercise and NMR EXR  [x] (11895) Provided verbal/tactile cueing for activities related to strengthening, flexibility, endurance, ROM  for improvements in scapular, scapulothoracic and UE control with self care, reaching, carrying, lifting, house/yardwork, driving/computer work.    [] (54693) Provided verbal/tactile cueing for activities related to improving balance, coordination, kinesthetic sense, posture, motor skill, proprioception  to assist with  scapular, scapulothoracic and UE control with self care, reaching, carrying, lifting, house/yardwork, driving/computer work. Therapeutic Activities:    [] (40262 or 11747) Provided verbal/tactile cueing for activities related to improving balance, coordination, kinesthetic sense, posture, motor skill, proprioception and motor activation to allow for proper function of scapular, scapulothoracic and UE control with self care, carrying, lifting, driving/computer work.      Home Exercise Program:    [x] (40798) Reviewed/Progressed HEP activities related to strengthening, flexibility, endurance, ROM of scapular, scapulothoracic and UE control with self care, reaching, carrying, lifting, house/yardwork, driving/computer work  [] (41515) Reviewed/Progressed HEP activities related to improving balance, coordination, kinesthetic sense, posture, motor skill, proprioception of scapular, scapulothoracic and UE control with self care, reaching, carrying, lifting, house/yardwork, driving/computer work      Manual Treatments:  PROM / STM / Oscillations-Mobs:  G-I, II, III, IV ([place]io, Inf., Post.)  [x] (90535) Provided manual therapy to mobilize soft tissue/joints of cervical/CT, scapular GHJ and UE for the purpose of modulating pain, promoting relaxation,  increasing ROM, reducing/eliminating soft tissue swelling/inflammation/restriction, improving soft tissue extensibility and allowing for proper ROM for normal function with self care, reaching, carrying, lifting, house/yardwork, driving/computer work    Modalities:  Declined, but pt prefers ice over heat    Charges  Timed Code Treatment Minutes: 35   Total Treatment Minutes: 50       [x] EVAL (LOW) 53249   [] EVAL (MOD) 54421   [] EVAL (HIGH) 74987   [] RE-EVAL     [x] YA(75039) x 1    [] IONTO  [] NMR (31111) x     [] VASO  [x] Manual (36773) x1      [] Other:  [] TA x      [] Mech Traction (72382)  [] ES(attended) (22342)      [] ES (un) (99310):     GOALS:  Patient stated goal: reduce pain with driving and daily activities  []? Progressing: []? Met: []? Not Met: []? Adjusted     Therapist goals for Patient:   Short Term Goals: To be achieved in: 2 weeks  1. Independent in HEP and progression per patient tolerance, in order to prevent re-injury. []? Progressing: []? Met: []? Not Met: []? Adjusted  2. Patient will have a decrease in pain to facilitate improvement in movement, function, and ADLs as indicated by Functional Deficits. []? Progressing: []? Met: []? Not Met: []? Adjusted     Long Term Goals: To be achieved in: 8 weeks  1. Disability index score of 20% or less for the NDI to assist with reaching prior level of function. []? Progressing: []? Met: []? Not Met: []? Adjusted  2. Patient will demonstrate increased AROM to at least 45* rotation of cervical spine to allow for turning head for safe driving. []? Progressing: []? Met: []? Not Met: []? Adjusted  3. Patient will demonstrate an increase in postural awareness and control and activation of  Deep cervical stabilizers to allow for proper functional mobility as indicated by patients Functional Deficits. []? Progressing: []? Met: []? Not Met: []? Adjusted   4. Patient will return to unloading  and putting dishes in upper cabinets (looking up and down) without increased symptoms or restriction. []? Progressing: []? Met: []? Not Met: []? Adjusted  5. Pt will report at least 50% decrease in HA frequency and severity. []? Progressing: []? Met: []? Not Met: []? Adjusted      Progression Towards Functional goals:  [] Patient is progressing as expected towards functional goals listed. [] Progression is slowed due to complexities listed. [] Progression has been slowed due to co-morbidities. [x] Plan just implemented, too soon to assess goals progression  [] Other:     ASSESSMENT:  See eval    Overall Progression Towards Functional goals/ Treatment Progress Update:  [] Patient is progressing as expected towards functional goals listed. [] Progression is slowed due to complexities/Impairments listed. [] Progression has been slowed due to co-morbidities.   [x] Plan just implemented, too soon to assess goals progression <30days   [] Goals require adjustment due to lack of progress  [] Patient is not progressing as expected and requires additional follow up with physician  [] Other    Prognosis for POC: [x] Good [] Fair  [] Poor      Patient requires continued skilled intervention: [x] Yes  [] No    Treatment/Activity Tolerance:  [x] Patient able to complete treatment  [] Patient limited by fatigue  [] Patient limited by pain    [] Patient limited by other medical complications  [] Other:     Return to Play: (if applicable)   []  Stage 1: Intro to Strength   []  Stage 2: Return to Run and Strength   []  Stage 3: Return to Jump and Strength   []  Stage 4: Dynamic Strength and Agility   []  Stage 5: Sport Specific Training     []  Ready to Return to Play, Meets All Above Stages   []  Not Ready for Return to Sports   Comments:                         PLAN: See eval  [] Continue per plan of care [] Alter current plan (see comments above)  [x] Plan of care initiated [] Hold pending MD visit [] Discharge      Electronically signed by:  Sanchez Ramos PT    Note: If patient does not return for scheduled/ recommended follow up visits, this note will serve as a discharge from care along with most recent update on progress. Access Code: [email or web address]  URL: [email or web address]  Date: 08/12/2021  Prepared by: Sanchez Ramos     Exercises  Supine Chin Tuck - 2 x daily - 7 x weekly - 1-2 sets - 5-10 reps - 2-3 seconds hold  Seated Scapular Retraction - 2 x daily - 7 x weekly - 2-3 sets - 5-10 reps - 2-30 seconds hold  Supine Suboccipital Release with Tennis Balls - 1-2 x daily - 7 x weekly

## 2021-08-16 ENCOUNTER — HOSPITAL ENCOUNTER (OUTPATIENT)
Dept: MRI IMAGING | Age: 71
Discharge: HOME | End: 2021-08-16
Payer: COMMERCIAL

## 2021-08-16 DIAGNOSIS — M47.812 SPONDYLOSIS OF CERVICAL REGION WITHOUT MYELOPATHY OR RADICULOPATHY: ICD-10-CM

## 2021-08-16 PROCEDURE — 72141 MRI NECK SPINE W/O DYE: CPT

## 2021-08-18 ENCOUNTER — OFFICE VISIT (OUTPATIENT)
Dept: ORTHOPEDIC SURGERY | Age: 71
End: 2021-08-18
Payer: COMMERCIAL

## 2021-08-18 VITALS — BODY MASS INDEX: 26 KG/M2 | WEIGHT: 161 LBS

## 2021-08-18 DIAGNOSIS — M65.331 TRIGGER MIDDLE FINGER OF RIGHT HAND: Primary | ICD-10-CM

## 2021-08-18 PROCEDURE — 4040F PNEUMOC VAC/ADMIN/RCVD: CPT | Performed by: PHYSICIAN ASSISTANT

## 2021-08-18 PROCEDURE — G8427 DOCREV CUR MEDS BY ELIG CLIN: HCPCS | Performed by: PHYSICIAN ASSISTANT

## 2021-08-18 PROCEDURE — 20550 NJX 1 TENDON SHEATH/LIGAMENT: CPT | Performed by: PHYSICIAN ASSISTANT

## 2021-08-18 PROCEDURE — 99204 OFFICE O/P NEW MOD 45 MIN: CPT | Performed by: PHYSICIAN ASSISTANT

## 2021-08-18 PROCEDURE — 1123F ACP DISCUSS/DSCN MKR DOCD: CPT | Performed by: PHYSICIAN ASSISTANT

## 2021-08-18 PROCEDURE — 3017F COLORECTAL CA SCREEN DOC REV: CPT | Performed by: PHYSICIAN ASSISTANT

## 2021-08-18 PROCEDURE — G8417 CALC BMI ABV UP PARAM F/U: HCPCS | Performed by: PHYSICIAN ASSISTANT

## 2021-08-18 PROCEDURE — 1090F PRES/ABSN URINE INCON ASSESS: CPT | Performed by: PHYSICIAN ASSISTANT

## 2021-08-18 PROCEDURE — G8399 PT W/DXA RESULTS DOCUMENT: HCPCS | Performed by: PHYSICIAN ASSISTANT

## 2021-08-18 PROCEDURE — 1036F TOBACCO NON-USER: CPT | Performed by: PHYSICIAN ASSISTANT

## 2021-08-18 RX ORDER — TRIAMCINOLONE ACETONIDE 40 MG/ML
20 INJECTION, SUSPENSION INTRA-ARTICULAR; INTRAMUSCULAR ONCE
Status: COMPLETED | OUTPATIENT
Start: 2021-08-18 | End: 2021-08-18

## 2021-08-18 RX ADMIN — TRIAMCINOLONE ACETONIDE 20 MG: 40 INJECTION, SUSPENSION INTRA-ARTICULAR; INTRAMUSCULAR at 15:34

## 2021-08-18 NOTE — PROGRESS NOTES
Administrations This Visit     triamcinolone acetonide (KENALOG-40) injection 20 mg     Admin Date  08/18/2021  15:34 Action  Given Dose  20 mg Route  Other Site  Finger (See Comments) Administered By  Kayce Sims MA    Ordering Provider: SD Becerril [address]: [id number]    Lot#: [id number]    : [facility] [employer] (PRIMARY CARE)    Patient Supplied?: No    Comments: Right Middle

## 2021-08-18 NOTE — PROGRESS NOTES
Ms. Azul Cox is a 80 y.o. right handed woman  who is seen today in Hand Surgical Consultation at the request of LUIS Copeland CNP. She presents today regarding bilateral symptoms, right greater than left which have been present for approximately 6 months. A history of antecedent trauma or injury is Absent. She reports symptoms to include moderate pain and stiffness with occasional popping, catching or locking of the bilateral Middle Finger. Finger symptoms are Seldom worse in the morning or overnight. She reports mild pain located at the base of the symptomatic finger(s). Symptoms are worsening over time. Previous treatment has included conservative measures. She does not claim relation of her symptoms to her required work activities. She has not undergone any form of testing. I have today reviewed with Azul Cox the clinically relevant, past medical history, medications, allergies,  family history, social history, and Review Of Systems & I have documented any details relevant to today's presenting complaints in my history above. Ms. Jackson Pulliam's self-reported past medical history, medications, allergies,  family history, social history, and Review Of Systems have been scanned into the chart under the \"Media\" tab. Physical Exam:  Ms. Jackson Pulliam's most recent vitals:  Vitals  Weight: 161 lb (73 kg)    She is well nourished, oriented to person, place & time. She demonstrates appropriate mood and affect as well as normal gait and station. Skin: Normal in appearance, Normal Color and Free of Lesions Bilaterally   Digital range of motion is limited by pain on the Right, greater than Left. Inducible triggering is noted upon flexion in the bilateral Middle Finger. There is an associated flexion contracture of the PIP joint. No other digit demonstrates evidence for stenosing tenosynovitis bilaterally.   Wrist range of motion is Full and equal bilaterally of her symptoms and to prevent future worsening or further damage, that definitive surgical treatment would be required. Ms. Nunu Martinez  voiced an appropriate understanding of our discussion, the options available to her, and of the expectations of her selected  treatment. She did wish to proceed with Right Middle Finger Flexor Tendon Sheath injection. Procedure:  right Middle Finger Trigger Finger Injection  [first Injection]: After full discussion of the nature of this process and outlining a treatment plan with Ms. Nunu Martinez, we discussed the complications, limitations, expectations, alternatives, and risks of injection of the flexor tendon sheath. I have explained the potential for bleeding, infection, potential side effects of the medication, and the remote possibility of damage to surrounding structures as result of the injection. She understood this information well and verbally consented to this treatment. The skin of the symptomatic digit was prepped with Isopropyl Alcohol and under aseptic conditions the flexor tendon sheath was injected with a combination of 1/2 ml of 0.25% Bupivacaine without Epinephrine and 20 mg of Triamcinolone (40 mg/ml). Good filling of the flexor sheath was noted. A dry sterile bandage was applied and the patient tolerated the injection without difficulty. I advised the patient of the expected response, possible reactions and the instructions for care of the hand. I have also discussed with Ms. Nunu Martinez the other treatment options available to her for this condition. We have today selected to proceed with treatment by injection with steroid medication.   She and I have agreed that if our current course of Injection treatment does not prove to be effective over the short term future, that she will schedule a follow-up appointment to discuss and select an alternate course of therapy including possibly further conservative treatment or surgical treatment. Ms. Jose A Bai has been given a full verbal list of instructions and precautions related to her present condition. I have asked her to followup with me in the office at the prescribed time. She is also specifically requested to call or return to the office sooner if her symptoms change or worsen prior to the next scheduled appointment.

## 2021-08-19 ENCOUNTER — HOSPITAL ENCOUNTER (OUTPATIENT)
Dept: PHYSICAL THERAPY | Age: 71
Setting detail: THERAPIES SERIES
Discharge: HOME | End: 2021-08-19
Payer: COMMERCIAL

## 2021-08-19 PROCEDURE — 97140 MANUAL THERAPY 1/> REGIONS: CPT | Performed by: PHYSICAL THERAPIST

## 2021-08-19 PROCEDURE — 97110 THERAPEUTIC EXERCISES: CPT | Performed by: PHYSICAL THERAPIST

## 2021-08-19 NOTE — FLOWSHEET NOTE
[facility], [address]  Phone: [phone]  Fax [phone]        Date:  2021    Patient Name:  Remi Lehman    :  1950  MRN: [id number]  Restrictions/Precautions:    Medical/Treatment Diagnosis Information:  · Diagnosis: M54.2 (ICD-10-CM) - Cervical pain, M47.812 (ICD-10-CM) - Spondylosis of cervical region without myelopathy or radiculopathy  · Treatment Diagnosis: M54.2 (ICD-10-CM) - Cervical pain  Insurance/Certification information:  PT Insurance Information: [address]  - $0DED $0CP 100% PT/OT MED [facility] [phone]  Physician Information:  Referring Practitioner: Dr Richard Wheeler  Has the plan of care been signed (Y/N):        [x]  Yes  [x]  No     Date of Patient follow up with Physician: none scheduled 21      Is this a Progress Report:     []  Yes  [x]  No        If Yes:  Date Range for reporting period:  Beginnin21  Ending:     Progress report will be due (10 Rx or 30 days whichever is less):        Recertification will be due (POC Duration  / 90 days whichever is less): 8 weeks      Visit # Insurance Allowable Auth Required   In-person 2 Needs auth, still pending 21 []  Yes []  No    Telehealth   []  Yes []  No    Total            Functional Scale: NDI 36%    Date assessed:  21      Therapy Diagnosis/Practice Pattern:F, conservative      Number of Comorbidities:  []0     []1-2    [x]3+    Latex Allergy:  [x]NO      []YES  Preferred Language for Healthcare:   [x]English       []other:      Pain level:  eval 7/10     SUBJECTIVE:  Pt reports MD's assistant called to report MRI \"just showed arthritis\" which has her feeling frustrated because she is still in so much pain. She states she is only take OTC meds to manage her pain.     OBJECTIVE:    Observation:    Test measurements:      MRI results 21:  Retrolisthesis of C5 relative to C4, and C6.  Disc and osteophytes narrow the   neural foramina at C3-C4, C4-C5, and C5-C6 as discussed above.  There is mild   stenosis of the thecal sac at C5-C6. RESTRICTIONS/PRECAUTIONS: trigeminal neuralgia, 5 mm largely stable anterior listhesis C4-C5 from x-rays read by MD on 7/28/21 to 8/11/21, DM w/ neuropathy    Exercises/Interventions:     Therapeutic Ex (31471) Sets/sec/Reps Notes/CUES   Supine chin tuck 2\" x10 HEP, form cues   Seated scap set HEP   Seated UT S Started to incr HA, stopped   Supine tennis ball self SOR reviewed HEP   Supine TB no money, HAB RTB x20 ea UT cues w/ HAB, form cues R w/ no money                                 Pt ed: e, HEP, tennis ball self SOR, home traction set up and use (), posture, , PM&R for pain management x10' Pt to bring traction NV if still having questions   Manual Intervention (98234) x35' total    Supine SOR  Supine gentle man traction   STM to B UT/SO/scalenes  Temporal release  UT S R/L in neutral SB  C1-2-3 lat glides gr ll  C6-7 lateral glides ll                              NMR re-education (28576)  CUES NEEDED                                                Therapeutic Activity (60839)                                         Therapeutic Exercise and NMR EXR  [x] (18625) Provided verbal/tactile cueing for activities related to strengthening, flexibility, endurance, ROM  for improvements in scapular, scapulothoracic and UE control with self care, reaching, carrying, lifting, house/yardwork, driving/computer work.    [] (14305) Provided verbal/tactile cueing for activities related to improving balance, coordination, kinesthetic sense, posture, motor skill, proprioception  to assist with  scapular, scapulothoracic and UE control with self care, reaching, carrying, lifting, house/yardwork, driving/computer work.     Therapeutic Activities:    [] (92588 or 62355) Provided verbal/tactile cueing for activities related to improving balance, coordination, kinesthetic sense, posture, motor skill, proprioception and motor activation to allow for proper function of scapular, scapulothoracic and UE control with self care, carrying, lifting, driving/computer work. Home Exercise Program:    [x] (62794) Reviewed/Progressed HEP activities related to strengthening, flexibility, endurance, ROM of scapular, scapulothoracic and UE control with self care, reaching, carrying, lifting, house/yardwork, driving/computer work  [] (86150) Reviewed/Progressed HEP activities related to improving balance, coordination, kinesthetic sense, posture, motor skill, proprioception of scapular, scapulothoracic and UE control with self care, reaching, carrying, lifting, house/yardwork, driving/computer work      Manual Treatments:  PROM / STM / Oscillations-Mobs:  G-I, II, III, IV (PA's, Inf., Post.)  [x] (58573) Provided manual therapy to mobilize soft tissue/joints of cervical/CT, scapular GHJ and UE for the purpose of modulating pain, promoting relaxation,  increasing ROM, reducing/eliminating soft tissue swelling/inflammation/restriction, improving soft tissue extensibility and allowing for proper ROM for normal function with self care, reaching, carrying, lifting, house/yardwork, driving/computer work    Modalities:  Declined, but pt prefers ice over heat    Charges  Timed Code Treatment Minutes: 42   Total Treatment Minutes: 42     [] EVAL (LOW) 19061   [] EVAL (MOD) 97613   [] EVAL (HIGH) 04113   [] RE-EVAL     [x] SD(29995) x 1    [] IONTO  [] NMR (45765) x     [] VASO  [x] Manual (40104) x2      [] Other:  [] TA x      [] Mech Traction (34038)  [] ES(attended) (82657)      [] ES (un) (40083):     GOALS:  Patient stated goal: reduce pain with driving and daily activities  []? Progressing: []? Met: []? Not Met: []? Adjusted     Therapist goals for Patient:   Short Term Goals: To be achieved in: 2 weeks  1. Independent in HEP and progression per patient tolerance, in order to prevent re-injury. []? Progressing: []? Met: []? Not Met: []? Adjusted  2. Patient will have a decrease in pain to facilitate improvement in movement, function, and ADLs as indicated by Functional Deficits. []? Progressing: []? Met: []? Not Met: []? Adjusted     Long Term Goals: To be achieved in: 8 weeks  1. Disability index score of 20% or less for the NDI to assist with reaching prior level of function. []? Progressing: []? Met: []? Not Met: []? Adjusted  2. Patient will demonstrate increased AROM to at least 45* rotation of cervical spine to allow for turning head for safe driving.   []? Progressing: []? Met: []? Not Met: []? Adjusted  3. Patient will demonstrate an increase in postural awareness and control and activation of  Deep cervical stabilizers to allow for proper functional mobility as indicated by patients Functional Deficits. []? Progressing: []? Met: []? Not Met: []? Adjusted   4. Patient will return to unloading  and putting dishes in upper cabinets (looking up and down) without increased symptoms or restriction. []? Progressing: []? Met: []? Not Met: []? Adjusted  5. Pt will report at least 50% decrease in HA frequency and severity. []? Progressing: []? Met: []? Not Met: []? Adjusted      Progression Towards Functional goals:  [] Patient is progressing as expected towards functional goals listed. [] Progression is slowed due to complexities listed. [] Progression has been slowed due to co-morbidities. [x] Plan just implemented, too soon to assess goals progression  [] Other:     ASSESSMENT:  Pt reports relief during SOR, man txn and temporal release. Good tolerance to supine TE with cues as noted. Overall Progression Towards Functional goals/ Treatment Progress Update:  [] Patient is progressing as expected towards functional goals listed. [] Progression is slowed due to complexities/Impairments listed. [] Progression has been slowed due to co-morbidities.   [x] Plan just implemented, too soon to assess goals progression <30days   [] Goals require adjustment due to lack of progress  [] Patient is not progressing as expected and requires additional follow up with physician  [] Other    Prognosis for POC: [x] Good [] Fair  [] Poor      Patient requires continued skilled intervention: [x] Yes  [] No    Treatment/Activity Tolerance:  [x] Patient able to complete treatment  [] Patient limited by fatigue  [] Patient limited by pain    [] Patient limited by other medical complications  [] Other:     Return to Play: (if applicable)   []  Stage 1: Intro to Strength   []  Stage 2: Return to Run and Strength   []  Stage 3: Return to Jump and Strength   []  Stage 4: Dynamic Strength and Agility   []  Stage 5: Sport Specific Training     []  Ready to Return to Play, Meets All Above Stages   []  Not Ready for Return to Sports   Comments:                         PLAN: See eval  [x] Continue per plan of care [] Alter current plan (see comments above)  [] Plan of care initiated [] Hold pending MD visit [] Discharge      Electronically signed by:  Leopold Elbe, PT    Note: If patient does not return for scheduled/ recommended follow up visits, this note will serve as a discharge from care along with most recent update on progress. Access Code: [email or web address]  URL: [email or web address]  Date: 08/12/2021  Prepared by: Leopold Elbe     Exercises  Supine Chin Tuck - 2 x daily - 7 x weekly - 1-2 sets - 5-10 reps - 2-3 seconds hold  Seated Scapular Retraction - 2 x daily - 7 x weekly - 2-3 sets - 5-10 reps - 2-30 seconds hold  Supine Suboccipital Release with Tennis Balls - 1-2 x daily - 7 x weekly

## 2021-08-24 ENCOUNTER — HOSPITAL ENCOUNTER (OUTPATIENT)
Dept: PHYSICAL THERAPY | Age: 71
Setting detail: THERAPIES SERIES
Discharge: HOME | End: 2021-08-24
Payer: COMMERCIAL

## 2021-08-24 PROCEDURE — 97110 THERAPEUTIC EXERCISES: CPT | Performed by: PHYSICAL THERAPIST

## 2021-08-24 PROCEDURE — 97140 MANUAL THERAPY 1/> REGIONS: CPT | Performed by: PHYSICAL THERAPIST

## 2021-08-24 NOTE — FLOWSHEET NOTE
[facility], [address]  Phone: [phone]  Fax [phone]        Date:  2021    Patient Name:  Yvette Callahan    :  1950  MRN: [id number]  Restrictions/Precautions:    Medical/Treatment Diagnosis Information:  · Diagnosis: M54.2 (ICD-10-CM) - Cervical pain, M47.812 (ICD-10-CM) - Spondylosis of cervical region without myelopathy or radiculopathy  · Treatment Diagnosis: M54.2 (ICD-10-CM) - Cervical pain  Insurance/Certification information:  PT Insurance Information: [address]  - $0DED $0CP 100% PT/OT MED [facility] [phone]  Physician Information:  Referring Practitioner: Dr Genia Nuñez  Has the plan of care been signed (Y/N):        [x]  Yes  [x]  No     Date of Patient follow up with Physician: none scheduled 21      Is this a Progress Report:     []  Yes  [x]  No        If Yes:  Date Range for reporting period:  Beginnin21  Ending:     Progress report will be due (10 Rx or 30 days whichever is less):        Recertification will be due (POC Duration  / 90 days whichever is less): 8 weeks      Visit # Insurance Allowable Auth Required   In-person 3 Needs auth, still pending 21 []  Yes []  No    Telehealth   []  Yes []  No    Total            Functional Scale: NDI 36%    Date assessed:  21      Therapy Diagnosis/Practice Pattern:F, conservative      Number of Comorbidities:  []0     []1-2    [x]3+    Latex Allergy:  [x]NO      []YES  Preferred Language for Healthcare:   [x]English       []other:      Pain level:  eval 7/10     SUBJECTIVE:  Pt brought her home traction unit for instruction. She is feeling a little better during the day, but continues to have daily HA's and wakes up with a lot of pain.     OBJECTIVE:    Observation:    Test measurements:      MRI results 21:  Retrolisthesis of C5 relative to C4, and C6.  Disc and osteophytes narrow motor skill, proprioception and motor activation to allow for proper function of scapular, scapulothoracic and UE control with self care, carrying, lifting, driving/computer work. Home Exercise Program:    [x] (19246) Reviewed/Progressed HEP activities related to strengthening, flexibility, endurance, ROM of scapular, scapulothoracic and UE control with self care, reaching, carrying, lifting, house/yardwork, driving/computer work  [] (97879) Reviewed/Progressed HEP activities related to improving balance, coordination, kinesthetic sense, posture, motor skill, proprioception of scapular, scapulothoracic and UE control with self care, reaching, carrying, lifting, house/yardwork, driving/computer work      Manual Treatments:  PROM / STM / Oscillations-Mobs:  G-I, II, III, IV (PA's, Inf., Post.)  [x] (59203) Provided manual therapy to mobilize soft tissue/joints of cervical/CT, scapular GHJ and UE for the purpose of modulating pain, promoting relaxation,  increasing ROM, reducing/eliminating soft tissue swelling/inflammation/restriction, improving soft tissue extensibility and allowing for proper ROM for normal function with self care, reaching, carrying, lifting, house/yardwork, driving/computer work    Modalities:  Declined, but pt prefers ice over heat    Charges  Timed Code Treatment Minutes: 47   Total Treatment Minutes: 47     [] EVAL (LOW) 40088   [] EVAL (MOD) 09781   [] EVAL (HIGH) 85072   [] RE-EVAL     [x] VD(92562) x 1    [] IONTO  [] NMR (07706) x     [] VASO  [x] Manual (55370) x2      [] Other:  [] TA x      [] Mech Traction (85243)  [] ES(attended) (01233)      [] ES (un) (95430):     GOALS:  Patient stated goal: reduce pain with driving and daily activities  []? Progressing: []? Met: []? Not Met: []? Adjusted     Therapist goals for Patient:   Short Term Goals: To be achieved in: 2 weeks  1. Independent in HEP and progression per patient tolerance, in order to prevent re-injury. []?  Progressing: []? Met: []? Not Met: []? Adjusted  2. Patient will have a decrease in pain to facilitate improvement in movement, function, and ADLs as indicated by Functional Deficits. []? Progressing: []? Met: []? Not Met: []? Adjusted     Long Term Goals: To be achieved in: 8 weeks  1. Disability index score of 20% or less for the NDI to assist with reaching prior level of function. []? Progressing: []? Met: []? Not Met: []? Adjusted  2. Patient will demonstrate increased AROM to at least 45* rotation of cervical spine to allow for turning head for safe driving.   []? Progressing: []? Met: []? Not Met: []? Adjusted  3. Patient will demonstrate an increase in postural awareness and control and activation of  Deep cervical stabilizers to allow for proper functional mobility as indicated by patients Functional Deficits. []? Progressing: []? Met: []? Not Met: []? Adjusted   4. Patient will return to unloading  and putting dishes in upper cabinets (looking up and down) without increased symptoms or restriction. []? Progressing: []? Met: []? Not Met: []? Adjusted  5. Pt will report at least 50% decrease in HA frequency and severity. []? Progressing: []? Met: []? Not Met: []? Adjusted      Progression Towards Functional goals:  [] Patient is progressing as expected towards functional goals listed. [] Progression is slowed due to complexities listed. [] Progression has been slowed due to co-morbidities. [x] Plan just implemented, too soon to assess goals progression  [] Other:     ASSESSMENT:  Upper cervical pinch following L UT S, but able to work it out. Pt set up txn unit in clinic with good form and all questions answered regarding use. Overall Progression Towards Functional goals/ Treatment Progress Update:  [] Patient is progressing as expected towards functional goals listed. [] Progression is slowed due to complexities/Impairments listed. [] Progression has been slowed due to co-morbidities.   [x] Plan just implemented, too soon to assess goals progression <30days   [] Goals require adjustment due to lack of progress  [] Patient is not progressing as expected and requires additional follow up with physician  [] Other    Prognosis for POC: [x] Good [] Fair  [] Poor      Patient requires continued skilled intervention: [x] Yes  [] No    Treatment/Activity Tolerance:  [x] Patient able to complete treatment  [] Patient limited by fatigue  [] Patient limited by pain    [] Patient limited by other medical complications  [] Other:     Return to Play: (if applicable)   []  Stage 1: Intro to Strength   []  Stage 2: Return to Run and Strength   []  Stage 3: Return to Jump and Strength   []  Stage 4: Dynamic Strength and Agility   []  Stage 5: Sport Specific Training     []  Ready to Return to Play, Meets All Above Stages   []  Not Ready for Return to Sports   Comments:                         PLAN: See eval  [x] Continue per plan of care [] Alter current plan (see comments above)  [] Plan of care initiated [] Hold pending MD visit [] Discharge      Electronically signed by:  Colt Perry PT    Note: If patient does not return for scheduled/ recommended follow up visits, this note will serve as a discharge from care along with most recent update on progress. Access Code: [email or web address]  URL: [email or web address]  Date: 08/12/2021  Prepared by: Colt Perry     Exercises  Supine Chin Tuck - 2 x daily - 7 x weekly - 1-2 sets - 5-10 reps - 2-3 seconds hold  Seated Scapular Retraction - 2 x daily - 7 x weekly - 2-3 sets - 5-10 reps - 2-30 seconds hold  Supine Suboccipital Release with Tennis Balls - 1-2 x daily - 7 x weekly

## 2021-08-26 ENCOUNTER — HOSPITAL ENCOUNTER (OUTPATIENT)
Dept: PHYSICAL THERAPY | Age: 71
Setting detail: THERAPIES SERIES
Discharge: HOME | End: 2021-08-26
Payer: COMMERCIAL

## 2021-08-26 PROCEDURE — 97110 THERAPEUTIC EXERCISES: CPT | Performed by: PHYSICAL THERAPIST

## 2021-08-26 PROCEDURE — 97140 MANUAL THERAPY 1/> REGIONS: CPT | Performed by: PHYSICAL THERAPIST

## 2021-08-26 NOTE — FLOWSHEET NOTE
[facility], [address]  Phone: [phone]  Fax [phone]        Date:  2021    Patient Name:  Jose A Bai    :  1950  MRN: [id number]  Restrictions/Precautions:    Medical/Treatment Diagnosis Information:  · Diagnosis: M54.2 (ICD-10-CM) - Cervical pain, M47.812 (ICD-10-CM) - Spondylosis of cervical region without myelopathy or radiculopathy  · Treatment Diagnosis: M54.2 (ICD-10-CM) - Cervical pain  Insurance/Certification information:  PT Insurance Information: [address]  - $0DED $0CP 100% PT/OT MED [facility] [phone]  Physician Information:  Referring Practitioner: Dr Lzia Douglass  Has the plan of care been signed (Y/N):        [x]  Yes  [x]  No     Date of Patient follow up with Physician: none scheduled 21      Is this a Progress Report:     []  Yes  [x]  No        If Yes:  Date Range for reporting period:  Beginnin21  Ending:     Progress report will be due (10 Rx or 30 days whichever is less):        Recertification will be due (POC Duration  / 90 days whichever is less): 8 weeks      Visit # Insurance Allowable Auth Required   In-person 4 No auth until 30 []  Yes []  No    Telehealth   []  Yes []  No    Total            Functional Scale: NDI 36%    Date assessed:  21      Therapy Diagnosis/Practice Pattern:F, conservative      Number of Comorbidities:  []0     []1-2    [x]3+    Latex Allergy:  [x]NO      []YES  Preferred Language for Healthcare:   [x]English       []other:      Pain level:  eval 7/10     SUBJECTIVE:  Pt reports feeling a lot of pressure on her L side today, but the traction unit does give her some relief. OBJECTIVE:    Observation:    Test measurements:      MRI results 21:  Retrolisthesis of C5 relative to C4, and C6.  Disc and osteophytes narrow the   neural foramina at C3-C4, C4-C5, and C5-C6 as discussed above.  There is mild   stenosis of the thecal sac at C5-C6. RESTRICTIONS/PRECAUTIONS: trigeminal neuralgia, 5 mm largely stable anterior listhesis C4-C5 from x-rays read by MD on 7/28/21 to 8/11/21, DM w/ neuropathy    Exercises/Interventions:     Therapeutic Ex (07621) Sets/sec/Reps Notes/CUES   Supine chin tuck  HEP, form cues   Seated scap set HEP   Seated UT S Started to incr HA, stopped   Supine tennis ball self SOR reviewed HEP   Supine TB no money, HAB, shoulder ext  UT cues w/ HAB, form cues R w/ no money             Chin tuck on wall w/ 2 towels   w/ no money  W/ HAB @45* 2\" x5  RTB 2x10  RTB 2x10 HEP  HEP  HEP                  Pt ed: e, HEP, (), posture, , ,  x3'    Manual Intervention (06483) x20' total    Supine SOR  Supine gentle man traction   STM to B UT/SO/scalenes  Temporal release    C1-2-3 lat glides gr ll  C6-7 lateral glides ll x25'    Prone thoracic mobs rotation T4-T8   PA's T1-3 gr ll-lll x5'                        NMR re-education (13182)  CUES NEEDED                                                Therapeutic Activity (72515)                                         Therapeutic Exercise and NMR EXR  [x] (29430) Provided verbal/tactile cueing for activities related to strengthening, flexibility, endurance, ROM  for improvements in scapular, scapulothoracic and UE control with self care, reaching, carrying, lifting, house/yardwork, driving/computer work.    [] (98064) Provided verbal/tactile cueing for activities related to improving balance, coordination, kinesthetic sense, posture, motor skill, proprioception  to assist with  scapular, scapulothoracic and UE control with self care, reaching, carrying, lifting, house/yardwork, driving/computer work.     Therapeutic Activities:    [] (11959 or 17607) Provided verbal/tactile cueing for activities related to improving balance, coordination, kinesthetic sense, posture, motor skill, proprioception and motor activation to allow for proper function of scapular, scapulothoracic and UE control with self care, carrying, lifting, driving/computer work. Home Exercise Program:    [x] (28948) Reviewed/Progressed HEP activities related to strengthening, flexibility, endurance, ROM of scapular, scapulothoracic and UE control with self care, reaching, carrying, lifting, house/yardwork, driving/computer work  [] (18474) Reviewed/Progressed HEP activities related to improving balance, coordination, kinesthetic sense, posture, motor skill, proprioception of scapular, scapulothoracic and UE control with self care, reaching, carrying, lifting, house/yardwork, driving/computer work      Manual Treatments:  PROM / STM / Oscillations-Mobs:  G-I, II, III, IV (PA's, Inf., Post.)  [x] (17138) Provided manual therapy to mobilize soft tissue/joints of cervical/CT, scapular GHJ and UE for the purpose of modulating pain, promoting relaxation,  increasing ROM, reducing/eliminating soft tissue swelling/inflammation/restriction, improving soft tissue extensibility and allowing for proper ROM for normal function with self care, reaching, carrying, lifting, house/yardwork, driving/computer work    Modalities:  Declined, but pt prefers ice over heat    Charges  Timed Code Treatment Minutes: 45   Total Treatment Minutes: 45     [] EVAL (LOW) 47468   [] EVAL (MOD) 41265   [] EVAL (HIGH) 76357   [] RE-EVAL     [x] ZE(31337) x 1    [] IONTO  [] NMR (73902) x     [] VASO  [x] Manual (40156) x2      [] Other:  [] TA x      [] Mech Traction (68638)  [] ES(attended) (26171)      [] ES (un) (27110):     GOALS:  Patient stated goal: reduce pain with driving and daily activities  []? Progressing: []? Met: []? Not Met: []? Adjusted     Therapist goals for Patient:   Short Term Goals: To be achieved in: 2 weeks  1. Independent in HEP and progression per patient tolerance, in order to prevent re-injury. []? Progressing: []? Met: []? Not Met: []? Adjusted  2.  Patient will have a decrease in pain to facilitate improvement in movement, function, and ADLs as indicated by Functional Deficits. []? Progressing: []? Met: []? Not Met: []? Adjusted     Long Term Goals: To be achieved in: 8 weeks  1. Disability index score of 20% or less for the NDI to assist with reaching prior level of function. []? Progressing: []? Met: []? Not Met: []? Adjusted  2. Patient will demonstrate increased AROM to at least 45* rotation of cervical spine to allow for turning head for safe driving.   []? Progressing: []? Met: []? Not Met: []? Adjusted  3. Patient will demonstrate an increase in postural awareness and control and activation of  Deep cervical stabilizers to allow for proper functional mobility as indicated by patients Functional Deficits. []? Progressing: []? Met: []? Not Met: []? Adjusted   4. Patient will return to unloading  and putting dishes in upper cabinets (looking up and down) without increased symptoms or restriction. []? Progressing: []? Met: []? Not Met: []? Adjusted  5. Pt will report at least 50% decrease in HA frequency and severity. []? Progressing: []? Met: []? Not Met: []? Adjusted      Progression Towards Functional goals:  [] Patient is progressing as expected towards functional goals listed. [] Progression is slowed due to complexities listed. [] Progression has been slowed due to co-morbidities. [x] Plan just implemented, too soon to assess goals progression  [] Other:     ASSESSMENT:  Able to progress posture strengthening to against wall without reported pain. Cues to decr UT activation. HEP added to home supine vs standing depending on pain level. Overall Progression Towards Functional goals/ Treatment Progress Update:  [] Patient is progressing as expected towards functional goals listed. [] Progression is slowed due to complexities/Impairments listed. [] Progression has been slowed due to co-morbidities.   [x] Plan just implemented, too soon to assess goals progression <30days   [] Goals require adjustment due to lack of progress  [] Patient is not progressing as expected and requires additional follow up with physician  [] Other    Prognosis for POC: [x] Good [] Fair  [] Poor      Patient requires continued skilled intervention: [x] Yes  [] No    Treatment/Activity Tolerance:  [x] Patient able to complete treatment  [] Patient limited by fatigue  [] Patient limited by pain    [] Patient limited by other medical complications  [] Other:     Return to Play: (if applicable)   []  Stage 1: Intro to Strength   []  Stage 2: Return to Run and Strength   []  Stage 3: Return to Jump and Strength   []  Stage 4: Dynamic Strength and Agility   []  Stage 5: Sport Specific Training     []  Ready to Return to Play, Meets All Above Stages   []  Not Ready for Return to Sports   Comments:                         PLAN: See eval  [x] Continue per plan of care [] Alter current plan (see comments above)  [] Plan of care initiated [] Hold pending MD visit [] Discharge      Electronically signed by:  Darryl Lancaster PT    Note: If patient does not return for scheduled/ recommended follow up visits, this note will serve as a discharge from care along with most recent update on progress. Access Code: [email or web address]  URL: [email or web address]  Date: 08/26/2021  Prepared by: Darryl Lancaster    Exercises  Supine Chin Tuck - 2 x daily - 7 x weekly - 1-2 sets - 5-10 reps - 2-3 seconds hold  Seated Scapular Retraction - 2 x daily - 7 x weekly - 2-3 sets - 5-10 reps - 2-30 seconds hold  Supine Suboccipital Release with Tennis Balls - 1-2 x daily - 7 x weekly  Standing Isometric Cervical Retraction with Chin Tucks and Ball at [place] - 1 x daily - 7 x weekly - 1-2 sets - 10 reps  Seated Shoulder Horizontal Abduction with Resistance - 1 x daily - 7 x weekly - 2-3 sets - 10 reps  Shoulder External Rotation and Scapular Retraction with Resistance - 1 x daily - 7 x weekly - 2-3 sets - 10 reps

## 2021-08-31 ENCOUNTER — HOSPITAL ENCOUNTER (OUTPATIENT)
Dept: PHYSICAL THERAPY | Age: 71
Setting detail: THERAPIES SERIES
Discharge: HOME | End: 2021-08-31
Payer: COMMERCIAL

## 2021-08-31 PROCEDURE — 97140 MANUAL THERAPY 1/> REGIONS: CPT | Performed by: PHYSICAL THERAPIST

## 2021-08-31 PROCEDURE — 97110 THERAPEUTIC EXERCISES: CPT | Performed by: PHYSICAL THERAPIST

## 2021-08-31 NOTE — FLOWSHEET NOTE
[facility], [address]  Phone: [phone]  Fax [phone]        Date:  2021    Patient Name:  Chris Ramon    :  1950  MRN: [id number]  Restrictions/Precautions:    Medical/Treatment Diagnosis Information:  · Diagnosis: M54.2 (ICD-10-CM) - Cervical pain, M47.812 (ICD-10-CM) - Spondylosis of cervical region without myelopathy or radiculopathy  · Treatment Diagnosis: M54.2 (ICD-10-CM) - Cervical pain  Insurance/Certification information:  PT Insurance Information: [address]  - $0DED $0CP 100% PT/OT MED [facility] [phone]  Physician Information:  Referring Practitioner: Dr Summer Zavala  Has the plan of care been signed (Y/N):        [x]  Yes  [x]  No     Date of Patient follow up with Physician: none scheduled 21      Is this a Progress Report:     []  Yes  [x]  No        If Yes:  Date Range for reporting period:  Beginnin21  Ending:     Progress report will be due (10 Rx or 30 days whichever is less):        Recertification will be due (POC Duration  / 90 days whichever is less): 8 weeks      Visit # Insurance Allowable Auth Required   In-person 5 No auth until 30 []  Yes []  No    Telehealth   []  Yes []  No    Total            Functional Scale: NDI 36%    Date assessed:  21      Therapy Diagnosis/Practice Pattern:F, conservative      Number of Comorbidities:  []0     []1-2    [x]3+    Latex Allergy:  [x]NO      []YES  Preferred Language for Healthcare:   [x]English       []other:      Pain level:  eval 7/10     SUBJECTIVE:  Pt reports she gets temporary improvement in pain when using traction or with PT manual techniques, but short lived. She did have second opinion at [facility] yesterday and they recommended PASHA and if that doesn't help then possible surgery. She has questions regarding and is feeling very anxious about her options.     OBJECTIVE:  Observation:    Test measurements:      MRI results 8/16/21:  Retrolisthesis of C5 relative to C4, and C6.  Disc and osteophytes narrow the   neural foramina at C3-C4, C4-C5, and C5-C6 as discussed above.  There is mild   stenosis of the thecal sac at C5-C6.        RESTRICTIONS/PRECAUTIONS: trigeminal neuralgia, 5 mm largely stable anterior listhesis C4-C5 from x-rays read by MD on 7/28/21 to 8/11/21, DM w/ neuropathy    Exercises/Interventions:     Therapeutic Ex (64939) Sets/sec/Reps Notes/CUES   Supine chin tuck  HEP, form cues   Seated scap set HEP   Seated UT S Started to incr HA, stopped   Supine tennis ball self SOR reviewed HEP   Supine TB no money, HAB,   shoulder ext YTB x15  RTB x20   form cues R w/ no money   Supine alt SB iso's PT gentle resist 3\" x10 R/L         Chin tuck on wall w/ 2 towels   w/ no money  W/ HAB @45*  HEP  HEP  HEP                  Pt ed: e, HEP, home traction set up and use (), posture, , , what to expect for [facility] [place] [facility] and possible recovery considerations if needing Sx x15'    Manual Intervention (98761) x30' total    Supine SOR  Supine gentle man traction   STM to B UT/SO/scalenes  Temporal release    C1-2-3 lat glides gr ll  C6-7 lateral glides ll x30'    Prone thoracic mobs rotation T4-T8   PA's T1-3 gr ll-lll                         NMR re-education (57387)  CUES NEEDED                                                Therapeutic Activity (62049)                                         Therapeutic Exercise and NMR EXR  [x] (64807) Provided verbal/tactile cueing for activities related to strengthening, flexibility, endurance, ROM  for improvements in scapular, scapulothoracic and UE control with self care, reaching, carrying, lifting, house/yardwork, driving/computer work.    [] (89538) Provided verbal/tactile cueing for activities related to improving balance, coordination, kinesthetic sense, posture, motor skill, proprioception  to assist with  scapular, scapulothoracic and UE control with self care, reaching, carrying, lifting, house/yardwork, driving/computer work. Therapeutic Activities:    [] (90020 or 26143) Provided verbal/tactile cueing for activities related to improving balance, coordination, kinesthetic sense, posture, motor skill, proprioception and motor activation to allow for proper function of scapular, scapulothoracic and UE control with self care, carrying, lifting, driving/computer work.      Home Exercise Program:    [x] (46455) Reviewed/Progressed HEP activities related to strengthening, flexibility, endurance, ROM of scapular, scapulothoracic and UE control with self care, reaching, carrying, lifting, house/yardwork, driving/computer work  [] (68742) Reviewed/Progressed HEP activities related to improving balance, coordination, kinesthetic sense, posture, motor skill, proprioception of scapular, scapulothoracic and UE control with self care, reaching, carrying, lifting, house/yardwork, driving/computer work      Manual Treatments:  PROM / STM / Oscillations-Mobs:  G-I, II, III, IV (PA's, Inf., Post.)  [x] (19196) Provided manual therapy to mobilize soft tissue/joints of cervical/CT, scapular GHJ and UE for the purpose of modulating pain, promoting relaxation,  increasing ROM, reducing/eliminating soft tissue swelling/inflammation/restriction, improving soft tissue extensibility and allowing for proper ROM for normal function with self care, reaching, carrying, lifting, house/yardwork, driving/computer work    Modalities:  Declined, but pt prefers ice over heat    Charges  Timed Code Treatment Minutes: 45   Total Treatment Minutes: 45     [] EVAL (LOW) 57549   [] EVAL (MOD) 92469   [] EVAL (HIGH) 00434   [] RE-EVAL     [x] TZ(66189) x 1    [] IONTO  [] NMR (75135) x     [] VASO  [x] Manual (52508) x2      [] Other:  [] TA x      [] Mech Traction (73995)  [] ES(attended) (85987)      [] ES (un) (33076):     GOALS:  Patient stated goal: reduce pain with driving and daily activities  []? Progressing: []? Met: []? Not Met: []? Adjusted     Therapist goals for Patient:   Short Term Goals: To be achieved in: 2 weeks  1. Independent in HEP and progression per patient tolerance, in order to prevent re-injury. []? Progressing: []? Met: []? Not Met: []? Adjusted  2. Patient will have a decrease in pain to facilitate improvement in movement, function, and ADLs as indicated by Functional Deficits. []? Progressing: []? Met: []? Not Met: []? Adjusted     Long Term Goals: To be achieved in: 8 weeks  1. Disability index score of 20% or less for the NDI to assist with reaching prior level of function. []? Progressing: []? Met: []? Not Met: []? Adjusted  2. Patient will demonstrate increased AROM to at least 45* rotation of cervical spine to allow for turning head for safe driving.   []? Progressing: []? Met: []? Not Met: []? Adjusted  3. Patient will demonstrate an increase in postural awareness and control and activation of  Deep cervical stabilizers to allow for proper functional mobility as indicated by patients Functional Deficits. []? Progressing: []? Met: []? Not Met: []? Adjusted   4. Patient will return to unloading  and putting dishes in upper cabinets (looking up and down) without increased symptoms or restriction. []? Progressing: []? Met: []? Not Met: []? Adjusted  5. Pt will report at least 50% decrease in HA frequency and severity. []? Progressing: []? Met: []? Not Met: []? Adjusted      Progression Towards Functional goals:  [] Patient is progressing as expected towards functional goals listed. [] Progression is slowed due to complexities listed. [] Progression has been slowed due to co-morbidities. [x] Plan just implemented, too soon to assess goals progression  [] Other:     ASSESSMENT:  All questions were answered regarding what to expect with PASHA and possible recovery timing if needing surgery.   Pt reported less anxiety and concern following discussion. She continues to have L>R SCM, scalene, LS and MATHEW tightness that improved with STM and SOR/trxn, but the pain gradually resumes in upright position. discussed increasing unloading time throughout her day to give more breaks. Overall Progression Towards Functional goals/ Treatment Progress Update:  [] Patient is progressing as expected towards functional goals listed. [] Progression is slowed due to complexities/Impairments listed. [] Progression has been slowed due to co-morbidities. [x] Plan just implemented, too soon to assess goals progression <30days   [] Goals require adjustment due to lack of progress  [] Patient is not progressing as expected and requires additional follow up with physician  [] Other    Prognosis for POC: [x] Good [] Fair  [] Poor      Patient requires continued skilled intervention: [x] Yes  [] No    Treatment/Activity Tolerance:  [x] Patient able to complete treatment  [] Patient limited by fatigue  [] Patient limited by pain    [] Patient limited by other medical complications  [] Other:     Return to Play: (if applicable)   []  Stage 1: Intro to Strength   []  Stage 2: Return to Run and Strength   []  Stage 3: Return to Jump and Strength   []  Stage 4: Dynamic Strength and Agility   []  Stage 5: Sport Specific Training     []  Ready to Return to Play, Meets All Above Stages   []  Not Ready for Return to Sports   Comments:                         PLAN: See bebo, edwin 1-2x/week until PASHA. [x] Continue per plan of care [] Alter current plan (see comments above)  [] Plan of care initiated [] Hold pending MD visit [] Discharge      Electronically signed by:  Erick Spencer, PT    Note: If patient does not return for scheduled/ recommended follow up visits, this note will serve as a discharge from care along with most recent update on progress. Access Code: [email or web address]  URL: [email or web address]  Date: 08/26/2021  Prepared by: Alo Sandoval Francisco Jrenberger    Exercises  Supine Chin Tuck - 2 x daily - 7 x weekly - 1-2 sets - 5-10 reps - 2-3 seconds hold  Seated Scapular Retraction - 2 x daily - 7 x weekly - 2-3 sets - 5-10 reps - 2-30 seconds hold  Supine Suboccipital Release with Tennis Balls - 1-2 x daily - 7 x weekly  Standing Isometric Cervical Retraction with Chin Tucks and Ball at [employer] [place] - 1 x daily - 7 x weekly - 1-2 sets - 10 reps  Seated Shoulder Horizontal Abduction with Resistance - 1 x daily - 7 x weekly - 2-3 sets - 10 reps  Shoulder External Rotation and Scapular Retraction with Resistance - 1 x daily - 7 x weekly - 2-3 sets - 10 reps

## 2021-09-02 ENCOUNTER — HOSPITAL ENCOUNTER (OUTPATIENT)
Dept: PHYSICAL THERAPY | Age: 71
Setting detail: THERAPIES SERIES
Discharge: HOME | End: 2021-09-02
Payer: COMMERCIAL

## 2021-09-02 PROCEDURE — 97110 THERAPEUTIC EXERCISES: CPT | Performed by: PHYSICAL THERAPIST

## 2021-09-02 PROCEDURE — 97140 MANUAL THERAPY 1/> REGIONS: CPT | Performed by: PHYSICAL THERAPIST

## 2021-09-02 NOTE — FLOWSHEET NOTE
[facility], [address] [place]  Phone: [phone]  Fax [phone]        Date:  2021    Patient Name:  Dalila Shaver    :  1950  MRN: [id number]  Restrictions/Precautions:    Medical/Treatment Diagnosis Information:  · Diagnosis: M54.2 (ICD-10-CM) - Cervical pain, M47.812 (ICD-10-CM) - Spondylosis of cervical region without myelopathy or radiculopathy  · Treatment Diagnosis: M54.2 (ICD-10-CM) - Cervical pain  Insurance/Certification information:  PT Insurance Information: [address]  - $0DED $0CP 100% PT/OT MED [facility] Yadira Couch [phone]  Physician Information:  Referring Practitioner: Dr Rola Grady  Has the plan of care been signed (Y/N):        [x]  Yes  [x]  No     Date of Patient follow up with Physician: none scheduled 21      Is this a Progress Report:     []  Yes  [x]  No        If Yes:  Date Range for reporting period:  Beginnin21  Ending:     Progress report will be due (10 Rx or 30 days whichever is less):        Recertification will be due (POC Duration  / 90 days whichever is less): 8 weeks      Visit # Insurance Allowable Auth Required   In-person 6 No auth until 30 []  Yes []  No    Telehealth   []  Yes []  No    Total            Functional Scale: NDI 36%    Date assessed:  21      Therapy Diagnosis/Practice Pattern:F, conservative      Number of Comorbidities:  []0     []1-2    [x]3+    Latex Allergy:  [x]NO      []YES  Preferred Language for Healthcare:   [x]English       []other:      Pain level:  eval 7/10  Current 4-5/10    SUBJECTIVE:  Pt reports she is feeling better today. No HA.     OBJECTIVE:    Observation:    Test measurements:      MRI results 21:  Retrolisthesis of C5 relative to C4, and C6.  Disc and osteophytes narrow the   neural foramina at C3-C4, C4-C5, and C5-C6 as discussed above.  There is mild   stenosis of the thecal sac at skill, proprioception and motor activation to allow for proper function of scapular, scapulothoracic and UE control with self care, carrying, lifting, driving/computer work. Home Exercise Program:    [x] (54169) Reviewed/Progressed HEP activities related to strengthening, flexibility, endurance, ROM of scapular, scapulothoracic and UE control with self care, reaching, carrying, lifting, house/yardwork, driving/computer work  [] (56886) Reviewed/Progressed HEP activities related to improving balance, coordination, kinesthetic sense, posture, motor skill, proprioception of scapular, scapulothoracic and UE control with self care, reaching, carrying, lifting, house/yardwork, driving/computer work      Manual Treatments:  PROM / STM / Oscillations-Mobs:  G-I, II, III, IV (PA's, Inf., Post.)  [x] (49597) Provided manual therapy to mobilize soft tissue/joints of cervical/CT, scapular GHJ and UE for the purpose of modulating pain, promoting relaxation,  increasing ROM, reducing/eliminating soft tissue swelling/inflammation/restriction, improving soft tissue extensibility and allowing for proper ROM for normal function with self care, reaching, carrying, lifting, house/yardwork, driving/computer work    Modalities:  Declined, but pt prefers ice over heat    Charges  Timed Code Treatment Minutes: 42   Total Treatment Minutes: 42     [] EVAL (LOW) 32095   [] EVAL (MOD) 57877   [] EVAL (HIGH) 30039   [] RE-EVAL     [x] UQ(51064) x 1    [] IONTO  [] NMR (90081) x     [] VASO  [x] Manual (65790) x2      [] Other:  [] TA x      [] Mech Traction (63285)  [] ES(attended) (60173)      [] ES (un) (57236):     GOALS:  Patient stated goal: reduce pain with driving and daily activities  []? Progressing: []? Met: []? Not Met: []? Adjusted     Therapist goals for Patient:   Short Term Goals: To be achieved in: 2 weeks  1. Independent in HEP and progression per patient tolerance, in order to prevent re-injury. []?  Progressing: []? Met: []? Not Met: []? Adjusted  2. Patient will have a decrease in pain to facilitate improvement in movement, function, and ADLs as indicated by Functional Deficits. []? Progressing: []? Met: []? Not Met: []? Adjusted     Long Term Goals: To be achieved in: 8 weeks  1. Disability index score of 20% or less for the NDI to assist with reaching prior level of function. []? Progressing: []? Met: []? Not Met: []? Adjusted  2. Patient will demonstrate increased AROM to at least 45* rotation of cervical spine to allow for turning head for safe driving.   []? Progressing: []? Met: []? Not Met: []? Adjusted  3. Patient will demonstrate an increase in postural awareness and control and activation of  Deep cervical stabilizers to allow for proper functional mobility as indicated by patients Functional Deficits. []? Progressing: []? Met: []? Not Met: []? Adjusted   4. Patient will return to unloading  and putting dishes in upper cabinets (looking up and down) without increased symptoms or restriction. []? Progressing: []? Met: []? Not Met: []? Adjusted  5. Pt will report at least 50% decrease in HA frequency and severity. []? Progressing: []? Met: []? Not Met: []? Adjusted      Progression Towards Functional goals:  [] Patient is progressing as expected towards functional goals listed. [] Progression is slowed due to complexities listed. [] Progression has been slowed due to co-morbidities. [x] Plan just implemented, too soon to assess goals progression  [] Other:     ASSESSMENT:  Decreased tenderness with manuals this visit. Added supine pec stretch and with B UE flexion AROM to encourage thoracic mobility. Overall Progression Towards Functional goals/ Treatment Progress Update:  [] Patient is progressing as expected towards functional goals listed. [] Progression is slowed due to complexities/Impairments listed. [] Progression has been slowed due to co-morbidities.   [x] Plan just implemented, too soon to assess goals progression <30days   [] Goals require adjustment due to lack of progress  [] Patient is not progressing as expected and requires additional follow up with physician  [] Other    Prognosis for POC: [x] Good [] Fair  [] Poor      Patient requires continued skilled intervention: [x] Yes  [] No    Treatment/Activity Tolerance:  [x] Patient able to complete treatment  [] Patient limited by fatigue  [] Patient limited by pain    [] Patient limited by other medical complications  [] Other:     Return to Play: (if applicable)   []  Stage 1: Intro to Strength   []  Stage 2: Return to Run and Strength   []  Stage 3: Return to Jump and Strength   []  Stage 4: Dynamic Strength and Agility   []  Stage 5: Sport Specific Training     []  Ready to Return to Play, Meets All Above Stages   []  Not Ready for Return to Sports   Comments:                         PLAN: See bebo, edwin 1-2x/week until PASHA. [x] Continue per plan of care [] Alter current plan (see comments above)  [] Plan of care initiated [] Hold pending MD visit [] Discharge      Electronically signed by:  Erick Spencer PT    Note: If patient does not return for scheduled/ recommended follow up visits, this note will serve as a discharge from care along with most recent update on progress. Access Code: [email or web address]  URL: [email or web address]  Date: 08/26/2021  Prepared by: Erick Spencer    Exercises  Supine Chin Tuck - 2 x daily - 7 x weekly - 1-2 sets - 5-10 reps - 2-3 seconds hold  Seated Scapular Retraction - 2 x daily - 7 x weekly - 2-3 sets - 5-10 reps - 2-30 seconds hold  Supine Suboccipital Release with Tennis Balls - 1-2 x daily - 7 x weekly  Standing Isometric Cervical Retraction with Chin Tucks and Ball at [employer] [place] - 1 x daily - 7 x weekly - 1-2 sets - 10 reps  Seated Shoulder Horizontal Abduction with Resistance - 1 x daily - 7 x weekly - 2-3 sets - 10 reps  Shoulder External Rotation and Scapular Retraction with Resistance - 1 x daily - 7 x weekly - 2-3 sets - 10 reps

## 2021-09-09 ENCOUNTER — HOSPITAL ENCOUNTER (OUTPATIENT)
Dept: PHYSICAL THERAPY | Age: 71
Setting detail: THERAPIES SERIES
Discharge: HOME | End: 2021-09-09
Payer: COMMERCIAL

## 2021-09-09 PROCEDURE — 97140 MANUAL THERAPY 1/> REGIONS: CPT | Performed by: PHYSICAL THERAPIST

## 2021-09-09 PROCEDURE — 97110 THERAPEUTIC EXERCISES: CPT | Performed by: PHYSICAL THERAPIST

## 2021-09-09 NOTE — FLOWSHEET NOTE
[facility], [address]  Phone: [phone]  Fax [phone]        Date:  2021    Patient Name:  Dona Huffman    :  1950  MRN: [id number]  Restrictions/Precautions:    Medical/Treatment Diagnosis Information:  · Diagnosis: M54.2 (ICD-10-CM) - Cervical pain, M47.812 (ICD-10-CM) - Spondylosis of cervical region without myelopathy or radiculopathy  · Treatment Diagnosis: M54.2 (ICD-10-CM) - Cervical pain  Insurance/Certification information:  PT Insurance Information: [address]  - $0DED $0CP 100% PT/OT MED [facility] [phone]  Physician Information:  Referring Practitioner: Dr Yasmin Dias  Has the plan of care been signed (Y/N):        [x]  Yes  [x]  No     Date of Patient follow up with Physician: none scheduled 21      Is this a Progress Report:     []  Yes  [x]  No        If Yes:  Date Range for reporting period:  Beginnin21  Ending:     Progress report will be due (10 Rx or 30 days whichever is less):        Recertification will be due (POC Duration  / 90 days whichever is less): 8 weeks      Visit # Insurance Allowable Auth Required   In-person 7 No auth until 30 []  Yes []  No    Telehealth   []  Yes []  No    Total            Functional Scale: NDI 36%    Date assessed:  21      Therapy Diagnosis/Practice Pattern:F, conservative      Number of Comorbidities:  []0     []1-2    [x]3+    Latex Allergy:  [x]NO      []YES  Preferred Language for Healthcare:   [x]English       []other:      Pain level:  eval 7/10  Current NT/10    SUBJECTIVE:  Pt reports she was very busy at [employer] getting ready for their garage sale and doing a lot of lifting and moving items. She stated she could hardly hold her head up this morning. She states she has not yet tried the new towel stretches given for HEP at .     OBJECTIVE: PN NV   Observation:    Test measurements: MRI results 8/16/21:  Retrolisthesis of C5 relative to C4, and C6.  Disc and osteophytes narrow the   neural foramina at C3-C4, C4-C5, and C5-C6 as discussed above.  There is mild   stenosis of the thecal sac at C5-C6.        RESTRICTIONS/PRECAUTIONS: trigeminal neuralgia, 5 mm largely stable anterior listhesis C4-C5 from x-rays read by MD on 7/28/21 to 8/11/21, DM w/ neuropathy    Exercises/Interventions:     Therapeutic Ex (07245) Sets/sec/Reps Notes/CUES   Supine chin tuck  HEP, form cues   Seated scap set HEP   Seated UT S Started to incr HA, stopped   Supine tennis ball self SOR reviewed HEP   Supine TB no money, HAB,   shoulder ext   Green TB x20   form cues R w/ no money   Supine alt SB iso's PT gentle resist 3\" x12 R/L    Supine pec S over towel roll  \"      \"          W/ B shoulder flexion x2'  x10 HEP  HEP        Chin tuck on wall w/ 2 towels   w/ no money  W/ HAB @45*  HEP  HEP  HEP   Fwd lean on table chin tuck 2\" x5              Pt ed: e, HEP,  (), posture, unloading when head is heavy and tired, , ,  x8'    Manual Intervention (51794) x30' total    Supine SOR  Supine gentle man traction   STM to B UT/SO/scalenes  Temporal release    C1-2-3 lat glides gr ll  C6-7 lateral glides ll x30'    Prone thoracic mobs rotation T4-T8   PA's T1-3 gr ll-lll                         NMR re-education (49305)  CUES NEEDED                                                Therapeutic Activity (19624)                                         Therapeutic Exercise and NMR EXR  [x] (77787) Provided verbal/tactile cueing for activities related to strengthening, flexibility, endurance, ROM  for improvements in scapular, scapulothoracic and UE control with self care, reaching, carrying, lifting, house/yardwork, driving/computer work.    [] (22239) Provided verbal/tactile cueing for activities related to improving balance, coordination, kinesthetic sense, posture, motor skill, proprioception  to assist with  scapular, scapulothoracic and UE control with self care, reaching, carrying, lifting, house/yardwork, driving/computer work. Therapeutic Activities:    [] (17728 or 26211) Provided verbal/tactile cueing for activities related to improving balance, coordination, kinesthetic sense, posture, motor skill, proprioception and motor activation to allow for proper function of scapular, scapulothoracic and UE control with self care, carrying, lifting, driving/computer work.      Home Exercise Program:    [x] (28834) Reviewed/Progressed HEP activities related to strengthening, flexibility, endurance, ROM of scapular, scapulothoracic and UE control with self care, reaching, carrying, lifting, house/yardwork, driving/computer work  [] (69875) Reviewed/Progressed HEP activities related to improving balance, coordination, kinesthetic sense, posture, motor skill, proprioception of scapular, scapulothoracic and UE control with self care, reaching, carrying, lifting, house/yardwork, driving/computer work      Manual Treatments:  PROM / STM / Oscillations-Mobs:  G-I, II, III, IV (PA's, Inf., Post.)  [x] (16343) Provided manual therapy to mobilize soft tissue/joints of cervical/CT, scapular GHJ and UE for the purpose of modulating pain, promoting relaxation,  increasing ROM, reducing/eliminating soft tissue swelling/inflammation/restriction, improving soft tissue extensibility and allowing for proper ROM for normal function with self care, reaching, carrying, lifting, house/yardwork, driving/computer work    Modalities:  Declined, but pt prefers ice over heat    Charges  Timed Code Treatment Minutes: 45   Total Treatment Minutes: 45     [] EVAL (LOW) 55002   [] EVAL (MOD) 82646   [] EVAL (HIGH) 49728   [] RE-EVAL     [x] JX(32330) x 1    [] IONTO  [] NMR (48287) x     [] VASO  [x] Manual (81496) x2      [] Other:  [] TA x      [] Mech Traction (05591)  [] ES(attended) (27557)      [] ES (un) (72033):     GOALS:  Patient stated goal: reduce pain with driving and daily activities  []? Progressing: []? Met: []? Not Met: []? Adjusted     Therapist goals for Patient:   Short Term Goals: To be achieved in: 2 weeks  1. Independent in HEP and progression per patient tolerance, in order to prevent re-injury. []? Progressing: []? Met: []? Not Met: []? Adjusted  2. Patient will have a decrease in pain to facilitate improvement in movement, function, and ADLs as indicated by Functional Deficits. []? Progressing: []? Met: []? Not Met: []? Adjusted     Long Term Goals: To be achieved in: 8 weeks  1. Disability index score of 20% or less for the NDI to assist with reaching prior level of function. []? Progressing: []? Met: []? Not Met: []? Adjusted  2. Patient will demonstrate increased AROM to at least 45* rotation of cervical spine to allow for turning head for safe driving.   []? Progressing: []? Met: []? Not Met: []? Adjusted  3. Patient will demonstrate an increase in postural awareness and control and activation of  Deep cervical stabilizers to allow for proper functional mobility as indicated by patients Functional Deficits. []? Progressing: []? Met: []? Not Met: []? Adjusted   4. Patient will return to unloading  and putting dishes in upper cabinets (looking up and down) without increased symptoms or restriction. []? Progressing: []? Met: []? Not Met: []? Adjusted  5. Pt will report at least 50% decrease in HA frequency and severity. []? Progressing: []? Met: []? Not Met: []? Adjusted      Progression Towards Functional goals:  [] Patient is progressing as expected towards functional goals listed. [] Progression is slowed due to complexities listed. [] Progression has been slowed due to co-morbidities. [x] Plan just implemented, too soon to assess goals progression  [] Other:     ASSESSMENT:  Challenged with fwd lean chin tuck to progress head and neck stability.  Again reviewed need to unload throughout the day when she is more hold  Supine Suboccipital Release with Tennis Balls - 1-2 x daily - 7 x weekly  Standing Isometric Cervical Retraction with Chin Tucks and Ball at [employer] [place] - 1 x daily - 7 x weekly - 1-2 sets - 10 reps  Seated Shoulder Horizontal Abduction with Resistance - 1 x daily - 7 x weekly - 2-3 sets - 10 reps  Shoulder External Rotation and Scapular Retraction with Resistance - 1 x daily - 7 x weekly - 2-3 sets - 10 reps

## 2021-09-14 ENCOUNTER — HOSPITAL ENCOUNTER (OUTPATIENT)
Dept: PHYSICAL THERAPY | Age: 71
Setting detail: THERAPIES SERIES
Discharge: HOME | End: 2021-09-14
Payer: COMMERCIAL

## 2021-09-14 PROCEDURE — 97140 MANUAL THERAPY 1/> REGIONS: CPT | Performed by: PHYSICAL THERAPIST

## 2021-09-14 PROCEDURE — 97110 THERAPEUTIC EXERCISES: CPT | Performed by: PHYSICAL THERAPIST

## 2021-09-14 NOTE — FLOWSHEET NOTE
[facility], [address]  Phone: [phone]  Fax [phone]        Date:  2021    Patient Name:  Phillip Garcia    :  1950  MRN: [id number]  Restrictions/Precautions:    Medical/Treatment Diagnosis Information:  · Diagnosis: M54.2 (ICD-10-CM) - Cervical pain, M47.812 (ICD-10-CM) - Spondylosis of cervical region without myelopathy or radiculopathy  · Treatment Diagnosis: M54.2 (ICD-10-CM) - Cervical pain  Insurance/Certification information:  PT Insurance Information: [address]  - $0DED $0CP 100% PT/OT MED [facility] Marcela Caren [phone]  Physician Information:  Referring Practitioner: Dr Sade Mcknight  Has the plan of care been signed (Y/N):        [x]  Yes  [x]  No     Date of Patient follow up with Physician: none scheduled 21      Is this a Progress Report:     []  Yes  [x]  No        If Yes:  Date Range for reporting period:  Beginnin21  Ending:     Progress report will be due (10 Rx or 30 days whichever is less):        Recertification will be due (POC Duration  / 90 days whichever is less): 8 weeks      Visit # Insurance Allowable Auth Required   In-person 8 No auth until 30 []  Yes []  No    Telehealth   []  Yes []  No    Total            Functional Scale: NDI 36%    Date assessed:  21  NDI--will bring back NV      Therapy Diagnosis/Practice Pattern:F, conservative      Number of Comorbidities:  []0     []1-2    [x]3+    Latex Allergy:  [x]NO      []YES  Preferred Language for Healthcare:   [x]English       []other:      Pain level:  eval 7/10  Current 5/10    SUBJECTIVE:  Pt reports she has been able to take a few more breaks in her days the last couple of days.     OBJECTIVE: PN    Observation:    Test measurements:  Cervical flexion 65, ext 60, SB R 35, SB L 30, rotation R 45, L 50    MRI results 21:  Retrolisthesis of C5 relative to C4, and C6.  Disc and osteophytes narrow the   neural foramina at C3-C4, C4-C5, and C5-C6 as discussed above.  There is mild   stenosis of the thecal sac at C5-C6.        RESTRICTIONS/PRECAUTIONS: trigeminal neuralgia, 5 mm largely stable anterior listhesis C4-C5 from x-rays read by MD on 7/28/21 to 8/11/21, DM w/ neuropathy    Exercises/Interventions:     Therapeutic Ex (90984) Sets/sec/Reps Notes/CUES   Supine chin tuck  HEP, form cues   Seated scap set HEP   Seated UT S Started to incr HA, stopped   Supine tennis ball self SOR  HEP   Supine TB no money, HAB,   shoulder ext      form cues R w/ no money   Supine alt SB iso's PT gentle resist 3\" x12 R/L    Supine pec S over towel roll  \"      \"          W/ B shoulder flexion  HEP  HEP        Chin tuck on wall w/ 2 towels   w/ no money  W/ HAB @45*  HEP  HEP  HEP   Fwd lean on table chin tuck 2\" x5    TB row, extension w/ chin tuck RTB 2x10 ea Scap/UT cues w/ fatigue   Wall push up x15              Pt ed: ePOC, HEP,  (), posture, unloading when head is heavy and tired, , ,  x5'    Manual Intervention (77034) x29' total    Supine SOR  Supine gentle man traction   STM to B UT/SO/scalenes  Temporal release    C1-2-3 lat glides gr ll  C6-7 lateral glides ll x25'    Prone thoracic mobs rotation T4-T8   PA's T1-3 gr ll-lll     reassessment x4'                   NMR re-education (06549)  CUES NEEDED                                                Therapeutic Activity (84545)                                         Therapeutic Exercise and NMR EXR  [x] (78474) Provided verbal/tactile cueing for activities related to strengthening, flexibility, endurance, ROM  for improvements in scapular, scapulothoracic and UE control with self care, reaching, carrying, lifting, house/yardwork, driving/computer work.    [] (30449) Provided verbal/tactile cueing for activities related to improving balance, coordination, kinesthetic sense, posture, motor skill, proprioception  to assist with  scapular, scapulothoracic and UE control with self care, reaching, carrying, lifting, house/yardwork, driving/computer work. Therapeutic Activities:    [] (57218 or 55685) Provided verbal/tactile cueing for activities related to improving balance, coordination, kinesthetic sense, posture, motor skill, proprioception and motor activation to allow for proper function of scapular, scapulothoracic and UE control with self care, carrying, lifting, driving/computer work.      Home Exercise Program:    [x] (83030) Reviewed/Progressed HEP activities related to strengthening, flexibility, endurance, ROM of scapular, scapulothoracic and UE control with self care, reaching, carrying, lifting, house/yardwork, driving/computer work  [] (33366) Reviewed/Progressed HEP activities related to improving balance, coordination, kinesthetic sense, posture, motor skill, proprioception of scapular, scapulothoracic and UE control with self care, reaching, carrying, lifting, house/yardwork, driving/computer work      Manual Treatments:  PROM / STM / Oscillations-Mobs:  G-I, II, III, IV (PA's, Inf., Post.)  [x] (73734) Provided manual therapy to mobilize soft tissue/joints of cervical/CT, scapular GHJ and UE for the purpose of modulating pain, promoting relaxation,  increasing ROM, reducing/eliminating soft tissue swelling/inflammation/restriction, improving soft tissue extensibility and allowing for proper ROM for normal function with self care, reaching, carrying, lifting, house/yardwork, driving/computer work    Modalities:  Declined, but pt prefers ice over heat    Charges  Timed Code Treatment Minutes: 45   Total Treatment Minutes: 45     [] EVAL (LOW) 81124   [] EVAL (MOD) 93213   [] EVAL (HIGH) 13073   [] RE-EVAL     [x] LM(12764) x 1    [] IONTO  [] NMR (86662) x     [] VASO  [x] Manual (04167) x2      [] Other:  [] TA x      [] Mech Traction (30849)  [] ES(attended) (26507)      [] ES (un) (31384):     GOALS:  Patient stated goal: reduce pain with driving and daily activities  []? Progressing: []? Met: []? Not Met: []? Adjusted     Therapist goals for Patient:   Short Term Goals: To be achieved in: 2 weeks  1. Independent in HEP and progression per patient tolerance, in order to prevent re-injury. []? Progressing: [x]? Met: []? Not Met: []? Adjusted  2. Patient will have a decrease in pain to facilitate improvement in movement, function, and ADLs as indicated by Functional Deficits. [x]? Progressing: []? Met: []? Not Met: []? Adjusted     Long Term Goals: To be achieved in: 8 weeks  1. Disability index score of 20% or less for the NDI to assist with reaching prior level of function. []? Progressing: []? Met: []? Not Met: []? Adjusted  2. Patient will demonstrate increased AROM to at least 45* rotation of cervical spine to allow for turning head for safe driving.   []? Progressing: [x]? Met: []? Not Met: []? Adjusted  3. Patient will demonstrate an increase in postural awareness and control and activation of  Deep cervical stabilizers to allow for proper functional mobility as indicated by patients Functional Deficits. [x]? Progressing: []? Met: []? Not Met: []? Adjusted   4. Patient will return to unloading  and putting dishes in upper cabinets (looking up and down) without increased symptoms or restriction. [x]? Progressing: []? Met: []? Not Met: []? Adjusted  5. Pt will report at least 50% decrease in HA frequency and severity. [x]? Progressing: []? Met: []? Not Met: []? Adjusted      Progression Towards Functional goals:  [x] Patient is progressing as expected towards functional goals listed. [] Progression is slowed due to complexities listed. [] Progression has been slowed due to co-morbidities. [] Plan just implemented, too soon to assess goals progression  [] Other:     ASSESSMENT:  Pt is making good improvements in posture awareness and AROM.   Pain and HA's still persist with increased activity and pt ed regarding need to take more preemptive breaks to rest posture muscles throughout the day. Overall Progression Towards Functional goals/ Treatment Progress Update:  [x] Patient is progressing as expected towards functional goals listed. [] Progression is slowed due to complexities/Impairments listed. [] Progression has been slowed due to co-morbidities. [] Plan just implemented, too soon to assess goals progression <30days   [] Goals require adjustment due to lack of progress  [] Patient is not progressing as expected and requires additional follow up with physician  [] Other    Prognosis for POC: [x] Good [] Fair  [] Poor      Patient requires continued skilled intervention: [x] Yes  [] No    Treatment/Activity Tolerance:  [x] Patient able to complete treatment  [] Patient limited by fatigue  [] Patient limited by pain    [] Patient limited by other medical complications  [] Other:     Return to Play: (if applicable)   []  Stage 1: Intro to Strength   []  Stage 2: Return to Run and Strength   []  Stage 3: Return to Jump and Strength   []  Stage 4: Dynamic Strength and Agility   []  Stage 5: Sport Specific Training     []  Ready to Return to Play, Meets All Above Stages   []  Not Ready for Return to Sports   Comments:                         PLAN: See eval, cont 1-2x/week until PASHA. [x] Continue per plan of care [] Alter current plan (see comments above)  [] Plan of care initiated [] Hold pending MD visit [] Discharge      Electronically signed by:  Padmini Huffman, PT    Note: If patient does not return for scheduled/ recommended follow up visits, this note will serve as a discharge from care along with most recent update on progress. Access Code: [email or web address]  URL: [email or web address]  Date: 08/26/2021  Prepared by: Padmini Huffman    Exercises  Supine Chin Tuck - 2 x daily - 7 x weekly - 1-2 sets - 5-10 reps - 2-3 seconds hold  Seated Scapular Retraction - 2 x daily - 7 x weekly - 2-3 sets - 5-10 reps - 2-30 seconds hold  Supine Suboccipital Release with Tennis Balls - 1-2 x daily - 7 x weekly  Standing Isometric Cervical Retraction with Chin Tucks and Ball at [employer] [place] - 1 x daily - 7 x weekly - 1-2 sets - 10 reps  Seated Shoulder Horizontal Abduction with Resistance - 1 x daily - 7 x weekly - 2-3 sets - 10 reps  Shoulder External Rotation and Scapular Retraction with Resistance - 1 x daily - 7 x weekly - 2-3 sets - 10 reps

## 2021-09-16 ENCOUNTER — HOSPITAL ENCOUNTER (OUTPATIENT)
Dept: PHYSICAL THERAPY | Age: 71
Setting detail: THERAPIES SERIES
Discharge: HOME | End: 2021-09-16
Payer: COMMERCIAL

## 2021-09-16 PROCEDURE — G0283 ELEC STIM OTHER THAN WOUND: HCPCS | Performed by: PHYSICAL THERAPIST

## 2021-09-16 PROCEDURE — 97140 MANUAL THERAPY 1/> REGIONS: CPT | Performed by: PHYSICAL THERAPIST

## 2021-09-16 NOTE — FLOWSHEET NOTE
[facility], [address]  Phone: [phone]  Fax [phone]        Date:  2021    Patient Name:  Claudean Joe    :  1950  MRN: [id number]  Restrictions/Precautions:    Medical/Treatment Diagnosis Information:  · Diagnosis: M54.2 (ICD-10-CM) - Cervical pain, M47.812 (ICD-10-CM) - Spondylosis of cervical region without myelopathy or radiculopathy  · Treatment Diagnosis: M54.2 (ICD-10-CM) - Cervical pain  Insurance/Certification information:  PT Insurance Information: [address]  - $0DED $0CP 100% PT/OT MED [facility] [phone]  Physician Information:  Referring Practitioner: Dr Jenifer Vigil  Has the plan of care been signed (Y/N):        [x]  Yes  []  No     Date of Patient follow up with Physician: none scheduled 21      Is this a Progress Report:     []  Yes  [x]  No        If Yes:  Date Range for reporting period:  Beginnin21  Ending:     Progress report will be due (10 Rx or 30 days whichever is less): 3/94/51       Recertification will be due (POC Duration  / 90 days whichever is less): 8 weeks      Visit # Insurance Allowable Auth Required   In-person 8 No auth until 30 []  Yes []  No    Telehealth   []  Yes []  No    Total            Functional Scale: NDI 36%    Date assessed:  21  NDI 34%      21     Therapy Diagnosis/Practice Pattern:F, conservative      Number of Comorbidities:  []0     []1-2    [x]3+    Latex Allergy:  [x]NO      []YES  Preferred Language for Healthcare:   [x]English       []other:      Pain level:  eval 7/10  Current \"bad\"/10    SUBJECTIVE:  Pt reports she did not sleep well last night and has a terrible HA today. She took ibuprofen this morning, but it usually doesn't help much.     OBJECTIVE: PN    Observation:    Test measurements:      MRI results 21:  Retrolisthesis of C5 relative to C4, and C6.  Disc and osteophytes narrow the   neural foramina at C3-C4, C4-C5, and C5-C6 as discussed above.  There is mild   stenosis of the thecal sac at C5-C6.        RESTRICTIONS/PRECAUTIONS: trigeminal neuralgia, 5 mm largely stable anterior listhesis C4-C5 from x-rays read by MD on 7/28/21 to 8/11/21, DM w/ neuropathy    Exercises/Interventions:     Therapeutic Ex (64832) Sets/sec/Reps Notes/CUES   Supine chin tuck  HEP, form cues   Seated scap set HEP   Seated UT S Started to incr HA, stopped   Supine tennis ball self SOR  HEP   Supine TB no money, HAB,   shoulder ext      form cues R w/ no money   Supine alt SB iso's PT gentle resist    Supine pec S over towel roll  \"      \"          W/ B shoulder flexion HEP  HEP       Chin tuck on wall w/ 2 towels   w/ no money  W/ HAB @45* HEP  HEP  HEP   Fwd lean on table chin tuck    TB row, extension w/ chin tuck Scap/UT cues w/ fatigue   Wall push up              Pt ed: ePOC, HEP,  (), posture, unloading when head is heavy and tired, , ,  '    Manual Intervention (30665) x30' total    Supine SOR  Supine gentle man traction   STM to B UT/SO/scalenes  Temporal release  UT S R/ in neutral SB   x30'    Prone thoracic mobs rotation T4-T8   PA's T1-3 gr ll-lll     reassessment                    NMR re-education (19532)  CUES NEEDED                                                Therapeutic Activity (29492)                                         Therapeutic Exercise and NMR EXR  [x] (24669) Provided verbal/tactile cueing for activities related to strengthening, flexibility, endurance, ROM  for improvements in scapular, scapulothoracic and UE control with self care, reaching, carrying, lifting, house/yardwork, driving/computer work.    [] (78422) Provided verbal/tactile cueing for activities related to improving balance, coordination, kinesthetic sense, posture, motor skill, proprioception  to assist with  scapular, scapulothoracic and UE control with self care, reaching, carrying, lifting, house/yardwork, driving/computer work. Therapeutic Activities:    [] (76705 or 52942) Provided verbal/tactile cueing for activities related to improving balance, coordination, kinesthetic sense, posture, motor skill, proprioception and motor activation to allow for proper function of scapular, scapulothoracic and UE control with self care, carrying, lifting, driving/computer work. Home Exercise Program:    [x] (13034) Reviewed/Progressed HEP activities related to strengthening, flexibility, endurance, ROM of scapular, scapulothoracic and UE control with self care, reaching, carrying, lifting, house/yardwork, driving/computer work  [] (32543) Reviewed/Progressed HEP activities related to improving balance, coordination, kinesthetic sense, posture, motor skill, proprioception of scapular, scapulothoracic and UE control with self care, reaching, carrying, lifting, house/yardwork, driving/computer work      Manual Treatments:  PROM / STM / Oscillations-Mobs:  G-I, II, III, IV (PA's, Inf., Post.)  [x] (05764) Provided manual therapy to mobilize soft tissue/joints of cervical/CT, scapular GHJ and UE for the purpose of modulating pain, promoting relaxation,  increasing ROM, reducing/eliminating soft tissue swelling/inflammation/restriction, improving soft tissue extensibility and allowing for proper ROM for normal function with self care, reaching, carrying, lifting, house/yardwork, driving/computer work    Modalities:  (pt prefers ice over heat)  Supine B PM/CP x15'    Charges  Timed Code Treatment Minutes: 30   Total Treatment Minutes: 45     [] EVAL (LOW) 00270   [] EVAL (MOD) 82896   [] EVAL (HIGH) 25652   [] RE-EVAL     [] JY(07631) x     [] IONTO  [] NMR (49627) x     [] VASO  [x] Manual (25473) x2      [] Other:  [] TA x      [] Mech Traction (25245)  [] ES(attended) (32568)      [x] ES (un) (10916):     GOALS:  Patient stated goal: reduce pain with driving and daily activities  []? Progressing: []? Met: []?  Not Met: []? Adjusted     Therapist goals for Patient:   Short Term Goals: To be achieved in: 2 weeks  1. Independent in HEP and progression per patient tolerance, in order to prevent re-injury. []? Progressing: [x]? Met: []? Not Met: []? Adjusted  2. Patient will have a decrease in pain to facilitate improvement in movement, function, and ADLs as indicated by Functional Deficits. [x]? Progressing: []? Met: []? Not Met: []? Adjusted     Long Term Goals: To be achieved in: 8 weeks  1. Disability index score of 20% or less for the NDI to assist with reaching prior level of function. [x]? Progressing: []? Met: []? Not Met: []? Adjusted  2. Patient will demonstrate increased AROM to at least 45* rotation of cervical spine to allow for turning head for safe driving.   []? Progressing: [x]? Met: []? Not Met: []? Adjusted  3. Patient will demonstrate an increase in postural awareness and control and activation of  Deep cervical stabilizers to allow for proper functional mobility as indicated by patients Functional Deficits. [x]? Progressing: []? Met: []? Not Met: []? Adjusted   4. Patient will return to unloading  and putting dishes in upper cabinets (looking up and down) without increased symptoms or restriction. [x]? Progressing: []? Met: []? Not Met: []? Adjusted  5. Pt will report at least 50% decrease in HA frequency and severity. [x]? Progressing: []? Met: []? Not Met: []? Adjusted      Progression Towards Functional goals:  [x] Patient is progressing as expected towards functional goals listed. [] Progression is slowed due to complexities listed. [] Progression has been slowed due to co-morbidities. [] Plan just implemented, too soon to assess goals progression  [] Other:     ASSESSMENT:  Increased sensitivity to STM, landen L side this visit. Trial of ES to help with pain. Pt reported some decrease in HA symptoms following session, but not fully resolved.     Overall Progression Towards Functional goals/ Treatment Progress Update:  [x] Patient is progressing as expected towards functional goals listed. [] Progression is slowed due to complexities/Impairments listed. [] Progression has been slowed due to co-morbidities. [] Plan just implemented, too soon to assess goals progression <30days   [] Goals require adjustment due to lack of progress  [] Patient is not progressing as expected and requires additional follow up with physician  [] Other    Prognosis for POC: [x] Good [] Fair  [] Poor      Patient requires continued skilled intervention: [x] Yes  [] No    Treatment/Activity Tolerance:  [x] Patient able to complete treatment  [] Patient limited by fatigue  [] Patient limited by pain    [] Patient limited by other medical complications  [] Other:     Return to Play: (if applicable)   []  Stage 1: Intro to Strength   []  Stage 2: Return to Run and Strength   []  Stage 3: Return to Jump and Strength   []  Stage 4: Dynamic Strength and Agility   []  Stage 5: Sport Specific Training     []  Ready to Return to Play, Meets All Above Stages   []  Not Ready for Return to Sports   Comments:                         PLAN: See edwin [facility] 1-2x/week until PASHA. [x] Continue per plan of care [] Alter current plan (see comments above)  [] Plan of care initiated [] Hold pending MD visit [] Discharge      Electronically signed by:  Hayes Mar PT    Note: If patient does not return for scheduled/ recommended follow up visits, this note will serve as a discharge from care along with most recent update on progress. Access Code: [email or web address]  URL: [email or web address]  Date: 08/26/2021  Prepared by: Hayes Mar    Exercises  Supine Chin Tuck - 2 x daily - 7 x weekly - 1-2 sets - 5-10 reps - 2-3 seconds hold  Seated Scapular Retraction - 2 x daily - 7 x weekly - 2-3 sets - 5-10 reps - 2-30 seconds hold  Supine Suboccipital Release with Tennis Balls - 1-2 x daily - 7 x weekly  Standing Isometric Cervical Retraction with Chin Tucks and Ball at [place] - 1 x daily - 7 x weekly - 1-2 sets - 10 reps  Seated Shoulder Horizontal Abduction with Resistance - 1 x daily - 7 x weekly - 2-3 sets - 10 reps  Shoulder External Rotation and Scapular Retraction with Resistance - 1 x daily - 7 x weekly - 2-3 sets - 10 reps

## 2021-09-23 ENCOUNTER — HOSPITAL ENCOUNTER (OUTPATIENT)
Dept: PHYSICAL THERAPY | Age: 71
Setting detail: THERAPIES SERIES
Discharge: HOME | End: 2021-09-23
Payer: COMMERCIAL

## 2021-09-23 PROCEDURE — 97110 THERAPEUTIC EXERCISES: CPT | Performed by: PHYSICAL THERAPIST

## 2021-09-23 PROCEDURE — 97140 MANUAL THERAPY 1/> REGIONS: CPT | Performed by: PHYSICAL THERAPIST

## 2021-09-23 NOTE — FLOWSHEET NOTE
[facility], [address] [place]  Phone: [phone]  Fax [phone]        Date:  2021    Patient Name:  Priscilla Read    :  1950  MRN: [id number]  Restrictions/Precautions:    Medical/Treatment Diagnosis Information:  · Diagnosis: M54.2 (ICD-10-CM) - Cervical pain, M47.812 (ICD-10-CM) - Spondylosis of cervical region without myelopathy or radiculopathy  · Treatment Diagnosis: M54.2 (ICD-10-CM) - Cervical pain  Insurance/Certification information:  PT Insurance Information: Pauline Dinora DUAL  - $0DED $0CP 100% PT/OT MED [facility] [phone]  Physician Information:  Referring Practitioner: Dr Smita Maldonado  Has the plan of care been signed (Y/N):        [x]  Yes  []  No     Date of Patient follow up with Physician: none scheduled 21      Is this a Progress Report:     []  Yes  [x]  No        If Yes:  Date Range for reporting period:  Beginnin21  Endin21    Progress report will be due (10 Rx or 30 days whichever is less):        Recertification will be due (POC Duration  / 90 days whichever is less): 8 weeks      Visit # Insurance Allowable Auth Required   In-person 9 No auth until 30 []  Yes []  No    Telehealth   []  Yes []  No    Total            Functional Scale: NDI 36%    Date assessed:  21  NDI 34%      21     Therapy Diagnosis/Practice Pattern:F, conservative      Number of Comorbidities:  []0     []1-2    [x]3+    Latex Allergy:  [x]NO      []YES  Preferred Language for Healthcare:   [x]English       []other:      Pain level:  eval 7/10  Current \"5-6\"/10    SUBJECTIVE:  Pt reports she has been using her traction unit a lot lately, but has not been as good with her other HEP due to being so busy at [employer] with the yard sale. She denies HA today, but continues to get them more days than not.   She reports a couple hours of relief following PT sessions, but pain usually returns. OBJECTIVE:     Observation:    Test measurements:      MRI results 8/16/21:  Retrolisthesis of C5 relative to C4, and C6.  Disc and osteophytes narrow the   neural foramina at C3-C4, C4-C5, and C5-C6 as discussed above.  There is mild   stenosis of the thecal sac at C5-C6.        RESTRICTIONS/PRECAUTIONS: trigeminal neuralgia, 5 mm largely stable anterior listhesis C4-C5 from x-rays read by MD on 7/28/21 to 8/11/21, DM w/ neuropathy    Exercises/Interventions:     Therapeutic Ex (50062) Sets/sec/Reps Notes/CUES   Supine chin tuck  HEP, form cues   Seated scap set HEP   Seated UT S Started to incr HA, stopped   Supine tennis ball self SOR  HEP   Supine TB no money, HAB,   shoulder ext      form cues R w/ no money   Supine alt SB iso's PT gentle resist    Supine pec S over towel roll  \"      \"          W/ B shoulder flexion x2'  x10 HEP  HEP       Chin tuck on wall w/ 2 towels   w/ no money  W/ HAB @45* HEP  HEP  HEP   Fwd lean on table chin tuck    TB row, extension w/ chin tuck RTB 2x10 eaScap/UT cues w/ fatigue   Wall push up x20             Pt ed: ePOC, HEP--importance of postural strength/stability for decreasing pain landen end of day,  (), posture, unloading when head is heavy and tired, , ,  x5'    Manual Intervention (77642) x20' total    Supine SOR  Supine gentle man traction   STM to B UT/SO/scalenes  Temporal release  SB  C1-2-3 lat glides gr ll  C6-7 lateral glides  x20'    Prone thoracic mobs rotation T4-T8   PA's T1-3 gr ll-lll     reassessment                    NMR re-education (20880)  CUES NEEDED                                                Therapeutic Activity (13682)                                         Therapeutic Exercise and NMR EXR  [x] (33591) Provided verbal/tactile cueing for activities related to strengthening, flexibility, endurance, ROM  for improvements in scapular, scapulothoracic and UE control with self care, reaching, carrying, lifting, house/yardwork, driving/computer work.    [] (68380) Provided verbal/tactile cueing for activities related to improving balance, coordination, kinesthetic sense, posture, motor skill, proprioception  to assist with  scapular, scapulothoracic and UE control with self care, reaching, carrying, lifting, house/yardwork, driving/computer work. Therapeutic Activities:    [] (94734 or 99196) Provided verbal/tactile cueing for activities related to improving balance, coordination, kinesthetic sense, posture, motor skill, proprioception and motor activation to allow for proper function of scapular, scapulothoracic and UE control with self care, carrying, lifting, driving/computer work.      Home Exercise Program:    [x] (54933) Reviewed/Progressed HEP activities related to strengthening, flexibility, endurance, ROM of scapular, scapulothoracic and UE control with self care, reaching, carrying, lifting, house/yardwork, driving/computer work  [] (01275) Reviewed/Progressed HEP activities related to improving balance, coordination, kinesthetic sense, posture, motor skill, proprioception of scapular, scapulothoracic and UE control with self care, reaching, carrying, lifting, house/yardwork, driving/computer work      Manual Treatments:  PROM / STM / Oscillations-Mobs:  G-I, II, III, IV (PA's, Inf., Post.)  [x] (77285) Provided manual therapy to mobilize soft tissue/joints of cervical/CT, scapular GHJ and UE for the purpose of modulating pain, promoting relaxation,  increasing ROM, reducing/eliminating soft tissue swelling/inflammation/restriction, improving soft tissue extensibility and allowing for proper ROM for normal function with self care, reaching, carrying, lifting, house/yardwork, driving/computer work    Modalities:  (pt prefers ice over heat)  S'    Charges  Timed Code Treatment Minutes: 40   Total Treatment Minutes: 40     [] EVAL (LOW) 87507   [] EVAL (MOD) 45486   [] EVAL (HIGH) 71359   [] RE-EVAL     [x] AA(50810) x2     [] IONTO  [] NMR (45124) x     [] VASO  [x] Manual (21146) x1      [] Other:  [] TA x      [] Mech Traction (66006)  [] ES(attended) (82258)      [] ES (un) (45882):     GOALS:  Patient stated goal: reduce pain with driving and daily activities  []? Progressing: []? Met: []? Not Met: []? Adjusted     Therapist goals for Patient:   Short Term Goals: To be achieved in: 2 weeks  1. Independent in HEP and progression per patient tolerance, in order to prevent re-injury. []? Progressing: [x]? Met: []? Not Met: []? Adjusted  2. Patient will have a decrease in pain to facilitate improvement in movement, function, and ADLs as indicated by Functional Deficits. [x]? Progressing: []? Met: []? Not Met: []? Adjusted     Long Term Goals: To be achieved in: 8 weeks  1. Disability index score of 20% or less for the NDI to assist with reaching prior level of function. [x]? Progressing: []? Met: []? Not Met: []? Adjusted  2. Patient will demonstrate increased AROM to at least 45* rotation of cervical spine to allow for turning head for safe driving.   []? Progressing: [x]? Met: []? Not Met: []? Adjusted  3. Patient will demonstrate an increase in postural awareness and control and activation of  Deep cervical stabilizers to allow for proper functional mobility as indicated by patients Functional Deficits. [x]? Progressing: []? Met: []? Not Met: []? Adjusted   4. Patient will return to unloading  and putting dishes in upper cabinets (looking up and down) without increased symptoms or restriction. [x]? Progressing: []? Met: []? Not Met: []? Adjusted  5. Pt will report at least 50% decrease in HA frequency and severity. [x]? Progressing: []? Met: []? Not Met: []? Adjusted      Progression Towards Functional goals:  [x] Patient is progressing as expected towards functional goals listed. [] Progression is slowed due to complexities listed. [] Progression has been slowed due to co-morbidities.   [] Plan just implemented, too soon to assess goals progression  [] Other:     ASSESSMENT: better tolerance to STW this visit and able to resume posture/scap strengthening this visit. Pt ed on importance of HEP compliance for progression and alleviating pain. Overall Progression Towards Functional goals/ Treatment Progress Update:  [x] Patient is progressing as expected towards functional goals listed. [] Progression is slowed due to complexities/Impairments listed. [] Progression has been slowed due to co-morbidities. [] Plan just implemented, too soon to assess goals progression <30days   [] Goals require adjustment due to lack of progress  [] Patient is not progressing as expected and requires additional follow up with physician  [] Other    Prognosis for POC: [x] Good [] Fair  [] Poor      Patient requires continued skilled intervention: [x] Yes  [] No    Treatment/Activity Tolerance:  [x] Patient able to complete treatment  [] Patient limited by fatigue  [] Patient limited by pain    [] Patient limited by other medical complications  [] Other:     Return to Play: (if applicable)   []  Stage 1: Intro to Strength   []  Stage 2: Return to Run and Strength   []  Stage 3: Return to Jump and Strength   []  Stage 4: Dynamic Strength and Agility   []  Stage 5: Sport Specific Training     []  Ready to Return to Play, Meets All Above Stages   []  Not Ready for Return to Sports   Comments:                         PLAN: See eval, cont 1-2x/week until PASHA. [x] Continue per plan of care [] Alter current plan (see comments above)  [] Plan of care initiated [] Hold pending MD visit [] Discharge      Electronically signed by:  Josh Lord, PT    Note: If patient does not return for scheduled/ recommended follow up visits, this note will serve as a discharge from care along with most recent update on progress. Access Code: [email or web address]  URL: [email or web address]  Date: 08/26/2021  Prepared by: Rachele Amaral Francisco Jrenberger    Exercises  Supine Chin Tuck - 2 x daily - 7 x weekly - 1-2 sets - 5-10 reps - 2-3 seconds hold  Seated Scapular Retraction - 2 x daily - 7 x weekly - 2-3 sets - 5-10 reps - 2-30 seconds hold  Supine Suboccipital Release with Tennis Balls - 1-2 x daily - 7 x weekly  Standing Isometric Cervical Retraction with Chin Tucks and Ball at [place] - 1 x daily - 7 x weekly - 1-2 sets - 10 reps  Seated Shoulder Horizontal Abduction with Resistance - 1 x daily - 7 x weekly - 2-3 sets - 10 reps  Shoulder External Rotation and Scapular Retraction with Resistance - 1 x daily - 7 x weekly - 2-3 sets - 10 reps

## 2021-09-30 ENCOUNTER — HOSPITAL ENCOUNTER (OUTPATIENT)
Dept: PHYSICAL THERAPY | Age: 71
Setting detail: THERAPIES SERIES
Discharge: HOME | End: 2021-09-30
Payer: COMMERCIAL

## 2021-09-30 PROCEDURE — G0283 ELEC STIM OTHER THAN WOUND: HCPCS | Performed by: PHYSICAL THERAPIST

## 2021-09-30 PROCEDURE — 97140 MANUAL THERAPY 1/> REGIONS: CPT | Performed by: PHYSICAL THERAPIST

## 2021-09-30 NOTE — FLOWSHEET NOTE
[facility], [address] [place]  Phone: [phone]  Fax [phone]        Date:  2021    Patient Name:  Cherry Myles    :  1950  MRN: [id number]  Restrictions/Precautions:    Medical/Treatment Diagnosis Information:  · Diagnosis: M54.2 (ICD-10-CM) - Cervical pain, M47.812 (ICD-10-CM) - Spondylosis of cervical region without myelopathy or radiculopathy  · Treatment Diagnosis: M54.2 (ICD-10-CM) - Cervical pain  Insurance/Certification information:  PT Insurance Information: [address]  - $0DED $0CP 100% PT/OT MED [facility] Malone King [phone]  Physician Information:  Referring Practitioner: Dr Home Simons  Has the plan of care been signed (Y/N):        [x]  Yes  []  No     Date of Patient follow up with Physician: none scheduled 21      Is this a Progress Report:     []  Yes  [x]  No        If Yes:  Date Range for reporting period:  Beginnin21  Endin21    Progress report will be due (10 Rx or 30 days whichever is less):        Recertification will be due (POC Duration  / 90 days whichever is less): 8 weeks      Visit # Insurance Allowable Auth Required   In-person 10 No auth until 30 []  Yes []  No    Telehealth   []  Yes []  No    Total            Functional Scale: NDI 36%    Date assessed:  21  NDI 34%      21     Therapy Diagnosis/Practice Pattern:F, conservative      Number of Comorbidities:  []0     []1-2    [x]3+    Latex Allergy:  [x]NO      []YES  Preferred Language for Healthcare:   [x]English       []other:      Pain level:  eval 7/10  Current 8/10    SUBJECTIVE:  Pt reports she has been very busy at [employer] still and has not been doing any of her HEP or using traction. She states she did not sleep well last night and is in severe pain with HA.     OBJECTIVE:     Observation:    Test measurements:      MRI results 21:  Retrolisthesis of C5 relative to C4, and C6.  Disc and osteophytes narrow the   neural foramina at C3-C4, C4-C5, and C5-C6 as discussed above.  There is mild   stenosis of the thecal sac at C5-C6.        RESTRICTIONS/PRECAUTIONS: trigeminal neuralgia, 5 mm largely stable anterior listhesis C4-C5 from x-rays read by MD on 7/28/21 to 8/11/21, DM w/ neuropathy    Exercises/Interventions:     Therapeutic Ex (99708) Sets/sec/Reps Notes/CUES   Supine chin tuck  HEP, form cues   Seated scap set HEP   Seated UT S Started to incr HA, stopped   Supine tennis ball self SOR  HEP   Supine TB no money, HAB,   shoulder ext      form cues R w/ no money   Supine alt SB iso's PT gentle resist    Supine pec S over towel roll  \"      \"          W/ B shoulder flexion  HEP  HEP       Chin tuck on wall w/ 2 towels   w/ no money  W/ HAB @45* HEP  HEP  HEP   Fwd lean on table chin tuck    TB row, extension w/ chin tuck Scap/UT cues w/ fatigue   Wall push up              Pt ed: e, HEP--importance of postural strength/stability for decreasing pain landen end of day, self care and symptom management  (), posture, unloading when head is heavy and tired, , ,  x6'    Manual Intervention (24769) x40' total    Supine SOR  Supine gentle man traction   STM to B UT/SO/scalenes  Temporal release  TPR prox scalenes R/L   SB  C1-2-3 lat glides gr ll    Gentle PA's T1-C7 x40'    Prone thoracic mobs rotation T4-T8   PA's T1-3 gr ll-lll     reassessment                    NMR re-education (19100)  CUES NEEDED                                                Therapeutic Activity (52733)                                         Therapeutic Exercise and NMR EXR  [x] (90140) Provided verbal/tactile cueing for activities related to strengthening, flexibility, endurance, ROM  for improvements in scapular, scapulothoracic and UE control with self care, reaching, carrying, lifting, house/yardwork, driving/computer work.    [] (40598) Provided verbal/tactile cueing for activities related to improving balance, coordination, kinesthetic sense, posture, motor skill, proprioception  to assist with  scapular, scapulothoracic and UE control with self care, reaching, carrying, lifting, house/yardwork, driving/computer work. Therapeutic Activities:    [] (73864 or 57867) Provided verbal/tactile cueing for activities related to improving balance, coordination, kinesthetic sense, posture, motor skill, proprioception and motor activation to allow for proper function of scapular, scapulothoracic and UE control with self care, carrying, lifting, driving/computer work.      Home Exercise Program:    [x] (71210) Reviewed/Progressed HEP activities related to strengthening, flexibility, endurance, ROM of scapular, scapulothoracic and UE control with self care, reaching, carrying, lifting, house/yardwork, driving/computer work  [] (24792) Reviewed/Progressed HEP activities related to improving balance, coordination, kinesthetic sense, posture, motor skill, proprioception of scapular, scapulothoracic and UE control with self care, reaching, carrying, lifting, house/yardwork, driving/computer work      Manual Treatments:  PROM / STM / Oscillations-Mobs:  G-I, II, III, IV (PA's, Inf., Post.)  [x] (00313) Provided manual therapy to mobilize soft tissue/joints of cervical/CT, scapular GHJ and UE for the purpose of modulating pain, promoting relaxation,  increasing ROM, reducing/eliminating soft tissue swelling/inflammation/restriction, improving soft tissue extensibility and allowing for proper ROM for normal function with self care, reaching, carrying, lifting, house/yardwork, driving/computer work    Modalities:  (pt prefers ice over heat)  Supine B PM/CP x15'    Charges  Timed Code Treatment Minutes: 40   Total Treatment Minutes:      [] EVAL (LOW) 70248   [] EVAL (MOD) 86216   [] EVAL (HIGH) 11200   [] RE-EVAL     [] PF(34027) x     [] IONTO  [] NMR (07290) x     [] VASO  [x] Manual (40848) x3      [] Other:  [] TA x [] [facility] Traction (35360)  [] ES(attended) (34201)      [x] ES (un) (23937):     GOALS:  Patient stated goal: reduce pain with driving and daily activities  []? Progressing: []? Met: []? Not Met: []? Adjusted     Therapist goals for Patient:   Short Term Goals: To be achieved in: 2 weeks  1. Independent in HEP and progression per patient tolerance, in order to prevent re-injury. []? Progressing: [x]? Met: []? Not Met: []? Adjusted  2. Patient will have a decrease in pain to facilitate improvement in movement, function, and ADLs as indicated by Functional Deficits. [x]? Progressing: []? Met: []? Not Met: []? Adjusted     Long Term Goals: To be achieved in: 8 weeks  1. Disability index score of 20% or less for the NDI to assist with reaching prior level of function. [x]? Progressing: []? Met: []? Not Met: []? Adjusted  2. Patient will demonstrate increased AROM to at least 45* rotation of cervical spine to allow for turning head for safe driving.   []? Progressing: [x]? Met: []? Not Met: []? Adjusted  3. Patient will demonstrate an increase in postural awareness and control and activation of  Deep cervical stabilizers to allow for proper functional mobility as indicated by patients Functional Deficits. [x]? Progressing: []? Met: []? Not Met: []? Adjusted   4. Patient will return to unloading  and putting dishes in upper cabinets (looking up and down) without increased symptoms or restriction. [x]? Progressing: []? Met: []? Not Met: []? Adjusted  5. Pt will report at least 50% decrease in HA frequency and severity. [x]? Progressing: []? Met: []? Not Met: []? Adjusted      Progression Towards Functional goals:  [x] Patient is progressing as expected towards functional goals listed. [] Progression is slowed due to complexities listed. [] Progression has been slowed due to co-morbidities.   [] Plan just implemented, too soon to assess goals progression  [] Other:     ASSESSMENT: pt with increased pain responses and tenderness at start of session. Increased time spent with manuals to decrease pain and HA with pian level 5/10 following session. Pt ed on importance of self care and symptom management and HEP for pain control. Overall Progression Towards Functional goals/ Treatment Progress Update:  [x] Patient is progressing as expected towards functional goals listed. [] Progression is slowed due to complexities/Impairments listed. [] Progression has been slowed due to co-morbidities. [] Plan just implemented, too soon to assess goals progression <30days   [] Goals require adjustment due to lack of progress  [] Patient is not progressing as expected and requires additional follow up with physician  [] Other    Prognosis for POC: [x] Good [] Fair  [] Poor      Patient requires continued skilled intervention: [x] Yes  [] No    Treatment/Activity Tolerance:  [x] Patient able to complete treatment  [] Patient limited by fatigue  [] Patient limited by pain    [] Patient limited by other medical complications  [] Other:     Return to Play: (if applicable)   []  Stage 1: Intro to Strength   []  Stage 2: Return to Run and Strength   []  Stage 3: Return to Jump and Strength   []  Stage 4: Dynamic Strength and Agility   []  Stage 5: Sport Specific Training     []  Ready to Return to Play, Meets All Above Stages   []  Not Ready for Return to Sports   Comments:                         PLAN: See eval, cont 1-2x/week until PASHA. [x] Continue per plan of care [] Alter current plan (see comments above)  [] Plan of care initiated [] Hold pending MD visit [] Discharge      Electronically signed by:  Matt Hay, PT    Note: If patient does not return for scheduled/ recommended follow up visits, this note will serve as a discharge from care along with most recent update on progress. Access Code: [email or web address]  URL: [email or web address]  Date: 08/26/2021  Prepared by: Steven Betancur Francisco Jrenberger    Exercises  Supine Chin Tuck - 2 x daily - 7 x weekly - 1-2 sets - 5-10 reps - 2-3 seconds hold  Seated Scapular Retraction - 2 x daily - 7 x weekly - 2-3 sets - 5-10 reps - 2-30 seconds hold  Supine Suboccipital Release with Tennis Balls - 1-2 x daily - 7 x weekly  Standing Isometric Cervical Retraction with Chin Tucks and Ball at [employer] - 1 x daily - 7 x weekly - 1-2 sets - 10 reps  Seated Shoulder Horizontal Abduction with Resistance - 1 x daily - 7 x weekly - 2-3 sets - 10 reps  Shoulder External Rotation and Scapular Retraction with Resistance - 1 x daily - 7 x weekly - 2-3 sets - 10 reps

## 2021-10-04 ENCOUNTER — HOSPITAL ENCOUNTER (OUTPATIENT)
Dept: PHYSICAL THERAPY | Age: 71
Setting detail: THERAPIES SERIES
Discharge: HOME | End: 2021-10-04
Payer: COMMERCIAL

## 2021-10-04 ENCOUNTER — TELEPHONE (OUTPATIENT)
Dept: FAMILY MEDICINE CLINIC | Age: 71
End: 2021-10-04

## 2021-10-04 PROCEDURE — 97140 MANUAL THERAPY 1/> REGIONS: CPT | Performed by: PHYSICAL THERAPIST

## 2021-10-04 PROCEDURE — 97110 THERAPEUTIC EXERCISES: CPT | Performed by: PHYSICAL THERAPIST

## 2021-10-04 NOTE — TELEPHONE ENCOUNTER
Patient would like to know if it is safe for her to get the [address] [place] Booster vaccine with the flu shot please advise

## 2021-10-04 NOTE — FLOWSHEET NOTE
[facility], [address] [place]  Phone: [phone]  Fax [phone]        Date:  10/4/2021    Patient Name:  Thiago Marmolejo    :  1950  MRN: [id number]  Restrictions/Precautions:    Medical/Treatment Diagnosis Information:  · Diagnosis: M54.2 (ICD-10-CM) - Cervical pain, M47.812 (ICD-10-CM) - Spondylosis of cervical region without myelopathy or radiculopathy  · Treatment Diagnosis: M54.2 (ICD-10-CM) - Cervical pain  Insurance/Certification information:  PT Insurance Information: [address]  - $0DED $0CP 100% PT/OT MED [facility] [phone]  Physician Information:  Referring Practitioner: Dr Brook Elizabeth  Has the plan of care been signed (Y/N):        [x]  Yes  []  No     Date of Patient follow up with Physician: none scheduled 21      Is this a Progress Report:     []  Yes  [x]  No        If Yes:  Date Range for reporting period:  Beginnin21  Endin21    Progress report will be due (10 Rx or 30 days whichever is less):        Recertification will be due (POC Duration  / 90 days whichever is less): 8 weeks      Visit # Insurance Allowable Auth Required   In-person 11 No auth until 30 []  Yes []  No    Telehealth   []  Yes []  No    Total            Functional Scale: NDI 36%    Date assessed:  21  NDI 34%      21     Therapy Diagnosis/Practice Pattern:F, conservative      Number of Comorbidities:  []0     []1-2    [x]3+    Latex Allergy:  [x]NO      []YES  Preferred Language for Healthcare:   [x]English       []other:      Pain level:  eval 7/10  Current 5-6/10    SUBJECTIVE:  Pt reports she did try using her traction over the weekend, but notes it is not as comfortable as it used to be. She states she has done some of the chin tucks, but not any of the other HEP.     OBJECTIVE:     Observation:    Test measurements:      MRI results 21:  Retrolisthesis of C5 relative to C4, and C6.  Disc and osteophytes narrow the   neural foramina at C3-C4, C4-C5, and C5-C6 as discussed above.  There is mild   stenosis of the thecal sac at C5-C6.        RESTRICTIONS/PRECAUTIONS: trigeminal neuralgia, 5 mm largely stable anterior listhesis C4-C5 from x-rays read by MD on 7/28/21 to 8/11/21, DM w/ neuropathy    Exercises/Interventions:     Therapeutic Ex (46209) Sets/sec/Reps Notes/CUES   Supine chin tuck 2\" x10 HEP, form cues   Seated scap set HEP   Seated UT S Started to incr HA, stopped   Supine tennis ball self SOR  HEP   Supine TB no money, HAB,   shoulder ext      form cues R w/ no money   Supine alt SB iso's PT gentle resist 3\" x12 R/L   Supine pec S over towel roll  \"      \"          W/ B shoulder flexion  HEP  HEP       Chin tuck on wall w/ 2 towels   w/ no money  W/ HAB @45* RTB 2x10  RTB [id number]  HEP  HEP   Fwd lean on table chin tuck    TB row, extension w/ chin tuck Scap/UT cues w/ fatigue   Wall push up              Pt ed: e, HEP--importance of postural strength/stability for decreasing pain landen end of day, self care and symptom management  (), posture, unloading when head is heavy and tired, , PM&R for pain management,  x6'    Manual Intervention (90386) x25' total    Supine SOR  Supine gentle man traction   STM to B UT/SO/scalenes  Temporal release  TPR prox scalenes R/L   SB  C1-2-3 lat glides gr ll    Gentle PA's T1-C7  Mulligan rotation C6-7, C7-T1 R/L x25'    Prone thoracic mobs rotation T4-T8   PA's T1-3 gr ll-lll     reassessment                    NMR re-education (07516)  CUES NEEDED                                                Therapeutic Activity (05321)                                         Therapeutic Exercise and NMR EXR  [x] (29286) Provided verbal/tactile cueing for activities related to strengthening, flexibility, endurance, ROM  for improvements in scapular, scapulothoracic and UE control with self care, reaching, carrying, lifting, house/yardwork, driving/computer work.    [] (98857) Provided verbal/tactile cueing for activities related to improving balance, coordination, kinesthetic sense, posture, motor skill, proprioception  to assist with  scapular, scapulothoracic and UE control with self care, reaching, carrying, lifting, house/yardwork, driving/computer work. Therapeutic Activities:    [] (37960 or 45827) Provided verbal/tactile cueing for activities related to improving balance, coordination, kinesthetic sense, posture, motor skill, proprioception and motor activation to allow for proper function of scapular, scapulothoracic and UE control with self care, carrying, lifting, driving/computer work.      Home Exercise Program:    [x] (67251) Reviewed/Progressed HEP activities related to strengthening, flexibility, endurance, ROM of scapular, scapulothoracic and UE control with self care, reaching, carrying, lifting, house/yardwork, driving/computer work  [] (42731) Reviewed/Progressed HEP activities related to improving balance, coordination, kinesthetic sense, posture, motor skill, proprioception of scapular, scapulothoracic and UE control with self care, reaching, carrying, lifting, house/yardwork, driving/computer work      Manual Treatments:  PROM / STM / Oscillations-Mobs:  G-I, II, III, IV (PA's, Inf., Post.)  [x] (50108) Provided manual therapy to mobilize soft tissue/joints of cervical/CT, scapular GHJ and UE for the purpose of modulating pain, promoting relaxation,  increasing ROM, reducing/eliminating soft tissue swelling/inflammation/restriction, improving soft tissue extensibility and allowing for proper ROM for normal function with self care, reaching, carrying, lifting, house/yardwork, driving/computer work    Modalities:  (pt prefers ice over heat)  ' Pt declined    Charges  Timed Code Treatment Minutes: 40   Total Treatment Minutes: 40     [] EVAL (LOW) 40322   [] EVAL (MOD) 72001   [] EVAL (HIGH) 48921   [] RE-EVAL     [x] DP(77072) x1 [] IONTO  [] NMR (27193) x     [] VASO  [x] Manual (62648) x2      [] Other:  [] TA x      [] Mech Traction (73075)  [] ES(attended) (88503)      [] ES (un) (12952):     GOALS:  Patient stated goal: reduce pain with driving and daily activities  []? Progressing: []? Met: []? Not Met: []? Adjusted     Therapist goals for Patient:   Short Term Goals: To be achieved in: 2 weeks  1. Independent in HEP and progression per patient tolerance, in order to prevent re-injury. []? Progressing: [x]? Met: []? Not Met: []? Adjusted  2. Patient will have a decrease in pain to facilitate improvement in movement, function, and ADLs as indicated by Functional Deficits. [x]? Progressing: []? Met: []? Not Met: []? Adjusted     Long Term Goals: To be achieved in: 8 weeks  1. Disability index score of 20% or less for the NDI to assist with reaching prior level of function. [x]? Progressing: []? Met: []? Not Met: []? Adjusted  2. Patient will demonstrate increased AROM to at least 45* rotation of cervical spine to allow for turning head for safe driving.   []? Progressing: [x]? Met: []? Not Met: []? Adjusted  3. Patient will demonstrate an increase in postural awareness and control and activation of  Deep cervical stabilizers to allow for proper functional mobility as indicated by patients Functional Deficits. [x]? Progressing: []? Met: []? Not Met: []? Adjusted   4. Patient will return to unloading  and putting dishes in upper cabinets (looking up and down) without increased symptoms or restriction. [x]? Progressing: []? Met: []? Not Met: []? Adjusted  5. Pt will report at least 50% decrease in HA frequency and severity. [x]? Progressing: []? Met: []? Not Met: []? Adjusted      Progression Towards Functional goals:  [x] Patient is progressing as expected towards functional goals listed. [] Progression is slowed due to complexities listed. [] Progression has been slowed due to co-morbidities.   [] Plan just implemented, too soon to assess goals progression  [] Other:     ASSESSMENT: Pt reported decreased pain following manuals and improved R>L cervical rotation with Mulligan. Pt able to resume TB shoulder and scap strengthening with some cues for relaxing UT and head position. Pt ed again regarding contacting MD to inquire about pain medication to help with relief of symptoms and sleeping. Overall Progression Towards Functional goals/ Treatment Progress Update:  [x] Patient is progressing as expected towards functional goals listed. [] Progression is slowed due to complexities/Impairments listed. [] Progression has been slowed due to co-morbidities. [] Plan just implemented, too soon to assess goals progression <30days   [] Goals require adjustment due to lack of progress  [] Patient is not progressing as expected and requires additional follow up with physician  [] Other    Prognosis for POC: [x] Good [] Fair  [] Poor      Patient requires continued skilled intervention: [x] Yes  [] No    Treatment/Activity Tolerance:  [x] Patient able to complete treatment  [] Patient limited by fatigue  [] Patient limited by pain    [] Patient limited by other medical complications  [] Other:     Return to Play: (if applicable)   []  Stage 1: Intro to Strength   []  Stage 2: Return to Run and Strength   []  Stage 3: Return to Jump and Strength   []  Stage 4: Dynamic Strength and Agility   []  Stage 5: Sport Specific Training     []  Ready to Return to Play, Meets All Above Stages   []  Not Ready for Return to Sports   Comments:                         PLAN: See bebo, edwin 1-2x/week until PASHA.   [x] Continue per plan of care [] Alter current plan (see comments above)  [] Plan of care initiated [] Hold pending MD visit [] Discharge      Electronically signed by:  Stiven Hussein PT    Note: If patient does not return for scheduled/ recommended follow up visits, this note will serve as a discharge from care along with most recent update on progress. Access Code: [email or web address]  URL: [email or web address]  Date: 08/26/2021  Prepared by: Siva Brown    Exercises  Supine Chin Tuck - 2 x daily - 7 x weekly - 1-2 sets - 5-10 reps - 2-3 seconds hold  Seated Scapular Retraction - 2 x daily - 7 x weekly - 2-3 sets - 5-10 reps - 2-30 seconds hold  Supine Suboccipital Release with Tennis Balls - 1-2 x daily - 7 x weekly  Standing Isometric Cervical Retraction with Chin Tucks and Ball at [facility] - 1 x daily - 7 x weekly - 1-2 sets - 10 reps  Seated Shoulder Horizontal Abduction with Resistance - 1 x daily - 7 x weekly - 2-3 sets - 10 reps  Shoulder External Rotation and Scapular Retraction with Resistance - 1 x daily - 7 x weekly - 2-3 sets - 10 reps

## 2021-10-05 ENCOUNTER — APPOINTMENT (OUTPATIENT)
Dept: PHYSICAL THERAPY | Age: 71
End: 2021-10-05
Payer: COMMERCIAL

## 2021-10-05 NOTE — TELEPHONE ENCOUNTER
As of right now the CDC is not recommending a booster for Moderna, and that may change in the near future. I recommend she go ahead and get her flu shot though.

## 2021-10-06 ENCOUNTER — HOSPITAL ENCOUNTER (OUTPATIENT)
Dept: PHYSICAL THERAPY | Age: 71
Setting detail: THERAPIES SERIES
Discharge: HOME | End: 2021-10-06
Payer: COMMERCIAL

## 2021-10-06 PROCEDURE — 97140 MANUAL THERAPY 1/> REGIONS: CPT | Performed by: PHYSICAL THERAPIST

## 2021-10-06 PROCEDURE — 97110 THERAPEUTIC EXERCISES: CPT | Performed by: PHYSICAL THERAPIST

## 2021-10-06 NOTE — FLOWSHEET NOTE
[facility], [address] [place]  Phone: [phone]  Fax [phone]        Date:  10/6/2021    Patient Name:  Peter Ferraro    :  1950  MRN: [id number]  Restrictions/Precautions:    Medical/Treatment Diagnosis Information:  · Diagnosis: M54.2 (ICD-10-CM) - Cervical pain, M47.812 (ICD-10-CM) - Spondylosis of cervical region without myelopathy or radiculopathy  · Treatment Diagnosis: M54.2 (ICD-10-CM) - Cervical pain  Insurance/Certification information:  PT Insurance Information: Iván Ortega DUAL  - $0DED $0CP 100% PT/OT MED [employer] [facility] [phone]  Physician Information:  Referring Practitioner: Dr Marti Rodriguez  Has the plan of care been signed (Y/N):        [x]  Yes  []  No     Date of Patient follow up with Physician: none scheduled 21      Is this a Progress Report:     [x]  Yes  []  No        If Yes:  Date Range for reporting period:  Beginnin21  Ending: 10/6/21    Progress report will be due (10 Rx or 30 days whichever is less):        Recertification will be due (POC Duration  / 90 days whichever is less): 21     Visit # Insurance Allowable Auth Required   In-person 12 No auth until 30 []  Yes []  No    Telehealth   []  Yes []  No    Total            Functional Scale: NDI 36%    Date assessed:  21  NDI 34%      21  NDI 28%      10/6/21     Therapy Diagnosis/Practice Pattern:F, conservative      Number of Comorbidities:  []0     []1-2    [x]3+    Latex Allergy:  [x]NO      []YES  Preferred Language for Healthcare:   [x]English       []other:      Pain level:  eval 7/10  Current 5/10    SUBJECTIVE:  Pt reports she has been able to do some work in her yard digging and trimming bushes. She notes she still continues to get daily HA's, but has been a little better this week.   She has been doing her HEP more consistently this week    OBJECTIVE:     Observation:    Test measurements:  Cervical flexion 70, ext 30, SB R 35, SB L 30, rotation R 50, L 45    MRI results 8/16/21:  Retrolisthesis of C5 relative to C4, and C6.  Disc and osteophytes narrow the   neural foramina at C3-C4, C4-C5, and C5-C6 as discussed above.  There is mild   stenosis of the thecal sac at C5-C6.        RESTRICTIONS/PRECAUTIONS: trigeminal neuralgia, 5 mm largely stable anterior listhesis C4-C5 from x-rays read by MD on 7/28/21 to 8/11/21, DM w/ neuropathy    Exercises/Interventions:     Therapeutic Ex (69921) Sets/sec/Reps Notes/CUES   Supine chin tuck 2\" x10 HEP, form cues   Seated scap set HEP   Seated UT S Started to incr HA, stopped   Supine tennis ball self SOR  HEP   Supine TB no money, HAB,   shoulder ext      form cues R w/ no money   Supine alt SB iso's PT gentle resist 3\" x12 R/L   Supine pec S over towel roll  \"      \"          W/ B shoulder flexion  HEP  HEP       Chin tuck on wall w/ 2 towels   w/ no money  W/ HAB @45* HEP  HEP  HEP   Fwd lean on table chin tuck    TB row, extension w/ chin tuck Scap/UT cues w/ fatigue   Wall push up    TB shoulder ext, bree up row RTT 2x15 ea Scap/UT cues        Pt ed: e, HEP--importance of postural strength/stability for decreasing pain landen end of day, self care and symptom management  (), posture, unloading when head is heavy and tired, , PM&R for pain management,  x6'    Manual Intervention (95296) x25' total    Supine SOR  Supine gentle man traction   STM to B UT/SO/scalenes  Temporal release  TPR prox scalenes R/L   SB  C1-2-3 lat glides gr ll    Gentle PA's T1-C7  Mulligan rotation C6-7, C7-T1 R/L x25'    Prone thoracic mobs rotation T4-T8   PA's T1-3 gr ll-lll     reassessment                    NMR re-education (10699)  CUES NEEDED                                                Therapeutic Activity (60286)                                         Therapeutic Exercise and NMR EXR  [x] (77561) Provided verbal/tactile cueing for activities related to strengthening, flexibility, endurance, ROM  for improvements in scapular, scapulothoracic and UE control with self care, reaching, carrying, lifting, house/yardwork, driving/computer work.    [] (41189) Provided verbal/tactile cueing for activities related to improving balance, coordination, kinesthetic sense, posture, motor skill, proprioception  to assist with  scapular, scapulothoracic and UE control with self care, reaching, carrying, lifting, house/yardwork, driving/computer work. Therapeutic Activities:    [] (66604 or 02163) Provided verbal/tactile cueing for activities related to improving balance, coordination, kinesthetic sense, posture, motor skill, proprioception and motor activation to allow for proper function of scapular, scapulothoracic and UE control with self care, carrying, lifting, driving/computer work.      Home Exercise Program:    [x] (56776) Reviewed/Progressed HEP activities related to strengthening, flexibility, endurance, ROM of scapular, scapulothoracic and UE control with self care, reaching, carrying, lifting, house/yardwork, driving/computer work  [] (13330) Reviewed/Progressed HEP activities related to improving balance, coordination, kinesthetic sense, posture, motor skill, proprioception of scapular, scapulothoracic and UE control with self care, reaching, carrying, lifting, house/yardwork, driving/computer work      Manual Treatments:  PROM / STM / Oscillations-Mobs:  G-I, II, III, IV (PA's, Inf., Post.)  [x] (10933) Provided manual therapy to mobilize soft tissue/joints of cervical/CT, scapular GHJ and UE for the purpose of modulating pain, promoting relaxation,  increasing ROM, reducing/eliminating soft tissue swelling/inflammation/restriction, improving soft tissue extensibility and allowing for proper ROM for normal function with self care, reaching, carrying, lifting, house/yardwork, driving/computer work    Modalities:  (pt prefers ice over heat)  ' Pt declined    Charges  Timed Code Treatment Minutes: 40   Total Treatment Minutes: 40     [] EVAL (LOW) 14094   [] EVAL (MOD) 99335   [] EVAL (HIGH) 39770   [] RE-EVAL     [x] ON(72822) x1    [] IONTO  [] NMR (88659) x     [] VASO  [x] Manual (60767) x2      [] Other:  [] TA x      [] Mech Traction (73933)  [] ES(attended) (65147)      [] ES (un) (41569):     GOALS:  Patient stated goal: reduce pain with driving and daily activities  []? Progressing: []? Met: []? Not Met: []? Adjusted     Therapist goals for Patient:   Short Term Goals: To be achieved in: 2 weeks  1. Independent in HEP and progression per patient tolerance, in order to prevent re-injury. []? Progressing: [x]? Met: []? Not Met: []? Adjusted  2. Patient will have a decrease in pain to facilitate improvement in movement, function, and ADLs as indicated by Functional Deficits. [x]? Progressing: []? Met: []? Not Met: []? Adjusted     Long Term Goals: To be achieved in: 8 weeks  1. Disability index score of 20% or less for the NDI to assist with reaching prior level of function. [x]? Progressing: []? Met: []? Not Met: []? Adjusted  2. Patient will demonstrate increased AROM to at least 45* rotation of cervical spine to allow for turning head for safe driving.   []? Progressing: [x]? Met: []? Not Met: []? Adjusted  3. Patient will demonstrate an increase in postural awareness and control and activation of  Deep cervical stabilizers to allow for proper functional mobility as indicated by patients Functional Deficits. [x]? Progressing: []? Met: []? Not Met: []? Adjusted   4. Patient will return to unloading  and putting dishes in upper cabinets (looking up and down) without increased symptoms or restriction. []? Progressing: [x]? Met: []? Not Met: []? Adjusted  5. Pt will report at least 50% decrease in HA frequency and severity. [x]? Progressing: []? Met: []? Not Met: []?  Adjusted      Progression Towards Functional goals:  [x] Patient is progressing as expected towards functional goals listed. [] Progression is slowed due to complexities listed. [] Progression has been slowed due to co-morbidities. [] Plan just implemented, too soon to assess goals progression  [] Other:     ASSESSMENT: Pt showing better symmetry with cervical AROM and improved driving ability. She continues to report daily HA's and intermittent compliance with HEP. She does demo improved posture awareness, landen with reading and using cell phone. Overall Progression Towards Functional goals/ Treatment Progress Update:  [x] Patient is progressing as expected towards functional goals listed. [] Progression is slowed due to complexities/Impairments listed. [] Progression has been slowed due to co-morbidities. [] Plan just implemented, too soon to assess goals progression <30days   [] Goals require adjustment due to lack of progress  [] Patient is not progressing as expected and requires additional follow up with physician  [] Other    Prognosis for POC: [x] Good [] Fair  [] Poor      Patient requires continued skilled intervention: [x] Yes  [] No    Treatment/Activity Tolerance:  [x] Patient able to complete treatment  [] Patient limited by fatigue  [] Patient limited by pain    [] Patient limited by other medical complications  [] Other:     Return to Play: (if applicable)   []  Stage 1: Intro to Strength   []  Stage 2: Return to Run and Strength   []  Stage 3: Return to Jump and Strength   []  Stage 4: Dynamic Strength and Agility   []  Stage 5: Sport Specific Training     []  Ready to Return to Play, Meets All Above Stages   []  Not Ready for Return to Sports   Comments:                         PLAN: See eval, cont 1-2x/week until PASHA.   [x] Continue per plan of care [] Alter current plan (see comments above)  [] Plan of care initiated [] Hold pending MD visit [] Discharge      Electronically signed by:  Claudell Goldsmith, PT    Note: If patient does not return for scheduled/ recommended follow up visits, this note will serve as a discharge from care along with most recent update on progress. Access Code: [email or web address]  URL: [email or web address]  Date: 08/26/2021  Prepared by: Melina Fonseca    Exercises  Supine Chin Tuck - 2 x daily - 7 x weekly - 1-2 sets - 5-10 reps - 2-3 seconds hold  Seated Scapular Retraction - 2 x daily - 7 x weekly - 2-3 sets - 5-10 reps - 2-30 seconds hold  Supine Suboccipital Release with Tennis Balls - 1-2 x daily - 7 x weekly  Standing Isometric Cervical Retraction with Chin Tucks and Ball at [place] - 1 x daily - 7 x weekly - 1-2 sets - 10 reps  Seated Shoulder Horizontal Abduction with Resistance - 1 x daily - 7 x weekly - 2-3 sets - 10 reps  Shoulder External Rotation and Scapular Retraction with Resistance - 1 x daily - 7 x weekly - 2-3 sets - 10 reps

## 2021-10-07 ENCOUNTER — APPOINTMENT (OUTPATIENT)
Dept: PHYSICAL THERAPY | Age: 71
End: 2021-10-07
Payer: COMMERCIAL

## 2021-10-11 ENCOUNTER — HOSPITAL ENCOUNTER (OUTPATIENT)
Dept: PHYSICAL THERAPY | Age: 71
Setting detail: THERAPIES SERIES
Discharge: HOME | End: 2021-10-11
Payer: COMMERCIAL

## 2021-10-11 PROCEDURE — 97140 MANUAL THERAPY 1/> REGIONS: CPT | Performed by: PHYSICAL THERAPIST

## 2021-10-11 PROCEDURE — 97110 THERAPEUTIC EXERCISES: CPT | Performed by: PHYSICAL THERAPIST

## 2021-10-11 NOTE — FLOWSHEET NOTE
[facility], [address] [place]  Phone: [phone]  Fax [phone]        Date:  10/11/2021    Patient Name:  Yesenia Mercado    :  1950  MRN: [id number]  Restrictions/Precautions:    Medical/Treatment Diagnosis Information:  · Diagnosis: M54.2 (ICD-10-CM) - Cervical pain, M47.812 (ICD-10-CM) - Spondylosis of cervical region without myelopathy or radiculopathy  · Treatment Diagnosis: M54.2 (ICD-10-CM) - Cervical pain  Insurance/Certification information:  PT Insurance Information: Roger Rivera DUAL  - $0DED $0CP 100% PT/OT MED [facility] [phone]  Physician Information:  Referring Practitioner: Dr Vance Auguste  Has the plan of care been signed (Y/N):        [x]  Yes  []  No     Date of Patient follow up with Physician: none scheduled 21      Is this a Progress Report:     []  Yes  [x]  No        If Yes:  Date Range for reporting period:  Beginnin21  Ending: 10/6/21    Progress report will be due (10 Rx or 30 days whichever is less):        Recertification will be due (POC Duration  / 90 days whichever is less): 21     Visit # Insurance Allowable Auth Required   In-person 13 No auth until 30 []  Yes []  No    Telehealth   []  Yes []  No    Total            Functional Scale: NDI 36%    Date assessed:  21  NDI 34%      21  NDI 28%      10/6/21     Therapy Diagnosis/Practice Pattern:F, conservative      Number of Comorbidities:  []0     []1-2    [x]3+    Latex Allergy:  [x]NO      []YES  Preferred Language for Healthcare:   [x]English       []other:      Pain level:  eval 7/10  Current \"bad\"/10    SUBJECTIVE:  Pt reports she is really hurting today after sleeping in a different bed all weekend while out of town. She states she can \" hardly hold her head up today\" and reports L sided HA.     OBJECTIVE:     Observation:    Test measurements:      MRI results 8/16/21:  Retrolisthesis of C5 relative to C4, and C6.  Disc and osteophytes narrow the   neural foramina at C3-C4, C4-C5, and C5-C6 as discussed above.  There is mild   stenosis of the thecal sac at C5-C6.        RESTRICTIONS/PRECAUTIONS: trigeminal neuralgia, 5 mm largely stable anterior listhesis C4-C5 from x-rays read by MD on 7/28/21 to 8/11/21, DM w/ neuropathy    Exercises/Interventions:     Therapeutic Ex (30979) Sets/sec/Reps Notes/CUES   Supine chin tuck  Seated chin tuck 2\" x10  3\" x6 HEP, form cues  Form cues   Seated scap set HEP   Seated UT S Started to incr HA, stopped   Supine tennis ball self SOR  HEP   Supine TB no money, HAB,   shoulder ext      form cues R w/ no money   Supine alt SB iso's PT gentle resist L   Supine pec S over towel roll  \"      \"          W/ B shoulder flexion  HEP  HEP       Chin tuck on wall w/ 2 towels   w/ no money  W/ HAB @45* HEP  HEP  HEP   Fwd lean on table chin tuck    TB row, extension w/ chin tuck Scap/UT cues w/ fatigue   Wall push up    TB shoulder ext,   palm up row  LPD  MT row   Green TT 2x10  Green TT 2x10  Green TT 2x10     Scap/UT cues  Scap/UT cues   Single arm pec S @ 45*' 3x20\" R/L gentle         Pt ed: e, HEP--importance of postural strength/stability for decreasing pain landen end of day,   (), posture, unloading when head is heavy and tired, , t,  x4'    Manual Intervention (99941) x25' total    Supine SOR  Supine gentle man traction   STM to B UT/SO/scalenes    TPR prox scalenes R/L  UT S R/L in neutral SB  C1-2-3 lat glides gr ll    Gentle PA's T1-C7   x25'    Prone thoracic mobs rotation T4-T8   PA's T1-3 gr ll-lll     reassessment                    NMR re-education (34598)  CUES NEEDED                                                Therapeutic Activity (92721)                                         Therapeutic Exercise and NMR EXR  [x] (20027) Provided verbal/tactile cueing for activities related to strengthening, flexibility, endurance, ROM  for improvements in scapular, scapulothoracic and UE control with self care, reaching, carrying, lifting, house/yardwork, driving/computer work.    [] (88942) Provided verbal/tactile cueing for activities related to improving balance, coordination, kinesthetic sense, posture, motor skill, proprioception  to assist with  scapular, scapulothoracic and UE control with self care, reaching, carrying, lifting, house/yardwork, driving/computer work. Therapeutic Activities:    [] (66024 or 09369) Provided verbal/tactile cueing for activities related to improving balance, coordination, kinesthetic sense, posture, motor skill, proprioception and motor activation to allow for proper function of scapular, scapulothoracic and UE control with self care, carrying, lifting, driving/computer work.      Home Exercise Program:    [x] (32065) Reviewed/Progressed HEP activities related to strengthening, flexibility, endurance, ROM of scapular, scapulothoracic and UE control with self care, reaching, carrying, lifting, house/yardwork, driving/computer work  [] (96118) Reviewed/Progressed HEP activities related to improving balance, coordination, kinesthetic sense, posture, motor skill, proprioception of scapular, scapulothoracic and UE control with self care, reaching, carrying, lifting, house/yardwork, driving/computer work      Manual Treatments:  PROM / STM / Oscillations-Mobs:  G-I, II, III, IV (PA's, Inf., Post.)  [x] (92968) Provided manual therapy to mobilize soft tissue/joints of cervical/CT, scapular GHJ and UE for the purpose of modulating pain, promoting relaxation,  increasing ROM, reducing/eliminating soft tissue swelling/inflammation/restriction, improving soft tissue extensibility and allowing for proper ROM for normal function with self care, reaching, carrying, lifting, house/yardwork, driving/computer work    Modalities:  (pt prefers ice over heat)  ' Pt declined    Charges  Timed Code Treatment Minutes: 40   Total Treatment Minutes: 40     [] EVAL (LOW) 88785   [] EVAL (MOD) 85757   [] EVAL (HIGH) 60188   [] RE-EVAL     [x] CV(75821) x1    [] IONTO  [] NMR (32527) x     [] VASO  [x] Manual (06162) x2      [] Other:  [] TA x      [] Mech Traction (38879)  [] ES(attended) (36016)      [] ES (un) (36355):     GOALS:  Patient stated goal: reduce pain with driving and daily activities  []? Progressing: []? Met: []? Not Met: []? Adjusted     Therapist goals for Patient:   Short Term Goals: To be achieved in: 2 weeks  1. Independent in HEP and progression per patient tolerance, in order to prevent re-injury. []? Progressing: [x]? Met: []? Not Met: []? Adjusted  2. Patient will have a decrease in pain to facilitate improvement in movement, function, and ADLs as indicated by Functional Deficits. [x]? Progressing: []? Met: []? Not Met: []? Adjusted     Long Term Goals: To be achieved in: 8 weeks  1. Disability index score of 20% or less for the NDI to assist with reaching prior level of function. [x]? Progressing: []? Met: []? Not Met: []? Adjusted  2. Patient will demonstrate increased AROM to at least 45* rotation of cervical spine to allow for turning head for safe driving.   []? Progressing: [x]? Met: []? Not Met: []? Adjusted  3. Patient will demonstrate an increase in postural awareness and control and activation of  Deep cervical stabilizers to allow for proper functional mobility as indicated by patients Functional Deficits. [x]? Progressing: []? Met: []? Not Met: []? Adjusted   4. Patient will return to unloading  and putting dishes in upper cabinets (looking up and down) without increased symptoms or restriction. []? Progressing: [x]? Met: []? Not Met: []? Adjusted  5. Pt will report at least 50% decrease in HA frequency and severity. [x]? Progressing: []? Met: []? Not Met: []? Adjusted      Progression Towards Functional goals:  [x] Patient is progressing as expected towards functional goals listed.     [] Progression is slowed due to complexities listed. [] Progression has been slowed due to co-morbidities. [] Plan just implemented, too soon to assess goals progression  [] Other:     ASSESSMENT: pt more relaxed this visit when engaged in conversation, but continues to require cues to relax/decreased guarding during gaps in discussion. Improved head position in sitting and standing following chin tucks with VTC's. Overall Progression Towards Functional goals/ Treatment Progress Update:  [x] Patient is progressing as expected towards functional goals listed. [] Progression is slowed due to complexities/Impairments listed. [] Progression has been slowed due to co-morbidities. [] Plan just implemented, too soon to assess goals progression <30days   [] Goals require adjustment due to lack of progress  [] Patient is not progressing as expected and requires additional follow up with physician  [] Other    Prognosis for POC: [x] Good [] Fair  [] Poor      Patient requires continued skilled intervention: [x] Yes  [] No    Treatment/Activity Tolerance:  [x] Patient able to complete treatment  [] Patient limited by fatigue  [] Patient limited by pain    [] Patient limited by other medical complications  [] Other:     Return to Play: (if applicable)   []  Stage 1: Intro to Strength   []  Stage 2: Return to Run and Strength   []  Stage 3: Return to Jump and Strength   []  Stage 4: Dynamic Strength and Agility   []  Stage 5: Sport Specific Training     []  Ready to Return to Play, Meets All Above Stages   []  Not Ready for Return to Sports   Comments:                         PLAN: See bebo, cont 1-2x/week until PASHA.   [x] Continue per plan of care [] Alter current plan (see comments above)  [] Plan of care initiated [] Hold pending MD visit [] Discharge      Electronically signed by:  Claudell Goldsmith, PT    Note: If patient does not return for scheduled/ recommended follow up visits, this note will serve as a discharge from care along with most recent update on progress. Access Code: [email or web address]  URL: [email or web address]  Date: 08/26/2021  Prepared by: Melina Fonseca    Exercises  Supine Chin Tuck - 2 x daily - 7 x weekly - 1-2 sets - 5-10 reps - 2-3 seconds hold  Seated Scapular Retraction - 2 x daily - 7 x weekly - 2-3 sets - 5-10 reps - 2-30 seconds hold  Supine Suboccipital Release with Tennis Balls - 1-2 x daily - 7 x weekly  Standing Isometric Cervical Retraction with Chin Tucks and Ball at [place] - 1 x daily - 7 x weekly - 1-2 sets - 10 reps  Seated Shoulder Horizontal Abduction with Resistance - 1 x daily - 7 x weekly - 2-3 sets - 10 reps  Shoulder External Rotation and Scapular Retraction with Resistance - 1 x daily - 7 x weekly - 2-3 sets - 10 reps

## 2021-10-12 ENCOUNTER — APPOINTMENT (OUTPATIENT)
Dept: PHYSICAL THERAPY | Age: 71
End: 2021-10-12
Payer: COMMERCIAL

## 2021-10-13 ENCOUNTER — HOSPITAL ENCOUNTER (OUTPATIENT)
Dept: PHYSICAL THERAPY | Age: 71
Setting detail: THERAPIES SERIES
Discharge: HOME | End: 2021-10-13
Payer: COMMERCIAL

## 2021-10-13 PROCEDURE — 97110 THERAPEUTIC EXERCISES: CPT | Performed by: PHYSICAL THERAPIST

## 2021-10-13 PROCEDURE — 97140 MANUAL THERAPY 1/> REGIONS: CPT | Performed by: PHYSICAL THERAPIST

## 2021-11-02 DIAGNOSIS — E03.9 HYPOTHYROIDISM, UNSPECIFIED TYPE: Chronic | ICD-10-CM

## 2021-11-02 DIAGNOSIS — I10 ESSENTIAL HYPERTENSION: Chronic | ICD-10-CM

## 2021-11-02 RX ORDER — LEVOTHYROXINE SODIUM 0.07 MG/1
TABLET ORAL
Qty: 90 TABLET | Refills: 1 | Status: SHIPPED | OUTPATIENT
Start: 2021-11-02 | End: 2022-03-29

## 2021-11-02 RX ORDER — SPIRONOLACTONE 50 MG/1
TABLET, FILM COATED ORAL
Qty: 90 TABLET | Refills: 1 | Status: SHIPPED | OUTPATIENT
Start: 2021-11-02 | End: 2022-03-29

## 2021-11-02 NOTE — TELEPHONE ENCOUNTER
7/28/2021          Future Appointments   Date Time Provider Anastasiia Alvarez   1/26/2022  7:00 AM LUIS Schwab - CNP [facility] [place] - [facility]

## 2021-11-15 ENCOUNTER — TELEPHONE (OUTPATIENT)
Dept: ORTHOPEDIC SURGERY | Age: 71
End: 2021-11-15

## 2021-11-15 NOTE — TELEPHONE ENCOUNTER
Appointment Request     Patient requesting earlier appointment: Yes  Appointment offered to patient: LASHELL ON 12/1 F/U L HAND PAIN /REQ INJ   Patient Contact Number: [phone]    Pt is lashell on 12/1 at [facility] w/[facility] but req to see Bryan Vo

## 2021-12-01 ENCOUNTER — OFFICE VISIT (OUTPATIENT)
Dept: ORTHOPEDIC SURGERY | Age: 71
End: 2021-12-01
Payer: COMMERCIAL

## 2021-12-01 VITALS — HEIGHT: 66 IN | WEIGHT: 161 LBS | BODY MASS INDEX: 25.88 KG/M2 | RESPIRATION RATE: 15 BRPM

## 2021-12-01 DIAGNOSIS — M65.30 TRIGGER FINGER, ACQUIRED: Primary | ICD-10-CM

## 2021-12-01 PROCEDURE — 99214 OFFICE O/P EST MOD 30 MIN: CPT | Performed by: ORTHOPAEDIC SURGERY

## 2021-12-01 PROCEDURE — 20550 NJX 1 TENDON SHEATH/LIGAMENT: CPT | Performed by: ORTHOPAEDIC SURGERY

## 2021-12-01 PROCEDURE — 1090F PRES/ABSN URINE INCON ASSESS: CPT | Performed by: ORTHOPAEDIC SURGERY

## 2021-12-01 PROCEDURE — 1123F ACP DISCUSS/DSCN MKR DOCD: CPT | Performed by: ORTHOPAEDIC SURGERY

## 2021-12-01 PROCEDURE — 3017F COLORECTAL CA SCREEN DOC REV: CPT | Performed by: ORTHOPAEDIC SURGERY

## 2021-12-01 PROCEDURE — 4040F PNEUMOC VAC/ADMIN/RCVD: CPT | Performed by: ORTHOPAEDIC SURGERY

## 2021-12-01 PROCEDURE — G8417 CALC BMI ABV UP PARAM F/U: HCPCS | Performed by: ORTHOPAEDIC SURGERY

## 2021-12-01 PROCEDURE — 1036F TOBACCO NON-USER: CPT | Performed by: ORTHOPAEDIC SURGERY

## 2021-12-01 PROCEDURE — G8427 DOCREV CUR MEDS BY ELIG CLIN: HCPCS | Performed by: ORTHOPAEDIC SURGERY

## 2021-12-01 PROCEDURE — G8399 PT W/DXA RESULTS DOCUMENT: HCPCS | Performed by: ORTHOPAEDIC SURGERY

## 2021-12-01 PROCEDURE — G8484 FLU IMMUNIZE NO ADMIN: HCPCS | Performed by: ORTHOPAEDIC SURGERY

## 2021-12-01 RX ORDER — TRIAMCINOLONE ACETONIDE 40 MG/ML
20 INJECTION, SUSPENSION INTRA-ARTICULAR; INTRAMUSCULAR ONCE
Status: COMPLETED | OUTPATIENT
Start: 2021-12-01 | End: 2021-12-01

## 2021-12-01 RX ADMIN — TRIAMCINOLONE ACETONIDE 20 MG: 40 INJECTION, SUSPENSION INTRA-ARTICULAR; INTRAMUSCULAR at 16:08

## 2021-12-01 NOTE — PATIENT INSTRUCTIONS
Information & Instructions   After Finger, Hand, Wrist, or Elbow Injection    Andrea Anderson MD    You have received an injection of local anesthetic (Bupivicaine without Epinephrine) for comfort & a steroid (Kenalog) for its strong anti-inflammatory effects. In order to give the medication a chance to reduce your inflammation and discomfort, it is recommended that you take it easy for a day or so. You may use your hand and arm as you feel comfortable, but you should avoid highly strenuous activity and heavy use for several days. Relief from the injection will often not begin for several days, and you may not feel full relief for up to one month. It is not uncommon to experience some local discomfort or pain at or around the injection site for a few days. To relieve these symptoms you may do the following if you feel necessary:       Apply ice to the affected area 20 minutes on and 20 minutes off. Do not apply ice directly to the skin. Use a thin layer (T-shirt, pillowcase, towel, etc.) to protect the skin. - If allowed by your other medical physicians, you may take -     2 Tylenol extra strength tablets every 4-6 hours       1-2 Aleve tablets twice a day     2-3 Advil tablets two to three times a day    If you are diabetic, the steroid medication may increase your blood sugar, so you are advised to monitor your sugar more closely so you can adjust it accordingly for a few days following your injection. If you need assistance with the control of you blood sugar, please contact you primary care physician for further advice. I will request that you please call the office one month after your injection at [phone] if you have not experienced relief of your symptoms (unless I have instructed you otherwise). If your injection has given you good relief of you symptoms as expected, then you only need to call the office if your symptoms return.

## 2021-12-01 NOTE — PROGRESS NOTES
Middle Finger Flexor Tendon Sheath injection. Procedure:  left Middle Finger Trigger Finger Injection  [first Injection]: After full discussion of the nature of this process and outlining a treatment plan with Ms. Phuong Garay, we discussed the complications, limitations, expectations, alternatives, and risks of injection of the flexor tendon sheath. I have explained the potential for bleeding, infection, potential side effects of the medication, and the remote possibility of damage to surrounding structures as result of the injection. She understood this information well and verbally consented to this treatment. The skin of the symptomatic digit was prepped with Isopropyl Alcohol and under aseptic conditions the flexor tendon sheath was injected with a combination of 1/2 ml of 0.25% Bupivacaine without Epinephrine and 20 mg of Triamcinolone (40 mg/ml). Good filling of the flexor sheath was noted. A dry sterile bandage was applied and the patient tolerated the injection without difficulty. I advised the patient of the expected response, possible reactions and the instructions for care of the hand. I have also discussed with Ms. Phuong Garay the other treatment options available to her for this condition. We have today selected to proceed with treatment by injection with steroid medication. She and I have agreed that if our current course of Injection treatment does not prove to be effective over the short term future, that she will schedule a follow-up appointment to discuss and select an alternate course of therapy including possibly further conservative treatment or surgical treatment. Ms. Phuong Garay has been given a full verbal list of instructions and precautions related to her present condition. I have asked her to followup with me in the office at the prescribed time.  She is also specifically requested to call or return to the office sooner if her symptoms change or worsen prior to the next scheduled appointment.

## 2021-12-01 NOTE — Clinical Note
Dear  Adeline Mills, APRN - CNP,    Thank you very much for your referral or Ms. Di Connolly to me for evaluation and treatment of her Hand & Wrist condition. I appreciate your confidence in me and thank you for allowing me the opportunity to care for your patients. If I can be of any further assistance to you on this or any other patient, please do not hesitate to contact me. Sincerely,    Paco Crawford.  Srinivasa Ward MD

## 2021-12-23 ENCOUNTER — OFFICE VISIT (OUTPATIENT)
Dept: FAMILY MEDICINE CLINIC | Age: 71
End: 2021-12-23
Payer: COMMERCIAL

## 2021-12-23 VITALS
WEIGHT: 160 LBS | RESPIRATION RATE: 16 BRPM | OXYGEN SATURATION: 98 % | SYSTOLIC BLOOD PRESSURE: 120 MMHG | BODY MASS INDEX: 25.82 KG/M2 | HEART RATE: 52 BPM | TEMPERATURE: 97.1 F | DIASTOLIC BLOOD PRESSURE: 72 MMHG

## 2021-12-23 DIAGNOSIS — Z91.81 AT HIGH RISK FOR FALLS: ICD-10-CM

## 2021-12-23 DIAGNOSIS — Z01.818 PREOP EXAMINATION: Primary | ICD-10-CM

## 2021-12-23 DIAGNOSIS — Z12.31 ENCOUNTER FOR SCREENING MAMMOGRAM FOR MALIGNANT NEOPLASM OF BREAST: ICD-10-CM

## 2021-12-23 DIAGNOSIS — M53.2X2 CERVICAL SPINE INSTABILITY: ICD-10-CM

## 2021-12-23 DIAGNOSIS — Z01.818 PREOP EXAMINATION: ICD-10-CM

## 2021-12-23 DIAGNOSIS — E11.9 TYPE 2 DIABETES MELLITUS WITHOUT COMPLICATION, WITHOUT LONG-TERM CURRENT USE OF INSULIN (HCC): ICD-10-CM

## 2021-12-23 DIAGNOSIS — I10 PRIMARY HYPERTENSION: Chronic | ICD-10-CM

## 2021-12-23 LAB
ANION GAP SERPL CALCULATED.3IONS-SCNC: 10 MMOL/L (ref 3–16)
APTT: 27.9 SEC (ref 26.2–38.6)
BUN BLDV-MCNC: 14 MG/DL (ref 7–20)
CALCIUM SERPL-MCNC: 9 MG/DL (ref 8.3–10.6)
CHLORIDE BLD-SCNC: 105 MMOL/L (ref 99–110)
CO2: 26 MMOL/L (ref 21–32)
CREAT SERPL-MCNC: 0.6 MG/DL (ref 0.6–1.2)
GFR AFRICAN AMERICAN: >60
GFR NON-AFRICAN AMERICAN: >60
GLUCOSE BLD-MCNC: 89 MG/DL (ref 70–99)
HCT VFR BLD CALC: 34.3 % (ref 36–48)
HEMOGLOBIN: 11.4 G/DL (ref 12–16)
INR BLD: 0.86 (ref 0.88–1.12)
MCH RBC QN AUTO: 32 PG (ref 26–34)
MCHC RBC AUTO-ENTMCNC: 33.2 G/DL (ref 31–36)
MCV RBC AUTO: 96.4 FL (ref 80–100)
PDW BLD-RTO: 12.5 % (ref 12.4–15.4)
PLATELET # BLD: 293 K/UL (ref 135–450)
PMV BLD AUTO: 7.3 FL (ref 5–10.5)
POTASSIUM SERPL-SCNC: 4 MMOL/L (ref 3.5–5.1)
PROTHROMBIN TIME: 9.6 SEC (ref 9.9–12.7)
RBC # BLD: 3.56 M/UL (ref 4–5.2)
SODIUM BLD-SCNC: 141 MMOL/L (ref 136–145)
WBC # BLD: 4.5 K/UL (ref 4–11)

## 2021-12-23 PROCEDURE — 93000 ELECTROCARDIOGRAM COMPLETE: CPT | Performed by: NURSE PRACTITIONER

## 2021-12-23 PROCEDURE — G8399 PT W/DXA RESULTS DOCUMENT: HCPCS | Performed by: NURSE PRACTITIONER

## 2021-12-23 PROCEDURE — G8417 CALC BMI ABV UP PARAM F/U: HCPCS | Performed by: NURSE PRACTITIONER

## 2021-12-23 PROCEDURE — 1123F ACP DISCUSS/DSCN MKR DOCD: CPT | Performed by: NURSE PRACTITIONER

## 2021-12-23 PROCEDURE — 1090F PRES/ABSN URINE INCON ASSESS: CPT | Performed by: NURSE PRACTITIONER

## 2021-12-23 PROCEDURE — G8484 FLU IMMUNIZE NO ADMIN: HCPCS | Performed by: NURSE PRACTITIONER

## 2021-12-23 PROCEDURE — G8427 DOCREV CUR MEDS BY ELIG CLIN: HCPCS | Performed by: NURSE PRACTITIONER

## 2021-12-23 PROCEDURE — 1036F TOBACCO NON-USER: CPT | Performed by: NURSE PRACTITIONER

## 2021-12-23 PROCEDURE — 2022F DILAT RTA XM EVC RTNOPTHY: CPT | Performed by: NURSE PRACTITIONER

## 2021-12-23 PROCEDURE — 90694 VACC AIIV4 NO PRSRV 0.5ML IM: CPT | Performed by: NURSE PRACTITIONER

## 2021-12-23 PROCEDURE — G0008 ADMIN INFLUENZA VIRUS VAC: HCPCS | Performed by: NURSE PRACTITIONER

## 2021-12-23 PROCEDURE — 99214 OFFICE O/P EST MOD 30 MIN: CPT | Performed by: NURSE PRACTITIONER

## 2021-12-23 PROCEDURE — 4040F PNEUMOC VAC/ADMIN/RCVD: CPT | Performed by: NURSE PRACTITIONER

## 2021-12-23 PROCEDURE — 3017F COLORECTAL CA SCREEN DOC REV: CPT | Performed by: NURSE PRACTITIONER

## 2021-12-23 SDOH — ECONOMIC STABILITY: FOOD INSECURITY: WITHIN THE PAST 12 MONTHS, THE FOOD YOU BOUGHT JUST DIDN'T LAST AND YOU DIDN'T HAVE MONEY TO GET MORE.: NEVER TRUE

## 2021-12-23 SDOH — ECONOMIC STABILITY: HOUSING INSECURITY
IN THE LAST 12 MONTHS, WAS THERE A TIME WHEN YOU DID NOT HAVE A STEADY PLACE TO SLEEP OR SLEPT IN A SHELTER (INCLUDING NOW)?: NO

## 2021-12-23 SDOH — ECONOMIC STABILITY: TRANSPORTATION INSECURITY
IN THE PAST 12 MONTHS, HAS LACK OF TRANSPORTATION KEPT YOU FROM MEETINGS, WORK, OR FROM GETTING THINGS NEEDED FOR DAILY LIVING?: NO

## 2021-12-23 SDOH — ECONOMIC STABILITY: TRANSPORTATION INSECURITY
IN THE PAST 12 MONTHS, HAS THE LACK OF TRANSPORTATION KEPT YOU FROM MEDICAL APPOINTMENTS OR FROM GETTING MEDICATIONS?: NO

## 2021-12-23 SDOH — ECONOMIC STABILITY: INCOME INSECURITY: IN THE LAST 12 MONTHS, WAS THERE A TIME WHEN YOU WERE NOT ABLE TO PAY THE MORTGAGE OR RENT ON TIME?: NO

## 2021-12-23 SDOH — ECONOMIC STABILITY: FOOD INSECURITY: WITHIN THE PAST 12 MONTHS, YOU WORRIED THAT YOUR FOOD WOULD RUN OUT BEFORE YOU GOT MONEY TO BUY MORE.: NEVER TRUE

## 2021-12-23 ASSESSMENT — PATIENT HEALTH QUESTIONNAIRE - PHQ9
SUM OF ALL RESPONSES TO PHQ QUESTIONS 1-9: 0
2. FEELING DOWN, DEPRESSED OR HOPELESS: 0
SUM OF ALL RESPONSES TO PHQ QUESTIONS 1-9: 0
SUM OF ALL RESPONSES TO PHQ QUESTIONS 1-9: 0
SUM OF ALL RESPONSES TO PHQ9 QUESTIONS 1 & 2: 0
1. LITTLE INTEREST OR PLEASURE IN DOING THINGS: 0

## 2021-12-23 ASSESSMENT — ENCOUNTER SYMPTOMS
COUGH: 0
NAUSEA: 0
DIARRHEA: 0
BACK PAIN: 0
SHORTNESS OF BREATH: 0
VOMITING: 0

## 2021-12-23 ASSESSMENT — SOCIAL DETERMINANTS OF HEALTH (SDOH): HOW HARD IS IT FOR YOU TO PAY FOR THE VERY BASICS LIKE FOOD, HOUSING, MEDICAL CARE, AND HEATING?: NOT HARD AT ALL

## 2021-12-23 NOTE — PROGRESS NOTES
Vaccine Information Sheet, \"Influenza - Inactivated\"  given to Cherry Myles, or parent/legal guardian of  Cherry Myles and verbalized understanding. Patient responses:    Have you ever had a reaction to a flu vaccine? No  Are you able to eat eggs without adverse effects? Yes  Do you have any current illness? No  Have you ever had Guillian [place] Syndrome? No    Flu vaccine given per order. Please see immunization tab.

## 2021-12-23 NOTE — PROGRESS NOTES
[facility]   Telephone: [phone]  Fax: [phone]  Preoperative History & Physical        DIAGNOSIS:  Cervical spine instability    PROCEDURE:  C4-C6 anterior cervical discectomy and fusion      History Obtained From:  patient    HISTORY OF PRESENT ILLNESS:    The patient is a 80 y.o. female with significant past medical history of BPD, chronic pain syndrome, diabetes mellitus, diabetic neuropathy, HTN, HLD, MDD, RLS who presents for preoperative for preoperative examination for above procedure. Surgery is scheduled for 12/29/2021 with Dr. Edwige Mancini at [facility].  Patient denies any new complaints or concerns today. Blood pressure is well controlled.        Past Medical History:   Diagnosis Date    Borderline personality disorder ([address]     Chronic pain     Colon disorder     hard time pushing stool out    Constipation     Diabetes mellitus ([address]     Diabetic neuropathy (HCC)     Electric shock     ECT therapy    Hyperlipidemia     Hypertension     Insomnia     Major depressive disorder, recurrent (HCC)     Morbid obesity ([address]     Osteoarthrosis     RLS (restless legs syndrome)     Suicidal ideation     Unspecified hypothyroidism     Vitamin deficiency      Past Surgical History:   Procedure Laterality Date    APPENDECTOMY      BACK SURGERY      CARPAL TUNNEL RELEASE Left 12/19/2017    CARPAL TUNNEL RELEASE Right 6/18/2019    RIGHT OPEN CARPAL TUNNEL RELEASE performed by Garima Lafleur MD at [address] [place] [address] Right     for pinched nerve    JOINT REPLACEMENT Bilateral     knees    KNEE SURGERY Left     meniscus    SHOULDER ARTHROSCOPY Right 12/02/2016    Right Total Shoulder Arthroscopy with Reverse Ball and Socket Deta Prosthesis    SHOULDER ARTHROSCOPY Left 05/30/2018    subacromial decompression, rotator cuff repair, biceps stump debridement    SMALL INTESTINE SURGERY N/A 9/16/2019    DIAGNOSTIC LAPAROSCOPY CONVERTED TO OPEN BOWEL RESECTION performed by Farhana Sanon MD at [facility]      TONSILLECTOMY       Current Outpatient Medications   Medication Sig Dispense Refill    levothyroxine (SYNTHROID) 75 MCG tablet TAKE 1 TABLET BY MOUTH EVERY DAY 90 tablet 1    spironolactone (ALDACTONE) 50 MG tablet TAKE 1 TABLET BY MOUTH EVERY DAY 90 tablet 1    carBAMazepine (TEGRETOL) 200 MG tablet Take 200 mg by mouth 3 times daily      rOPINIRole (REQUIP) 2 MG tablet TAKE 1-2 TABLETS BY MOUTH EVERY DAY AT NIGHT FOR REST LEG SYNDROME 180 tablet 3    lithium 300 MG capsule       alirocumab (PRALUENT) 75 MG/ML SOAJ injection pen Inject 75 mg into the skin every 14 days      Coenzyme Q10 (COQ-10) 400 MG CAPS Take by mouth      desvenlafaxine succinate (PRISTIQ) 50 MG TB24 extended release tablet TAKE 1 TABLET BY MOUTH EVERY EVENING      tiZANidine (ZANAFLEX) 2 MG tablet TAKE 1 TABLET BY MOUTH 3 TIMES DAILY AS NEEDED 270 tablet 1    CVS D3 25 MCG (1000 UT) CAPS TAKE 1 CAPSULE BY MOUTH EVERY DAY 90 capsule 1    linaCLOtide (LINZESS) 72 MCG CAPS capsule Take 145 mcg by mouth every morning (before breakfast)       cetirizine (ZYRTEC ALLERGY) 10 MG tablet Take 10 mg by mouth daily      QUEtiapine (SEROQUEL) 100 MG tablet Take 150 mg by mouth nightly       CVS MELATONIN 3 MG TABS tablet TAKE 1 TABLET BY MOUTH NIGHTLY 90 tablet 1    B Complex Vitamins (VITAMIN B COMPLEX) TABS Take 1 tablet by mouth 2 times daily (Patient taking differently: Take 1 tablet by mouth daily ) 180 tablet 1    bisacodyl (DULCOLAX) 5 MG EC tablet Take 10 mg by mouth 2 times daily        No current facility-administered medications for this visit.          Allergies:  Levaquin [levofloxacin in d5w], Levofloxacin, and Pcn [penicillins]  History of allergic reaction to anesthesia:  No  Planned anesthesia: General   Known anesthesia problems: None   Bleeding risk: No recent or remote history of abnormal bleeding  Personal or FH of DVT/PE: No Patient objection to receiving blood products: No    Social History     Tobacco Use   Smoking Status Former Smoker    Packs/day: 2.00    Years: 20.00    Pack years: 40.00    Types: Cigarettes    Start date: 1971   [place] Quit date: 1991    Years since quittin.0   Smokeless Tobacco Never Used     The patient states she drinks 0 per week. Family History   Problem Relation Age of Onset    Breast Cancer Mother     Stroke Father      REVIEW OF SYSTEMS:    Review of Systems   Constitutional: Negative for chills, fatigue and fever. Respiratory: Negative for cough and shortness of breath. Cardiovascular: Positive for leg swelling (chronic swelling left lower extremity worsening). Negative for chest pain. Gastrointestinal: Negative for diarrhea, nausea and vomiting. Endocrine: Negative for polydipsia, polyphagia and polyuria. Musculoskeletal: Positive for arthralgias, joint swelling, myalgias and neck pain. Negative for back pain, gait problem and neck stiffness. Neurological: Negative for dizziness and headaches. All other systems reviewed and are negative. PHYSICAL EXAM:    /72 (Site: Left Upper Arm, Position: Sitting)   Pulse 52   Temp 97.1 °F (36.2 °C) (Temporal)   Resp 16   Wt 160 lb (72.6 kg)   SpO2 98%   Breastfeeding No   BMI 25.82 kg/m²   Physical Exam  Vitals and nursing note reviewed. Constitutional:       General: She is not in acute distress. Appearance: Normal appearance. She is normal weight. She is not ill-appearing, toxic-appearing or diaphoretic. HENT:      Head: Normocephalic and atraumatic. Right Ear: External ear normal.      Left Ear: External ear normal.   Eyes:      Extraocular Movements: Extraocular movements intact. Conjunctiva/sclera: Conjunctivae normal.      Pupils: Pupils are equal, round, and reactive to light. Cardiovascular:      Rate and Rhythm: Normal rate and regular rhythm. Heart sounds: Normal heart sounds. in mortality. Beta-blockers should be started days to weeks prior to surgery and titrated to pulse < 70.  4. Deep vein thrombosis prophylaxis: regimen to be chosen by surgical team  5. No contraindications to planned surgery      LUIS Garcia CNP, CNP    MMA [place]. [address] [facility], [address] [place] [address]  [phone]  On the basis of positive falls risk screening, assessment and plan is as follows: home safety tips provided.

## 2021-12-24 LAB
ESTIMATED AVERAGE GLUCOSE: 111.2 MG/DL
HBA1C MFR BLD: 5.5 %

## 2022-01-17 ENCOUNTER — HOSPITAL ENCOUNTER (OUTPATIENT)
Dept: WOMENS IMAGING | Age: 72
Discharge: HOME | End: 2022-01-17
Payer: COMMERCIAL

## 2022-01-17 DIAGNOSIS — Z12.31 SCREENING MAMMOGRAM FOR BREAST CANCER: ICD-10-CM

## 2022-01-17 DIAGNOSIS — Z12.31 ENCOUNTER FOR SCREENING MAMMOGRAM FOR MALIGNANT NEOPLASM OF BREAST: ICD-10-CM

## 2022-01-17 PROCEDURE — 77067 SCR MAMMO BI INCL CAD: CPT

## 2022-01-17 PROCEDURE — 77063 BREAST TOMOSYNTHESIS BI: CPT

## 2022-01-25 ENCOUNTER — TELEPHONE (OUTPATIENT)
Dept: FAMILY MEDICINE CLINIC | Age: 72
End: 2022-01-25

## 2022-01-25 NOTE — TELEPHONE ENCOUNTER
Patient was notified and scheduled for   Future Appointments   Date Time Provider Anastasiia Alvarez   4/21/2022  7:00 AM LUIS Griffith - YUDI VALDOVINOS

## 2022-01-25 NOTE — TELEPHONE ENCOUNTER
Yes she does not need to keep that appointment. I would like for her to schedule AWV in April and we will follow up on her chronic conditions then as well.

## 2022-01-25 NOTE — TELEPHONE ENCOUNTER
Tashia Spears came in today. Tashia Spears thought she had an appointment today however she has one for tomorrow. (6 month appt)    Tashia Spears had an appointment pre-op on 12/23/21. She had labs done that day too. Patient would like to know if she should keep her appointment for tomorrow since she had an appointment on 12/23/21 and labs. Please advise.

## 2022-03-28 DIAGNOSIS — E03.9 HYPOTHYROIDISM, UNSPECIFIED TYPE: Chronic | ICD-10-CM

## 2022-03-28 DIAGNOSIS — I10 ESSENTIAL HYPERTENSION: Chronic | ICD-10-CM

## 2022-03-28 DIAGNOSIS — G25.81 RLS (RESTLESS LEGS SYNDROME): ICD-10-CM

## 2022-03-28 NOTE — TELEPHONE ENCOUNTER
12/23/2021    Future Appointments   Date Time Provider Anastasiia Alvarez   4/21/2022  7:00 AM Gregg Golds, APRN - CNP [facility] - [facility]

## 2022-03-29 RX ORDER — LEVOTHYROXINE SODIUM 0.07 MG/1
TABLET ORAL
Qty: 90 TABLET | Refills: 1 | Status: SHIPPED | OUTPATIENT
Start: 2022-03-29

## 2022-03-29 RX ORDER — SPIRONOLACTONE 50 MG/1
TABLET, FILM COATED ORAL
Qty: 90 TABLET | Refills: 1 | Status: SHIPPED | OUTPATIENT
Start: 2022-03-29 | End: 2022-10-13 | Stop reason: SDUPTHER

## 2022-03-29 RX ORDER — CLINDAMYCIN HYDROCHLORIDE 300 MG/1
300 CAPSULE ORAL 2 TIMES DAILY
Qty: 6 CAPSULE | Refills: 0 | Status: SHIPPED | OUTPATIENT
Start: 2022-03-29 | End: 2022-04-01

## 2022-03-29 RX ORDER — ROPINIROLE 2 MG/1
TABLET, FILM COATED ORAL
Qty: 180 TABLET | Refills: 3 | Status: SHIPPED | OUTPATIENT
Start: 2022-03-29

## 2022-04-21 ENCOUNTER — HOSPITAL ENCOUNTER (OUTPATIENT)
Dept: GENERAL RADIOLOGY | Age: 72
Discharge: HOME | End: 2022-04-21
Payer: COMMERCIAL

## 2022-04-21 ENCOUNTER — OFFICE VISIT (OUTPATIENT)
Dept: FAMILY MEDICINE CLINIC | Age: 72
End: 2022-04-21
Payer: COMMERCIAL

## 2022-04-21 VITALS
WEIGHT: 158 LBS | BODY MASS INDEX: 25.5 KG/M2 | RESPIRATION RATE: 16 BRPM | OXYGEN SATURATION: 95 % | DIASTOLIC BLOOD PRESSURE: 73 MMHG | HEART RATE: 78 BPM | SYSTOLIC BLOOD PRESSURE: 136 MMHG | TEMPERATURE: 96.9 F

## 2022-04-21 DIAGNOSIS — M25.541 METACARPOPHALANGEAL JOINT PAIN OF RIGHT HAND: ICD-10-CM

## 2022-04-21 DIAGNOSIS — E11.9 TYPE 2 DIABETES MELLITUS WITHOUT COMPLICATION, WITHOUT LONG-TERM CURRENT USE OF INSULIN (HCC): ICD-10-CM

## 2022-04-21 DIAGNOSIS — E03.9 HYPOTHYROIDISM, UNSPECIFIED TYPE: ICD-10-CM

## 2022-04-21 DIAGNOSIS — Z00.00 MEDICARE ANNUAL WELLNESS VISIT, SUBSEQUENT: ICD-10-CM

## 2022-04-21 DIAGNOSIS — Z00.00 MEDICARE ANNUAL WELLNESS VISIT, SUBSEQUENT: Primary | ICD-10-CM

## 2022-04-21 LAB
HBA1C MFR BLD: 5.3 %
HCT VFR BLD CALC: 35.9 % (ref 36–48)
HEMOGLOBIN: 12 G/DL (ref 12–16)
MCH RBC QN AUTO: 32 PG (ref 26–34)
MCHC RBC AUTO-ENTMCNC: 33.3 G/DL (ref 31–36)
MCV RBC AUTO: 96.2 FL (ref 80–100)
PDW BLD-RTO: 13.4 % (ref 12.4–15.4)
PLATELET # BLD: 287 K/UL (ref 135–450)
PMV BLD AUTO: 6.9 FL (ref 5–10.5)
RBC # BLD: 3.74 M/UL (ref 4–5.2)
TSH SERPL DL<=0.05 MIU/L-ACNC: 5.5 UIU/ML (ref 0.27–4.2)
WBC # BLD: 3.8 K/UL (ref 4–11)

## 2022-04-21 PROCEDURE — 73130 X-RAY EXAM OF HAND: CPT

## 2022-04-21 PROCEDURE — 3044F HG A1C LEVEL LT 7.0%: CPT | Performed by: NURSE PRACTITIONER

## 2022-04-21 PROCEDURE — 4040F PNEUMOC VAC/ADMIN/RCVD: CPT | Performed by: NURSE PRACTITIONER

## 2022-04-21 PROCEDURE — 1123F ACP DISCUSS/DSCN MKR DOCD: CPT | Performed by: NURSE PRACTITIONER

## 2022-04-21 PROCEDURE — 3017F COLORECTAL CA SCREEN DOC REV: CPT | Performed by: NURSE PRACTITIONER

## 2022-04-21 PROCEDURE — G0439 PPPS, SUBSEQ VISIT: HCPCS | Performed by: NURSE PRACTITIONER

## 2022-04-21 PROCEDURE — 83036 HEMOGLOBIN GLYCOSYLATED A1C: CPT | Performed by: NURSE PRACTITIONER

## 2022-04-21 ASSESSMENT — PATIENT HEALTH QUESTIONNAIRE - PHQ9
SUM OF ALL RESPONSES TO PHQ9 QUESTIONS 1 & 2: 0
DEPRESSION UNABLE TO ASSESS: FUNCTIONAL CAPACITY MOTIVATION LIMITS ACCURACY
SUM OF ALL RESPONSES TO PHQ QUESTIONS 1-9: 0
SUM OF ALL RESPONSES TO PHQ QUESTIONS 1-9: 0
1. LITTLE INTEREST OR PLEASURE IN DOING THINGS: 0
SUM OF ALL RESPONSES TO PHQ QUESTIONS 1-9: 0
SUM OF ALL RESPONSES TO PHQ QUESTIONS 1-9: 0
2. FEELING DOWN, DEPRESSED OR HOPELESS: 0

## 2022-04-21 ASSESSMENT — ANXIETY QUESTIONNAIRES
1. FEELING NERVOUS, ANXIOUS, OR ON EDGE: 1
7. FEELING AFRAID AS IF SOMETHING AWFUL MIGHT HAPPEN: 0
3. WORRYING TOO MUCH ABOUT DIFFERENT THINGS: 0
GAD7 TOTAL SCORE: 1
5. BEING SO RESTLESS THAT IT IS HARD TO SIT STILL: 0
2. NOT BEING ABLE TO STOP OR CONTROL WORRYING: 0
4. TROUBLE RELAXING: 0
IF YOU CHECKED OFF ANY PROBLEMS ON THIS QUESTIONNAIRE, HOW DIFFICULT HAVE THESE PROBLEMS MADE IT FOR YOU TO DO YOUR WORK, TAKE CARE OF THINGS AT HOME, OR GET ALONG WITH OTHER PEOPLE: NOT DIFFICULT AT ALL
6. BECOMING EASILY ANNOYED OR IRRITABLE: 0

## 2022-04-21 NOTE — PROGRESS NOTES
Medicare Annual Wellness Visit    Marichuy Laguna is here for Medicare AWV, Diabetes, Other (feels sick after patients eats ), and Other (finger joint pain)    Assessment & Plan   Medicare annual wellness visit, subsequent  -     TSH; Future  Type 2 diabetes mellitus without complication, without long-term current use of insulin (HCC)  -     POCT glycosylated hemoglobin (Hb A1C)  -     CBC; Future  Metacarpophalangeal joint pain of right hand  -     XR HAND RIGHT (MIN 3 VIEWS); Future  Hypothyroidism, unspecified type      Recommendations for Preventive Services Due: see orders and patient instructions/AVS.  Recommended screening schedule for the next 5-10 years is provided to the patient in written form: see Patient Instructions/AVS.     Return for Medicare Annual Wellness Visit in 1 year. Subjective   The following acute and/or chronic problems were also addressed today:      Nausea after eating sometimes, feels like blood sugar drops but her glucose is normal and then eats something sweet and feels better. After eating feels better and then never has that issues again. No abdominal pain, appetite is good. Finger pain: right gonzalez- index and thumb MCP joint. Aching and stiffness. Has trouble with gripping and opening things. Did see ortho for this problem previously and was given steroid injection which helped for a while. Diabetes: Diet controlled. Denies polyuria, polydipsia or polyphagia. Patient's complete Health Risk Assessment and screening values have been reviewed and are found in Flowsheets. The following problems were reviewed today and where indicated follow up appointments were made and/or referrals ordered.     Positive Risk Factor Screenings with Interventions:    Fall Risk:  Do you feel unsteady or are you worried about falling? : (!) yes  2 or more falls in past year?: (!) yes  Fall with injury in past year?: no  Timed Up and Go Test > 12 seconds?: no  Fall Risk Interventions: · Home safety tips provided     Depression:  Depression Unable to Assess: Functional capacity motivation limits accuracy  PHQ-2 Score: 0  PHQ-9 Total Score: 0    Severity:1-4 = minimal depression, 5-9 = mild depression, 10-14 = moderate depression, 15-19 = moderately severe depression, 20-27 = severe depression             Safety:  Do you have working smoke detectors?: Yes  Do you have any tripping hazards - loose or unsecured carpets or rugs?: (!) Yes  Do you have any tripping hazards - clutter in doorways, halls, or stairs?: No  Do you have either shower bars, grab bars, non-slip mats or non-slip surfaces in your shower or bathtub?: Yes  Do all of your stairways have a railing or banister?: Yes  Do you always fasten your seatbelt when you are in a car?: Yes    Safety Interventions:  · Home safety tips provided           Objective   Vitals:    04/21/22 0702   BP: 136/73   Site: Left Upper Arm   Position: Sitting   Pulse: 78   Resp: 16   Temp: 96.9 °F (36.1 °C)   TempSrc: Temporal   SpO2: 95%   Weight: 158 lb (71.7 kg)      Body mass index is 25.5 kg/m². Allergies   Allergen Reactions    Levaquin [Levofloxacin In D5w]      Unknown reaction    Levofloxacin      Pt. Unaware of drug or reaction    Pcn [Penicillins] Hives     Prior to Visit Medications    Medication Sig Taking?  Authorizing Provider   spironolactone (ALDACTONE) 50 MG tablet TAKE 1 TABLET BY MOUTH EVERY DAY Yes LUIS Chavez CNP   rOPINIRole (REQUIP) 2 MG tablet TAKE 1-2 TABLETS BY MOUTH EVERY DAY AT NIGHT FOR REST LEG SYNDROME Yes LUIS Chavez CNP   levothyroxine (SYNTHROID) 75 MCG tablet TAKE 1 TABLET BY MOUTH EVERY DAY Yes LUIS Chavez CNP   carBAMazepine (TEGRETOL) 200 MG tablet Take 200 mg by mouth 3 times daily Yes Historical Provider, MD   lithium 300 MG capsule  Yes Historical Provider, MD   alirocumab (PRALUENT) 75 MG/ML SOAJ injection pen Inject 75 mg into the skin every 14 days Yes Historical Provider, MD   Coenzyme Q10 (COQ-10) 400 MG CAPS Take by mouth Yes Historical Provider, MD   desvenlafaxine succinate (PRISTIQ) 50 MG TB24 extended release tablet TAKE 1 TABLET BY MOUTH EVERY EVENING Yes Historical Provider, MD   CVS D3 25 MCG (1000 UT) CAPS TAKE 1 CAPSULE BY MOUTH EVERY DAY Yes LUIS Long CNP   linaCLOtide (LINZESS) 72 MCG CAPS capsule Take 145 mcg by mouth every morning (before breakfast)  Yes Historical Provider, MD   cetirizine (ZYRTEC ALLERGY) 10 MG tablet Take 10 mg by mouth daily Yes Historical Provider, MD   QUEtiapine (SEROQUEL) 100 MG tablet Take 150 mg by mouth nightly  Yes Historical Provider, MD   CVS MELATONIN 3 MG TABS tablet TAKE 1 TABLET BY MOUTH NIGHTLY Yes LUIS Long CNP   B Complex Vitamins (VITAMIN B COMPLEX) TABS Take 1 tablet by mouth 2 times daily  Patient taking differently: Take 1 tablet by mouth daily  Yes LUIS Long CNP   bisacodyl (DULCOLAX) 5 MG EC tablet Take 10 mg by mouth 2 times daily  Yes Historical Provider, MD   tiZANidine (ZANAFLEX) 2 MG tablet TAKE 1 TABLET BY MOUTH 3 TIMES DAILY AS NEEDED  [facility] (Including outside providers/suppliers regularly involved in providing care):   Patient Care Team:  LUIS Long CNP as PCP - General (Nurse Practitioner Family)  LUIS Long CNP as PCP - [facility] Empaneled Provider    Reviewed and updated this visit:  Tobacco  Allergies  Meds  Problems  Med Hx  Surg Hx  Soc Hx  Fam Hx

## 2022-04-21 NOTE — PATIENT INSTRUCTIONS
- XR of hand  - blood work today  - Follow up 6 months      Personalized Preventive Plan for Jozef Lipoma - 4/21/2022  Medicare offers a range of preventive health benefits. Some of the tests and screenings are paid in full while other may be subject to a deductible, co-insurance, and/or copay. Some of these benefits include a comprehensive review of your medical history including lifestyle, illnesses that may run in your family, and various assessments and screenings as appropriate. After reviewing your medical record and screening and assessments performed today your provider may have ordered immunizations, labs, imaging, and/or referrals for you. A list of these orders (if applicable) as well as your Preventive Care list are included within your After Visit Summary for your review. Other Preventive Recommendations:    · A preventive eye exam performed by an eye specialist is recommended every 1-2 years to screen for glaucoma; cataracts, macular degeneration, and other eye disorders. · A preventive dental visit is recommended every 6 months. · Try to get at least 150 minutes of exercise per week or 10,000 steps per day on a pedometer . · Order or download the FREE \"Exercise & Physical Activity: Your Everyday Guide\" from [employer]. Call 1-472.158.4029 or search [employer] online. · You need 5345-6017 mg of calcium and 5990-1505 IU of vitamin D per day. It is possible to meet your calcium requirement with diet alone, but a vitamin D supplement is usually necessary to meet this goal.  · When exposed to the sun, use a sunscreen that protects against both UVA and UVB radiation with an SPF of 30 or greater. Reapply every 2 to 3 hours or after sweating, drying off with a towel, or swimming. · Always wear a seat belt when traveling in a car. Always wear a helmet when riding a bicycle or motorcycle.

## 2022-04-22 DIAGNOSIS — E03.9 HYPOTHYROIDISM, UNSPECIFIED TYPE: Primary | ICD-10-CM

## 2022-06-07 ENCOUNTER — OFFICE VISIT (OUTPATIENT)
Dept: FAMILY MEDICINE CLINIC | Age: 72
End: 2022-06-07
Payer: COMMERCIAL

## 2022-06-07 VITALS
OXYGEN SATURATION: 98 % | DIASTOLIC BLOOD PRESSURE: 79 MMHG | WEIGHT: 152 LBS | TEMPERATURE: 97.1 F | BODY MASS INDEX: 24.53 KG/M2 | HEART RATE: 80 BPM | SYSTOLIC BLOOD PRESSURE: 130 MMHG | RESPIRATION RATE: 16 BRPM

## 2022-06-07 DIAGNOSIS — H61.21 IMPACTED CERUMEN OF RIGHT EAR: Primary | ICD-10-CM

## 2022-06-07 DIAGNOSIS — M19.041 OSTEOARTHRITIS OF METACARPOPHALANGEAL (MCP) JOINT OF RIGHT THUMB: ICD-10-CM

## 2022-06-07 PROCEDURE — 1123F ACP DISCUSS/DSCN MKR DOCD: CPT | Performed by: NURSE PRACTITIONER

## 2022-06-07 PROCEDURE — 1036F TOBACCO NON-USER: CPT | Performed by: NURSE PRACTITIONER

## 2022-06-07 PROCEDURE — G8427 DOCREV CUR MEDS BY ELIG CLIN: HCPCS | Performed by: NURSE PRACTITIONER

## 2022-06-07 PROCEDURE — G8420 CALC BMI NORM PARAMETERS: HCPCS | Performed by: NURSE PRACTITIONER

## 2022-06-07 PROCEDURE — 99213 OFFICE O/P EST LOW 20 MIN: CPT | Performed by: NURSE PRACTITIONER

## 2022-06-07 PROCEDURE — 3017F COLORECTAL CA SCREEN DOC REV: CPT | Performed by: NURSE PRACTITIONER

## 2022-06-07 PROCEDURE — G8399 PT W/DXA RESULTS DOCUMENT: HCPCS | Performed by: NURSE PRACTITIONER

## 2022-06-07 PROCEDURE — 1090F PRES/ABSN URINE INCON ASSESS: CPT | Performed by: NURSE PRACTITIONER

## 2022-06-07 ASSESSMENT — ANXIETY QUESTIONNAIRES
7. FEELING AFRAID AS IF SOMETHING AWFUL MIGHT HAPPEN: 0
1. FEELING NERVOUS, ANXIOUS, OR ON EDGE: 2
6. BECOMING EASILY ANNOYED OR IRRITABLE: 0
GAD7 TOTAL SCORE: 3
IF YOU CHECKED OFF ANY PROBLEMS ON THIS QUESTIONNAIRE, HOW DIFFICULT HAVE THESE PROBLEMS MADE IT FOR YOU TO DO YOUR WORK, TAKE CARE OF THINGS AT HOME, OR GET ALONG WITH OTHER PEOPLE: NOT DIFFICULT AT ALL
4. TROUBLE RELAXING: 0
5. BEING SO RESTLESS THAT IT IS HARD TO SIT STILL: 0
2. NOT BEING ABLE TO STOP OR CONTROL WORRYING: 1
3. WORRYING TOO MUCH ABOUT DIFFERENT THINGS: 0

## 2022-06-07 ASSESSMENT — ENCOUNTER SYMPTOMS
COUGH: 0
VOMITING: 0
SHORTNESS OF BREATH: 0
BACK PAIN: 0
DIARRHEA: 0
NAUSEA: 0

## 2022-06-07 ASSESSMENT — PATIENT HEALTH QUESTIONNAIRE - PHQ9
SUM OF ALL RESPONSES TO PHQ QUESTIONS 1-9: 0
SUM OF ALL RESPONSES TO PHQ9 QUESTIONS 1 & 2: 0
2. FEELING DOWN, DEPRESSED OR HOPELESS: 0
SUM OF ALL RESPONSES TO PHQ QUESTIONS 1-9: 0
DEPRESSION UNABLE TO ASSESS: FUNCTIONAL CAPACITY MOTIVATION LIMITS ACCURACY
SUM OF ALL RESPONSES TO PHQ QUESTIONS 1-9: 0
SUM OF ALL RESPONSES TO PHQ QUESTIONS 1-9: 0
1. LITTLE INTEREST OR PLEASURE IN DOING THINGS: 0

## 2022-06-07 NOTE — PROGRESS NOTES
2022     Chief Complaint   Patient presents with    Other     right ear feel clogged past two weeks      Dimitrios Poole (:  1950) is a 71 y.o. female, here for evaluation of the following medical concerns:    HPI    Right ear feels clogged x 2 weeks. No pain or drainage from the ear. Sometimes feels a little better but more often feels clogged. Denies fever, nasal congestion, rhinorrhea. Still having pain right hand. Had XR in April showed moderate OA of the 1st MCP joint. . Will refer back to ortho, saw YUNIER Prater last year and had cortisone injection which helped for a few months. Moderate osteoarthritis of the 1st carpometacarpal joint.       Widening of the scapholunate interval.  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  Chronicity is unknown, suspected to be chronic, given   the degenerative changes in the wrist.       Review of Systems   Constitutional: Negative for chills, fatigue and fever. HENT:        Right ear clogged   Respiratory: Negative for cough and shortness of breath. Cardiovascular: Positive for leg swelling (chronic swelling left lower extremity worsening). Negative for chest pain. Gastrointestinal: Negative for diarrhea, nausea and vomiting. Endocrine: Negative for polydipsia, polyphagia and polyuria. Musculoskeletal: Positive for arthralgias, joint swelling, myalgias and neck pain. Negative for back pain, gait problem and neck stiffness. Neurological: Negative for dizziness and headaches. All other systems reviewed and are negative. Prior to Visit Medications    Medication Sig Taking?  Authorizing Provider   spironolactone (ALDACTONE) 50 MG tablet TAKE 1 TABLET BY MOUTH EVERY DAY Yes LUIS Long CNP   rOPINIRole (REQUIP) 2 MG tablet TAKE 1-2 TABLETS BY MOUTH EVERY DAY AT NIGHT FOR REST LEG SYNDROME Yes LUIS Long CNP   levothyroxine (SYNTHROID) 75 MCG tablet TAKE 1 TABLET BY MOUTH EVERY DAY Yes Jacobo Jaylen, APRN - CNP   carBAMazepine (TEGRETOL) 200 MG tablet Take 200 mg by mouth 3 times daily Yes Historical Provider, MD   lithium 300 MG capsule  Yes Historical Provider, MD   alirocumab (PRALUENT) 75 MG/ML SOAJ injection pen Inject 75 mg into the skin every 14 days Yes Historical Provider, MD   Coenzyme Q10 (COQ-10) 400 MG CAPS Take by mouth Yes Historical Provider, MD   desvenlafaxine succinate (PRISTIQ) 50 MG TB24 extended release tablet TAKE 1 TABLET BY MOUTH EVERY EVENING Yes Historical Provider, MD   CVS D3 25 MCG (1000 UT) CAPS TAKE 1 CAPSULE BY MOUTH EVERY DAY Yes LUIS Cheung CNP   linaCLOtide (LINZESS) 72 MCG CAPS capsule Take 145 mcg by mouth every morning (before breakfast)  Yes Historical Provider, MD   cetirizine (ZYRTEC ALLERGY) 10 MG tablet Take 10 mg by mouth daily Yes Historical Provider, MD   QUEtiapine (SEROQUEL) 100 MG tablet Take 150 mg by mouth nightly  Yes Historical Provider, MD   CVS MELATONIN 3 MG TABS tablet TAKE 1 TABLET BY MOUTH NIGHTLY Yes LUIS Cheung CNP   B Complex Vitamins (VITAMIN B COMPLEX) TABS Take 1 tablet by mouth 2 times daily  Patient taking differently: Take 1 tablet by mouth daily  Yes LUIS Cheung CNP   bisacodyl (DULCOLAX) 5 MG EC tablet Take 10 mg by mouth 2 times daily  Yes Historical Provider, MD        Social History     Tobacco Use    Smoking status: Former Smoker     Packs/day: 2.00     Years: 20.00     Pack years: 40.00     Types: Cigarettes     Start date: 1971     Quit date: 1991     Years since quittin.5    Smokeless tobacco: Never Used   Substance Use Topics    Alcohol use: Yes     Comment: rarely        Vitals:    22 1515   BP: 130/79   Site: Left Upper Arm   Position: Sitting   Pulse: 80   Resp: 16   Temp: 97.1 °F (36.2 °C)   TempSrc: Temporal   SpO2: 98%   Weight: 152 lb (68.9 kg)     Estimated body mass index is 24.53 kg/m² as calculated from the following: Height as of 12/1/21: 5' 6\" (1.676 m). Weight as of this encounter: 152 lb (68.9 kg). Physical Exam  Vitals and nursing note reviewed. Constitutional:       General: She is not in acute distress. Appearance: Normal appearance. She is well-developed. She is not ill-appearing, toxic-appearing or diaphoretic. HENT:      Head: Normocephalic and atraumatic. Right Ear: Tympanic membrane, ear canal and external ear normal. There is impacted cerumen. Left Ear: Tympanic membrane, ear canal and external ear normal. There is no impacted cerumen. Eyes:      Extraocular Movements: Extraocular movements intact. Pupils: Pupils are equal, round, and reactive to light. Cardiovascular:      Rate and Rhythm: Normal rate. Heart sounds: S1 normal and S2 normal.   Pulmonary:      Effort: Pulmonary effort is normal. No respiratory distress. Neurological:      General: No focal deficit present. Mental Status: She is alert and oriented to person, place, and time. Mental status is at baseline. Cranial Nerves: No cranial nerve deficit. Psychiatric:         Speech: Speech normal.       ASSESSMENT/PLAN:  1. Impacted cerumen of right ear- removed with warm water lavage, tolerated well and reported improvement in her symptoms. 2. 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 An electronic signature was used to authenticate this note.     --Ace Urbina, APRN - CNP on 6/7/2022 at 3:45 PM

## 2022-06-15 ENCOUNTER — OFFICE VISIT (OUTPATIENT)
Dept: ORTHOPEDIC SURGERY | Age: 72
End: 2022-06-15
Payer: COMMERCIAL

## 2022-06-15 VITALS — RESPIRATION RATE: 16 BRPM | HEIGHT: 66 IN | WEIGHT: 152 LBS | BODY MASS INDEX: 24.43 KG/M2

## 2022-06-15 DIAGNOSIS — M79.644 PAIN OF RIGHT THUMB: Primary | ICD-10-CM

## 2022-06-15 PROCEDURE — 1090F PRES/ABSN URINE INCON ASSESS: CPT | Performed by: PHYSICIAN ASSISTANT

## 2022-06-15 PROCEDURE — G8427 DOCREV CUR MEDS BY ELIG CLIN: HCPCS | Performed by: PHYSICIAN ASSISTANT

## 2022-06-15 PROCEDURE — 3017F COLORECTAL CA SCREEN DOC REV: CPT | Performed by: PHYSICIAN ASSISTANT

## 2022-06-15 PROCEDURE — 1123F ACP DISCUSS/DSCN MKR DOCD: CPT | Performed by: PHYSICIAN ASSISTANT

## 2022-06-15 PROCEDURE — 99213 OFFICE O/P EST LOW 20 MIN: CPT | Performed by: PHYSICIAN ASSISTANT

## 2022-06-15 PROCEDURE — 1036F TOBACCO NON-USER: CPT | Performed by: PHYSICIAN ASSISTANT

## 2022-06-15 PROCEDURE — L3908 WHO COCK-UP NONMOLDE PRE OTS: HCPCS | Performed by: PHYSICIAN ASSISTANT

## 2022-06-15 PROCEDURE — G8399 PT W/DXA RESULTS DOCUMENT: HCPCS | Performed by: PHYSICIAN ASSISTANT

## 2022-06-15 PROCEDURE — G8420 CALC BMI NORM PARAMETERS: HCPCS | Performed by: PHYSICIAN ASSISTANT

## 2022-06-15 NOTE — PROGRESS NOTES
Ms. Jozef Oquendo is a 71 y.o. right handed woman  who is seen today in Hand Surgical Consultation at the request of LUIS Wright CNP. She has previously been seen for bilateral middle finger trigger fingers, which are not currently bothering her,    She is seen today regarding a 3 year(s) history of right basilar thumb pain without history of previous injury. She  was seen for this concern by her primary care physician; previous treatment has included conservative measures. She  reports moderate Basilar Thumb pain located about the base of the right thumbs at the level of the [facility] Joint, no tenderness of the remaining hand, wrist, or elbow on either side. She notes today, no neurologic symptoms in the hands. Symptoms are worsening over time. I have today reviewed with Jozef Oquendo the clinically relevant, past medical history, medications, allergies,  family history, social history, and Review Of Systems & I have documented any details relevant to today's presenting complaints in my history above. Ms. Artur Pulliam's self-reported past medical history, medications, allergies,  family history, social history, and Review Of Systems have been scanned into the chart under the \"Media\" tab. Physical Exam:  Ms. Artur Pulliam's most recent vitals:  Vitals  Resp: 16  Height: 5' 6\" (167.6 cm)  Weight: 152 lb (68.9 kg)    She is well nourished, oriented to person, place & time. She demonstrates appropriate mood and affect as well as normal gait and station. Skin: Normal in appearance, Normal Color and Free of Lesions Bilateral   Finger range of motion is limited by mild pain on the right greater than left . Examination for Stenosing Tenosynovitis demonstrates mild tenderness, thickening & nodularity at the A-1 pulley(s) of the Right Ring Finger. There is a palpable Nota's Node. There is Inducible triggering on active flexion with pain.   No other digits demonstrate evidence of Stenosing Tenosynovitis. Thumb range of motion is  decreased in flexion at the basilar joint on the Right, greater than Left   Wrist range of motion is limited by pain on the Right, greater than Left  There is no evidence of gross joint instability bilaterally. Sensation is subjectively normal in the Whole Hand bilaterally  Vascular examination reveals normal, good capillary refill and good color bilaterally  Swelling/enlargement is mild in the base of the thumb on the Right, normal on the Left  Maximal pain is elicited with palpation of the thumb CMC joint on the Right, normal on the Left. The remainder of the hands & wrists are not tender to palpation. Muscular strength is clinically appropriate bilaterally. Specifically, the Ulnar Styloid is not tender to palpation, the dorsal margin of the TFCC is mildly tender to palpation, the DRUJ is not tender to palpation and without effusion or instability, the Ulnar Intra-Carpal joints are mildly tender to palpation and without instability, the ECU tendon is not tender to palpation and is  subluxing from it's sub-sheath. Ulnar sided pain is  exacerbated with maximal wrist Ulnar Deviation and is worse in full supination, worse in neutral rotation, and is worse in full pronation. Radiographic Evaluation:  Radiographs, taken From another [de-identified] Office outside of my practice were Personally Reviewed & Interpreted by myself today (3 views of the right hand). They demonstrate no evidence of an acute fracture. There is moderate osteoarthritis of the first [facility] Joint with moderate joint narrowing and little osteophyte formation, & 0 degrees of MP joint hyper-extension. There is moderate widening noted between the scaphoid and lunate. The Distal Radio-Ulnar Joint reveals mild evidence of degenerative change. There is mild evidence of cystic change of the proximal ulnar base of the pisiform and the ulnar styloid.   There is not evidence of other static carpal instability of mal-position. There is not evidence of other injury or bony fracture. Impression:  Ms. 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 Ms. Vincenzo Spatz also presents with right Ulnar Sided Wrist pain, the underlying etiology of which are uncertain    Plan:  I have had a thorough discussion with Ms. Vincenzo Spatz regarding the treatment options available for her initially presenting right Thumb Basilar Joint osteoarthritis, which is causing her significant symptoms and difficulty. I have outlined for Ms. Vincenzo Spatz the risk, benefits and consequences of the various treatment modalities, including a reasonable expectation for the long term success of each. We have discussed the likelihood that further more aggressive treatment may be required for her current presenting condition. Based upon our current discussion and a reasonable understating of the options available to her, Ms. Vincenzo Spatz has selected to proceed with a conservative plan of treatment consisting of: the use of protective splints, activity modification, and the judicious use of over-the-counter anti-inflammatory medications if allowed by her primary care physician. An appropriately sized Removable forearm based Thumb-Spica orthosis was provided. Instructions were given regarding splint use and wear as well as suggestions for use of the other modalities were discussed. I have clearly explained to her that the above outlined treatment plan should not be expected to 'cure' her arthritic condition, but we are rather treating the symptoms with which she presents. She has understood that in order to achieve more durable relief of her symptoms and to prevent future worsening or further damage, that definitive surgical treatment would be required.  Ms. Vincenzo Spatz  voiced an appropriate understanding of our discussion, the options available to her, and of the expectations of her selected  treatment. Procedures    Dianna Gonzalez Titan Wrist and Thumb Brace     Patient was prescribed a Dianna Gonzalez Titan Wrist and Thumb Brace. The right wrist and thumb will require stabilization / immobilization from this semi-rigid / rigid orthosis to improve their function. The orthosis will assist in protecting the affected area, provide functional support and facilitate healing. The patient was educated and fit by a healthcare professional with expert knowledge and specialization in brace application while under the direct supervision of the treating physician. Verbal and written instructions for the use of and application of this item were provided. They were instructed to contact the office immediately should the brace result in increased pain, decreased sensation, increased swelling or worsening of the condition. I have also discussed with Ms. Bryan Forman  the other treatment options available to her  for this condition. We have today selected to proceed with conservative management. She and I have agreed that if our current course of conservative treatment does not prove to be effective over the short term future, that she will schedule a follow-up appointment to discuss and select an alternate course of therapy including possibly injection or surgical treatment. Ms. Bryan Forman has been given a full verbal list of instructions and precautions related to her present condition. I have asked her to followup with me in the office at the prescribed time. She is also specifically requested to call or return to the office sooner if her symptoms change or worsen prior to the next scheduled appointment.

## 2022-10-13 DIAGNOSIS — I10 ESSENTIAL HYPERTENSION: Chronic | ICD-10-CM

## 2022-10-13 RX ORDER — SPIRONOLACTONE 50 MG/1
TABLET, FILM COATED ORAL
Qty: 90 TABLET | Refills: 1 | Status: SHIPPED | OUTPATIENT
Start: 2022-10-13

## 2022-10-13 NOTE — TELEPHONE ENCOUNTER
LOV 6/7/2022    Future Appointments   Date Time Provider Anastasiia Alvarez   10/20/2022  7:00 AM LUIS Madrigal CNP

## 2022-10-20 ENCOUNTER — OFFICE VISIT (OUTPATIENT)
Dept: FAMILY MEDICINE CLINIC | Age: 72
End: 2022-10-20
Payer: COMMERCIAL

## 2022-10-20 VITALS
RESPIRATION RATE: 16 BRPM | HEART RATE: 70 BPM | BODY MASS INDEX: 26.15 KG/M2 | DIASTOLIC BLOOD PRESSURE: 80 MMHG | WEIGHT: 162 LBS | OXYGEN SATURATION: 98 % | SYSTOLIC BLOOD PRESSURE: 116 MMHG | TEMPERATURE: 97.1 F

## 2022-10-20 DIAGNOSIS — E11.9 TYPE 2 DIABETES MELLITUS WITHOUT COMPLICATION, WITHOUT LONG-TERM CURRENT USE OF INSULIN (HCC): Primary | ICD-10-CM

## 2022-10-20 DIAGNOSIS — G89.29 CHRONIC PAIN OF MULTIPLE JOINTS: ICD-10-CM

## 2022-10-20 DIAGNOSIS — I10 ESSENTIAL HYPERTENSION: ICD-10-CM

## 2022-10-20 DIAGNOSIS — M12.9 ARTHRITIS, MULTIPLE JOINT INVOLVEMENT: ICD-10-CM

## 2022-10-20 DIAGNOSIS — M25.50 CHRONIC PAIN OF MULTIPLE JOINTS: ICD-10-CM

## 2022-10-20 DIAGNOSIS — E03.9 HYPOTHYROIDISM, UNSPECIFIED TYPE: ICD-10-CM

## 2022-10-20 DIAGNOSIS — K59.09 CHRONIC CONSTIPATION: ICD-10-CM

## 2022-10-20 PROBLEM — K56.2 VOLVULUS OF SMALL INTESTINE (HCC): Status: RESOLVED | Noted: 2019-09-16 | Resolved: 2022-10-20

## 2022-10-20 PROBLEM — K56.609 SMALL BOWEL OBSTRUCTION (HCC): Status: RESOLVED | Noted: 2019-11-19 | Resolved: 2022-10-20

## 2022-10-20 LAB
A/G RATIO: 2.2 (ref 1.1–2.2)
ALBUMIN SERPL-MCNC: 4.6 G/DL (ref 3.4–5)
ALP BLD-CCNC: 88 U/L (ref 40–129)
ALT SERPL-CCNC: 21 U/L (ref 10–40)
ANION GAP SERPL CALCULATED.3IONS-SCNC: 13 MMOL/L (ref 3–16)
AST SERPL-CCNC: 24 U/L (ref 15–37)
BILIRUB SERPL-MCNC: 0.3 MG/DL (ref 0–1)
BUN BLDV-MCNC: 18 MG/DL (ref 7–20)
CALCIUM SERPL-MCNC: 9.6 MG/DL (ref 8.3–10.6)
CHLORIDE BLD-SCNC: 101 MMOL/L (ref 99–110)
CHOLESTEROL, FASTING: 245 MG/DL (ref 0–199)
CO2: 28 MMOL/L (ref 21–32)
CREAT SERPL-MCNC: 0.6 MG/DL (ref 0.6–1.2)
GFR SERPL CREATININE-BSD FRML MDRD: >60 ML/MIN/{1.73_M2}
GLUCOSE BLD-MCNC: 85 MG/DL (ref 70–99)
HBA1C MFR BLD: 5.2 %
HCT VFR BLD CALC: 35.5 % (ref 36–48)
HDLC SERPL-MCNC: 114 MG/DL (ref 40–60)
HEMOGLOBIN: 12.2 G/DL (ref 12–16)
LDL CHOLESTEROL CALCULATED: 119 MG/DL
MCH RBC QN AUTO: 32.1 PG (ref 26–34)
MCHC RBC AUTO-ENTMCNC: 34.3 G/DL (ref 31–36)
MCV RBC AUTO: 93.5 FL (ref 80–100)
PDW BLD-RTO: 12.8 % (ref 12.4–15.4)
PLATELET # BLD: 312 K/UL (ref 135–450)
PMV BLD AUTO: 7.4 FL (ref 5–10.5)
POTASSIUM SERPL-SCNC: 4.6 MMOL/L (ref 3.5–5.1)
RBC # BLD: 3.79 M/UL (ref 4–5.2)
SODIUM BLD-SCNC: 142 MMOL/L (ref 136–145)
T4 FREE: 0.8 NG/DL (ref 0.9–1.8)
TOTAL PROTEIN: 6.7 G/DL (ref 6.4–8.2)
TRIGLYCERIDE, FASTING: 62 MG/DL (ref 0–150)
TSH REFLEX: 5.38 UIU/ML (ref 0.27–4.2)
VLDLC SERPL CALC-MCNC: 12 MG/DL
WBC # BLD: 4.5 K/UL (ref 4–11)

## 2022-10-20 PROCEDURE — G8484 FLU IMMUNIZE NO ADMIN: HCPCS | Performed by: NURSE PRACTITIONER

## 2022-10-20 PROCEDURE — 1036F TOBACCO NON-USER: CPT | Performed by: NURSE PRACTITIONER

## 2022-10-20 PROCEDURE — 1090F PRES/ABSN URINE INCON ASSESS: CPT | Performed by: NURSE PRACTITIONER

## 2022-10-20 PROCEDURE — 82044 UR ALBUMIN SEMIQUANTITATIVE: CPT | Performed by: NURSE PRACTITIONER

## 2022-10-20 PROCEDURE — 83036 HEMOGLOBIN GLYCOSYLATED A1C: CPT | Performed by: NURSE PRACTITIONER

## 2022-10-20 PROCEDURE — 3044F HG A1C LEVEL LT 7.0%: CPT | Performed by: NURSE PRACTITIONER

## 2022-10-20 PROCEDURE — 2022F DILAT RTA XM EVC RTNOPTHY: CPT | Performed by: NURSE PRACTITIONER

## 2022-10-20 PROCEDURE — G8427 DOCREV CUR MEDS BY ELIG CLIN: HCPCS | Performed by: NURSE PRACTITIONER

## 2022-10-20 PROCEDURE — 3017F COLORECTAL CA SCREEN DOC REV: CPT | Performed by: NURSE PRACTITIONER

## 2022-10-20 PROCEDURE — G8417 CALC BMI ABV UP PARAM F/U: HCPCS | Performed by: NURSE PRACTITIONER

## 2022-10-20 PROCEDURE — G8399 PT W/DXA RESULTS DOCUMENT: HCPCS | Performed by: NURSE PRACTITIONER

## 2022-10-20 PROCEDURE — 99214 OFFICE O/P EST MOD 30 MIN: CPT | Performed by: NURSE PRACTITIONER

## 2022-10-20 PROCEDURE — 1123F ACP DISCUSS/DSCN MKR DOCD: CPT | Performed by: NURSE PRACTITIONER

## 2022-10-20 ASSESSMENT — ENCOUNTER SYMPTOMS
BACK PAIN: 0
DIARRHEA: 0
SHORTNESS OF BREATH: 0
COUGH: 0
NAUSEA: 0
VOMITING: 0

## 2022-10-20 ASSESSMENT — PATIENT HEALTH QUESTIONNAIRE - PHQ9
1. LITTLE INTEREST OR PLEASURE IN DOING THINGS: 0
DEPRESSION UNABLE TO ASSESS: FUNCTIONAL CAPACITY MOTIVATION LIMITS ACCURACY
SUM OF ALL RESPONSES TO PHQ QUESTIONS 1-9: 0
2. FEELING DOWN, DEPRESSED OR HOPELESS: 0
SUM OF ALL RESPONSES TO PHQ9 QUESTIONS 1 & 2: 0
SUM OF ALL RESPONSES TO PHQ QUESTIONS 1-9: 0

## 2022-10-20 ASSESSMENT — ANXIETY QUESTIONNAIRES
IF YOU CHECKED OFF ANY PROBLEMS ON THIS QUESTIONNAIRE, HOW DIFFICULT HAVE THESE PROBLEMS MADE IT FOR YOU TO DO YOUR WORK, TAKE CARE OF THINGS AT HOME, OR GET ALONG WITH OTHER PEOPLE: NOT DIFFICULT AT ALL
5. BEING SO RESTLESS THAT IT IS HARD TO SIT STILL: 0
2. NOT BEING ABLE TO STOP OR CONTROL WORRYING: 0
6. BECOMING EASILY ANNOYED OR IRRITABLE: 0
7. FEELING AFRAID AS IF SOMETHING AWFUL MIGHT HAPPEN: 0
GAD7 TOTAL SCORE: 0
4. TROUBLE RELAXING: 0
1. FEELING NERVOUS, ANXIOUS, OR ON EDGE: 0
3. WORRYING TOO MUCH ABOUT DIFFERENT THINGS: 0

## 2022-10-20 NOTE — PATIENT INSTRUCTIONS
- blood work today  - make appointment with rheumatologist for the arthritis  - Follow up with me in 6 months         Dr. Janeen Ugarte  [address], [address] [facility]  [phone]

## 2022-10-21 ENCOUNTER — TELEPHONE (OUTPATIENT)
Dept: FAMILY MEDICINE CLINIC | Age: 72
End: 2022-10-21

## 2022-10-21 NOTE — TELEPHONE ENCOUNTER
NOT ABLE TO ADDEND MESSAGE. Lab results given. Please order re-check thyroids         LMTCB. Lab results printed and faxed to cardiologist.     Please order re-check thyroids         TSH is slightly high but T4 just slightly low. Recommend staying on same dose of Synthroid- recheck thyroid labs in 2 months. Kidney function, liver enzymes, blood count look good.  Let her know will send cholesterol panel to her cardiologist.      Please fax these results to her cardiologist Dr. Joan Oleary with [facility] [address] Fax [phone]

## 2022-12-06 SDOH — HEALTH STABILITY: PHYSICAL HEALTH: ON AVERAGE, HOW MANY MINUTES DO YOU ENGAGE IN EXERCISE AT THIS LEVEL?: 0 MIN

## 2022-12-06 SDOH — HEALTH STABILITY: PHYSICAL HEALTH: ON AVERAGE, HOW MANY DAYS PER WEEK DO YOU ENGAGE IN MODERATE TO STRENUOUS EXERCISE (LIKE A BRISK WALK)?: 0 DAYS

## 2022-12-07 ENCOUNTER — OFFICE VISIT (OUTPATIENT)
Dept: ORTHOPEDIC SURGERY | Age: 72
End: 2022-12-07

## 2022-12-07 VITALS — RESPIRATION RATE: 16 BRPM | WEIGHT: 162 LBS | HEIGHT: 66 IN | BODY MASS INDEX: 26.03 KG/M2

## 2022-12-07 DIAGNOSIS — M25.531 RIGHT WRIST PAIN: Primary | ICD-10-CM

## 2022-12-07 RX ORDER — TRIAMCINOLONE ACETONIDE 40 MG/ML
40 INJECTION, SUSPENSION INTRA-ARTICULAR; INTRAMUSCULAR ONCE
Status: COMPLETED | OUTPATIENT
Start: 2022-12-07 | End: 2022-12-07

## 2022-12-07 RX ADMIN — TRIAMCINOLONE ACETONIDE 40 MG: 40 INJECTION, SUSPENSION INTRA-ARTICULAR; INTRAMUSCULAR at 16:41

## 2022-12-07 NOTE — LETTER
CONSENT TO OPERATION  AND/OR OTHER PROCEDURE(S)          PATIENT : Amber Funes   YOB: 1950      DATE : 12/7/22          1. I request and consent that Dr. Yoni Huff. Laura Iraheta,  and/or his associates or assistants perform an operation and/or procedures on the above patient at  [place] [address], to treat the condition(s) which appear indicated by the diagnostic studies already performed. I have been told that in general terms the nature, purpose and reasonable expectations of the operation and/or procedure(s) are:       left Middle Finger Digital Mucous Cyst  Excision with DIP Joint Debridement       2. It has been explained to me by the informing physician that during the course of the operation and/or procedure(s) unforeseen conditions may be revealed that necessitate an extension of the original operation and/or procedure(s) or different operation and/or procedures than those set forth in Paragraph 1. I therefore authorize and request that my physician and/or his associates or assistants perform such operations and/or procedures as are necessary and desirable in the exercise of professional judgment. The authority granted under this Paragraph 2 shall extend to all conditions that require treatment and are known to my physician at the time the operation is commenced. 3. I have been made aware by the informing physician of certain risks and consequences that are associated with the operation and/or procedure(s) described in Paragraph 1, the reasonable alternative methods or treatment, the possible consequences, the possibility that the operation and/or procedure(s) may be unsuccessful and the possibility of complications.   I understand the reasonably known risks to be:      - Bleeding  - Infection  - Poor Healing  - Possible Damage to Nerve, Vessel, Tendon/Muscle or Bone  - Need for further Treatment/Surgery  - Stiffness  - Pain  - Residual or Recurrent Symptoms  - Anesthetic and/or Medical Risks  - We have discussed the specific limitations and risks of hospital and/or office based treatment at this time due to the COVID-19 pandemic                I have been counseled about the risks of melania Covid-19 in the tami-operative and post-operative periods related to this procedure. I have been made aware that melania Covid-19 around the time of a surgical procedure may worsen my prognosis for recovering from the virus and lend to a higher morbidity and or mortality risk. With this knowledge, I have requested to proceed with the procedure as scheduled. 4. I have also been informed by the informing physician that there are other risks from both known and unknown causes that are attendant to the performance of any surgical procedure. I am aware that the practice of medicine and surgery is not an exact science, and that no guarantees have been made to me concerning the results of the operation and/or procedure(s). 5. I   CONSENT / REFUSE CONSENT  (strike the phrase that does not apply) to the taking of photographs before, during and/or after the operation or procedure for scientific/educational purposes. 6. I consent to the administration of anesthesia and to the use of such anesthetics as may be deemed advisable by the anesthesiologist who has been engaged by me or my physician. 7. I certify that I have read and understand the above consent to operation and/or other procedure(s); that the explanations therein referred to were made to me by the informing physician in advance of my signing this consent; that all blanks or statements requiring insertion or completion were filled in and inapplicable paragraphs, if any, were stricken before I signed; and that all questions asked by me about the operation and/or procedure(s) which I have consented to have been fully answered in a satisfactory manner. _______________________           12/7/22                              Witness     Signature Of Patient         Date        Daina Moreno. Ollie                                                 Informing Physician                                           Signature of Informing Physician                              If patient is unable to sign or is a minor, complete one of the following:    (A)  Patient is a minor   years of age. (B)  Patient is unable to sign because: The undersigned represents that he or she is duly authorized to execute this consent for and on behalf of the above named patient. Witness               o  Parent  o  Guardian   o  Spouse       o  Other (specify)                                           Patient Name: Rohini Clark  Patient YOB: 1950  Dr. Irvin Ramon' Return To Work Policy  Regarding your ability to return to work after surgery or injury, Dr. Irvin Ramon will not state that any patient is off of work or cannot work at all. He will place you on restrictions after your surgical procedure or injury. Depending on the details of your particular situation, Dr. Irvin Ramon may state that you will have either light use or no use of your hand for a specific number of weeks. It is your obligation to communicate with your employer regarding your restrictions. It is your employer's decision as to whether they will accommodate your restrictions (i.e. allow you to come to work in your restricted capacity) or to not allow you to return to work under your restrictions. Dr. Irvin Ramon does not participate in making this decision and cannot influence your employer regarding their decision. If you do not communicate your restrictions to your employer, or if you do not present to work as you are scheduled to, Dr. Irvin Ramon will not provide an 'excuse' to explain your absence.   A doctors note, or official forms (BWC, FMLA, etc.) will be filled out, upon request, to indicate your date of surgery and your restrictions as stated above. Dr. Phuong Holliday' Narcotic Policy  Patients will only be prescribed narcotics after surgical procedures or significant injury. Not all procedures cause pain great enough to require Narcotics and thus, not all patients will receive prescriptions after surgical procedures or injuries. Narcotics are never prescribed for chronic conditions. Narcotics are never prescribed for use longer than one week at a time. Refills are only granted in unusual circumstances and only at Dr. Rajesh Kenyon discretion. Patients who are receiving narcotic medication from another physician or who are under pain management contracts will not be given a prescription for narcotics for any reason. Surgery Arrival Time:  You have been advised of the START TIME for your surgery as well as the ARRIVAL TIME at which you need to arrive at the surgery facility. Please understand that there is a certain amount of preparation which takes place at the surgery facility prior to the start of your surgery. If you arrive later than your scheduled ARRIVAL TIME, it may be necessary to cancel your surgery for that day and reschedule your procedure due to lack of adequate time for pre-surgery preparations. Thank you for being on time for your arrival.    I have read the above policies and understand that by agreeing to proceed with treatment by Dr. Jeaneth Cervantes and his team, that I am agreeing to abide by these policies.   Patient Name:  Arthur Flowers    Patient Signature:  _____________________________    Emelia Epps Date:   12/7/22

## 2022-12-07 NOTE — PATIENT INSTRUCTIONS
Information & Instructions   After Finger, Hand, Wrist, or Elbow Injection    Alexsandra Young MD    You have received an injection of local anesthetic (Bupivicaine without Epinephrine) for comfort & a steroid (Kenalog) for its strong anti-inflammatory effects. In order to give the medication a chance to reduce your inflammation and discomfort, it is recommended that you take it easy for a day or so. You may use your hand and arm as you feel comfortable, but you should avoid highly strenuous activity and heavy use for several days. Relief from the injection will often not begin for several days, and you may not feel full relief for up to one month. It is not uncommon to experience some local discomfort or pain at or around the injection site for a few days. To relieve these symptoms you may do the following if you feel necessary:       Apply ice to the affected area 20 minutes on and 20 minutes off. Do not apply ice directly to the skin. Use a thin layer (T-shirt, pillowcase, towel, etc.) to protect the skin. - If allowed by your other medical physicians, you may take -     2 Tylenol extra strength tablets every 4-6 hours       1-2 Aleve tablets twice a day     2-3 Advil tablets two to three times a day    If you are diabetic, the steroid medication may increase your blood sugar, so you are advised to monitor your sugar more closely so you can adjust it accordingly for a few days following your injection. If you need assistance with the control of you blood sugar, please contact you primary care physician for further advice. I will request that you please call the office one month after your injection at [phone] if you have not experienced relief of your symptoms (unless I have instructed you otherwise). If your injection has given you good relief of you symptoms as expected, then you only need to call the office if your symptoms return.

## 2022-12-07 NOTE — PROGRESS NOTES
Administrations This Visit       triamcinolone acetonide (KENALOG-40) injection 40 mg       Admin Date  12/07/2022  16:41 Action  Given Dose  40 mg Route  Other Site  Wrist Right Administered By  Almas Lepe MA    Ordering Provider: Gibson Manzano MD    NDC: 9840-6577-00    Lot#: [id number]    : [facility] (PRIMARY CARE)    Patient Supplied?: No

## 2022-12-07 NOTE — Clinical Note
Dear  Nato Mancuso, APRN - CNP,  Thank you very much for your referral or Ms. Pieter De Jesus to me for evaluation and treatment of her Hand & Wrist condition. I appreciate your confidence in me and thank you for allowing me the opportunity to care for your patients. If I can be of any further assistance to you on this or any other patient, please do not hesitate to contact me. Sincerely,  Aleksandr Lord.  Mason Gomez MD

## 2022-12-07 NOTE — PROGRESS NOTES
Ms. Shay Albright returns today in follow-up of her previously treated  right wrist  Degenerative Osteoarthritis. She was last seen by Bing Lynne PA-C in December, 2021 at which time she was treated with splinting of the right wrist.  She experienced minimal relief of her initial symptoms. She  has noticed symptom worsening over the last several months. She returns today with worsened symptoms of right Wrist pain and swelling, requesting further treatment. She also presents today regarding a left sided Middle Finger mass which have been present for approximately 4 months. A history of antecedent trauma or injury is Absent. She reports a prominent mass on the paronychial aspect of the Middle Finger with symptoms that include pain at the extremes of position &  limitation of motion. Finger symptoms are exacerbated with certain gripping  or weight bearing activities. The size of the mass has been fluctuating with time and change in activity level. She reports that the mass has ever drained in the past;  There has not been any previous sign of infection in the region of the mass. I have today reviewed with Shay Albright the clinically relevant, past medical history, medications, allergies,  family history, social history, and Review Of Systems & I have documented any details relevant to today's presenting complaints in my history above. Ms. Yessica Pulliam's self-reported past medical history, medications, allergies,  family history, social history, and Review Of Systems have been scanned into the chart under the \"Media\" tab. Physical Exam:  Vitals  Resp: 16  Height: 5' 6\" (167.6 cm)  Weight: 162 lb (73.5 kg)  Ms. Shay Albright appears well, she is in no apparent distress, she demonstrates appropriate mood & affect.       Skin: Normal in appearance, Normal Color, and Free of Lesions Bilaterally   Digital range of motion is without significant limitation bilaterally  Wrist range of motion is limited by pain on the Right, normal on the Left  There is no evidence of gross joint instability bilaterally. Sensation is subjectively normal bilaterally  Vascular examination reveals normal, good capillary refill, and good color bilaterally  There is mild swelling over the Dorso-radial wrist and carpus on the Right, normal on the Left  Maximal pain is encountered with palpation over the Ulno-carpal Joint on the Right, normal on the Left  Muscular strength is clinically appropriate bilaterally. Examination of the right first Carpo-Metacarpal Joint of the wrist demonstrates little radial subluxation of the Thumb Metacarpal base upon the Trapezium. There is moderate pain with palpation at the [facility] joint line; pain is moderately worsened with Crank & Grind Maneuvers. There is additional discomfort at the Ziyebp-Envwzqlkv-Rmvogwpip joint. An adduction contracture of the first web space has not developed with a compensatory hyper-extension deformity at the MP joint measuring 0 degrees. There is a 6 mm Fluid Filled mass on the Dorsal and Eponychial aspect of the Middle Finger nearest to the DIP Joint. The mass is mildly tender to palpation, unchanged in maximal digital flexion/extension. The mass does have a \"clear\" appearance to it's contents and does appear to transilluminate. There is not a longitudinal nail plate deformity. Radiographic Evaluation:  Radiographs, taken In My Office were Personally Reviewed & Interpreted by myself today (3 views of the right wrist). They demonstrate no evidence of acute fracture, subluxation, or dislocation. There is moderate degenerative change at the 1st [facility] joint, the Bnpuaa-Pptbnknhc-Wjjgzvgcf joint  and moderate SLAC wrist deformity      Impression:  Ms. Jeffry Cordero is showing evidence of persistent  wrist  Degenerative Osteoarthritis with worsening symptoms and left middle finger mucous cyst after previous treatment.   She requests additional treatment at this time.    Plan:  I have had a thorough discussion with Ms. Nadeen Coulter regarding the treatment options available for her initially presenting left Middle Finger Digital Mucous Cyst, which is causing her significant  limitations. I have outlined for Ms. Nadeen Coulter the benefits and consequences of the various treatment modalities, including the fact that surgical treatment is the only modality which is reasonably expected to provide long lasting or permanent resolution of her symptoms. Based upon our current discussion and a reasonable understating of the options available to her, Ms. Nadeen Coulter has selected to proceed with surgical Middle Finger Digital Mucous Cyst excision with arthrotomy and DIP Joint debridement as needed. I have discussed the details of the surgical procedure, the pre, tami and postoperative concerns and the appropriate expectations after surgery with Ms. Nadeen Coulter today. She was given the opportunity to ask questions, voiced an understanding of the procedure, and she did wish to proceed with Left Middle Finger Digital Mucous Cyst excision with arthrotomy and DIP Joint debridement as needed. I had an extensive discussion with Ms. Nadeen Coulter (and any family members present with her today) regarding the natural history, etiology, and long term consequences of this problem. We have mutually selected a surgical treatment plan  and, in my opinion, surgical intervention is indicated at this time. I have discussed with her the potential complications, limitations, expectations, alternatives, and risks of Middle Finger Digital Mucous Cyst excision with arthrotomy and DIP Joint debridement as needed. She has had full opportunity to ask her questions. I have answered them all to her satisfaction.  I feel that Ms. Nadeen Coulter (and any family members present with her today) do understand our discussion today and she has provided informed consent for Left Middle Finger Digital Mucous Cyst excision with arthrotomy and DIP Joint debridement as needed. I have also discussed with Ms. Gigi Heimlich the other treatment options available to her for this condition. We have today selected to proceed with Surgical treatment. She has voiced and  understanding that there are other less aggressive treatment options which are available in this situation, albeit possibly less efficacious or durable, and she is comfortable with the plan that she has chosen. I have had a thorough discussion with Ms. Gigi Heimlich regarding the treatment options available for her worsened Right wrist joint osteoarthritis, which is causing her functional limitations. I have outlined for Ms. Gigi Heimlich the the various treatment modalities available to her, including the options and utility of the  judicious use of over-the-counter anti-inflammatory medications (if allowed by her primary care physician), the temporary relief afforded by injection of corticosteroids, and the more definitive option of surgical treatment for this problem if she feels that the duration or severity of her symptoms merits surgical intervention. I have explained  the reasonable expectations for the long term success of each option and the likelihood that further more aggressive treatment could be required for her current presenting condition. Based upon our current discussion and a reasonable understating of the options available to her, Ms. Gigi Heimlich has selected to proceed with  an injection to the right wrist joint. I have outlined for her the nature of the injection, and the pre, tami and post injection considerations and the appropriate expectations for this injection. I have clearly explained to her that the above outlined treatment plan should not be expected to 'cure' her wrist osteoarthritis, but we are rather treating the symptoms with which she presents.   She has understood that in order to achieve long lasting relief of her symptoms and to prevent future worsening or further damage, that definitive surgical treatment would be required. Ms. Jenn Shah  voiced an appropriate understanding of our discussion, the options available to her, and of the expectations of her selected  treatment. She did wish to proceed with Right wrist joint injection. Procedure:  Right Wrist Joint injection [first Injection]: After full discussion of the nature of Wrist Joint osteoarthritis and outlining a treatment plan with Ms. Jenn Shah, we discussed the complications, limitations, expectations, alternatives, and risks of injection to the [facility] Joint. She understood this information well and verbally consented to this treatment. The skin of the symptomatic extremity was prepped with Isopropyl Alcohol and under aseptic conditions the  Scapho-lunate Joint was injected with a combination of 2 ml of Triamcinolone (40 mg/ml) and Bupivacaine 0.25% without Epinephrine. There was good filling of the joint space. A dry, sterile bandage was applied and she  tolerated the injection without difficulty. I advised her  of the expected response, possible reactions and the instructions for care of the hand. She is instructed in the judicious use of over-the-counter anti-inflammatory medications or pain relievers for her symptoms if allowed by his primary care physician. I have also discussed with Ms. Jenn Shah the other treatment options available to her for this condition. We have today selected to proceed with treatment by injection with steroid medication. She and I have agreed that if our current course of Injection treatment does not prove to be effective over the short term future, that she will schedule a follow-up appointment to discuss and select an alternate course of therapy including possibly further conservative treatment or surgical treatment.        Ms. Jenn Shah has been given a full verbal list of instructions and precautions related to her present condition. I have asked her to followup with me in the office at the prescribed time. She is also specifically requested to call or return to the office sooner if her symptoms change or worsen prior to the next scheduled appointment.

## 2022-12-07 NOTE — LETTER
[facility] SURGERY  Surgery Scheduling Form:  DEMOGRAPHICS:                                                                                                              .    Patient Name:  Molly Walter  Patient :  1950   Patient SS#:  [id number]    Patient Phone:  [phone] (home)  Alt. Patient Phone:    Patient Address:  [address] [place] [id number]    PCP:  LUIS Dailey CNP  Insurance:   Payer/Plan Subscr  Sex Relation Sub. Ins. ID Effective Group Num   1. Chiquita Avina 1950 Female Self [id number]                                    [address] [id number]     DIAGNOSIS & PROCEDURE:                                                                                            .    Diagnosis:   left Middle Finger Digital Mucous Cyst  M67. 4XA  Operation:  left Middle Finger Digital Mucous Cyst  Excision with Distal interphalangeal Joint Debridement  [CPT: 43509 + 29097]  Location:  [address]  Surgeon:  Fletcher Cortes    SCHEDULING INFORMATION:                                                                                         .    Surgeon's Scheduling Instruction:  elective    RN Post-op Appt:  [x] Yes   [] No  Preferred Thursday:   [] Yes   [] No    Requested Date:  22 OR Time: 7:40am  Patient Arrival Time:    OR Time Required:  15  Minutes  Anesthesia:  MAC/TIVA  Equipment:  None  Mini C-Arm:  Yes   Standard C-Arm:  No  Status:  outpatient  PAT Required:  Yes  Comments:                      Abdullahi Chaudhary MD  22 2:25 PM    BILLING INFORMATION:                                                                                                    .    Procedure:       CPT Code Modifier  left Middle Finger Digital Mucous Cyst  Excision with Distal interphalangeal Joint Debridement      .                      Pre Operative Physician Prophylaxis Orders - SCIP Protocols      Pre-Operative Antibiotic Order:    Allergies   Allergen Reactions    Levaquin [Levofloxacin In D5w]      Unknown reaction    Levofloxacin      Pt.  Unaware of drug or reaction    Pcn [Penicillins] Hives          [x]  ----  No Antibiotic Ordered       []  ----  Give the following Antibiotic within 1 hour prior to start time:         Ancef 1 gram IV if patient is less than 200 pounds    or       Ancef 2 grams IV if patient is greater than 200 pounds    or      Vancomycin 1 gram IV (over 1 hour) if patient is allergic to           PENICILLINS or CEFALOSPORINS       Procedure: left Middle Finger Digital Mucous Cyst  Excision with Distal interphalangeal Joint Debridement     Patient: Michelle Morocho  :    1950    Physician Signature:     Date: 22  Time: 2:25 PM

## 2022-12-14 ENCOUNTER — TELEPHONE (OUTPATIENT)
Dept: ORTHOPEDIC SURGERY | Age: 72
End: 2022-12-14

## 2022-12-14 NOTE — FLOWSHEET NOTE
[place] [facility] [facility], [address]  Phone: [phone]  Fax [phone]        Date:  10/13/2021    Patient Name:  Palmer Han    :  1950  MRN: [id number]  Restrictions/Precautions:    Medical/Treatment Diagnosis Information:  · Diagnosis: M54.2 (ICD-10-CM) - Cervical pain, M47.812 (ICD-10-CM) - Spondylosis of cervical region without myelopathy or radiculopathy  · Treatment Diagnosis: M54.2 (ICD-10-CM) - Cervical pain  Insurance/Certification information:  PT Insurance Information: Jonelle Macdonald DUAL  - $0DED $0CP 100% PT/OT MED [facility] Félix Chandra [phone]  Physician Information:  Referring Practitioner: Dr Scooter Brunner  Has the plan of care been signed (Y/N):        [x]  Yes  []  No     Date of Patient follow up with Physician: none scheduled 21      Is this a Progress Report:     []  Yes  [x]  No        If Yes:  Date Range for reporting period:  Beginnin21  Ending: 10/6/21    Progress report will be due (10 Rx or 30 days whichever is less):        Recertification will be due (POC Duration  / 90 days whichever is less): 21     Visit # Insurance Allowable Auth Required   In-person 13 No auth until 30 []  Yes []  No    Telehealth   []  Yes []  No    Total            Functional Scale: NDI 36%    Date assessed:  21  NDI 34%      21  NDI 28%      10/6/21     Therapy Diagnosis/Practice Pattern:F, conservative      Number of Comorbidities:  []0     []1-2    [x]3+    Latex Allergy:  [x]NO      []YES  Preferred Language for Healthcare:   [x]English       []other:      Pain level:  eval 7/10  Current 5/10    SUBJECTIVE:  Pt reports she is feeling a little better today. No HA, but she did take ibuprofen. She states she does take the Tramadol, but she does not feel that it gives her much relief.     OBJECTIVE:     Observation:    Test measurements:  Cervical flexion 70, ext 40, SB R 40, SB Trena Guzman is here today for Headache (Body ache, sore throat, cough non productive. Started 2 days ago ) and Depression (And anxiety )    Concerns/symptoms: see above     Medications: medications verified and updated  Refills needed today? Yes, Medication Pended, Pharmacy verified     Tobacco history: verified     Advanced Directives: No not on file, not interested.    BP >140/90? No    Care Teams Updated? Yes    Patient Preference for result Communication via:     Cell Phone:   Telephone Information:   Mobile [phone]     Okay to leave a message containing results? Yes    Health Maintenance Due   Topic Date Due   • Hepatitis B Vaccine (1 of 3 - 3-dose series) Never done   • COVID-19 Vaccine (4 - Booster for Moderna series) 02/23/2022   • Breast Cancer Screening  05/11/2022   • Influenza Vaccine (1) 09/01/2022      Patient is due for topics as listed above but is not proceeding with Immunization(s) COVID-19, Hep B and Influenza and Mammogram at this time.     Immunization History   Administered Date(s) Administered   • COVID-19 Moderna Vaccine 02/03/2021, 03/03/2021, 12/29/2021   • Influenza, Unspecified Formulation 12/03/2012   • Influenza, quadrivalent, MDCK, preserve-free (FLUCELVAX) 11/02/2018   • Influenza, quadrivalent, multi-dose 11/06/2020   • Influenza, quadrivalent, preserve-free 09/26/2019, 11/06/2020, 01/10/2022   • Influenza, seasonal, injectable, trivalent 12/03/2012, 11/25/2013   • Novel Influenza [id number] 01/11/2010   • Novel Influenza I0G8-13, Unspecified Formulation 01/11/2010   • Pneumococcal Polysaccharide Vacc (Pneumovax 23) 12/03/2012   • Shingrix (Shingles Zoster) 11/24/2020, 05/11/2021   • Tdap 11/25/2013         PHQ 2:  Date Adult PHQ 2 Score Adult PHQ 2 Interpretation   12/14/2022 4 Further screening needed       PHQ 9:  Date Adult PHQ 9 Score Adult PHQ 9 Interpretation   12/14/2022 9 Mild Depression            L 35, rotation R 50, L 60    MRI results 8/16/21:  Retrolisthesis of C5 relative to C4, and C6.  Disc and osteophytes narrow the   neural foramina at C3-C4, C4-C5, and C5-C6 as discussed above.  There is mild   stenosis of the thecal sac at C5-C6.        RESTRICTIONS/PRECAUTIONS: trigeminal neuralgia, 5 mm largely stable anterior listhesis C4-C5 from x-rays read by MD on 7/28/21 to 8/11/21, DM w/ neuropathy    Exercises/Interventions:     Therapeutic Ex (06683) Sets/sec/Reps Notes/CUES   Supine chin tuck  Seated chin tuck 2\" x10   HEP, form cues  Form cues   Seated scap set HEP   Seated UT S Started to incr HA, stopped   Supine tennis ball self SOR  HEP   Supine TB no money, HAB,   shoulder ext      form cues R w/ no money   Supine alt SB iso's PT gentle resist L   Supine pec S over towel roll  \"      \"          W/ B shoulder flexion  HEP  HEP       Chin tuck on wall w/ 2 towels   w/ no money  W/ HAB @45* HEP  HEP  HEP   Fwd lean on table chin tuck    TB row, extension w/ chin tuck Scap/UT cues w/ fatigue   Wall push up    TB shoulder ext,   palm up row  LPD  MT row   Green TT 2x10  Green TT 2x10  Green TT 2x10     Scap/UT cues  Scap/UT cues   Single arm pec S @ 39*  Doorway B pec S @ 39* 2x10\" R gentle  4x15\" B R shoulder pn reported        Pt ed: e POC, HEP--importance of postural strength/stability for decreasing pain landen end of day,   (), posture, unloading when head is heavy and tired, , t,  x4'    Manual Intervention (32265) x25' total    Supine SOR  Supine gentle man traction   STM to B UT/SO/scalenes    TPR prox scalenes R/L  UT S R/L in neutral SB  C1-2-3 lat glides gr ll    Gentle PA's T1-C7  Mulligan rotation C6-7, C7-T1 R/L x25'    Prone thoracic mobs rotation T4-T8   PA's T1-3 gr ll-lll     reassessment                    NMR re-education (23444)  CUES NEEDED                                                Therapeutic Activity (31189)                                         Therapeutic Exercise and NMR EXR  [x] (52301) Provided verbal/tactile cueing for activities related to strengthening, flexibility, endurance, ROM  for improvements in scapular, scapulothoracic and UE control with self care, reaching, carrying, lifting, house/yardwork, driving/computer work.    [] (23789) Provided verbal/tactile cueing for activities related to improving balance, coordination, kinesthetic sense, posture, motor skill, proprioception  to assist with  scapular, scapulothoracic and UE control with self care, reaching, carrying, lifting, house/yardwork, driving/computer work. Therapeutic Activities:    [] (73450 or 84248) Provided verbal/tactile cueing for activities related to improving balance, coordination, kinesthetic sense, posture, motor skill, proprioception and motor activation to allow for proper function of scapular, scapulothoracic and UE control with self care, carrying, lifting, driving/computer work.      Home Exercise Program:    [x] (33673) Reviewed/Progressed HEP activities related to strengthening, flexibility, endurance, ROM of scapular, scapulothoracic and UE control with self care, reaching, carrying, lifting, house/yardwork, driving/computer work  [] (14052) Reviewed/Progressed HEP activities related to improving balance, coordination, kinesthetic sense, posture, motor skill, proprioception of scapular, scapulothoracic and UE control with self care, reaching, carrying, lifting, house/yardwork, driving/computer work      Manual Treatments:  PROM / STM / Oscillations-Mobs:  G-I, II, III, IV (PA's, Inf., Post.)  [x] (54530) Provided manual therapy to mobilize soft tissue/joints of cervical/CT, scapular GHJ and UE for the purpose of modulating pain, promoting relaxation,  increasing ROM, reducing/eliminating soft tissue swelling/inflammation/restriction, improving soft tissue extensibility and allowing for proper ROM for normal function with self care, reaching, carrying, lifting, house/yardwork, driving/computer work    Modalities:  (pt prefers ice over heat)  ' Pt declined    Charges  Timed Code Treatment Minutes: 42   Total Treatment Minutes: 42     [] EVAL (LOW) 90478   [] EVAL (MOD) 91800   [] EVAL (HIGH) 29373   [] RE-EVAL     [x] OK(78560) x1    [] IONTO  [] NMR (59861) x     [] VASO  [x] Manual (38956) x2      [] Other:  [] TA x      [] Mech Traction (02002)  [] ES(attended) (93117)      [] ES (un) (14260):     GOALS:  Patient stated goal: reduce pain with driving and daily activities  [x]? Progressing: []? Met: []? Not Met: []? Adjusted     Therapist goals for Patient:   Short Term Goals: To be achieved in: 2 weeks  1. Independent in HEP and progression per patient tolerance, in order to prevent re-injury. []? Progressing: [x]? Met: []? Not Met: []? Adjusted  2. Patient will have a decrease in pain to facilitate improvement in movement, function, and ADLs as indicated by Functional Deficits. [x]? Progressing: []? Met: []? Not Met: []? Adjusted     Long Term Goals: To be achieved in: 8 weeks  1. Disability index score of 20% or less for the NDI to assist with reaching prior level of function. [x]? Progressing: []? Met: []? Not Met: []? Adjusted  2. Patient will demonstrate increased AROM to at least 45* rotation of cervical spine to allow for turning head for safe driving.   []? Progressing: [x]? Met: []? Not Met: []? Adjusted  3. Patient will demonstrate an increase in postural awareness and control and activation of  Deep cervical stabilizers to allow for proper functional mobility as indicated by patients Functional Deficits. [x]? Progressing: []? Met: []? Not Met: []? Adjusted   4. Patient will return to unloading  and putting dishes in upper cabinets (looking up and down) without increased symptoms or restriction. []? Progressing: [x]? Met: []? Not Met: []? Adjusted  5. Pt will report at least 50% decrease in HA frequency and severity. [x]? Progressing: []? Met: []? Not Met: []?  Adjusted Progression Towards Functional goals:  [x] Patient is progressing as expected towards functional goals listed. [] Progression is slowed due to complexities listed. [] Progression has been slowed due to co-morbidities. [] Plan just implemented, too soon to assess goals progression  [] Other:     ASSESSMENT: AROM improved this visit and decreased cues with TB strengthening so green TB given for HEP. R shoulder tightness and soreness reported with single arm S, better with B doorway S. Pt verbalized good understanding of HEP, unloading and posture correction for decreasing symptoms and stresses on her spine. Overall Progression Towards Functional goals/ Treatment Progress Update:  [x] Patient is progressing as expected towards functional goals listed. [] Progression is slowed due to complexities/Impairments listed. [] Progression has been slowed due to co-morbidities. [] Plan just implemented, too soon to assess goals progression <30days   [] Goals require adjustment due to lack of progress  [] Patient is not progressing as expected and requires additional follow up with physician  [] Other    Prognosis for POC: [x] Good [] Fair  [] Poor      Patient requires continued skilled intervention: [] Yes  [] No    Treatment/Activity Tolerance:  [x] Patient able to complete treatment  [] Patient limited by fatigue  [] Patient limited by pain    [] Patient limited by other medical complications  [] Other:     Return to Play: (if applicable)   []  Stage 1: Intro to Strength   []  Stage 2: Return to Run and Strength   []  Stage 3: Return to Jump and Strength   []  Stage 4: Dynamic Strength and Agility   []  Stage 5: Sport Specific Training     []  Ready to Return to Play, Meets All Above Stages   []  Not Ready for Return to Sports   Comments:                         PLAN: Pt out of town next 2 weeks and [facility] following. She will call for follow up if needed following [facility].   [x] Continue per plan of care [x] Alter current plan (see comments above)  [] Plan of care initiated [] Hold pending MD visit [] Discharge      Electronically signed by:  Lonny Riley PT    Note: If patient does not return for scheduled/ recommended follow up visits, this note will serve as a discharge from care along with most recent update on progress. Access Code: [email or web address]  URL: [email or web address]  Date: 08/26/2021  Prepared by: Lonny Riley    Exercises  Supine Chin Tuck - 2 x daily - 7 x weekly - 1-2 sets - 5-10 reps - 2-3 seconds hold  Seated Scapular Retraction - 2 x daily - 7 x weekly - 2-3 sets - 5-10 reps - 2-30 seconds hold  Supine Suboccipital Release with Tennis Balls - 1-2 x daily - 7 x weekly  Standing Isometric Cervical Retraction with Chin Tucks and Ball at [place] - 1 x daily - 7 x weekly - 1-2 sets - 10 reps  Seated Shoulder Horizontal Abduction with Resistance - 1 x daily - 7 x weekly - 2-3 sets - 10 reps  Shoulder External Rotation and Scapular Retraction with Resistance - 1 x daily - 7 x weekly - 2-3 sets - 10 reps

## 2022-12-15 ENCOUNTER — OFFICE VISIT (OUTPATIENT)
Dept: FAMILY MEDICINE CLINIC | Age: 72
End: 2022-12-15
Payer: COMMERCIAL

## 2022-12-15 VITALS
SYSTOLIC BLOOD PRESSURE: 105 MMHG | HEART RATE: 75 BPM | TEMPERATURE: 97.1 F | OXYGEN SATURATION: 98 % | WEIGHT: 165 LBS | BODY MASS INDEX: 26.63 KG/M2 | RESPIRATION RATE: 16 BRPM | DIASTOLIC BLOOD PRESSURE: 67 MMHG

## 2022-12-15 DIAGNOSIS — E11.9 TYPE 2 DIABETES MELLITUS WITHOUT COMPLICATION, WITHOUT LONG-TERM CURRENT USE OF INSULIN (HCC): ICD-10-CM

## 2022-12-15 DIAGNOSIS — M67.449 MUCOUS CYST OF FINGER: ICD-10-CM

## 2022-12-15 DIAGNOSIS — E03.9 HYPOTHYROIDISM, UNSPECIFIED TYPE: ICD-10-CM

## 2022-12-15 DIAGNOSIS — Z01.818 PRE-OP EXAMINATION: Primary | ICD-10-CM

## 2022-12-15 PROCEDURE — 3017F COLORECTAL CA SCREEN DOC REV: CPT | Performed by: NURSE PRACTITIONER

## 2022-12-15 PROCEDURE — 93000 ELECTROCARDIOGRAM COMPLETE: CPT | Performed by: NURSE PRACTITIONER

## 2022-12-15 PROCEDURE — 1123F ACP DISCUSS/DSCN MKR DOCD: CPT | Performed by: NURSE PRACTITIONER

## 2022-12-15 PROCEDURE — G8484 FLU IMMUNIZE NO ADMIN: HCPCS | Performed by: NURSE PRACTITIONER

## 2022-12-15 PROCEDURE — 1090F PRES/ABSN URINE INCON ASSESS: CPT | Performed by: NURSE PRACTITIONER

## 2022-12-15 PROCEDURE — 3044F HG A1C LEVEL LT 7.0%: CPT | Performed by: NURSE PRACTITIONER

## 2022-12-15 PROCEDURE — G8427 DOCREV CUR MEDS BY ELIG CLIN: HCPCS | Performed by: NURSE PRACTITIONER

## 2022-12-15 PROCEDURE — 1036F TOBACCO NON-USER: CPT | Performed by: NURSE PRACTITIONER

## 2022-12-15 PROCEDURE — G8417 CALC BMI ABV UP PARAM F/U: HCPCS | Performed by: NURSE PRACTITIONER

## 2022-12-15 PROCEDURE — 3078F DIAST BP <80 MM HG: CPT | Performed by: NURSE PRACTITIONER

## 2022-12-15 PROCEDURE — 99213 OFFICE O/P EST LOW 20 MIN: CPT | Performed by: NURSE PRACTITIONER

## 2022-12-15 PROCEDURE — 2022F DILAT RTA XM EVC RTNOPTHY: CPT | Performed by: NURSE PRACTITIONER

## 2022-12-15 PROCEDURE — G8399 PT W/DXA RESULTS DOCUMENT: HCPCS | Performed by: NURSE PRACTITIONER

## 2022-12-15 PROCEDURE — 3074F SYST BP LT 130 MM HG: CPT | Performed by: NURSE PRACTITIONER

## 2022-12-15 RX ORDER — CELECOXIB 200 MG/1
200 CAPSULE ORAL DAILY
COMMUNITY

## 2022-12-15 RX ORDER — PREDNISONE 1 MG/1
5 TABLET ORAL DAILY
COMMUNITY

## 2022-12-15 ASSESSMENT — ANXIETY QUESTIONNAIRES
1. FEELING NERVOUS, ANXIOUS, OR ON EDGE: 0
5. BEING SO RESTLESS THAT IT IS HARD TO SIT STILL: 0
6. BECOMING EASILY ANNOYED OR IRRITABLE: 0
3. WORRYING TOO MUCH ABOUT DIFFERENT THINGS: 0
4. TROUBLE RELAXING: 0
2. NOT BEING ABLE TO STOP OR CONTROL WORRYING: 0
IF YOU CHECKED OFF ANY PROBLEMS ON THIS QUESTIONNAIRE, HOW DIFFICULT HAVE THESE PROBLEMS MADE IT FOR YOU TO DO YOUR WORK, TAKE CARE OF THINGS AT HOME, OR GET ALONG WITH OTHER PEOPLE: NOT DIFFICULT AT ALL
GAD7 TOTAL SCORE: 0
7. FEELING AFRAID AS IF SOMETHING AWFUL MIGHT HAPPEN: 0

## 2022-12-15 ASSESSMENT — PATIENT HEALTH QUESTIONNAIRE - PHQ9
5. POOR APPETITE OR OVEREATING: 0
1. LITTLE INTEREST OR PLEASURE IN DOING THINGS: 0
9. THOUGHTS THAT YOU WOULD BE BETTER OFF DEAD, OR OF HURTING YOURSELF: 0
6. FEELING BAD ABOUT YOURSELF - OR THAT YOU ARE A FAILURE OR HAVE LET YOURSELF OR YOUR FAMILY DOWN: 0
SUM OF ALL RESPONSES TO PHQ9 QUESTIONS 1 & 2: 0
7. TROUBLE CONCENTRATING ON THINGS, SUCH AS READING THE NEWSPAPER OR WATCHING TELEVISION: 0
SUM OF ALL RESPONSES TO PHQ QUESTIONS 1-9: 0
DEPRESSION UNABLE TO ASSESS: FUNCTIONAL CAPACITY MOTIVATION LIMITS ACCURACY
2. FEELING DOWN, DEPRESSED OR HOPELESS: 0
8. MOVING OR SPEAKING SO SLOWLY THAT OTHER PEOPLE COULD HAVE NOTICED. OR THE OPPOSITE, BEING SO FIGETY OR RESTLESS THAT YOU HAVE BEEN MOVING AROUND A LOT MORE THAN USUAL: 0
3. TROUBLE FALLING OR STAYING ASLEEP: 0
4. FEELING TIRED OR HAVING LITTLE ENERGY: 0
SUM OF ALL RESPONSES TO PHQ QUESTIONS 1-9: 0
10. IF YOU CHECKED OFF ANY PROBLEMS, HOW DIFFICULT HAVE THESE PROBLEMS MADE IT FOR YOU TO DO YOUR WORK, TAKE CARE OF THINGS AT HOME, OR GET ALONG WITH OTHER PEOPLE: 0

## 2022-12-15 ASSESSMENT — ENCOUNTER SYMPTOMS
SHORTNESS OF BREATH: 0
VOMITING: 0
BACK PAIN: 0
DIARRHEA: 0
NAUSEA: 0
COUGH: 0

## 2022-12-15 NOTE — PROGRESS NOTES
[facility]   Telephone: [phone]  Fax: [phone]  Preoperative History & Physical        DIAGNOSIS:  MUCOUS CYST OF FINER; OSTEOARTHRITIS OF HAND    PROCEDURE:  LEFT MIDDLE FINGER DIGITAL MUCOUS CYST  EXCISION WITH DISTAL INTERPHALANGEAL JOINT DEBRIDEMENT      History Obtained From:  patient    HISTORY OF PRESENT ILLNESS:    The patient is a 71 y.o. female with significant past medical history of BPD, chronic pain, DM II, HTN, HLD, insomnia, MDD, hypothyroidism who presents for preoperative examination for above procedure. Surgery is scheduled for 12/23/2022 with Dr. Nida Saenz at [facility].         Past Medical History:   Diagnosis Date    Borderline personality disorder ([address]     Chronic pain     Colon disorder     hard time pushing stool out    Constipation     Diabetes mellitus ([address]     Diabetic neuropathy (HCC)     Electric shock     ECT therapy    Hyperlipidemia     Hypertension     Insomnia     Major depressive disorder, recurrent (HCC)     Morbid obesity (HCC)     Osteoarthrosis     RLS (restless legs syndrome)     Small bowel obstruction ([address] 11/19/2019    Suicidal ideation     Unspecified hypothyroidism     Vitamin deficiency     Volvulus of small intestine ([address] 9/16/2019     Past Surgical History:   Procedure Laterality Date    APPENDECTOMY      BACK SURGERY      CARPAL TUNNEL RELEASE Left 12/19/2017    CARPAL TUNNEL RELEASE Right 6/18/2019    RIGHT OPEN CARPAL TUNNEL RELEASE performed by Claudia Meadows MD at [facility]     for pinched nerve    JOINT REPLACEMENT Bilateral     knees    KNEE SURGERY Left     meniscus    SHOULDER ARTHROSCOPY Right 12/02/2016    Right Total Shoulder Arthroscopy with Reverse Ball and Socket Deta Prosthesis    SHOULDER ARTHROSCOPY Left 05/30/2018    subacromial decompression, rotator cuff repair, biceps stump debridement    SMALL INTESTINE SURGERY N/A 9/16/2019    DIAGNOSTIC LAPAROSCOPY CONVERTED TO OPEN BOWEL RESECTION performed by Elly Jones MD at [facility]       Current Outpatient Medications   Medication Sig Dispense Refill    celecoxib (CELEBREX) 200 MG capsule Take 200 mg by mouth daily      predniSONE (DELTASONE) 5 MG tablet Take 5 mg by mouth daily      spironolactone (ALDACTONE) 50 MG tablet TAKE 1 TABLET BY MOUTH EVERY DAY 90 tablet 1    rOPINIRole (REQUIP) 2 MG tablet TAKE 1-2 TABLETS BY MOUTH EVERY DAY AT NIGHT FOR REST LEG SYNDROME 180 tablet 3    levothyroxine (SYNTHROID) 75 MCG tablet TAKE 1 TABLET BY MOUTH EVERY DAY 90 tablet 1    carBAMazepine (TEGRETOL) 200 MG tablet Take 200 mg by mouth 3 times daily      lithium 300 MG capsule       alirocumab (PRALUENT) 75 MG/ML SOAJ injection pen Inject 75 mg into the skin every 14 days      Coenzyme Q10 (COQ-10) 400 MG CAPS Take by mouth      desvenlafaxine succinate (PRISTIQ) 50 MG TB24 extended release tablet TAKE 1 TABLET BY MOUTH EVERY EVENING      [employer] D3 25 MCG (1000 UT) CAPS TAKE 1 CAPSULE BY MOUTH EVERY DAY 90 capsule 1    linaCLOtide (LINZESS) 72 MCG CAPS capsule Take 145 mcg by mouth every morning (before breakfast)       cetirizine (ZYRTEC) 10 MG tablet Take 10 mg by mouth daily      QUEtiapine (SEROQUEL) 100 MG tablet Take 150 mg by mouth nightly       CVS MELATONIN 3 MG TABS tablet TAKE 1 TABLET BY MOUTH NIGHTLY 90 tablet 1    B Complex Vitamins (VITAMIN B COMPLEX) TABS Take 1 tablet by mouth 2 times daily (Patient taking differently: Take 1 tablet by mouth daily) 180 tablet 1    bisacodyl (DULCOLAX) 5 MG EC tablet Take 10 mg by mouth 2 times daily        No current facility-administered medications for this visit.          Allergies:  Levaquin [levofloxacin in d5w], Levofloxacin, and Pcn [penicillins]  History of allergic reaction to anesthesia:  No  Planned anesthesia: MAC   Known anesthesia problems: None   Bleeding risk: No recent or remote history of abnormal bleeding  Personal or FH of DVT/PE: No        Social History     Tobacco Use   Smoking Status Former    Packs/day: 2.00    Years: 20.00    Pack years: 40.00    Types: Cigarettes    Start date: 1971    Quit date: 1991    Years since quittin.0   Smokeless Tobacco Never     The patient states she drinks 0 per week. Family History   Problem Relation Age of Onset    Breast Cancer Mother     Stroke Father        REVIEW OF SYSTEMS:    Review of Systems   Constitutional:  Negative for chills, fatigue and fever. Respiratory:  Negative for cough and shortness of breath. Cardiovascular:  Positive for leg swelling (chronic swelling left lower extremity worsening). Negative for chest pain. Gastrointestinal:  Negative for diarrhea, nausea and vomiting. Endocrine: Negative for polydipsia, polyphagia and polyuria. Musculoskeletal:  Positive for arthralgias, joint swelling, myalgias and neck pain. Negative for back pain, gait problem and neck stiffness. Neurological:  Negative for dizziness and headaches. Trigeminal neuralgia- she is on Tegretol (Dr. Dago Cox)    All other systems reviewed and are negative. PHYSICAL EXAM:    /67 (Site: Left Upper Arm, Position: Sitting)   Pulse 75   Temp 97.1 °F (36.2 °C) (Temporal)   Resp 16   Wt 165 lb (74.8 kg)   SpO2 98%   BMI 26.63 kg/m²   Physical Exam  Vitals and nursing note reviewed. Constitutional:       General: She is not in acute distress. Appearance: Normal appearance. She is normal weight. She is not ill-appearing, toxic-appearing or diaphoretic. HENT:      Head: Normocephalic and atraumatic. Right Ear: External ear normal.   Cardiovascular:      Rate and Rhythm: Normal rate and regular rhythm. Heart sounds: Normal heart sounds. No murmur heard. No friction rub. No gallop. Pulmonary:      Effort: Pulmonary effort is normal. No respiratory distress. Breath sounds: Normal breath sounds. No stridor. No wheezing, rhonchi or rales. Neurological:      General: No focal deficit present. Mental Status: She is alert and oriented to person, place, and time. Mental status is at baseline. Cranial Nerves: No cranial nerve deficit. ASSESSMENT AND PLAN:  1. Pre-op examination  - EKG 12 Lead    2. Mucous cyst of finger    3. Type 2 diabetes mellitus without complication, without long-term current use of insulin ([facility]    1. Preoperative workup as follows: ECG  2. Change in medication regimen before surgery: discontinue Celebrex 7 days prior   3. Prophylaxis for cardiac events with perioperative beta-blockers: Not indicated  ACC/AHA indications for pre-operative beta-blocker use:    Vascular surgery with history of postitive stress test  Intermediate or high risk surgery with history of CAD   Intermediate or high risk surgery with multiple clinical predictors of CAD- 2 of the following: history of compensated or prior heart failure, history of cerebrovascular disease, DM, or renal insufficiency    Routine administration of higher-dose, long-acting metoprolol in beta-blocker-naïve patients on the day of surgery, and in the absence of dose titration is associated with an overall increase in mortality. Beta-blockers should be started days to weeks prior to surgery and titrated to pulse < 70.  4. Deep vein thrombosis prophylaxis: regimen to be chosen by surgical team  5. No contraindications to planned surgery      LUIS Reyes CNP, CNP    [facility] [place].  [address]  [phone]

## 2022-12-15 NOTE — PATIENT INSTRUCTIONS
Stop Celebrex 7 days prior to surgery    Eat and drink nothing after midnight the night before the planned procedure. Stop all aspirin, ibuprofen, aleve (tylenol/acetaminophen is ok) for seven days prior to the procedure.

## 2022-12-16 LAB — TSH REFLEX: 2.18 UIU/ML (ref 0.27–4.2)

## 2022-12-19 ENCOUNTER — TELEPHONE (OUTPATIENT)
Dept: FAMILY MEDICINE CLINIC | Age: 72
End: 2022-12-19

## 2022-12-19 NOTE — TELEPHONE ENCOUNTER
Poly with [facility] called to state that the patient had an abnormal EKG at her pre-op visit with [facility]. She is wondering if [facility] saw that, and if the patient is still cleared for surgery this Friday.

## 2022-12-20 NOTE — TELEPHONE ENCOUNTER
Fax the EKG to her cardiology Dr. Adam Sanchez and ask him to review before surgery. He has already cleared her but would like for him to review her EKG as well.

## 2022-12-27 ENCOUNTER — TELEPHONE (OUTPATIENT)
Dept: ORTHOPEDIC SURGERY | Age: 72
End: 2022-12-27

## 2022-12-27 NOTE — TELEPHONE ENCOUNTER
Surgery and/or Procedure Scheduling     Contact Name: Nidia Rodriguez Request: LEFT MIDDLE [address] [facility] JOINT DEBRIDEMENT  Patient Contact Number: [phone]     8) Pt DIDN'T HAVE PROCEDURE, SO APPT W/ TONNY ASH FOR 01/04/23 NEEDS TO BE CANCELLED. 2) Pt WOULD LIKE TO GET THE SX RESCHEDULED. PLEASE CALL TO DISCUSS @ THE # ABOVE.

## 2022-12-30 ENCOUNTER — TELEPHONE (OUTPATIENT)
Dept: ORTHOPEDIC SURGERY | Age: 72
End: 2022-12-30

## 2022-12-30 NOTE — TELEPHONE ENCOUNTER
Auth: NPR  Date: 01/10/23  Reference # None  Spoke with: None  Type of SX: Outpatient  Location: [facility]  CPT: 94533, 34290   DX: [id number]  SX area: [facility]  Insurance: [facility]

## 2022-12-30 NOTE — PROGRESS NOTES
Tamiemaría elena Bailee    Age 71 y.o.    female    1950    MRN [id number]    1/10/2023  Arrival Time_____________  OR Time____________25 Arleen Sabino     Procedure(s):  LEFT MIDDLE FINGER DIGITAL MUCOUS CYST EXCISION WITH DISTAL INTERPHALANGEAL JOINT DEBRIDEMENT                      General    Surgeon(s):  Hi Harding, MD       Phone [phone] ([place])     [address]  Cell         Work  _____________________________________________________________________  _____________________________________________________________________  _____________________________________________________________________  _____________________________________________________________________  _____________________________________________________________________    PCP _____________________________ Phone_________________     H&P__________________Bringing      Chart            Epic   DOS      Called________  EKG__________________Bringing      Chart            Epic   DOS      Called________  LAB__________________ Bringing      Chart            Epic   DOS      Called________  Cardiac Clearance_______Bringing      Chart            Epic      DOS      Called________    Cardiologist________________________ Phone___________________________    ? Cheondoism concerns / Waiver on Chart            PAT Communications________________  ? Pre-op Instructions Given [place]          _________________________________  ? Directions to Surgery Center                          _________________________________  ? Transportation Home_______________      __________________________________  ?  Crutches/Walker__________________        __________________________________    ________Pre-op Orders   _______Transcribed    _______Wt.  ________Pharmacy          _______SCD  ______VTE     ______TED [facility]  _______  Surgery Consent    _______  Anesthesia Consent         COVID DATE______________LOCATION________________ RESULT__________

## 2023-01-09 ENCOUNTER — ANESTHESIA EVENT (OUTPATIENT)
Dept: OPERATING ROOM | Age: 73
End: 2023-01-09
Payer: COMMERCIAL

## 2023-01-10 ENCOUNTER — HOSPITAL ENCOUNTER (OUTPATIENT)
Age: 73
Setting detail: OUTPATIENT SURGERY
Discharge: HOME | End: 2023-01-10
Attending: ORTHOPAEDIC SURGERY | Admitting: ORTHOPAEDIC SURGERY
Payer: COMMERCIAL

## 2023-01-10 ENCOUNTER — ANESTHESIA (OUTPATIENT)
Dept: OPERATING ROOM | Age: 73
End: 2023-01-10
Payer: COMMERCIAL

## 2023-01-10 VITALS
HEART RATE: 64 BPM | HEIGHT: 66 IN | WEIGHT: 156 LBS | BODY MASS INDEX: 25.07 KG/M2 | TEMPERATURE: 96.9 F | SYSTOLIC BLOOD PRESSURE: 124 MMHG | DIASTOLIC BLOOD PRESSURE: 82 MMHG | RESPIRATION RATE: 17 BRPM | OXYGEN SATURATION: 97 %

## 2023-01-10 DIAGNOSIS — M67.449 DIGITAL MUCOUS CYST OF FINGER: ICD-10-CM

## 2023-01-10 LAB
GLUCOSE BLD-MCNC: 94 MG/DL (ref 70–99)
PERFORMED ON: NORMAL

## 2023-01-10 PROCEDURE — 3700000000 HC ANESTHESIA ATTENDED CARE: Performed by: ORTHOPAEDIC SURGERY

## 2023-01-10 PROCEDURE — 3600000003 HC SURGERY LEVEL 3 BASE: Performed by: ORTHOPAEDIC SURGERY

## 2023-01-10 PROCEDURE — 7100000001 HC PACU RECOVERY - ADDTL 15 MIN: Performed by: ORTHOPAEDIC SURGERY

## 2023-01-10 PROCEDURE — 6360000002 HC RX W HCPCS: Performed by: NURSE ANESTHETIST, CERTIFIED REGISTERED

## 2023-01-10 PROCEDURE — 2580000003 HC RX 258: Performed by: ANESTHESIOLOGY

## 2023-01-10 PROCEDURE — 2500000003 HC RX 250 WO HCPCS: Performed by: NURSE ANESTHETIST, CERTIFIED REGISTERED

## 2023-01-10 PROCEDURE — 7100000010 HC PHASE II RECOVERY - FIRST 15 MIN: Performed by: ORTHOPAEDIC SURGERY

## 2023-01-10 PROCEDURE — 2709999900 HC NON-CHARGEABLE SUPPLY: Performed by: ORTHOPAEDIC SURGERY

## 2023-01-10 PROCEDURE — 2500000003 HC RX 250 WO HCPCS: Performed by: ORTHOPAEDIC SURGERY

## 2023-01-10 PROCEDURE — 3700000001 HC ADD 15 MINUTES (ANESTHESIA): Performed by: ORTHOPAEDIC SURGERY

## 2023-01-10 PROCEDURE — 3600000013 HC SURGERY LEVEL 3 ADDTL 15MIN: Performed by: ORTHOPAEDIC SURGERY

## 2023-01-10 PROCEDURE — 7100000000 HC PACU RECOVERY - FIRST 15 MIN: Performed by: ORTHOPAEDIC SURGERY

## 2023-01-10 PROCEDURE — 7100000011 HC PHASE II RECOVERY - ADDTL 15 MIN: Performed by: ORTHOPAEDIC SURGERY

## 2023-01-10 PROCEDURE — A4217 STERILE WATER/SALINE, 500 ML: HCPCS | Performed by: ORTHOPAEDIC SURGERY

## 2023-01-10 PROCEDURE — 88304 TISSUE EXAM BY PATHOLOGIST: CPT

## 2023-01-10 PROCEDURE — 2580000003 HC RX 258: Performed by: ORTHOPAEDIC SURGERY

## 2023-01-10 RX ORDER — SODIUM CHLORIDE, SODIUM LACTATE, POTASSIUM CHLORIDE, CALCIUM CHLORIDE 600; 310; 30; 20 MG/100ML; MG/100ML; MG/100ML; MG/100ML
INJECTION, SOLUTION INTRAVENOUS CONTINUOUS
Status: DISCONTINUED | OUTPATIENT
Start: 2023-01-10 | End: 2023-01-10 | Stop reason: HOSPADM

## 2023-01-10 RX ORDER — PROPOFOL 10 MG/ML
INJECTION, EMULSION INTRAVENOUS PRN
Status: DISCONTINUED | OUTPATIENT
Start: 2023-01-10 | End: 2023-01-10 | Stop reason: SDUPTHER

## 2023-01-10 RX ORDER — ONDANSETRON 2 MG/ML
4 INJECTION INTRAMUSCULAR; INTRAVENOUS
Status: CANCELLED | OUTPATIENT
Start: 2023-01-10 | End: 2023-01-11

## 2023-01-10 RX ORDER — SODIUM CHLORIDE 0.9 % (FLUSH) 0.9 %
5-40 SYRINGE (ML) INJECTION EVERY 12 HOURS SCHEDULED
Status: DISCONTINUED | OUTPATIENT
Start: 2023-01-10 | End: 2023-01-10 | Stop reason: HOSPADM

## 2023-01-10 RX ORDER — SODIUM CHLORIDE 0.9 % (FLUSH) 0.9 %
5-40 SYRINGE (ML) INJECTION EVERY 12 HOURS SCHEDULED
Status: CANCELLED | OUTPATIENT
Start: 2023-01-10

## 2023-01-10 RX ORDER — BUPIVACAINE HYDROCHLORIDE 5 MG/ML
INJECTION, SOLUTION EPIDURAL; INTRACAUDAL PRN
Status: DISCONTINUED | OUTPATIENT
Start: 2023-01-10 | End: 2023-01-10 | Stop reason: ALTCHOICE

## 2023-01-10 RX ORDER — SODIUM CHLORIDE 0.9 % (FLUSH) 0.9 %
5-40 SYRINGE (ML) INJECTION PRN
Status: CANCELLED | OUTPATIENT
Start: 2023-01-10

## 2023-01-10 RX ORDER — MAGNESIUM HYDROXIDE 1200 MG/15ML
LIQUID ORAL CONTINUOUS PRN
Status: COMPLETED | OUTPATIENT
Start: 2023-01-10 | End: 2023-01-10

## 2023-01-10 RX ORDER — LIDOCAINE HYDROCHLORIDE 20 MG/ML
INJECTION, SOLUTION INFILTRATION; PERINEURAL PRN
Status: DISCONTINUED | OUTPATIENT
Start: 2023-01-10 | End: 2023-01-10 | Stop reason: SDUPTHER

## 2023-01-10 RX ORDER — ACETAMINOPHEN 500 MG
1000 TABLET ORAL
Status: CANCELLED | OUTPATIENT
Start: 2023-01-10 | End: 2023-01-11

## 2023-01-10 RX ORDER — SODIUM CHLORIDE 0.9 % (FLUSH) 0.9 %
5-40 SYRINGE (ML) INJECTION PRN
Status: DISCONTINUED | OUTPATIENT
Start: 2023-01-10 | End: 2023-01-10 | Stop reason: HOSPADM

## 2023-01-10 RX ORDER — DROPERIDOL 2.5 MG/ML
0.62 INJECTION, SOLUTION INTRAMUSCULAR; INTRAVENOUS
Status: CANCELLED | OUTPATIENT
Start: 2023-01-10 | End: 2023-01-11

## 2023-01-10 RX ORDER — SODIUM CHLORIDE 9 MG/ML
INJECTION, SOLUTION INTRAVENOUS PRN
Status: CANCELLED | OUTPATIENT
Start: 2023-01-10

## 2023-01-10 RX ORDER — SODIUM CHLORIDE 9 MG/ML
INJECTION, SOLUTION INTRAVENOUS PRN
Status: DISCONTINUED | OUTPATIENT
Start: 2023-01-10 | End: 2023-01-10 | Stop reason: HOSPADM

## 2023-01-10 RX ORDER — CYCLOBENZAPRINE HCL 5 MG
TABLET ORAL
COMMUNITY
Start: 2022-12-23

## 2023-01-10 RX ADMIN — SODIUM CHLORIDE, POTASSIUM CHLORIDE, SODIUM LACTATE AND CALCIUM CHLORIDE: 600; 310; 30; 20 INJECTION, SOLUTION INTRAVENOUS at 12:35

## 2023-01-10 RX ADMIN — LIDOCAINE HYDROCHLORIDE 80 MG: 20 INJECTION, SOLUTION INFILTRATION; PERINEURAL at 13:30

## 2023-01-10 RX ADMIN — PROPOFOL 250 MG: 10 INJECTION, EMULSION INTRAVENOUS at 13:30

## 2023-01-10 ASSESSMENT — PAIN SCALES - GENERAL
PAINLEVEL_OUTOF10: 0
PAINLEVEL_OUTOF10: 0

## 2023-01-10 ASSESSMENT — PAIN - FUNCTIONAL ASSESSMENT: PAIN_FUNCTIONAL_ASSESSMENT: 0-10

## 2023-01-10 NOTE — ANESTHESIA POSTPROCEDURE EVALUATION
Department of Anesthesiology  Postprocedure Note    Patient: Aminata Millan  MRN: [id number]  YOB: 1950  Date of evaluation: 1/10/2023      Procedure Summary     Date: 01/10/23 Room / Location: Karen Beth 01 / [facility]    Anesthesia Start: 5982 Anesthesia Stop: 7499    Procedure: LEFT MIDDLE FINGER DIGITAL MUCOUS CYST EXCISION WITH DISTAL INTERPHALANGEAL JOINT DEBRIDEMENT (Left: Fingers) Diagnosis:       Digital mucous cyst of finger      (LEFT MIDDLE FINGER DIGITAL MUCOUS CYST)    Surgeons: Kary Rai MD Responsible Provider: Mariola Velasquez MD    Anesthesia Type: general ASA Status: 3          Anesthesia Type: No value filed.     Eliud Phase I: Eliud Score: 9    Eliud Phase II:        Anesthesia Post Evaluation    Patient location during evaluation: PACU  Patient participation: complete - patient participated  Level of consciousness: awake and alert  Airway patency: patent  Nausea & Vomiting: no nausea and no vomiting  Complications: no  Cardiovascular status: hemodynamically stable  Respiratory status: acceptable, room air and spontaneous ventilation  Hydration status: stable  Comments: --------------------            01/10/23               1353     --------------------   BP:     (!) 117/58   Pulse:      71       Resp:       16       Temp:                SpO2:      94%      --------------------

## 2023-01-10 NOTE — OP NOTE
OPERATIVE REPORT          Patient:  Jeffry Cordero    YOB: 1950  Date of Service:  1/10/2023    Location:  [facility]      Preoperative Diagnosis:  Left Middle Finger Digital Mucous cyst & DIP Joint Arthritis. Postoperative Diagnosis:  Same. Procedure:  Excision of Left Middle Finger Digital Mucous cyst & DIP Joint Arthrotomy & Debridement. Surgeon:    Shruthi Clark. Shell De Leon MD    Surgical Assistant:    NEHA Roman Assistant    Anesthesia:  MAC/TIVA    Blood Loss:  Minimal.     Complications:  None. Tourniquet Time:  4 minutes. Indications:  Ms. Jeffry Cordero is a 71y.o. year old female with a history of Left Middle Finger Digital Mucous cyst & DIP Joint Arthritis which has been symptomatic. As her symptoms have not responded to conservative treatment, I have discussed with her preoperatively the complications, limitations, expectations, alternatives and risks of the surgical care which she understood. All of her questions have been fully answered, and Ms. Jeffry Cordero has provided written informed consent to proceed. After written consent was obtained and the proper operative site was identified and marked, Ms. Jeffry Cordero was brought to the operating room, placed in the supine position on the operating room table with the Left arm extended upon a hand table. The planned anesthesia was administered by the anesthesia service and the Left upper extremity was prepped and draped in the usual sterile fashion. After Eshmarch exsanguination, the pneumo-tourniquet was inflated to 250 milimeters of mercury about the upper arm. A 1 cm. transverse incision was fashioned directly overlying the DIP extensor crease and extended distally to create a flap containing the cyst.  Dissection was carried carefully through the subcutaneous tissue identifying and protecting the neurovascular structures. The flap was raised full thickness from the peritenon level.   The cyst was encountered.  The stalk, which was found to be emanating from the substance of the extensor tendon, was circumferentially identified and excised .  The DIP joint capsule was accessed through the extensor defect.  the joint capsule was opened and the joint inspected.  There was a medium dorsal osteophyte which was debrided to a smooth contour.  The area was carefully inspected and found to be free of residual cyst material and stalk.  The cyst origin was treated with electrocautery and the wound was irrigated copiously with sterile saline for irrigation.  The pneumo-tourniquet was deflated after a period of 4 minutes elevation. The fingers & flap were immediately pink and well perfused.  Hemostasis was easily obtained with direct pressure and electrocautery and the wound was closed with interrupted absorbable sutures in the skin.  The wound was dressed with adaptic, dry sterile dressings, and a very well padded hand and finger dressing was applied.      Ms. Debbie Pulliam  was awakened from anesthesia having tolerated the procedure without apparent complication, and was returned to the recovery room in stable condition.    At the conclusion of the procedure all needle, instrument, and sponge counts were correct.             Wade Levin MD   1/10/2023 , 1:29 PM

## 2023-01-10 NOTE — DISCHARGE INSTRUCTIONS
Post-Operative Instructions    Cyst or Mass Removal:    Keep hand elevated with fingers above eye-level to control swelling. Keep hand and bandage clean and dry. Do not change or unwrap bandage. Please leave bandage in place until your follow-up appointment. Maintain finger motion by fully straightening and fully bending fingers at least once an hour (while awake). This may cause some discomfort, but will not damage surgery. You may use your operated hand for lightweight tasks (e.g. writing, eating, dressing, etc.). NO LIFTING, CARRYING OR HEAVY USE. Most Patients do not have \"Serious Pain\" after this procedure and thus most patients do not require prescription pain medication. You may take over the counter medication (Tylenol, Advil, Aleve, etc.) as needed. If you are unable to tolerate the discomfort after your surgery and the OTC medications do not provide some relief, you may contact our office to discuss other options. .    Please call the office at [phone]  in 24 - 48 hours to schedule a follow up appointment for approximately 1 - 2 weeks after surgery. Please call the office at [phone]  if you have any questions or problems. Tavares Gamez MD    ANESTHESIA DISCHARGE INSTRUCTIONS    You are under the influence of drugs- do not drink alcohol, drive a car, operate machinery(such as power tools, kitchen appliances, etc), sign legal documents, or make any important decisions for 24 hours (or while on pain medications). Children should not ride bikes or [place] or play on gym sets  for 24 hours after surgery. A responsible adult should be with you for 24 hours. Rest at home today- increase activity as tolerated. Progress slowly to a regular diet unless your physician has instructed you otherwise. Drink plenty of water. CALL YOUR DOCTOR IF YOU:  Have moderate to severe nausea or vomiting AND are unable to hold down fluids or prescribed medications.   Have bright red bloody drainage from your dressing that won't stop oozing. Do not get relief with your pain medication    NORMAL (POSSIBLE) SIDE EFFECTS FROM ANESTHESIA:     Confusion, temporary memory loss, delayed reaction times in the first 24 hours  Lightheadedness, dizziness, difficulty focusing, blurred vision  Nausea/vomiting can happen  Shivering, feeling cold, sore throat, cough and muscle aches should stop within 24-48 hours  Trouble urinating - call your surgeon if it has been more than 8 hrs  Bruising or soreness at the IV site - call if it remains red, firm or there is drainage             FEMALES OF CHILDBEARING AGE WHO ARE TAKING BIRTH CONTROL PILLS:  You may have received a medication during your procedure that interferes with the   actions of birth control pills (Bridion or Emend). Use some other kind of birth control in addition to your pills, like a condom, for 1 month after your procedure to prevent unwanted pregnancy. The following instructions are to be followed if you have a known history or diagnosis of sleep apnea: For all sleep apnea patients:  ? Sleep on your side or sitting up in a chair whenever possible, especially the first 24 hours after surgery. ? Use only medicines prescribed by your doctor. ? Do not drink alcohol. ? If you have a dental device to assist you while at rest, use it at all times for the first 24 hours. For patients using CPAP machines:  ? Use your CPAP machine during all periods of sleep as usual.  ? Use your CPAP machine during all periods of daytime rest while on pain medicines. ** Follow up with your primary care doctor for continued care. IF YOU DO NOT TAKE ALL OF YOUR NARCOTIC PAIN MEDICATION, please dispose of them responsibly. There are drop off boxes in the Emergency Departments 24/7 at both [facility] and [place]. If these locations are not convenient, other options for discarding them can be found at:  [email or web address]    Hospital or office staff may NOT accept any medications to drop off in the cabinet for you. What is a Surgical Site Infection or  (SSI)? A surgical site infection (SSI) is an infection that occurs after surgery in the part of the body where the surgery took place. Most patients who have surgery do not develop an infection. However, infections can develop in about 1-3 cases for every 100 patients who have had surgery. Our goal is for you to NOT experience any complications and be completely satisfied with your care! However, some signs or symptoms to look for and report immediately to your doctor are:   1. Fever above 101 degrees    2. Redness and increasing pain around the area  where you had surgery   3. Drainage of cloudy fluid or pus coming from the surgical area    Some of the things we/ you can do to prevent SSI's are:   1. Clean hands with soap and water or an alcohol-based hand rub before and after caring for the operative area. This occurs the day of surgery and for the next 2 weeks. 2.Sometimes you receive an appropriate antibiotic within 60 minutes before your surgery or take one for several days after surgery depending on your surgeon's instructions and/or the type of surgery you are having. 3. Family and/or friends who visit you should NOT touch the surgical wound or dressings until advised by your surgeon. 4. Be sure to elevate and decrease the swelling after your surgery to help prevent infection. 5. If you are a diabetic, you need to closely monitor your blood sugar levels and report any significant increases or changes to your surgeon to help promote the healing process.

## 2023-01-10 NOTE — H&P
Pre-operative Update of H&P:    I  have seen & examined Ms. Rohini Clark related solely to her hand and upper extremity conditions, prior to the scheduled procedure on the date of her surgery. The indications for the planned surgical procedure & and her upper-extremity condition are unchanged.

## 2023-01-10 NOTE — ANESTHESIA PRE PROCEDURE
Department of Anesthesiology  Preprocedure Note       Name:  Rohini Clark   Age:  71 y.o.  :  1950                                          MRN:  [id number]         Date:  1/10/2023      Surgeon: Lily Suggs):  Raul Martin MD    Procedure: LEFT MIDDLE FINGER DIGITAL MUCOUS CYST EXCISION WITH DISTAL INTERPHALANGEAL JOINT DEBRIDEMENT (Left: Fingers)    Medications prior to admission:   Prior to Admission medications    Medication Sig Start Date End Date Taking?  Authorizing Provider   cyclobenzaprine (FLEXERIL) 5 MG tablet TAKE 1 TABLET BY MOUTH 2 HOURS BEFORE BEDTIME 22   Historical Provider, MD   celecoxib (CELEBREX) 200 MG capsule Take 200 mg by mouth daily    Historical Provider, MD   predniSONE (DELTASONE) 5 MG tablet Take 5 mg by mouth daily    Historical Provider, MD   spironolactone (ALDACTONE) 50 MG tablet TAKE 1 TABLET BY MOUTH EVERY DAY 10/13/22   Leela Alicia, APRN - CNP   rOPINIRole (REQUIP) 2 MG tablet TAKE 1-2 TABLETS BY MOUTH EVERY DAY AT NIGHT FOR REST LEG SYNDROME 3/29/22   Leela Alicia, APRN - CNP   levothyroxine (SYNTHROID) 75 MCG tablet TAKE 1 TABLET BY MOUTH EVERY DAY 3/29/22   Leela Alicia, APRN - CNP   carBAMazepine (TEGRETOL) 200 MG tablet Take 200 mg by mouth 3 times daily 21   Historical Provider, MD   lithium 300 MG capsule Take 300 mg by mouth daily 21   Historical Provider, MD   alirocumab (PRALUENT) 75 MG/ML SOAJ injection pen Inject 75 mg into the skin every 14 days 20   Historical Provider, MD   Coenzyme Q10 (COQ-10) 400 MG CAPS Take by mouth daily    Historical Provider, MD   desvenlafaxine succinate (PRISTIQ) 50 MG TB24 extended release tablet TAKE 1 TABLET BY MOUTH EVERY EVENING 20   Historical Provider, MD   [facility] D3 25 MCG (1000 UT) CAPS TAKE 1 CAPSULE BY MOUTH EVERY DAY 20   Leela Alicia, APRN - CNP   linaCLOtide (LINZESS) 72 MCG CAPS capsule Take 145 mcg by mouth every morning (before breakfast) Historical Provider, MD   cetirizine (ZYRTEC) 10 MG tablet Take 10 mg by mouth daily    Historical Provider, MD   QUEtiapine (SEROQUEL) 100 MG tablet Take 150 mg by mouth nightly     Historical Provider, MD   [facility] MELATONIN 3 MG TABS tablet TAKE 1 TABLET BY MOUTH NIGHTLY 1/25/19   LUIS Escobar CNP   B Complex Vitamins (VITAMIN B COMPLEX) TABS Take 1 tablet by mouth 2 times daily  Patient taking differently: Take 1 tablet by mouth daily 10/12/17   LUIS Escobar CNP   bisacodyl (DULCOLAX) 5 MG EC tablet Take 10 mg by mouth 2 times daily     Historical Provider, MD       Current medications:    No current facility-administered medications for this visit. No current outpatient medications on file. Facility-Administered Medications Ordered in Other Visits   Medication Dose Route Frequency Provider Last Rate Last Admin    sodium chloride flush 0.9 % injection 5-40 mL  5-40 mL IntraVENous 2 times per day Ronny Benton MD        sodium chloride flush 0.9 % injection 5-40 mL  5-40 mL IntraVENous PRN Ronny Benton MD        0.9 % sodium chloride infusion   IntraVENous PRN Ronny Benton MD        lactated ringers infusion   IntraVENous Continuous Jacky Maxwell  mL/hr at 01/10/23 1235 New Bag at 01/10/23 1235    sodium chloride flush 0.9 % injection 5-40 mL  5-40 mL IntraVENous 2 times per day Ronny Benton MD        sodium chloride flush 0.9 % injection 5-40 mL  5-40 mL IntraVENous PRN Ronny Benton MD        0.9 % sodium chloride infusion   IntraVENous PRN Ronny Benton MD           Allergies: Allergies   Allergen Reactions    Levaquin [Levofloxacin In D5w]      Unknown reaction    Levofloxacin      Pt.  Unaware of drug or reaction    Pcn [Penicillins] Hives       Problem List:    Patient Active Problem List   Diagnosis Code    CMC arthritis, thumb, degenerative M18.9    Complete rotator cuff tear of left shoulder M75.122    Vertigo R42    Encephalopathy G93.40    Polypharmacy Z79.899    Borderline personality disorder (HCC) F60.3    Moderate major depression (HCC) F32.1    HTN (hypertension) I10    Hypothyroid E03.9    Osteoarthritis of glenohumeral joint M19.019    Rotator cuff arthropathy M12.819    Right shoulder pain M25.511    Status post total shoulder arthroplasty Z96.619    Chronic constipation K59.09    Bilateral carpal tunnel syndrome G56.03    Right wrist pain M25.531    Left wrist pain M25.532    Type 2 diabetes mellitus without complication, without long-term current use of insulin (HCC) E11.9    S/P rotator cuff repair Z98.890    S/P partial colectomy Z90.49    Acute strain of neck muscle S16. 1XXA    Pain radiating to right shoulder M25.511    Moderate episode of recurrent major depressive disorder (HCC) F33.1    Leg swelling M79.89       Past Medical History:        Diagnosis Date    Borderline personality disorder ([address]     Chronic pain     Colon disorder     hard time pushing stool out    Constipation     Diabetes mellitus ([address]     Diabetic neuropathy ([address]     Electric shock     ECT therapy    Heart murmur     Hyperlipidemia     Hypertension     Insomnia     Major depressive disorder, recurrent ([address]     Morbid obesity ([address]     Osteoarthrosis     RLS (restless legs syndrome)     Small bowel obstruction ([address] 11/19/2019    Suicidal ideation     Unspecified hypothyroidism     Vitamin deficiency     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       Past Surgical History:        Procedure Laterality Date    APPENDECTOMY      BACK SURGERY      CARPAL TUNNEL RELEASE Left 12/19/2017    CARPAL TUNNEL RELEASE Right 06/18/2019    RIGHT OPEN CARPAL TUNNEL RELEASE performed by Tawana Seth MD at [address] [place] [address] Right     for pinched nerve    JOINT REPLACEMENT Bilateral     knees    KNEE SURGERY Left     meniscus    SHOULDER ARTHROSCOPY Right 2016    Right Total Shoulder Arthroscopy with Reverse Ball and Socket Deta Prosthesis    SHOULDER ARTHROSCOPY Left 2018    subacromial decompression, rotator cuff repair, biceps stump debridement    SMALL INTESTINE SURGERY N/A 2019    DIAGNOSTIC LAPAROSCOPY CONVERTED TO OPEN BOWEL RESECTION performed by Adrien Nassar MD at [address] [place] [address] History:    Social History     Tobacco Use    Smoking status: Former     Packs/day: 2.00     Years: 20.00     Pack years: 40.00     Types: Cigarettes     Start date: 1971     Quit date: 1991     Years since quittin.1    Smokeless tobacco: Never   Substance Use Topics    Alcohol use: Yes     Comment: 0-1 drinks per month                                Counseling given: Not Answered      Vital Signs (Current): There were no vitals filed for this visit.                                            BP Readings from Last 3 Encounters:   01/10/23 123/66   12/15/22 105/67   10/20/22 116/80       NPO Status:                                                                                 BMI:   Wt Readings from Last 3 Encounters:   01/10/23 156 lb (70.8 kg)   12/15/22 165 lb (74.8 kg)   22 162 lb (73.5 kg)     There is no height or weight on file to calculate BMI.    CBC:   Lab Results   Component Value Date/Time    WBC 4.5 10/20/2022 08:16 AM    RBC 3.79 10/20/2022 08:16 AM    HGB 12.2 10/20/2022 08:16 AM    HCT 35.5 10/20/2022 08:16 AM    MCV 93.5 10/20/2022 08:16 AM    RDW 12.8 10/20/2022 08:16 AM     10/20/2022 08:16 AM       CMP:   Lab Results   Component Value Date/Time     10/20/2022 08:16 AM    K 4.6 10/20/2022 08:16 AM     10/20/2022 08:16 AM    CO2 28 10/20/2022 08:16 AM    BUN 18 10/20/2022 08:16 AM    CREATININE 0.6 10/20/2022 08:16 AM    GFRAA >60 2021 10:05 AM    AGRATIO 2.2 10/20/2022 08:16 AM    LABGLOM >60 10/20/2022 08:16 AM    GLUCOSE 85 10/20/2022 08:16 AM    PROT 6.7 10/20/2022 08:16 AM    CALCIUM 9.6 10/20/2022 08:16 AM    BILITOT 0.3 10/20/2022 08:16 AM    ALKPHOS 88 10/20/2022 08:16 AM    AST 24 10/20/2022 08:16 AM    ALT 21 10/20/2022 08:16 AM       POC Tests:   No results for input(s): POCGLU, POCNA, POCK, POCCL, POCBUN, POCHEMO, POCHCT in the last 72 hours. Coags:   Lab Results   Component Value Date/Time    PROTIME 9.6 12/23/2021 10:05 AM    INR 0.86 12/23/2021 10:05 AM    APTT 27.9 12/23/2021 10:05 AM       HCG (If Applicable): No results found for: PREGTESTUR, PREGSERUM, HCG, HCGQUANT     ABGs: No results found for: PHART, PO2ART, GIL0OXP, XEB6MXN, BEART, N7OOKSXV     Type & Screen (If Applicable):  No results found for: LABABO, [address]    Anesthesia Evaluation  Patient summary reviewed history of anesthetic complications (can wake up mean sometimes):   Airway: Mallampati: II  TM distance: >3 FB   Neck ROM: full  Mouth opening: > = 3 FB   Dental:    (+) poor dentition      Pulmonary:Negative Pulmonary ROS breath sounds clear to auscultation                             Cardiovascular:    (+) hypertension:,         Rhythm: regular  Rate: normal                    Neuro/Psych:   (+) neuromuscular disease (diabetic neuropathy):, psychiatric history:            GI/Hepatic/Renal: Neg GI/Hepatic/Renal ROS       (-) GERD, liver disease and no renal disease       Endo/Other:    (+) DiabetesType II DM, , hypothyroidism::., .                 Abdominal:             Vascular: negative vascular ROS. Other Findings:             Anesthesia Plan      general     ASA 3       Induction: intravenous. MIPS: Prophylactic antiemetics administered. Anesthetic plan and risks discussed with patient and sibling. Plan discussed with CRNA. Attending anesthesiologist reviewed and agrees with Preprocedure content        All questions answered and agrees with plan.         Thelma Byrd MD   1/10/2023

## 2023-01-16 DIAGNOSIS — I10 ESSENTIAL HYPERTENSION: Chronic | ICD-10-CM

## 2023-01-16 NOTE — TELEPHONE ENCOUNTER
Future Appointments   Date Time Provider Anastasiia Alvarez   1/18/2023  9:15 AM Don Gomez RN [facility]   4/19/2023  7:00 AM LUIS Olivier - CNP [facility] [place] - [facility]     LOV 12/15/2022

## 2023-01-17 RX ORDER — SPIRONOLACTONE 50 MG/1
TABLET, FILM COATED ORAL
Qty: 90 TABLET | Refills: 1 | Status: SHIPPED | OUTPATIENT
Start: 2023-01-17

## 2023-01-18 ENCOUNTER — OFFICE VISIT (OUTPATIENT)
Dept: ORTHOPEDIC SURGERY | Age: 73
End: 2023-01-18

## 2023-01-18 VITALS — WEIGHT: 156 LBS | HEIGHT: 66 IN | BODY MASS INDEX: 25.07 KG/M2 | RESPIRATION RATE: 16 BRPM

## 2023-01-18 DIAGNOSIS — M25.531 RIGHT WRIST PAIN: Primary | ICD-10-CM

## 2023-01-18 PROCEDURE — 99024 POSTOP FOLLOW-UP VISIT: CPT | Performed by: ORTHOPAEDIC SURGERY

## 2023-01-18 NOTE — PROGRESS NOTES
Ms. Rian Garcia returns today in follow-up of her recent Excision of Left Middle Finger Digital Mucous cyst & DIP Joint Arthrotomy & Debridement Index Finger Mass Excision done approximately 1 week ago. She has done well noting mild discomfort and no other reported complications. She notes pre-operative symptoms to be completely resolved at this time. Physical Exam:  Bandage intact and well cared for  Skin incision is healing well, without erythema, drainage or sign of infection. Digital range of motion is without significant limitation in the Middle Finger otherwise normal bilaterally. Wrist shows limited by pain range of motion. Sensation is normal in the Whole Hand. Vascular examination reveals normal, good capillary refill, and good color. Swelling is minimal.  There is no residual discomfort at the surgical site, no evidence for recurrence of the mass. Impression:  Ms. Rian Garcia is doing well after recent Excision of Left Middle Finger Digital Mucous cyst & DIP Joint Arthrotomy & Debridement Middle Finger Mass Excision. Plan:  Ms. Rian Garcia is instructed in work on Active & Passive range of motion of the digits, wrist, & elbow. These modalities were specifically demonstrated to her today. We discussed the appropriateness of gradual resumption of use of the operated hand and the return to normal use as comfort allows. She is given instructions regarding management of the fresh surgical incision and progressive use of desensitization and tissue massage techniques. We discussed the appropriate expectations and timeline for symptom improvement including the potential for some longer term residual swelling or fullness at the surgical site. I have reviewed the surgical pathology report with her today. She is provided a written patient instruction sheet titled: Postoperative Instructions After Finger Mass Excision.     I have asked Ms. Rian Garcia to follow-up with me or contact me by telephone over the next 2-4 weeks if her symptoms have not fully resolved or if she has not regained full & painless return of function. She is also specifically instructed to return to the office or call for an appointment sooner if her symptoms are changing or worsening prior to that time.

## 2023-01-20 ENCOUNTER — TELEPHONE (OUTPATIENT)
Dept: FAMILY MEDICINE CLINIC | Age: 73
End: 2023-01-20

## 2023-01-20 DIAGNOSIS — E03.9 HYPOTHYROIDISM, UNSPECIFIED TYPE: Chronic | ICD-10-CM

## 2023-01-20 RX ORDER — LEVOTHYROXINE SODIUM 0.07 MG/1
TABLET ORAL
Qty: 90 TABLET | Refills: 3 | Status: SHIPPED | OUTPATIENT
Start: 2023-01-20

## 2023-01-20 NOTE — TELEPHONE ENCOUNTER
Patient called  She is out of medication   (Pharmacy told pt they have sent 2 refills request)  Levothyroxine 75 mcg  [facility] on file in chart = pharmacy  12/15/2022 last appt  Future Appointments   Date Time Provider Anastasiia Alvarez   4/19/2023  7:00 AM LUIS Soliz - [place] [id number]

## 2023-01-27 ENCOUNTER — TELEPHONE (OUTPATIENT)
Dept: ORTHOPEDIC SURGERY | Age: 73
End: 2023-01-27

## 2023-01-27 PROBLEM — M67.449 DIGITAL MUCOUS CYST OF FINGER: Status: ACTIVE | Noted: 2023-01-27

## 2023-01-27 RX ORDER — SULFAMETHOXAZOLE AND TRIMETHOPRIM 800; 160 MG/1; MG/1
1 TABLET ORAL EVERY 12 HOURS SCHEDULED
Qty: 20 TABLET | Refills: 0 | Status: SHIPPED | OUTPATIENT
Start: 2023-01-27 | End: 2023-02-06

## 2023-01-27 NOTE — TELEPHONE ENCOUNTER
Patient came into the office stating that the finger she had surgery on is infected.   It  is hot to the touch and it is swollen.   Would like to know what to do.   Allergies.  PCN  Pharmacy [facility].

## 2023-02-08 ENCOUNTER — HOSPITAL ENCOUNTER (OUTPATIENT)
Dept: WOMENS IMAGING | Age: 73
Discharge: HOME | End: 2023-02-08
Payer: COMMERCIAL

## 2023-02-08 DIAGNOSIS — Z12.31 VISIT FOR SCREENING MAMMOGRAM: ICD-10-CM

## 2023-02-08 PROCEDURE — 77063 BREAST TOMOSYNTHESIS BI: CPT

## 2023-02-16 ENCOUNTER — TELEPHONE (OUTPATIENT)
Dept: FAMILY MEDICINE CLINIC | Age: 73
End: 2023-02-16

## 2023-02-16 NOTE — TELEPHONE ENCOUNTER
Pt came in and dropped off a reasonable accommodations verification form for NP LINDA Dennis to look over and sign. Call pt with any questions or when ready for .      Last OV 12/15/22    Future Appointments   Date Time Provider Anastasiia Alvarez   4/19/2023  7:00 AM LUIS Patel - [facility]UD[facility]DOVINOS

## 2023-02-27 RX ORDER — CLINDAMYCIN HYDROCHLORIDE 300 MG/1
300 CAPSULE ORAL 2 TIMES DAILY
Qty: 6 CAPSULE | Refills: 0 | Status: SHIPPED | OUTPATIENT
Start: 2023-02-27 | End: 2023-03-02

## 2023-02-27 NOTE — TELEPHONE ENCOUNTER
Future Appointments   Date Time Provider Anastasiia Alvarez   4/19/2023  7:00 AM Mechele Keween[place], APRN - CNP [facility] [place] [facility]     LOV 12/15/2022

## 2023-02-27 NOTE — TELEPHONE ENCOUNTER
Patient needs a refill on clindamycin  .      Pharmacy: [facility]/PHARMACY Elise Porter , [facility] [address] [phone]      Patient having a dental procedure and needs premed

## 2023-03-31 ENCOUNTER — APPOINTMENT (OUTPATIENT)
Dept: GENERAL RADIOLOGY | Age: 73
End: 2023-03-31
Payer: COMMERCIAL

## 2023-03-31 ENCOUNTER — HOSPITAL ENCOUNTER (EMERGENCY)
Age: 73
Discharge: HOME | End: 2023-03-31
Payer: COMMERCIAL

## 2023-03-31 VITALS
HEART RATE: 99 BPM | RESPIRATION RATE: 16 BRPM | BODY MASS INDEX: 25.71 KG/M2 | DIASTOLIC BLOOD PRESSURE: 76 MMHG | OXYGEN SATURATION: 94 % | SYSTOLIC BLOOD PRESSURE: 155 MMHG | HEIGHT: 66 IN | TEMPERATURE: 97.9 F | WEIGHT: 160 LBS

## 2023-03-31 DIAGNOSIS — S63.622A SPRAIN OF INTERPHALANGEAL JOINT OF LEFT THUMB, INITIAL ENCOUNTER: Primary | ICD-10-CM

## 2023-03-31 PROCEDURE — 99283 EMERGENCY DEPT VISIT LOW MDM: CPT

## 2023-03-31 PROCEDURE — 73130 X-RAY EXAM OF HAND: CPT

## 2023-03-31 ASSESSMENT — PAIN - FUNCTIONAL ASSESSMENT: PAIN_FUNCTIONAL_ASSESSMENT: NONE - DENIES PAIN

## 2023-03-31 NOTE — DISCHARGE INSTRUCTIONS
X-ray negative for fracture. Thumb spica splint applied. Wear this for about 5 to 7 days. Follow-up with your other provider next week for recheck. I do recommend Tylenol 1000 mg every 6 or 8 hours for primary pain control. For inflammation I do recommend OTC Aleve 220 mg take this twice daily with food for few days.

## 2023-03-31 NOTE — ED PROVIDER NOTES
with Reverse Ball and Socket Deta Prosthesis    SHOULDER ARTHROSCOPY Left 05/30/2018    subacromial decompression, rotator cuff repair, biceps stump debridement    SMALL INTESTINE SURGERY N/A 09/16/2019    DIAGNOSTIC LAPAROSCOPY CONVERTED TO OPEN BOWEL RESECTION performed by Phylicia Mao MD at [facility]       Previous Medications    ALIROCUMAB (PRALUENT) 75 MG/ML SOAJ INJECTION PEN    Inject 75 mg into the skin every 14 days    B COMPLEX VITAMINS (VITAMIN B COMPLEX) TABS    Take 1 tablet by mouth 2 times daily    BISACODYL (DULCOLAX) 5 MG EC TABLET    Take 10 mg by mouth 2 times daily     CARBAMAZEPINE (TEGRETOL) 200 MG TABLET    Take 200 mg by mouth 3 times daily    CELECOXIB (CELEBREX) 200 MG CAPSULE    Take 200 mg by mouth daily    CETIRIZINE (ZYRTEC) 10 MG TABLET    Take 10 mg by mouth daily    COENZYME Q10 (COQ-10) 400 MG CAPS    Take by mouth daily    [employer] D3 25 MCG (1000 UT) CAPS    TAKE 1 CAPSULE BY MOUTH EVERY DAY    [employer] MELATONIN 3 MG TABS TABLET    TAKE 1 TABLET BY MOUTH NIGHTLY    CYCLOBENZAPRINE (FLEXERIL) 5 MG TABLET    TAKE 1 TABLET BY MOUTH 2 HOURS BEFORE BEDTIME    DESVENLAFAXINE SUCCINATE (PRISTIQ) 50 MG TB24 EXTENDED RELEASE TABLET    TAKE 1 TABLET BY MOUTH EVERY EVENING    LEVOTHYROXINE (SYNTHROID) 75 MCG TABLET    TAKE 1 TABLET BY MOUTH EVERY DAY    LINACLOTIDE (LINZESS) 72 MCG CAPS CAPSULE    Take 145 mcg by mouth every morning (before breakfast)     LITHIUM 300 MG CAPSULE    Take 300 mg by mouth daily    PREDNISONE (DELTASONE) 5 MG TABLET    Take 5 mg by mouth daily    QUETIAPINE (SEROQUEL) 100 MG TABLET    Take 150 mg by mouth nightly     ROPINIROLE (REQUIP) 2 MG TABLET    TAKE 1-2 TABLETS BY MOUTH EVERY DAY AT NIGHT FOR REST LEG SYNDROME    SPIRONOLACTONE (ALDACTONE) 50 MG TABLET    TAKE 1 TABLET BY MOUTH EVERY DAY       ALLERGIES     Levaquin [levofloxacin in d5w], Levofloxacin, and Pcn [penicillins]    FAMILYHISTORY       Family History 1733   BP: (!) 155/76   Pulse: 99   Resp: 16   Temp: 97.9 °F (36.6 °C)   SpO2: 94%   Weight: 160 lb (72.6 kg)   Height: 5' 6\" (1.676 m)       Patient was given the following medications:  Medications - No data to display          Is this patient to be included in the SEP-1 Core Measure due to severe sepsis or septic shock? No   Exclusion criteria - the patient is NOT to be included for SEP-1 Core Measure due to: Infection is not suspected    Chronic Conditions affecting care: None   has a past medical history of Borderline personality disorder ([facility] Chronic pain, Colon disorder, Constipation, Diabetes mellitus ([facility] Diabetic neuropathy ([facility] Electric shock, Heart murmur, Hyperlipidemia, Hypertension, Insomnia, Major depressive disorder, recurrent ([facility] Morbid obesity ([facility] [facility] Osteoarthrosis, RLS (restless legs syndrome), Small bowel obstruction ([facility] (11/19/2019), Suicidal ideation, Unspecified hypothyroidism, Vitamin deficiency, and 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 CONSULTS: (Who and What was discussed)  None    Social Determinants Significantly Affecting Health : None    Records Reviewed (External and Source) none    CC/HPI Summary, DDx, ED Course, and Reassessment:     Patient presenting with trip and fall injury occurring at [facility] at approximately 3 PM this afternoon. Injury nondominant left thumb. X-ray negative. Thumb spica splint applied. She will use Tylenol 1000 mg every 6 or 8 hours for primary pain control. She expressed hesitation in using ibuprofen. I did recommend OTC Aleve 220 mg twice daily with food for the next 3 to 4 days. Patient to follow-up with her PCP as needed. The patient is expressed understanding her diagnosis and treatment plan. Disposition Considerations (tests considered but not done, Admit vs D/C, Shared Decision Making, Pt Expectation of Test or Tx.):       Patient be discharged with diagnosis strain or sprain of the nondominant left thumb.   X-ray was

## 2023-04-02 SDOH — HEALTH STABILITY: PHYSICAL HEALTH: ON AVERAGE, HOW MANY MINUTES DO YOU ENGAGE IN EXERCISE AT THIS LEVEL?: 0 MIN

## 2023-04-02 SDOH — HEALTH STABILITY: PHYSICAL HEALTH: ON AVERAGE, HOW MANY DAYS PER WEEK DO YOU ENGAGE IN MODERATE TO STRENUOUS EXERCISE (LIKE A BRISK WALK)?: 0 DAYS

## 2023-04-05 ENCOUNTER — OFFICE VISIT (OUTPATIENT)
Dept: ORTHOPEDIC SURGERY | Age: 73
End: 2023-04-05

## 2023-04-05 VITALS — WEIGHT: 160 LBS | HEIGHT: 66 IN | BODY MASS INDEX: 25.71 KG/M2 | RESPIRATION RATE: 16 BRPM

## 2023-04-05 DIAGNOSIS — M25.531 RIGHT WRIST PAIN: Primary | ICD-10-CM

## 2023-04-05 NOTE — PROGRESS NOTES
use of protective splints, activity modification, and the judicious use of over-the-counter anti-inflammatory medications if allowed by her primary care physician. An appropriately sized Removable forearm based Wrist orthosis was provided. Instructions were given regarding splint use and wear as well as suggestions for use of the other modalities were discussed. I have clearly explained to her that the above outlined treatment plan should not be expected to 'cure' her  SLAC Wrist deformity and osteoarthritis, but we are rather treating the symptoms with which she presents. She has understood that in order to achieve more durable relief of her symptoms and to prevent future worsening or further damage, that definitive surgical treatment would be required. Ms. Parisi See  voiced an appropriate understanding of our discussion, the options available to her, and of the expectations of her selected  treatment. Procedures    Dianna Gonzalez Titan Wrist Long Forearm Brace     Patient was prescribed a Dianna Gonzalez Titan Wrist and Forearm Brace. The right wrist will require stabilization / immobilization from this semi-rigid / rigid orthosis to improve their function. The orthosis will assist in protecting the affected area, provide functional support and facilitate healing. The patient was educated and fit by a healthcare professional with expert knowledge and specialization in brace application while under the direct supervision of the treating physician. Verbal and written instructions for the use of and application of this item were provided. They were instructed to contact the office immediately should the brace result in increased pain, decreased sensation, increased swelling or worsening of the condition. I have also discussed with Ms. Isak Thomas  the other treatment options available to her  for this condition. We have today selected to proceed with conservative management.   She and I have

## 2023-04-19 ENCOUNTER — OFFICE VISIT (OUTPATIENT)
Dept: FAMILY MEDICINE CLINIC | Age: 73
End: 2023-04-19
Payer: COMMERCIAL

## 2023-04-19 VITALS
OXYGEN SATURATION: 98 % | WEIGHT: 168.6 LBS | BODY MASS INDEX: 27.1 KG/M2 | SYSTOLIC BLOOD PRESSURE: 137 MMHG | HEIGHT: 66 IN | HEART RATE: 86 BPM | DIASTOLIC BLOOD PRESSURE: 76 MMHG

## 2023-04-19 DIAGNOSIS — E11.9 TYPE 2 DIABETES MELLITUS WITHOUT COMPLICATION, WITHOUT LONG-TERM CURRENT USE OF INSULIN (HCC): Primary | ICD-10-CM

## 2023-04-19 DIAGNOSIS — I10 PRIMARY HYPERTENSION: Chronic | ICD-10-CM

## 2023-04-19 DIAGNOSIS — G47.10 HYPERSOMNIA: ICD-10-CM

## 2023-04-19 DIAGNOSIS — Z91.81 AT HIGH RISK FOR FALLS: ICD-10-CM

## 2023-04-19 DIAGNOSIS — M15.9 PRIMARY OSTEOARTHRITIS INVOLVING MULTIPLE JOINTS: ICD-10-CM

## 2023-04-19 DIAGNOSIS — E03.9 ACQUIRED HYPOTHYROIDISM: Chronic | ICD-10-CM

## 2023-04-19 DIAGNOSIS — E55.9 VITAMIN D DEFICIENCY: ICD-10-CM

## 2023-04-19 DIAGNOSIS — Z78.9 STATIN INTOLERANCE: ICD-10-CM

## 2023-04-19 DIAGNOSIS — E78.2 MIXED HYPERLIPIDEMIA: ICD-10-CM

## 2023-04-19 PROBLEM — M15.0 PRIMARY OSTEOARTHRITIS INVOLVING MULTIPLE JOINTS: Status: ACTIVE | Noted: 2023-04-19

## 2023-04-19 LAB — HBA1C MFR BLD: 5.5 %

## 2023-04-19 PROCEDURE — 2022F DILAT RTA XM EVC RTNOPTHY: CPT | Performed by: NURSE PRACTITIONER

## 2023-04-19 PROCEDURE — G8427 DOCREV CUR MEDS BY ELIG CLIN: HCPCS | Performed by: NURSE PRACTITIONER

## 2023-04-19 PROCEDURE — 1123F ACP DISCUSS/DSCN MKR DOCD: CPT | Performed by: NURSE PRACTITIONER

## 2023-04-19 PROCEDURE — 83036 HEMOGLOBIN GLYCOSYLATED A1C: CPT | Performed by: NURSE PRACTITIONER

## 2023-04-19 PROCEDURE — 99214 OFFICE O/P EST MOD 30 MIN: CPT | Performed by: NURSE PRACTITIONER

## 2023-04-19 PROCEDURE — G8417 CALC BMI ABV UP PARAM F/U: HCPCS | Performed by: NURSE PRACTITIONER

## 2023-04-19 PROCEDURE — 3044F HG A1C LEVEL LT 7.0%: CPT | Performed by: NURSE PRACTITIONER

## 2023-04-19 PROCEDURE — 3075F SYST BP GE 130 - 139MM HG: CPT | Performed by: NURSE PRACTITIONER

## 2023-04-19 PROCEDURE — 1090F PRES/ABSN URINE INCON ASSESS: CPT | Performed by: NURSE PRACTITIONER

## 2023-04-19 PROCEDURE — G8399 PT W/DXA RESULTS DOCUMENT: HCPCS | Performed by: NURSE PRACTITIONER

## 2023-04-19 PROCEDURE — 1036F TOBACCO NON-USER: CPT | Performed by: NURSE PRACTITIONER

## 2023-04-19 PROCEDURE — 3017F COLORECTAL CA SCREEN DOC REV: CPT | Performed by: NURSE PRACTITIONER

## 2023-04-19 PROCEDURE — 3078F DIAST BP <80 MM HG: CPT | Performed by: NURSE PRACTITIONER

## 2023-04-19 RX ORDER — SULINDAC 200 MG/1
200 TABLET ORAL 2 TIMES DAILY
COMMUNITY
Start: 2023-04-09 | End: 2023-04-19

## 2023-04-19 SDOH — ECONOMIC STABILITY: INCOME INSECURITY: HOW HARD IS IT FOR YOU TO PAY FOR THE VERY BASICS LIKE FOOD, HOUSING, MEDICAL CARE, AND HEATING?: NOT HARD AT ALL

## 2023-04-19 SDOH — ECONOMIC STABILITY: FOOD INSECURITY: WITHIN THE PAST 12 MONTHS, YOU WORRIED THAT YOUR FOOD WOULD RUN OUT BEFORE YOU GOT MONEY TO BUY MORE.: NEVER TRUE

## 2023-04-19 SDOH — ECONOMIC STABILITY: FOOD INSECURITY: WITHIN THE PAST 12 MONTHS, THE FOOD YOU BOUGHT JUST DIDN'T LAST AND YOU DIDN'T HAVE MONEY TO GET MORE.: NEVER TRUE

## 2023-04-19 ASSESSMENT — PATIENT HEALTH QUESTIONNAIRE - PHQ9
8. MOVING OR SPEAKING SO SLOWLY THAT OTHER PEOPLE COULD HAVE NOTICED. OR THE OPPOSITE, BEING SO FIGETY OR RESTLESS THAT YOU HAVE BEEN MOVING AROUND A LOT MORE THAN USUAL: 0
SUM OF ALL RESPONSES TO PHQ QUESTIONS 1-9: 1
6. FEELING BAD ABOUT YOURSELF - OR THAT YOU ARE A FAILURE OR HAVE LET YOURSELF OR YOUR FAMILY DOWN: 0
SUM OF ALL RESPONSES TO PHQ QUESTIONS 1-9: 1
SUM OF ALL RESPONSES TO PHQ9 QUESTIONS 1 & 2: 0
7. TROUBLE CONCENTRATING ON THINGS, SUCH AS READING THE NEWSPAPER OR WATCHING TELEVISION: 0
2. FEELING DOWN, DEPRESSED OR HOPELESS: 0
1. LITTLE INTEREST OR PLEASURE IN DOING THINGS: 0
SUM OF ALL RESPONSES TO PHQ QUESTIONS 1-9: 1
SUM OF ALL RESPONSES TO PHQ QUESTIONS 1-9: 1
3. TROUBLE FALLING OR STAYING ASLEEP: 1
5. POOR APPETITE OR OVEREATING: 0
9. THOUGHTS THAT YOU WOULD BE BETTER OFF DEAD, OR OF HURTING YOURSELF: 0
4. FEELING TIRED OR HAVING LITTLE ENERGY: 0

## 2023-04-19 ASSESSMENT — ENCOUNTER SYMPTOMS
COUGH: 0
DIARRHEA: 0
NAUSEA: 0
VOMITING: 0
SHORTNESS OF BREATH: 0
BACK PAIN: 0

## 2023-04-19 NOTE — PATIENT INSTRUCTIONS
- Taper off of prednisone. Reduce dose to half tablet (2.5 mg) every other day for a week, then half tablet (2.5 mg) every 3rd day for a week for two a week and then half tablet (2.5 mg) twice/week for a week and then stop.      - referral to sleep clinic    - fasting labs before next appointment

## 2023-04-19 NOTE — PROGRESS NOTES
06-Apr-2017 11:35
oriented to person, place, and time. Mental status is at baseline. Cranial Nerves: No cranial nerve deficit. Psychiatric:         Speech: Speech normal.       ASSESSMENT/PLAN:  1. Type 2 diabetes mellitus without complication, without long-term current use of insulin (HCC)  - controlled, continue dietary and lifestyle modifications  - POCT glycosylated hemoglobin (Hb A1C)  - TSH with Reflex; Future  - Lipid, Fasting; Future  - CBC; Future  - Comprehensive Metabolic Panel; Future  - Hemoglobin A1C; Future    2. Hypersomnia  - referral to sleep clinic for further evaluation  - [facility]    3. Primary hypertension  - controlled, continue spironolactone    4. Acquired hypothyroidism  - controlled, continue levothyroxine    5. Mixed hyperlipidemia  - on Praluent through cardiolog    6. Statin intolerance    7. Vitamin D deficiency  - Vitamin D 25 Hydroxy; Future    8. Primary osteoarthritis involving multiple joints  - weaning off of prednisone, she has not noticed an improvement in her pain and does not want to go back to rheumatology (see AVS for weaning guide)    9. At high risk for falls    - fasting labs before next visit    Return in about 4 months (around 8/19/2023). An electronic signature was used to authenticate this note. --LUIS Garcia - CNP on 4/19/2023 at 8:28 AM  On the basis of positive falls risk screening, assessment and plan is as follows: home safety tips providedy.

## 2023-04-24 ENCOUNTER — OFFICE VISIT (OUTPATIENT)
Dept: PULMONOLOGY | Age: 73
End: 2023-04-24
Payer: COMMERCIAL

## 2023-04-24 VITALS
HEART RATE: 74 BPM | TEMPERATURE: 97.6 F | DIASTOLIC BLOOD PRESSURE: 75 MMHG | OXYGEN SATURATION: 96 % | WEIGHT: 170 LBS | BODY MASS INDEX: 27.32 KG/M2 | SYSTOLIC BLOOD PRESSURE: 141 MMHG | HEIGHT: 66 IN | RESPIRATION RATE: 16 BRPM

## 2023-04-24 DIAGNOSIS — E66.3 OVERWEIGHT (BMI 25.0-29.9): ICD-10-CM

## 2023-04-24 DIAGNOSIS — G25.81 RLS (RESTLESS LEGS SYNDROME): ICD-10-CM

## 2023-04-24 DIAGNOSIS — G47.10 HYPERSOMNIA: ICD-10-CM

## 2023-04-24 DIAGNOSIS — I10 PRIMARY HYPERTENSION: Chronic | ICD-10-CM

## 2023-04-24 DIAGNOSIS — G47.33 OSA (OBSTRUCTIVE SLEEP APNEA): Primary | ICD-10-CM

## 2023-04-24 PROBLEM — G50.0 TRIGEMINAL NEURALGIA: Status: ACTIVE | Noted: 2021-04-15

## 2023-04-24 PROBLEM — M53.2X2 SPINAL INSTABILITIES, CERVICAL REGION: Status: ACTIVE | Noted: 2021-11-29

## 2023-04-24 PROBLEM — F31.9 BIPOLAR DISORDER WITH DEPRESSION (HCC): Status: ACTIVE | Noted: 2022-09-21

## 2023-04-24 PROBLEM — M54.12 RADICULOPATHY, CERVICAL REGION: Status: ACTIVE | Noted: 2021-08-30

## 2023-04-24 PROBLEM — E11.51 TYPE 2 DIABETES MELLITUS WITH DIABETIC PERIPHERAL ANGIOPATHY WITHOUT GANGRENE, WITHOUT LONG-TERM CURRENT USE OF INSULIN (HCC): Status: ACTIVE | Noted: 2020-02-24

## 2023-04-24 PROBLEM — Z91.09 ENVIRONMENTAL ALLERGIES: Status: ACTIVE | Noted: 2021-03-20

## 2023-04-24 PROCEDURE — G8417 CALC BMI ABV UP PARAM F/U: HCPCS | Performed by: NURSE PRACTITIONER

## 2023-04-24 PROCEDURE — 3078F DIAST BP <80 MM HG: CPT | Performed by: NURSE PRACTITIONER

## 2023-04-24 PROCEDURE — 1090F PRES/ABSN URINE INCON ASSESS: CPT | Performed by: NURSE PRACTITIONER

## 2023-04-24 PROCEDURE — 99203 OFFICE O/P NEW LOW 30 MIN: CPT | Performed by: NURSE PRACTITIONER

## 2023-04-24 PROCEDURE — 1036F TOBACCO NON-USER: CPT | Performed by: NURSE PRACTITIONER

## 2023-04-24 PROCEDURE — G8427 DOCREV CUR MEDS BY ELIG CLIN: HCPCS | Performed by: NURSE PRACTITIONER

## 2023-04-24 PROCEDURE — G8399 PT W/DXA RESULTS DOCUMENT: HCPCS | Performed by: NURSE PRACTITIONER

## 2023-04-24 PROCEDURE — 1123F ACP DISCUSS/DSCN MKR DOCD: CPT | Performed by: NURSE PRACTITIONER

## 2023-04-24 PROCEDURE — 3017F COLORECTAL CA SCREEN DOC REV: CPT | Performed by: NURSE PRACTITIONER

## 2023-04-24 PROCEDURE — 3077F SYST BP >= 140 MM HG: CPT | Performed by: NURSE PRACTITIONER

## 2023-04-24 RX ORDER — ACETAMINOPHEN 500 MG/1
TABLET ORAL
COMMUNITY
Start: 2023-03-01

## 2023-04-24 RX ORDER — LORAZEPAM 0.5 MG/1
TABLET ORAL
COMMUNITY
Start: 2023-03-22

## 2023-04-24 RX ORDER — QUETIAPINE FUMARATE 50 MG/1
TABLET, EXTENDED RELEASE ORAL
COMMUNITY
Start: 2023-03-05

## 2023-04-24 RX ORDER — LINACLOTIDE 145 UG/1
CAPSULE, GELATIN COATED ORAL DAILY
COMMUNITY
Start: 2023-02-06

## 2023-04-24 RX ORDER — LORAZEPAM 2 MG/1
TABLET ORAL
COMMUNITY
Start: 2023-02-27

## 2023-04-24 ASSESSMENT — SLEEP AND FATIGUE QUESTIONNAIRES
HOW LIKELY ARE YOU TO NOD OFF OR FALL ASLEEP WHILE SITTING AND TALKING TO SOMEONE: 0
HOW LIKELY ARE YOU TO NOD OFF OR FALL ASLEEP WHILE SITTING QUIETLY AFTER LUNCH WITHOUT ALCOHOL: 0
HOW LIKELY ARE YOU TO NOD OFF OR FALL ASLEEP WHILE LYING DOWN TO REST IN THE AFTERNOON WHEN CIRCUMSTANCES PERMIT: 1
HOW LIKELY ARE YOU TO NOD OFF OR FALL ASLEEP WHILE SITTING AND READING: 3
HOW LIKELY ARE YOU TO NOD OFF OR FALL ASLEEP WHILE SITTING INACTIVE IN A PUBLIC PLACE: 1
HOW LIKELY ARE YOU TO NOD OFF OR FALL ASLEEP IN A CAR, WHILE STOPPED FOR A FEW MINUTES IN TRAFFIC: 1
HOW LIKELY ARE YOU TO NOD OFF OR FALL ASLEEP WHEN YOU ARE A PASSENGER IN A CAR FOR AN HOUR WITHOUT A BREAK: 2
HOW LIKELY ARE YOU TO NOD OFF OR FALL ASLEEP WHILE WATCHING TV: 2
ESS TOTAL SCORE: 10
NECK CIRCUMFERENCE (INCHES): 13

## 2023-04-24 ASSESSMENT — ENCOUNTER SYMPTOMS
SHORTNESS OF BREATH: 0
CHEST TIGHTNESS: 0
SORE THROAT: 0
ABDOMINAL PAIN: 0
EYE PAIN: 0
COUGH: 0
WHEEZING: 0
RHINORRHEA: 0

## 2023-04-24 NOTE — PATIENT INSTRUCTIONS
needs. Pt instructed of all future appointment dates & times, including radiology, labs, procedures & referrals. If procedures were scheduled preparation instructions provided. Instructions on future appointments with [facility] were given. MA Communication: The following orders are received by verbal communication from Guero Juarez, [facility]    Orders include:   [address] will call to schedule. Dr. Cruzito Dejesus will call you with the results    Your home sleep test is scheduled to be picked up at the Sleep center located at [facility]. Address to Sleep Center: The Sleep Center at [address] [place] [address], [address] [place] [address]            Phone: [phone] Fax: [phone]    If you should need to cancel or reschedule your appointment, please call the Sleep Center at [phone] as soon as possible. We ask that you please phone the [facility] Patient Pre-Services ([phone]) at least 3-5 days prior to your sleep study to pre-register. Failing to pre-register may ultimately cause your insurance to not pay for this procedure. Please refrain from or reduce the use of caffeine and/or alcohol prior to your sleep study and avoid napping the day of your study as these will affect the accuracy of your test results.

## 2023-05-22 ENCOUNTER — HOSPITAL ENCOUNTER (OUTPATIENT)
Dept: SLEEP CENTER | Age: 73
Discharge: HOME | End: 2023-05-24
Payer: COMMERCIAL

## 2023-05-22 DIAGNOSIS — G47.10 HYPERSOMNIA: ICD-10-CM

## 2023-05-22 DIAGNOSIS — G25.81 RLS (RESTLESS LEGS SYNDROME): ICD-10-CM

## 2023-05-22 DIAGNOSIS — G47.33 OSA (OBSTRUCTIVE SLEEP APNEA): ICD-10-CM

## 2023-05-22 PROCEDURE — 95806 SLEEP STUDY UNATT&RESP EFFT: CPT

## 2023-05-22 PROCEDURE — 95806 SLEEP STUDY UNATT&RESP EFFT: CPT | Performed by: INTERNAL MEDICINE

## 2023-05-23 ENCOUNTER — TELEPHONE (OUTPATIENT)
Dept: PULMONOLOGY | Age: 73
End: 2023-05-23

## 2023-05-23 PROBLEM — G47.33 OSA (OBSTRUCTIVE SLEEP APNEA): Status: ACTIVE | Noted: 2023-05-23

## 2023-05-23 NOTE — TELEPHONE ENCOUNTER
Faxed orders and first OV note with the comment that I will fax the complete sleep study results once they are scanned in along with Pt demographics .

## 2023-05-23 NOTE — TELEPHONE ENCOUNTER
Called and talked with the patient over the phone  Went over the results that the sleep apnea was a mild form of sleep apnea however the patient has had a history of hypertension diabetes and a very poor sleep quality with insomnia and hypersomnolence  Patient does qualify for AutoPap therapy  We will set up with an AutoPap therapy  DME is [employer] [email or web address]  Patient is instructed to call us when she gets her machine so that we can set up a follow-up appointment                                     [address]  [address] [address]  Dept: [phone]  Loc: [phone]     Diagnosis:  [x] SUE (ICD-10: G47.33)  o CSA (ICD-10: G47.31)  [] Complex Sleep Apnea (ICD-10: G47.37)  []  (ICD-10: G47.37)  [] Hypoxemia (ICD-10: R09.02)  [] COPD (J44.90)  [] Chronic Respiratory Failure with hypoxemia (J96.11)  [] Chronicr Respiratory Failure with hypercapnia (J96.12)  [] Restrictive Lung Disease (J98.4)      [x] New Rx (Device Preference: __________sutoresmed_______________)     [] Change Order       [] Replace ___________  [] Clinical assessments and may include IN-Check device, spirometry and ETCO2 PRN    [] Discontinue Order -  [] CPAP    [] BPAP    [] PAP    [] Oxygen   /   AMA?  [] Yes   [] No              Therapy    AHI: ________ SELINA: ________  LOW SpO2: ________%      DME:[email or web address]    DEVICE / SETTINGS RAMP / COMFORT INTERFACE   []  CPAP () Pressure    Ramp: _________ min's  [] Ramp to patient preference General PAP Supply Kit  Medicaid does not cover heated tubing  ([employer])  PAP Tubing:  [x] Heated ()- 1/3 mos                         [x] Regular () - 1/3 mos  [x] Disposable Water Chamber () - 1/6 mos  [x] Disposable Filter () - 2/mo  [x] Non-Disposable Filter () - 1/6 mos   []  BiLevel PAP ()           IPAP        []  BiLevel

## 2023-06-07 ENCOUNTER — OFFICE VISIT (OUTPATIENT)
Dept: ORTHOPEDIC SURGERY | Age: 73
End: 2023-06-07

## 2023-06-07 VITALS — RESPIRATION RATE: 16 BRPM | HEIGHT: 66 IN | WEIGHT: 165 LBS | BODY MASS INDEX: 26.52 KG/M2

## 2023-06-07 DIAGNOSIS — M25.531 RIGHT WRIST PAIN: Primary | ICD-10-CM

## 2023-06-07 RX ORDER — TRIAMCINOLONE ACETONIDE 40 MG/ML
40 INJECTION, SUSPENSION INTRA-ARTICULAR; INTRAMUSCULAR ONCE
Status: COMPLETED | OUTPATIENT
Start: 2023-06-07 | End: 2023-06-07

## 2023-06-07 RX ADMIN — TRIAMCINOLONE ACETONIDE 40 MG: 40 INJECTION, SUSPENSION INTRA-ARTICULAR; INTRAMUSCULAR at 16:07

## 2023-06-07 NOTE — PROGRESS NOTES
Administrations This Visit       triamcinolone acetonide (KENALOG-40) injection 40 mg       Admin Date  06/07/2023  16:07 Action  Given Dose  40 mg Route  Other Site  Wrist Right Administered By  Sesar Mcgarry MA    Ordering Provider: Marissa Cheng MD    NDC: 4837-7722-14    Lot#: [id number]    : [facility] (PRIMARY CARE)    Patient Supplied?: No
of the expected response, possible reactions and the instructions for care of the hand. She is instructed in the judicious use of over-the-counter anti-inflammatory medications or pain relievers for her symptoms if allowed by his primary care physician. I have also discussed with Ms. Mcarthur Dancer the other treatment options available to her for this condition. We have today selected to proceed with treatment by injection with steroid medication. She and I have agreed that if our current course of Injection treatment does not prove to be effective over the short term future, that she will schedule a follow-up appointment to discuss and select an alternate course of therapy including possibly further conservative treatment or surgical treatment. Ms. Mcarthur Dancer has been given a full verbal list of instructions and precautions related to her present condition. I have asked her to followup with me in the office at the prescribed time. She is also specifically requested to call or return to the office sooner if her symptoms change or worsen prior to the next scheduled appointment.

## 2023-06-07 NOTE — PATIENT INSTRUCTIONS
Information & Instructions   After Finger, Hand, Wrist, or Elbow Injection    Gaetano Benitez MD    You have received an injection of local anesthetic (Bupivicaine without Epinephrine) for comfort & a steroid (Kenalog) for its strong anti-inflammatory effects. In order to give the medication a chance to reduce your inflammation and discomfort, it is recommended that you take it easy for a day or so. You may use your hand and arm as you feel comfortable, but you should avoid highly strenuous activity and heavy use for several days. Relief from the injection will often not begin for several days, and you may not feel full relief for up to one month. It is not uncommon to experience some local discomfort or pain at or around the injection site for a few days. To relieve these symptoms you may do the following if you feel necessary:       Apply ice to the affected area 20 minutes on and 20 minutes off. Do not apply ice directly to the skin. Use a thin layer (T-shirt, pillowcase, towel, etc.) to protect the skin. - If allowed by your other medical physicians, you may take -     2 Tylenol extra strength tablets every 4-6 hours       1-2 Aleve tablets twice a day     2-3 Advil tablets two to three times a day    If you are diabetic, the steroid medication may increase your blood sugar, so you are advised to monitor your sugar more closely so you can adjust it accordingly for a few days following your injection. If you need assistance with the control of you blood sugar, please contact you primary care physician for further advice. I will request that you please call the office one month after your injection at [phone] if you have not experienced relief of your symptoms (unless I have instructed you otherwise). If your injection has given you good relief of you symptoms as expected, then you only need to call the office if your symptoms return. Refill Authorization Note     is requesting a refill authorization.    Brief assessment and rationale for refill: APPROVE: prr  Amount/Quantity of medication ordered: 90d         Refills Authorized: Yes  If authorized number of refills: 2           Medication Therapy Plan: Madeleine 9 more; last lipids 3/18  Name and strength of medication: PRAVASTATIN 40MG TAB  How patient will take medication: t1t po daily   Medication reconciliation completed: No  Comments:   Lab Results   Component Value Date    CHOL 136 03/06/2018    CHOL 118 (L) 08/02/2017    CHOL 110 (L) 05/02/2017     Lab Results   Component Value Date    HDL 29 (L) 03/06/2018    HDL 30 (L) 08/02/2017    HDL 24 (L) 05/02/2017     Lab Results   Component Value Date    LDLCALC 76.0 03/06/2018    LDLCALC 57.4 (L) 08/02/2017    LDLCALC 69.6 05/02/2017     Lab Results   Component Value Date    TRIG 155 (H) 03/06/2018    TRIG 153 (H) 08/02/2017    TRIG 82 05/02/2017     Lab Results   Component Value Date    CHOLHDL 21.3 03/06/2018    CHOLHDL 25.4 08/02/2017    CHOLHDL 21.8 05/02/2017

## 2023-06-12 ENCOUNTER — TELEPHONE (OUTPATIENT)
Dept: ORTHOPEDIC SURGERY | Age: 73
End: 2023-06-12

## 2023-06-12 NOTE — TELEPHONE ENCOUNTER
General Question     Subject: R THUMB INJECTION  Patient and /or Facility Request: Daniella Griffith  Contact Number: [phone]    PATIENT CALLED IN TO SEE IF SHE CAN GET AN INJECTION IN HER R THUMB. Terrance Rosas PATIENT HAD AN INJECTION IN HER R HAND ON 6-7-23. Terrance Rosas     PLEASE ADVISE

## 2023-06-12 NOTE — TELEPHONE ENCOUNTER
Left voicemail message for patient, she can have an injection in her thumb without a waiting period from her injection in her wrist.

## 2023-06-19 ENCOUNTER — OFFICE VISIT (OUTPATIENT)
Dept: ORTHOPEDIC SURGERY | Age: 73
End: 2023-06-19
Payer: COMMERCIAL

## 2023-06-19 VITALS — HEIGHT: 66 IN | WEIGHT: 168 LBS | BODY MASS INDEX: 27 KG/M2 | RESPIRATION RATE: 16 BRPM

## 2023-06-19 DIAGNOSIS — M18.11 PRIMARY OSTEOARTHRITIS OF FIRST CARPOMETACARPAL JOINT OF RIGHT HAND: Primary | ICD-10-CM

## 2023-06-19 PROCEDURE — 1123F ACP DISCUSS/DSCN MKR DOCD: CPT | Performed by: ORTHOPAEDIC SURGERY

## 2023-06-19 PROCEDURE — G8428 CUR MEDS NOT DOCUMENT: HCPCS | Performed by: ORTHOPAEDIC SURGERY

## 2023-06-19 PROCEDURE — G8399 PT W/DXA RESULTS DOCUMENT: HCPCS | Performed by: ORTHOPAEDIC SURGERY

## 2023-06-19 PROCEDURE — 99213 OFFICE O/P EST LOW 20 MIN: CPT | Performed by: ORTHOPAEDIC SURGERY

## 2023-06-19 PROCEDURE — 1036F TOBACCO NON-USER: CPT | Performed by: ORTHOPAEDIC SURGERY

## 2023-06-19 PROCEDURE — 20605 DRAIN/INJ JOINT/BURSA W/O US: CPT | Performed by: ORTHOPAEDIC SURGERY

## 2023-06-19 PROCEDURE — 1090F PRES/ABSN URINE INCON ASSESS: CPT | Performed by: ORTHOPAEDIC SURGERY

## 2023-06-19 PROCEDURE — 3017F COLORECTAL CA SCREEN DOC REV: CPT | Performed by: ORTHOPAEDIC SURGERY

## 2023-06-19 PROCEDURE — G8417 CALC BMI ABV UP PARAM F/U: HCPCS | Performed by: ORTHOPAEDIC SURGERY

## 2023-06-19 RX ORDER — TRIAMCINOLONE ACETONIDE 40 MG/ML
40 INJECTION, SUSPENSION INTRA-ARTICULAR; INTRAMUSCULAR ONCE
Status: COMPLETED | OUTPATIENT
Start: 2023-06-19 | End: 2023-06-19

## 2023-06-19 RX ADMIN — TRIAMCINOLONE ACETONIDE 40 MG: 40 INJECTION, SUSPENSION INTRA-ARTICULAR; INTRAMUSCULAR at 16:05

## 2023-06-19 NOTE — PROGRESS NOTES
Administrations This Visit       triamcinolone acetonide (KENALOG-40) injection 40 mg       Admin Date  06/19/2023  16:05 Action  Given Dose  40 mg Route  Other Site  Wrist Right Administered By  Natasha Coats MA    Ordering Provider: Tawnya Leos MD    NDC: 9648-0833-97    Lot#: [id number]    : [facility] (PRIMARY CARE)    Patient Supplied?: No
the expected response, possible reactions and the instructions for care of the hand. She is instructed in the judicious use of over-the-counter anti-inflammatory medications or pain relievers for her symptoms if allowed by his primary care physician. I have also discussed with Ms. Nilay Foy the other treatment options available to her for this condition. We have today selected to proceed with treatment by injection with steroid medication. She and I have agreed that if our current course of Injection treatment does not prove to be effective over the short term future, that she will schedule a follow-up appointment to discuss and select an alternate course of therapy including possibly further conservative treatment or surgical treatment. I have explained to Ms. Nilay oFy that despite successful treatment (surgical or otherwise) of her current presenting condition, that due to her coexistent conditions (both diagnosed and undiagnosed), that she is likely to have some permanent residual symptoms related to these conditions that do not improve long term. I have also explained that maximal recovery of function & symptom improvement may take a full year or longer to realize. She voiced a clear understanding of this. Ms. Nilay Fyo has been given a full verbal list of instructions and precautions related to her present condition. I have asked her to followup with me in the office at the prescribed time. She is also specifically requested to call or return to the office sooner if her symptoms change or worsen prior to the next scheduled appointment.     \

## 2023-06-19 NOTE — PATIENT INSTRUCTIONS
Information & Instructions   After Finger, Hand, Wrist, or Elbow Injection    Ivette Moore MD    You have received an injection of local anesthetic (Bupivicaine without Epinephrine) for comfort & a steroid (Kenalog) for its strong anti-inflammatory effects. In order to give the medication a chance to reduce your inflammation and discomfort, it is recommended that you take it easy for a day or so. You may use your hand and arm as you feel comfortable, but you should avoid highly strenuous activity and heavy use for several days. Relief from the injection will often not begin for several days, and you may not feel full relief for up to one month. It is not uncommon to experience some local discomfort or pain at or around the injection site for a few days. To relieve these symptoms you may do the following if you feel necessary:       Apply ice to the affected area 20 minutes on and 20 minutes off. Do not apply ice directly to the skin. Use a thin layer (T-shirt, pillowcase, towel, etc.) to protect the skin. - If allowed by your other medical physicians, you may take -     2 Tylenol extra strength tablets every 4-6 hours       1-2 Aleve tablets twice a day     2-3 Advil tablets two to three times a day    If you are diabetic, the steroid medication may increase your blood sugar, so you are advised to monitor your sugar more closely so you can adjust it accordingly for a few days following your injection. If you need assistance with the control of you blood sugar, please contact you primary care physician for further advice. I will request that you please call the office one month after your injection at [phone] if you have not experienced relief of your symptoms (unless I have instructed you otherwise). If your injection has given you good relief of you symptoms as expected, then you only need to call the office if your symptoms return.

## 2023-06-23 ENCOUNTER — OFFICE VISIT (OUTPATIENT)
Dept: ORTHOPEDIC SURGERY | Age: 73
End: 2023-06-23

## 2023-06-23 VITALS — HEIGHT: 66 IN | WEIGHT: 168 LBS | BODY MASS INDEX: 27 KG/M2

## 2023-06-23 DIAGNOSIS — M79.642 LEFT HAND PAIN: Primary | ICD-10-CM

## 2023-06-23 NOTE — PROGRESS NOTES
Chief Complaint    New Patient (Left hand pain )      History of Present Illness:  Quinn Chin is a 68 y.o. female who presents tonight for evaluation of left dorsal hand pain. Patient states that over the past 2 days she has been helping her sister move and she has been doing a lot of repetitive activity including sleeping with her left hand and she is developed pain over the third and fourth MCP joint. She was concerned about mild erythema and swelling in the area. She has been taking ibuprofen 600 mg 3 times daily with some benefit. Patient has a history of CMC arthritis bilaterally right greater than left has been seeing Dr. Samuel Moore. Pain Assessment  Location of Pain: Hand  Location Modifiers: Left  Severity of Pain: 6  Quality of Pain: Aching  Duration of Pain: Persistent  Frequency of Pain: Constant  Aggravating Factors: Bending  Limiting Behavior: Some  Relieving Factors: Rest  Result of Injury: No  Work-Related Injury: No  Are there other pain locations you wish to document?: No    Medical History:  Patient's medications, allergies, past medical, surgical, social and family histories were reviewed and updated as appropriate. Review of Systems:  Pertinent items are noted in HPI  Review of systems reviewed from Patient History Form dated on 6/23/2023 and available in the patient's chart under the Media tab. Vital Signs:  Ht 5' 6\" (1.676 m)   Wt 168 lb (76.2 kg)   BMI 27.12 kg/m²     General Exam:   Constitutional: Patient is adequately groomed with no evidence of malnutrition  Mental Status: The patient is oriented to time, place and person. The patient's mood and affect are appropriate. Neurological: The patient has good coordination. There is no weakness or sensory deficit. Left hand examination:    Inspection: Patient special obtained reveals the skin to be intact with no penetrating or perforating wounds. There is no obvious deformity noted.   There is mild erythema and mild swelling

## 2023-07-05 ENCOUNTER — HOSPITAL ENCOUNTER (OUTPATIENT)
Dept: PHYSICAL THERAPY | Age: 73
Setting detail: THERAPIES SERIES
Discharge: HOME | End: 2023-07-05
Payer: COMMERCIAL

## 2023-07-05 PROCEDURE — 97161 PT EVAL LOW COMPLEX 20 MIN: CPT | Performed by: PHYSICAL THERAPIST

## 2023-07-05 PROCEDURE — 97110 THERAPEUTIC EXERCISES: CPT | Performed by: PHYSICAL THERAPIST

## 2023-07-05 NOTE — FLOWSHEET NOTE
Therapeutic Activity (13169)                                                Therapeutic Exercise and NMR EXR  [] (22192) Provided verbal/tactile cueing for activities related to strengthening, flexibility, endurance, ROM for improvements in LE, proximal hip, and core control with self care, mobility, lifting, ambulation.  [] (99043) Provided verbal/tactile cueing for activities related to improving balance, coordination, kinesthetic sense, posture, motor skill, proprioception  to assist with LE, proximal hip, and core control in self care, mobility, lifting, ambulation and eccentric single leg control.      NMR and Therapeutic Activities:    [] (91801 or 29711) Provided verbal/tactile cueing for activities related to improving balance, coordination, kinesthetic sense, posture, motor skill, proprioception and motor activation to allow for proper function of core, proximal hip and LE with self care and ADLs  [] (27499) Gait Re-education- Provided training and instruction to the patient for proper LE, core and proximal hip recruitment and positioning and eccentric body weight control with ambulation re-education including up and down stairs     Home Exercise Program:    [x] (10630) Reviewed/Progressed HEP activities related to strengthening, flexibility, endurance, ROM of core, proximal hip and LE for functional self-care, mobility, lifting and ambulation/stair navigation   [] (16679)Reviewed/Progressed HEP activities related to improving balance, coordination, kinesthetic sense, posture, motor skill, proprioception of core, proximal hip and LE for self care, mobility, lifting, and ambulation/stair navigation      Manual Treatments:  PROM / STM / Oscillations-Mobs:  G-I, II, III, IV (PA's, Inf., Post.)  [] (85032) Provided manual therapy to mobilize LE, proximal hip and/or LS spine soft tissue/joints for the purpose of modulating pain, promoting relaxation,  increasing ROM, reducing/eliminating soft

## 2023-07-05 NOTE — PLAN OF CARE
flowsheet)    GOALS:  Patient stated goal: regain and improve balance   [] Progressing: [] Met: [] Not Met: [] Adjusted    Therapist goals for Patient:   Short Term Goals: To be achieved in: 2 weeks  1. Independent in HEP and progression per patient tolerance, in order to prevent re-injury. [] Progressing: [] Met: [] Not Met: [] Adjusted  2. Patient will have a decrease in pain to facilitate improvement in movement, function, and ADLs as indicated by Functional Deficits. [] Progressing: [] Met: [] Not Met: [] Adjusted    Long Term Goals: To be achieved in: 8 weeks  1. Disability index score of 65/80 or more per LEFS to assist with reaching prior level of function. [] Progressing: [] Met: [] Not Met: [] Adjusted  2.patient able to perform tandem balance B for at least 10 sec and perform rhomberg EC on airex for at least 20 sec   [] Progressing: [] Met: [] Not Met: [] Adjusted  3. Patient will demonstrate an increase in Strength to good proximal hip strength and control, within 5lb HHD in LE to allow for proper functional mobility as indicated by patients Functional Deficits. [] Progressing: [] Met: [] Not Met: [] Adjusted  4. Patient will return to being able to stand at [employer] in choir and ambulate functional distanceswithout increased symptoms or restriction.    [] Progressing: [] Met: [] Not Met: [] Adjusted      Electronically signed by:  Giovana Casillas, PT

## 2023-07-10 ENCOUNTER — HOSPITAL ENCOUNTER (OUTPATIENT)
Dept: PHYSICAL THERAPY | Age: 73
Setting detail: THERAPIES SERIES
Discharge: HOME | End: 2023-07-10
Payer: COMMERCIAL

## 2023-07-10 PROCEDURE — 97110 THERAPEUTIC EXERCISES: CPT | Performed by: PHYSICAL THERAPIST

## 2023-07-10 PROCEDURE — 97112 NEUROMUSCULAR REEDUCATION: CPT | Performed by: PHYSICAL THERAPIST

## 2023-07-10 NOTE — FLOWSHEET NOTE
65/80 or more per LEFS to assist with reaching prior level of function. [] Progressing: [] Met: [] Not Met: [] Adjusted  2.patient able to perform tandem balance B for at least 10 sec and perform rhomberg EC on airex for at least 20 sec   [] Progressing: [] Met: [] Not Met: [] Adjusted  3. Patient will demonstrate an increase in Strength to good proximal hip strength and control, within 5lb HHD in LE to allow for proper functional mobility as indicated by patients Functional Deficits. [] Progressing: [] Met: [] Not Met: [] Adjusted  4. Patient will return to being able to stand at [employer] in choir and ambulate functional distanceswithout increased symptoms or restriction. [] Progressing: [] Met: [] Not Met: [] Adjusted    Progression Towards Functional goals:  [] Patient is progressing as expected towards functional goals listed. [] Progression is slowed due to complexities listed. [] Progression has been slowed due to co-morbidities. [x] Plan just implemented, too soon to assess goals progression  [] Other:         Overall Progression Towards Functional goals/ Treatment Progress Update:  [] Patient is progressing as expected towards functional goals listed. [] Progression is slowed due to complexities/Impairments listed. [] Progression has been slowed due to co-morbidities.   [x] Plan just implemented, too soon to assess goals progression <30days   [] Goals require adjustment due to lack of progress  [] Patient is not progressing as expected and requires additional follow up with physician  [] Other    Prognosis for POC: [x] Good [] Fair  [] Poor      Patient requires continued skilled intervention: [x] Yes  [] No    Treatment/Activity Tolerance:  [x] Patient able to complete treatment  [] Patient limited by fatigue  [] Patient limited by pain    [] Patient limited by other medical complications  [] Other:     ASSESSMENT: pt demonstrates understanding of HEP, challenged with dynamic balance exercises

## 2023-07-18 ENCOUNTER — HOSPITAL ENCOUNTER (OUTPATIENT)
Dept: PHYSICAL THERAPY | Age: 73
Setting detail: THERAPIES SERIES
Discharge: HOME | End: 2023-07-18
Payer: COMMERCIAL

## 2023-07-18 PROCEDURE — 97112 NEUROMUSCULAR REEDUCATION: CPT | Performed by: PHYSICAL THERAPIST

## 2023-07-18 PROCEDURE — 97110 THERAPEUTIC EXERCISES: CPT | Performed by: PHYSICAL THERAPIST

## 2023-07-18 NOTE — FLOWSHEET NOTE
ASSESSMENT: improving balance and able to progress dynamic balance exercises     Return to Play: (if applicable)   []  Stage 1: Intro to Strength   []  Stage 2: Return to Run and Strength   []  Stage 3: Return to Jump and Strength   []  Stage 4: Dynamic Strength and Agility   []  Stage 5: Sport Specific Training     []  Ready to Return to Play, Meets All Above Stages   []  Not Ready for Return to Sports   Comments:                         Access Code: [email or web address]  URL: [email or web address]  Date: 07/05/2023  Prepared by: Anjelica Crawford    Exercises  - Side to Side Weight Shift with Unilateral Counter Support  - 2 x daily - 7 x weekly - 2 sets - 30 reps  - Forward Backward Weight Shift with Counter Support  - 2 x daily - 7 x weekly - 2 sets - 30 reps  - Standing Toe Raises at Chair  - 2 x daily - 7 x weekly - 3 sets - 10 reps  - Side Stepping with Resistance at Ankles and Counter Support  - 2 x daily - 7 x weekly - 3 sets - 10 reps  - Standing Tandem Balance with Counter Support  - 2 x daily - 7 x weekly - 1 sets - 4 reps - 10 hold      PLAN:   [x] Continue per plan of care [] Alter current plan (see comments above)  [] Plan of care initiated [] Hold pending MD visit [] Discharge      Electronically signed by:  Anjelica Crawford PT    Note: If patient does not return for scheduled/ recommended follow up visits, this note will serve as a discharge from care along with most recent update on progress.

## 2023-07-24 ENCOUNTER — HOSPITAL ENCOUNTER (OUTPATIENT)
Dept: PHYSICAL THERAPY | Age: 73
Setting detail: THERAPIES SERIES
Discharge: HOME | End: 2023-07-24
Payer: COMMERCIAL

## 2023-07-24 PROCEDURE — 97112 NEUROMUSCULAR REEDUCATION: CPT | Performed by: PHYSICAL THERAPIST

## 2023-07-24 PROCEDURE — 97110 THERAPEUTIC EXERCISES: CPT | Performed by: PHYSICAL THERAPIST

## 2023-07-24 NOTE — FLOWSHEET NOTE
[facility], [address] [facility]  Phone: [phone]  Fax [phone]    Physical Therapy Treatment Note/ Progress Report:           Date:  2023    Patient Name:  Laly Cobb    :  1950  MRN: [id number]  Restrictions/Precautions:    Medical/Treatment Diagnosis Information:  Balance problems [R26.89]  Balance problem Z92.34   Insurance/Certification information:   Tanya Osborn   Physician Information:  Klarissa Alexandre,*  Has the plan of care been signed (Y/N):        []  Yes  [x]  No     Date of Patient follow up with Physician:       Is this a Progress Report:     []  Yes  [x]  No        If Yes:  Date Range for reporting period:  Beginning 23  Ending    Progress report will be due (10 Rx or 30 days whichever is less): 17       Recertification will be due (POC Duration  / 90 days whichever is less): 8 weeks       Visit # Insurance Allowable Auth Required   In-person 4 30 []  Yes [x]  No    Telehealth   []  Yes []  No    Total          Functional Scale: LEFS 57/80    Date assessed:  23      Therapy Diagnosis/Practice Pattern: 5A       Number of Comorbidities:  []0     []1-2    X 3+    Latex Allergy:  [x]NO      []YES  Preferred Language for Healthcare:   [x]English       []other:      Pain level:  /10 cervical      SUBJECTIVE:  pt reports she did a choir concert yesterday and stood on risers. She did ok, only had one instance of feeling wobbly.  She did stas than she had been doing before starting PT   OBJECTIVE:   Observation:   Test measurements:      RESTRICTIONS/PRECAUTIONS: B TKR, shoulder, cervican    Exercises/Interventions:     Therapeutic Ex (50327)/ NMR  Sets/sec Reps Notes/CUES   Standing TR   Hep    Tandem balance with head turns  1 Hep          Side step with TB  GTB at ankles  On airex Hep    Wobble board  1 min ea      NuStep  5 min      Tandem walking on airex with head turns  3

## 2023-07-25 ENCOUNTER — TELEPHONE (OUTPATIENT)
Dept: PULMONOLOGY | Age: 73
End: 2023-07-25

## 2023-07-25 DIAGNOSIS — G47.33 OSA (OBSTRUCTIVE SLEEP APNEA): Primary | ICD-10-CM

## 2023-07-25 NOTE — TELEPHONE ENCOUNTER
Pt. Not tolerating CPAP causing pressure in chest. [facility] is her DME told her to call MD. [place] [facility] for CR had to leave message also called rep to try and get CR

## 2023-07-26 ENCOUNTER — HOSPITAL ENCOUNTER (OUTPATIENT)
Dept: PHYSICAL THERAPY | Age: 73
Setting detail: THERAPIES SERIES
Discharge: HOME | End: 2023-07-26
Payer: COMMERCIAL

## 2023-07-26 PROCEDURE — 97112 NEUROMUSCULAR REEDUCATION: CPT | Performed by: PHYSICAL THERAPIST

## 2023-07-26 PROCEDURE — 97110 THERAPEUTIC EXERCISES: CPT | Performed by: PHYSICAL THERAPIST

## 2023-07-26 NOTE — FLOWSHEET NOTE
other medical complications  [] Other:     ASSESSMENT: improving stability with balance exercises with head turns . Return to Play: (if applicable)   []  Stage 1: Intro to Strength   []  Stage 2: Return to Run and Strength   []  Stage 3: Return to Jump and Strength   []  Stage 4: Dynamic Strength and Agility   []  Stage 5: Sport Specific Training     []  Ready to Return to Play, Meets All Above Stages   []  Not Ready for Return to Sports   Comments:                         Access Code: [email or web address]  URL: [email or web address]  Date: 07/05/2023  Prepared by: Aguilar Thayer    Exercises  - Side to Side Weight Shift with Unilateral Counter Support  - 2 x daily - 7 x weekly - 2 sets - 30 reps  - Forward Backward Weight Shift with Counter Support  - 2 x daily - 7 x weekly - 2 sets - 30 reps  - Standing Toe Raises at Chair  - 2 x daily - 7 x weekly - 3 sets - 10 reps  - Side Stepping with Resistance at Ankles and Counter Support  - 2 x daily - 7 x weekly - 3 sets - 10 reps  - Standing Tandem Balance with Counter Support  - 2 x daily - 7 x weekly - 1 sets - 4 reps - 10 hold      PLAN:   [x] Continue per plan of care [] Alter current plan (see comments above)  [] Plan of care initiated [] Hold pending MD visit [] Discharge      Electronically signed by:  Aguilar Thayer PT    Note: If patient does not return for scheduled/ recommended follow up visits, this note will serve as a discharge from care along with most recent update on progress.

## 2023-07-28 ENCOUNTER — TELEPHONE (OUTPATIENT)
Dept: FAMILY MEDICINE CLINIC | Age: 73
End: 2023-07-28

## 2023-07-28 NOTE — TELEPHONE ENCOUNTER
Patient stopped in to request [place] fill out/sign a Reasonable Accommodation Request Form for her apartment so that she can have a comfort height toilet. This has been scanned into the chart as well as placed in Carline's box in the front office.      Please call when this is available for .     6/14/2023 last ov    Future Appointments   Date Time Provider [address]   8/2/2023 10:30 AM Christa Coronado PT GAEL PT Bayron Freedman   8/25/2023  7:00 AM Katty Pineda APRN - YUDI VALDOVINOS

## 2023-07-28 NOTE — TELEPHONE ENCOUNTER
Call DME and respiratory therapist (Candy)and left VM requesting CR. Call pt and make her aware of what we waiting from her DME ,pt stated that she stop using her CPAP a week ago because can't tolerate pressure.

## 2023-07-31 ENCOUNTER — APPOINTMENT (OUTPATIENT)
Dept: PHYSICAL THERAPY | Age: 73
End: 2023-07-31
Payer: COMMERCIAL

## 2023-08-02 ENCOUNTER — HOSPITAL ENCOUNTER (OUTPATIENT)
Dept: PHYSICAL THERAPY | Age: 73
Setting detail: THERAPIES SERIES
Discharge: HOME | End: 2023-08-02
Payer: COMMERCIAL

## 2023-08-02 PROCEDURE — 97110 THERAPEUTIC EXERCISES: CPT | Performed by: PHYSICAL THERAPIST

## 2023-08-02 PROCEDURE — 97112 NEUROMUSCULAR REEDUCATION: CPT | Performed by: PHYSICAL THERAPIST

## 2023-08-02 NOTE — TELEPHONE ENCOUNTER
Patient contacted and relate Kristy's CNP message.  Pt schedule f/u    Order for pressure change send to DME

## 2023-08-02 NOTE — DISCHARGE SUMMARY
(38791)Reviewed/Progressed HEP activities related to improving balance, coordination, kinesthetic sense, posture, motor skill, proprioception of core, proximal hip and LE for self care, mobility, lifting, and ambulation/stair navigation      Manual Treatments:  PROM / STM / Oscillations-Mobs:  G-I, II, III, IV (PA's, Inf., Post.)  [] (85083) Provided manual therapy to mobilize LE, proximal hip and/or LS spine soft tissue/joints for the purpose of modulating pain, promoting relaxation,  increasing ROM, reducing/eliminating soft tissue swelling/inflammation/restriction, improving soft tissue extensibility and allowing for proper ROM for normal function with self care, mobility, lifting and ambulation. Modalities:     [] GAME READY (VASO)- for significant edema, swelling, pain control. Vasopneumatic compression applied to  for significant edema, swelling, pain control. Girth Left Right Post Vaso   Knee: Midpatella      Knee: 5 cm above      Knee: 15 cm above      Ankle: transmalleolar      Ankle: figure 8      [] ICD 10: R60.9 Edema unspecified  [] ICD 10: R22.4 Localized swelling of lower limb  [] ICD 10: R60.1 Generalized edema  [] ICD 10: R60.9 Edema unspecified      Charges  Timed Code Treatment Minutes: 45   Total Treatment Minutes: 45     Medicare total (add KX at $2000)         BWC time in/ time out:    TE time in /out    Manual time in/out    Neuro re ed time in/out    Untimed minutes        [] EVAL (LOW) 79007   [] EVAL (MOD) 22603  [] EVAL (HIGH) 91811   [] RE-EVAL     [x] VM(30797) x2     [] IONTO  [x] NMR (21609) x1     [] VASO  [] Manual (36670) x      [] Other:  [] TA x      [] Mech Traction (39363)  [] ES(attended) (18971)      [] ES (un) (56120):       GOALS:  Patient stated goal: regain and improve balance   [x] Progressing: [] Met: [] Not Met: [] Adjusted    Therapist goals for Patient:   Short Term Goals: To be achieved in: 2 weeks  1.  Independent in HEP and progression per patient

## 2023-08-02 NOTE — TELEPHONE ENCOUNTER
CR reviewed. Will change to Min 5, max 12. She will need follow up appointment in office in 4 weeks time. Please send order to DME and schedule fu. If still not able to tolerate, will need to have in lab titration study.

## 2023-08-10 RX ORDER — CLINDAMYCIN HYDROCHLORIDE 300 MG/1
300 CAPSULE ORAL 2 TIMES DAILY
Qty: 6 CAPSULE | Refills: 0 | OUTPATIENT
Start: 2023-08-10 | End: 2023-08-13

## 2023-08-10 NOTE — TELEPHONE ENCOUNTER
6/14/2023    Future Appointments   Date Time Provider [address]   8/25/2023  7:00 AM LUIS Cantu CNP   9/11/2023  9:00 AM LUIS Baer CNP [facility]

## 2023-08-22 SDOH — HEALTH STABILITY: PHYSICAL HEALTH: ON AVERAGE, HOW MANY MINUTES DO YOU ENGAGE IN EXERCISE AT THIS LEVEL?: 0 MIN

## 2023-08-22 SDOH — HEALTH STABILITY: PHYSICAL HEALTH: ON AVERAGE, HOW MANY DAYS PER WEEK DO YOU ENGAGE IN MODERATE TO STRENUOUS EXERCISE (LIKE A BRISK WALK)?: 0 DAYS

## 2023-08-22 ASSESSMENT — PATIENT HEALTH QUESTIONNAIRE - PHQ9
3. TROUBLE FALLING OR STAYING ASLEEP: 0
SUM OF ALL RESPONSES TO PHQ QUESTIONS 1-9: 0
7. TROUBLE CONCENTRATING ON THINGS, SUCH AS READING THE NEWSPAPER OR WATCHING TELEVISION: 0
10. IF YOU CHECKED OFF ANY PROBLEMS, HOW DIFFICULT HAVE THESE PROBLEMS MADE IT FOR YOU TO DO YOUR WORK, TAKE CARE OF THINGS AT HOME, OR GET ALONG WITH OTHER PEOPLE: 0
6. FEELING BAD ABOUT YOURSELF - OR THAT YOU ARE A FAILURE OR HAVE LET YOURSELF OR YOUR FAMILY DOWN: 0
SUM OF ALL RESPONSES TO PHQ9 QUESTIONS 1 & 2: 0
1. LITTLE INTEREST OR PLEASURE IN DOING THINGS: 0
8. MOVING OR SPEAKING SO SLOWLY THAT OTHER PEOPLE COULD HAVE NOTICED. OR THE OPPOSITE, BEING SO FIGETY OR RESTLESS THAT YOU HAVE BEEN MOVING AROUND A LOT MORE THAN USUAL: 0
9. THOUGHTS THAT YOU WOULD BE BETTER OFF DEAD, OR OF HURTING YOURSELF: 0
5. POOR APPETITE OR OVEREATING: 0
4. FEELING TIRED OR HAVING LITTLE ENERGY: 0
SUM OF ALL RESPONSES TO PHQ QUESTIONS 1-9: 0
2. FEELING DOWN, DEPRESSED OR HOPELESS: 0

## 2023-08-22 ASSESSMENT — LIFESTYLE VARIABLES
HOW MANY STANDARD DRINKS CONTAINING ALCOHOL DO YOU HAVE ON A TYPICAL DAY: 1
HOW MANY STANDARD DRINKS CONTAINING ALCOHOL DO YOU HAVE ON A TYPICAL DAY: 1 OR 2
HOW OFTEN DO YOU HAVE A DRINK CONTAINING ALCOHOL: MONTHLY OR LESS
HOW OFTEN DO YOU HAVE SIX OR MORE DRINKS ON ONE OCCASION: 1
HOW OFTEN DO YOU HAVE A DRINK CONTAINING ALCOHOL: 2

## 2023-08-24 DIAGNOSIS — E11.9 TYPE 2 DIABETES MELLITUS WITHOUT COMPLICATION, WITHOUT LONG-TERM CURRENT USE OF INSULIN (HCC): ICD-10-CM

## 2023-08-24 DIAGNOSIS — E55.9 VITAMIN D DEFICIENCY: ICD-10-CM

## 2023-08-24 LAB
25(OH)D3 SERPL-MCNC: 39.9 NG/ML
ALBUMIN SERPL-MCNC: 4.2 G/DL (ref 3.4–5)
ALBUMIN/GLOB SERPL: 2.2 {RATIO} (ref 1.1–2.2)
ALP SERPL-CCNC: 88 U/L (ref 40–129)
ALT SERPL-CCNC: 24 U/L (ref 10–40)
ANION GAP SERPL CALCULATED.3IONS-SCNC: 8 MMOL/L (ref 3–16)
AST SERPL-CCNC: 26 U/L (ref 15–37)
BILIRUB SERPL-MCNC: <0.2 MG/DL (ref 0–1)
BUN SERPL-MCNC: 16 MG/DL (ref 7–20)
CALCIUM SERPL-MCNC: 9.3 MG/DL (ref 8.3–10.6)
CHLORIDE SERPL-SCNC: 101 MMOL/L (ref 99–110)
CHOLEST SERPL-MCNC: 245 MG/DL (ref 0–199)
CO2 SERPL-SCNC: 32 MMOL/L (ref 21–32)
CREAT SERPL-MCNC: 0.6 MG/DL (ref 0.6–1.2)
DEPRECATED RDW RBC AUTO: 13.5 % (ref 12.4–15.4)
GFR SERPLBLD CREATININE-BSD FMLA CKD-EPI: >60 ML/MIN/{1.73_M2}
GLUCOSE SERPL-MCNC: 92 MG/DL (ref 70–99)
HCT VFR BLD AUTO: 35.4 % (ref 36–48)
HDLC SERPL-MCNC: 106 MG/DL (ref 40–60)
HGB BLD-MCNC: 11.9 G/DL (ref 12–16)
LDL CHOLESTEROL CALCULATED: 124 MG/DL
MCH RBC QN AUTO: 31.8 PG (ref 26–34)
MCHC RBC AUTO-ENTMCNC: 33.5 G/DL (ref 31–36)
MCV RBC AUTO: 94.8 FL (ref 80–100)
PLATELET # BLD AUTO: 299 K/UL (ref 135–450)
PMV BLD AUTO: 7.2 FL (ref 5–10.5)
POTASSIUM SERPL-SCNC: 4.3 MMOL/L (ref 3.5–5.1)
PROT SERPL-MCNC: 6.1 G/DL (ref 6.4–8.2)
RBC # BLD AUTO: 3.74 M/UL (ref 4–5.2)
SODIUM SERPL-SCNC: 141 MMOL/L (ref 136–145)
TRIGL SERPL-MCNC: 77 MG/DL (ref 0–150)
TSH SERPL DL<=0.005 MIU/L-ACNC: 3.26 UIU/ML (ref 0.27–4.2)
VLDLC SERPL CALC-MCNC: 15 MG/DL
WBC # BLD AUTO: 4.1 K/UL (ref 4–11)

## 2023-08-25 ENCOUNTER — OFFICE VISIT (OUTPATIENT)
Dept: FAMILY MEDICINE CLINIC | Age: 73
End: 2023-08-25
Payer: COMMERCIAL

## 2023-08-25 VITALS
BODY MASS INDEX: 27.44 KG/M2 | DIASTOLIC BLOOD PRESSURE: 67 MMHG | HEART RATE: 84 BPM | TEMPERATURE: 97.9 F | OXYGEN SATURATION: 96 % | RESPIRATION RATE: 16 BRPM | WEIGHT: 170 LBS | SYSTOLIC BLOOD PRESSURE: 112 MMHG

## 2023-08-25 DIAGNOSIS — M15.9 PRIMARY OSTEOARTHRITIS INVOLVING MULTIPLE JOINTS: ICD-10-CM

## 2023-08-25 DIAGNOSIS — E78.2 MIXED HYPERLIPIDEMIA: ICD-10-CM

## 2023-08-25 DIAGNOSIS — Z00.00 MEDICARE ANNUAL WELLNESS VISIT, SUBSEQUENT: Primary | ICD-10-CM

## 2023-08-25 DIAGNOSIS — E03.9 ACQUIRED HYPOTHYROIDISM: Chronic | ICD-10-CM

## 2023-08-25 DIAGNOSIS — E11.9 TYPE 2 DIABETES MELLITUS WITHOUT COMPLICATION, WITHOUT LONG-TERM CURRENT USE OF INSULIN (HCC): ICD-10-CM

## 2023-08-25 DIAGNOSIS — F31.9 BIPOLAR DISORDER WITH DEPRESSION (HCC): ICD-10-CM

## 2023-08-25 DIAGNOSIS — G25.81 RLS (RESTLESS LEGS SYNDROME): ICD-10-CM

## 2023-08-25 DIAGNOSIS — D64.9 LOW HEMOGLOBIN: ICD-10-CM

## 2023-08-25 LAB
CREATININE URINE POCT: NORMAL
EST. AVERAGE GLUCOSE BLD GHB EST-MCNC: 116.9 MG/DL
HBA1C MFR BLD: 5.7 %
MICROALBUMIN/CREAT 24H UR: NORMAL MG/G{CREAT}
MICROALBUMIN/CREAT UR-RTO: NORMAL

## 2023-08-25 PROCEDURE — 3078F DIAST BP <80 MM HG: CPT | Performed by: NURSE PRACTITIONER

## 2023-08-25 PROCEDURE — 82044 UR ALBUMIN SEMIQUANTITATIVE: CPT | Performed by: NURSE PRACTITIONER

## 2023-08-25 PROCEDURE — 3017F COLORECTAL CA SCREEN DOC REV: CPT | Performed by: NURSE PRACTITIONER

## 2023-08-25 PROCEDURE — 3074F SYST BP LT 130 MM HG: CPT | Performed by: NURSE PRACTITIONER

## 2023-08-25 PROCEDURE — G0439 PPPS, SUBSEQ VISIT: HCPCS | Performed by: NURSE PRACTITIONER

## 2023-08-25 PROCEDURE — 3044F HG A1C LEVEL LT 7.0%: CPT | Performed by: NURSE PRACTITIONER

## 2023-08-25 PROCEDURE — 1123F ACP DISCUSS/DSCN MKR DOCD: CPT | Performed by: NURSE PRACTITIONER

## 2023-08-25 SDOH — ECONOMIC STABILITY: FOOD INSECURITY: WITHIN THE PAST 12 MONTHS, THE FOOD YOU BOUGHT JUST DIDN'T LAST AND YOU DIDN'T HAVE MONEY TO GET MORE.: NEVER TRUE

## 2023-08-25 SDOH — ECONOMIC STABILITY: FOOD INSECURITY: WITHIN THE PAST 12 MONTHS, YOU WORRIED THAT YOUR FOOD WOULD RUN OUT BEFORE YOU GOT MONEY TO BUY MORE.: NEVER TRUE

## 2023-08-25 SDOH — ECONOMIC STABILITY: INCOME INSECURITY: HOW HARD IS IT FOR YOU TO PAY FOR THE VERY BASICS LIKE FOOD, HOUSING, MEDICAL CARE, AND HEATING?: NOT HARD AT ALL

## 2023-08-25 ASSESSMENT — ANXIETY QUESTIONNAIRES
IF YOU CHECKED OFF ANY PROBLEMS ON THIS QUESTIONNAIRE, HOW DIFFICULT HAVE THESE PROBLEMS MADE IT FOR YOU TO DO YOUR WORK, TAKE CARE OF THINGS AT HOME, OR GET ALONG WITH OTHER PEOPLE: NOT DIFFICULT AT ALL
6. BECOMING EASILY ANNOYED OR IRRITABLE: 0
2. NOT BEING ABLE TO STOP OR CONTROL WORRYING: 0
3. WORRYING TOO MUCH ABOUT DIFFERENT THINGS: 0
4. TROUBLE RELAXING: 0
5. BEING SO RESTLESS THAT IT IS HARD TO SIT STILL: 0
1. FEELING NERVOUS, ANXIOUS, OR ON EDGE: 0
7. FEELING AFRAID AS IF SOMETHING AWFUL MIGHT HAPPEN: 0
GAD7 TOTAL SCORE: 0

## 2023-08-25 NOTE — PROGRESS NOTES
Medicare Annual Wellness Visit    H[place] is here for Medicare AWV and Diabetes    Assessment & Plan   Medicare annual wellness visit, subsequent  Type 2 diabetes mellitus without complication, without long-term current use of insulin (HCC)  Diet controlled, A1C 5.7  Continue to monitor diet  Regular exercise  -     POCT microalbumin  Acquired hypothyroidism  Controlled, continue levothyroxine  Mixed hyperlipidemia  On Praluent through cardiologist. ,   RLS (restless legs syndrome)  Stable, continue ropinirole   Bipolar disorder with depression ([address])  Managed by psychiatry (Dr. Erick Sheffield)  Low hemoglobin  On recent labs with normal MCV/MCH. Will recheck CBC in 2 weeks as well as iron and b12/folate  -     CBC with Auto Differential; Future  -     Iron and TIBC; Future  -     Ferritin; Future  -     Vitamin B12 & Folate; Future  Primary osteoarthritis involving multiple joints  She ortho for right wrist. Chiropractor for neck pain    Recommendations for Preventive Services Due: see orders and patient instructions/AVS.  Recommended screening schedule for the next 5-10 years is provided to the patient in written form: see Patient Instructions/AVS.     Return in about 6 months (around 2/25/2024). Subjective   The following acute and/or chronic problems were also addressed today:    Diabetes Mellitus Type 2: Current symptoms/problems include none. Well controlled  Off of Metformin since 2019  Stays active  Enjoys singing  No recent hypoglycemia  Neuropathy stable  No kidney issues  Hemoglobin A1C   Date Value Ref Range Status   08/24/2023 5.7 See comment % Final     Comment:     Comment:  Diagnosis of Diabetes: > or = 6.5%  Increased risk of diabetes (Prediabetes): 5.7-6.4%  Glycemic Control: Nonpregnant Adults: <7.0%                    Pregnant: <6.0%           Hypothyroidism: Recent symptoms: fatigue (chronic), hypersomnia (SUE).  She denies weight gain, weight loss, cold intolerance, and heat

## 2023-09-07 ENCOUNTER — TELEPHONE (OUTPATIENT)
Dept: FAMILY MEDICINE CLINIC | Age: 73
End: 2023-09-07

## 2023-09-07 DIAGNOSIS — D64.9 LOW HEMOGLOBIN: ICD-10-CM

## 2023-09-07 LAB
BASOPHILS # BLD: 0 K/UL (ref 0–0.2)
BASOPHILS NFR BLD: 1.3 %
DEPRECATED RDW RBC AUTO: 13.4 % (ref 12.4–15.4)
EOSINOPHIL # BLD: 0.1 K/UL (ref 0–0.6)
EOSINOPHIL NFR BLD: 1.9 %
FERRITIN SERPL IA-MCNC: 57.5 NG/ML (ref 15–150)
FOLATE SERPL-MCNC: >20 NG/ML (ref 4.78–24.2)
HCT VFR BLD AUTO: 35.7 % (ref 36–48)
HGB BLD-MCNC: 11.8 G/DL (ref 12–16)
IRON SATN MFR SERPL: 30 % (ref 15–50)
IRON SERPL-MCNC: 90 UG/DL (ref 37–145)
LYMPHOCYTES # BLD: 1.5 K/UL (ref 1–5.1)
LYMPHOCYTES NFR BLD: 39.3 %
MCH RBC QN AUTO: 31.5 PG (ref 26–34)
MCHC RBC AUTO-ENTMCNC: 33.1 G/DL (ref 31–36)
MCV RBC AUTO: 95.2 FL (ref 80–100)
MONOCYTES # BLD: 0.4 K/UL (ref 0–1.3)
MONOCYTES NFR BLD: 9.2 %
NEUTROPHILS # BLD: 1.9 K/UL (ref 1.7–7.7)
NEUTROPHILS NFR BLD: 48.3 %
PLATELET # BLD AUTO: 308 K/UL (ref 135–450)
PMV BLD AUTO: 7.1 FL (ref 5–10.5)
RBC # BLD AUTO: 3.74 M/UL (ref 4–5.2)
TIBC SERPL-MCNC: 302 UG/DL (ref 260–445)
VIT B12 SERPL-MCNC: 896 PG/ML (ref 211–911)
WBC # BLD AUTO: 3.9 K/UL (ref 4–11)

## 2023-09-07 NOTE — TELEPHONE ENCOUNTER
Pt has been going to [facility]. They need to speak with NP Jonatan Oviedo about extending her visits. Pt was originally approved for 30 visits and is down to 5. Please call office to see what they need.   [phone]

## 2023-09-07 NOTE — TELEPHONE ENCOUNTER
Called the office and was told they needed nothing from us that they go through the insurance company

## 2023-09-08 DIAGNOSIS — D64.9 LOW HEMOGLOBIN: Primary | ICD-10-CM

## 2023-09-11 ENCOUNTER — OFFICE VISIT (OUTPATIENT)
Dept: PULMONOLOGY | Age: 73
End: 2023-09-11
Payer: COMMERCIAL

## 2023-09-11 VITALS
BODY MASS INDEX: 27.16 KG/M2 | TEMPERATURE: 98.1 F | SYSTOLIC BLOOD PRESSURE: 138 MMHG | RESPIRATION RATE: 16 BRPM | HEIGHT: 66 IN | OXYGEN SATURATION: 93 % | WEIGHT: 169 LBS | HEART RATE: 65 BPM | DIASTOLIC BLOOD PRESSURE: 70 MMHG

## 2023-09-11 DIAGNOSIS — G47.10 HYPERSOMNIA: ICD-10-CM

## 2023-09-11 DIAGNOSIS — I10 PRIMARY HYPERTENSION: Chronic | ICD-10-CM

## 2023-09-11 DIAGNOSIS — G47.33 OSA (OBSTRUCTIVE SLEEP APNEA): Primary | ICD-10-CM

## 2023-09-11 DIAGNOSIS — E66.3 OVERWEIGHT (BMI 25.0-29.9): ICD-10-CM

## 2023-09-11 DIAGNOSIS — G25.81 RLS (RESTLESS LEGS SYNDROME): ICD-10-CM

## 2023-09-11 PROCEDURE — 1090F PRES/ABSN URINE INCON ASSESS: CPT | Performed by: NURSE PRACTITIONER

## 2023-09-11 PROCEDURE — 1123F ACP DISCUSS/DSCN MKR DOCD: CPT | Performed by: NURSE PRACTITIONER

## 2023-09-11 PROCEDURE — 3017F COLORECTAL CA SCREEN DOC REV: CPT | Performed by: NURSE PRACTITIONER

## 2023-09-11 PROCEDURE — G8417 CALC BMI ABV UP PARAM F/U: HCPCS | Performed by: NURSE PRACTITIONER

## 2023-09-11 PROCEDURE — G8427 DOCREV CUR MEDS BY ELIG CLIN: HCPCS | Performed by: NURSE PRACTITIONER

## 2023-09-11 PROCEDURE — 3078F DIAST BP <80 MM HG: CPT | Performed by: NURSE PRACTITIONER

## 2023-09-11 PROCEDURE — 1036F TOBACCO NON-USER: CPT | Performed by: NURSE PRACTITIONER

## 2023-09-11 PROCEDURE — G8399 PT W/DXA RESULTS DOCUMENT: HCPCS | Performed by: NURSE PRACTITIONER

## 2023-09-11 PROCEDURE — 99213 OFFICE O/P EST LOW 20 MIN: CPT | Performed by: NURSE PRACTITIONER

## 2023-09-11 PROCEDURE — 3075F SYST BP GE 130 - 139MM HG: CPT | Performed by: NURSE PRACTITIONER

## 2023-09-11 ASSESSMENT — ENCOUNTER SYMPTOMS
ABDOMINAL PAIN: 0
CHEST TIGHTNESS: 0
RHINORRHEA: 0
WHEEZING: 0
COUGH: 0
SHORTNESS OF BREATH: 0
SORE THROAT: 0
EYE PAIN: 0

## 2023-09-11 ASSESSMENT — SLEEP AND FATIGUE QUESTIONNAIRES
NECK CIRCUMFERENCE (INCHES): 13.5
HOW LIKELY ARE YOU TO NOD OFF OR FALL ASLEEP IN A CAR, WHILE STOPPED FOR A FEW MINUTES IN TRAFFIC: 0
HOW LIKELY ARE YOU TO NOD OFF OR FALL ASLEEP WHILE SITTING AND TALKING TO SOMEONE: 0
HOW LIKELY ARE YOU TO NOD OFF OR FALL ASLEEP WHILE SITTING QUIETLY AFTER LUNCH WITHOUT ALCOHOL: 1
HOW LIKELY ARE YOU TO NOD OFF OR FALL ASLEEP WHILE SITTING AND READING: 2
HOW LIKELY ARE YOU TO NOD OFF OR FALL ASLEEP WHILE LYING DOWN TO REST IN THE AFTERNOON WHEN CIRCUMSTANCES PERMIT: 0
HOW LIKELY ARE YOU TO NOD OFF OR FALL ASLEEP WHILE SITTING INACTIVE IN A PUBLIC PLACE: 1
HOW LIKELY ARE YOU TO NOD OFF OR FALL ASLEEP WHEN YOU ARE A PASSENGER IN A CAR FOR AN HOUR WITHOUT A BREAK: 0
ESS TOTAL SCORE: 4
HOW LIKELY ARE YOU TO NOD OFF OR FALL ASLEEP WHILE WATCHING TV: 0

## 2023-09-11 NOTE — PROGRESS NOTES
MA Communication: The following orders are received by verbal communication from Dante Mckeon E Charline Adam    Orders include:  [address] will call to schedule. Follow up with Karley Ramirez CNP In 2 months.

## 2023-09-11 NOTE — PATIENT INSTRUCTIONS
considered a no show. Patients with three missed appointments within 1 year or four missed appointments within 2 years can be dismissed from the practice. Please be aware that our physicians are required to work in the Intensive Care Unit at [facility].  Your appointment may need to be rescheduled if they are designated to work during your appointment time. You may receive a survey regarding the care you received during your visit. Your input is valuable to us. We encourage you to complete and return your survey. We hope you will choose us in the future for your healthcare needs. Pt instructed of all future appointment dates & times, including radiology, labs, procedures & referrals. If procedures were scheduled preparation instructions provided. Instructions on future appointments with [facility] were given. MA Communication: The following orders are received by verbal communication from Dante Mckeon    Orders include:  [address] [facility] will call to schedule. Follow up with Karley Ramirez CNP In 2 months. The Sleep Center at [address] [facility],[address] & [address] Floor, [address] [place] [address], [address] [place] [address],[address]                      Phone: [phone] Fax: [phone]    Please arrive at the [facility] at the time indicated. On Saturday and Sunday night a sleep tech will come down to let you in the building and escort you upstairs to the sleep center; please call from the parking lot if no one is at the door when you arrive. PLEASE DO NOT ARRIVE TO THE SLEEP CENTER ANY EARLIER THAN 8:30PM AS THERE IS NO STAFF ON DUTY AND THE DOORS WILL BE LOCKED    IMPORTANT: We ask that you please phone the [facility] Patient Pre-Services ([phone]) at least 3-5 days prior to your sleep study to pre-register. Failing to pre-register may ultimately cause your insurance to not pay for this procedure.      Please be aware that [facility] is a

## 2023-09-11 NOTE — PROGRESS NOTES
Chief Complaint   Patient presents with    Follow-up     SUE  4 Wk f/u      Radha Lan comes in today for follow-up of PAP therapy. Diagnosed with mild obstructive sleep apnea on the study done 5/22/23. (AHI 8.8, desat to 84%, weight 170 lbs)   Machine received June 2023. On 7/25/23, changed from [address] [place] [address] 5/max 20 to Min 5/ max 12 cm H20. Denies HA, ear popping or belching with PAP use. States pressures feel like she can't get air in. She states she does not sleep well. She has tried multiple mask and still not able to tolerate, currently with full face mask. Pillows did not work for her. She would like to discuss different treatment options. She continues Requip, ropinirole,  for her RLS and this is controlling her symptoms. No recent changes in health history. 9/11/2023     9:00 AM 4/24/2023    11:32 AM   Sleep Medicine   Sitting and reading 2 3   Watching TV 0 2   Sitting, inactive in a public place (e.g. a theatre or a meeting) 1 1   As a passenger in a car for an hour without a break 0 2   Lying down to rest in the afternoon when circumstances permit 0 1   Sitting and talking to someone 0 0   Sitting quietly after a lunch without alcohol 1 0   In a car, while stopped for a few minutes in traffic 0 1   [place] Sleepiness Score 4 10   Neck circumference (Inches) 13.5 13     0 = no chance of dozing  1 = slight chance of dozing  2 = moderate chance of dozing  3 = high chance of dozing    Interpretation:   0-7: It is unlikely that you are abnormally sleepy. 8-9:     You have an average amount of daytime sleepiness. 10-15: You may be excessively sleepy depending on the situation. You may want to consider seeking medical attention. 16-24: You are excessively sleepy and should consider seeking medical attention      PAP Compliance report reviewed dates 8/8/23-9/6/23:    PAP data -set on Min 5, Max 12 cm H20  95% at 11.4 cm H20.    Days with usage 27 days   Days without device

## 2023-09-12 ENCOUNTER — OFFICE VISIT (OUTPATIENT)
Dept: FAMILY MEDICINE CLINIC | Age: 73
End: 2023-09-12

## 2023-09-12 ENCOUNTER — TELEPHONE (OUTPATIENT)
Dept: FAMILY MEDICINE CLINIC | Age: 73
End: 2023-09-12

## 2023-09-12 VITALS
BODY MASS INDEX: 27.05 KG/M2 | DIASTOLIC BLOOD PRESSURE: 80 MMHG | OXYGEN SATURATION: 96 % | HEART RATE: 62 BPM | WEIGHT: 167.6 LBS | TEMPERATURE: 96.9 F | SYSTOLIC BLOOD PRESSURE: 138 MMHG | RESPIRATION RATE: 16 BRPM

## 2023-09-12 DIAGNOSIS — I10 PRIMARY HYPERTENSION: Primary | ICD-10-CM

## 2023-09-12 DIAGNOSIS — R53.1 WEAKNESS: ICD-10-CM

## 2023-09-12 DIAGNOSIS — D64.9 NORMOCYTIC ANEMIA: ICD-10-CM

## 2023-09-12 DIAGNOSIS — R42 DIZZINESS AFTER EXTENSION OF NECK: ICD-10-CM

## 2023-09-12 RX ORDER — BLOOD PRESSURE TEST KIT
1 KIT MISCELLANEOUS
Qty: 1 KIT | Refills: 0 | Status: SHIPPED | OUTPATIENT
Start: 2023-09-12

## 2023-09-12 RX ORDER — MECLIZINE HYDROCHLORIDE 25 MG/1
25 TABLET ORAL 3 TIMES DAILY PRN
Qty: 15 TABLET | Refills: 0 | Status: CANCELLED | OUTPATIENT
Start: 2023-09-12

## 2023-09-12 RX ORDER — UREA 10 %
2 LOTION (ML) TOPICAL NIGHTLY PRN
COMMUNITY

## 2023-09-12 RX ORDER — QUETIAPINE 150 MG/1
150 TABLET, FILM COATED, EXTENDED RELEASE ORAL
COMMUNITY

## 2023-09-12 NOTE — PROGRESS NOTES
9/12/2023    This is a 68 y.o. female   Chief Complaint   Patient presents with    Fatigue     Feeling weak,     Hypertension   . HPI  Pt presents today for the following:     Patient's sister present today. Weakness: Sx began overnight,    pt is a diabetic and last A1C from 08/24/23 was 5.7, strted on CPAP 2 months ago and it hasn't worked out well, took CPAP off and woke up at 3 AM, sat in chair, felt that she couldn't move her right side but was able to move her hand. Denies hx of TIA's or CVA. 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 Fasting cholesterol and LDL elevated on patient's recent August 24, 2023 labs, total protein decreased at 6.1    Red blood cell count, white blood cell count, hemoglobin, hematocrit all decreased with white blood cell count, hematocrit and hemoglobin levels decreasing since August 24, 2023, vitamin B-12 level 896. Blood in stool, in toilet, or on TP:  No, has hx of intestinal blockage  05/20/21 A1C 10.9:   Carotids:    Daughter states pt hasn't been eating enough, pt states he doesn't eat a lot of meat. HTN: Taking Spironolactone 50 mg daily, today's /80, home readings 180-190's    Also admits to neck pain and sees chiropractor for 2 months, pain shoots up into the head and causes pt to feel strange. Feels dizzy when she looks up. Currently feels that room is spinning.      Past Medical History:   Diagnosis Date    Anxiety 2000    Borderline personality disorder ([address])     Chronic pain     Colon disorder     hard time pushing stool out    Constipation     Diabetes mellitus ([address])     Diabetic neuropathy (HCC)     Electric shock     ECT therapy    GERD (gastroesophageal reflux disease)     Controlled    Heart murmur     Hyperlipidemia     Hypertension     Insomnia     Major depressive disorder, recurrent ([address])     Morbid obesity ([address])     Neuropathy 2014

## 2023-09-13 DIAGNOSIS — D64.9 NORMOCYTIC ANEMIA: ICD-10-CM

## 2023-09-13 LAB
FERRITIN SERPL IA-MCNC: 66.5 NG/ML (ref 15–150)
IRON SATN MFR SERPL: 26 % (ref 15–50)
IRON SERPL-MCNC: 73 UG/DL (ref 37–145)
TIBC SERPL-MCNC: 278 UG/DL (ref 260–445)

## 2023-09-19 ENCOUNTER — HOSPITAL ENCOUNTER (OUTPATIENT)
Dept: VASCULAR LAB | Age: 73
Discharge: HOME | End: 2023-09-19
Attending: FAMILY MEDICINE
Payer: COMMERCIAL

## 2023-09-19 DIAGNOSIS — R42 DIZZINESS AFTER EXTENSION OF NECK: ICD-10-CM

## 2023-09-19 DIAGNOSIS — R53.1 WEAKNESS: ICD-10-CM

## 2023-09-19 PROCEDURE — 93880 EXTRACRANIAL BILAT STUDY: CPT

## 2023-09-21 ENCOUNTER — OFFICE VISIT (OUTPATIENT)
Dept: FAMILY MEDICINE CLINIC | Age: 73
End: 2023-09-21
Payer: COMMERCIAL

## 2023-09-21 VITALS
DIASTOLIC BLOOD PRESSURE: 82 MMHG | OXYGEN SATURATION: 96 % | SYSTOLIC BLOOD PRESSURE: 140 MMHG | BODY MASS INDEX: 27.12 KG/M2 | TEMPERATURE: 97.1 F | WEIGHT: 168 LBS | HEART RATE: 64 BPM | RESPIRATION RATE: 16 BRPM

## 2023-09-21 DIAGNOSIS — I10 PRIMARY HYPERTENSION: Primary | ICD-10-CM

## 2023-09-21 DIAGNOSIS — R42 DIZZINESS AFTER EXTENSION OF NECK: ICD-10-CM

## 2023-09-21 DIAGNOSIS — R26.9 IMPAIRED GAIT: ICD-10-CM

## 2023-09-21 PROCEDURE — 1090F PRES/ABSN URINE INCON ASSESS: CPT | Performed by: NURSE PRACTITIONER

## 2023-09-21 PROCEDURE — 1123F ACP DISCUSS/DSCN MKR DOCD: CPT | Performed by: NURSE PRACTITIONER

## 2023-09-21 PROCEDURE — 3075F SYST BP GE 130 - 139MM HG: CPT | Performed by: NURSE PRACTITIONER

## 2023-09-21 PROCEDURE — G8417 CALC BMI ABV UP PARAM F/U: HCPCS | Performed by: NURSE PRACTITIONER

## 2023-09-21 PROCEDURE — 3017F COLORECTAL CA SCREEN DOC REV: CPT | Performed by: NURSE PRACTITIONER

## 2023-09-21 PROCEDURE — 99213 OFFICE O/P EST LOW 20 MIN: CPT | Performed by: NURSE PRACTITIONER

## 2023-09-21 PROCEDURE — G8427 DOCREV CUR MEDS BY ELIG CLIN: HCPCS | Performed by: NURSE PRACTITIONER

## 2023-09-21 PROCEDURE — 3078F DIAST BP <80 MM HG: CPT | Performed by: NURSE PRACTITIONER

## 2023-09-21 PROCEDURE — 1036F TOBACCO NON-USER: CPT | Performed by: NURSE PRACTITIONER

## 2023-09-21 PROCEDURE — G8399 PT W/DXA RESULTS DOCUMENT: HCPCS | Performed by: NURSE PRACTITIONER

## 2023-09-21 RX ORDER — AMLODIPINE BESYLATE 2.5 MG/1
2.5 TABLET ORAL DAILY
Qty: 90 TABLET | Refills: 3 | Status: SHIPPED | OUTPATIENT
Start: 2023-09-21

## 2023-09-21 ASSESSMENT — ENCOUNTER SYMPTOMS
BACK PAIN: 0
COUGH: 0
DIARRHEA: 0
VOMITING: 0
SHORTNESS OF BREATH: 0
NAUSEA: 0

## 2023-09-21 NOTE — PATIENT INSTRUCTIONS
- check blood pressure at home every morning and every evening for the next week and then send those readings to me via My chart next Thursday  - continue spironolactone 50 mg daily for high blood pressure  - Start amlodipine 2.5 mg daily if your systolic blood pressure (top number) is over 150; we may change this to taking it everyday if your BP is running high consistently but since you had a low reading yesterday we will be cautious about that  - follow up in 4 weeks     October 19th at 8 am

## 2023-09-21 NOTE — PROGRESS NOTES
2023     Chief Complaint   Patient presents with    Hypertension       Karyle Marseille (:  1950) is a 68 y.o. female, here for evaluation of the following medical concerns:    HPI    Here for follow up on dizziness, saw Dr. Daniel Montgomery on 2023 after having an episode that morning where she woke up at 3 am and felt she could not move the right arm. She denies any further episodes like this since she was last seen. Carotid doppler performed 2023- bilateral internal carotid stenosis < 50%. Does have an appointment 23 with neurology for her postural dizziness and balance issues. Started monitoring her blood pressure at home yesterday, last evening she recorded it at 87/56. She did not feel dizzy at that time. Her blood pressure today is elevated. Denies CP or SOB. Takes spironolactone 50 mg daily. Review of Systems   Constitutional:  Positive for fatigue. Negative for chills and fever. Respiratory:  Negative for cough and shortness of breath. Cardiovascular:  Positive for leg swelling (chronic swelling left lower extremity worsening). Negative for chest pain. Gastrointestinal:  Negative for diarrhea, nausea and vomiting. Endocrine: Negative for polydipsia, polyphagia and polyuria. Musculoskeletal:  Positive for arthralgias, joint swelling and neck pain. Negative for back pain, gait problem, myalgias and neck stiffness. Neurological:  Positive for weakness. Negative for dizziness and headaches. Positive for balance issues. Psychiatric/Behavioral:  Positive for sleep disturbance. All other systems reviewed and are negative. Prior to Visit Medications    Medication Sig Taking?  Authorizing Provider   amLODIPine (NORVASC) 2.5 MG tablet Take 1 tablet by mouth daily Take if SBP >150 Yes Debbie Katz, APRN - CNP   QUEtiapine (SEROQUEL XR) 150 MG TB24 extended release tablet Take 1 tablet by mouth nightly Yes Historical Provider, MD   melatonin 1 MG

## 2023-09-29 ENCOUNTER — HOSPITAL ENCOUNTER (OUTPATIENT)
Dept: SLEEP CENTER | Age: 73
Discharge: HOME | End: 2023-10-01
Payer: COMMERCIAL

## 2023-09-29 DIAGNOSIS — G25.81 RLS (RESTLESS LEGS SYNDROME): ICD-10-CM

## 2023-09-29 DIAGNOSIS — G47.33 OSA (OBSTRUCTIVE SLEEP APNEA): ICD-10-CM

## 2023-09-29 DIAGNOSIS — G47.10 HYPERSOMNIA: ICD-10-CM

## 2023-09-29 PROCEDURE — 95810 POLYSOM 6/> YRS 4/> PARAM: CPT

## 2023-09-29 PROCEDURE — 95811 POLYSOM 6/>YRS CPAP 4/> PARM: CPT

## 2023-10-03 ENCOUNTER — APPOINTMENT (OUTPATIENT)
Dept: GENERAL RADIOLOGY | Age: 73
End: 2023-10-03
Payer: COMMERCIAL

## 2023-10-03 ENCOUNTER — HOSPITAL ENCOUNTER (EMERGENCY)
Age: 73
Discharge: HOME | End: 2023-10-03
Payer: COMMERCIAL

## 2023-10-03 VITALS
TEMPERATURE: 98.3 F | WEIGHT: 166 LBS | BODY MASS INDEX: 26.79 KG/M2 | RESPIRATION RATE: 16 BRPM | DIASTOLIC BLOOD PRESSURE: 68 MMHG | OXYGEN SATURATION: 95 % | SYSTOLIC BLOOD PRESSURE: 132 MMHG | HEART RATE: 62 BPM

## 2023-10-03 DIAGNOSIS — M54.31 RIGHT SIDED SCIATICA: Primary | ICD-10-CM

## 2023-10-03 PROCEDURE — 6370000000 HC RX 637 (ALT 250 FOR IP): Performed by: PHYSICIAN ASSISTANT

## 2023-10-03 PROCEDURE — 6360000002 HC RX W HCPCS: Performed by: PHYSICIAN ASSISTANT

## 2023-10-03 PROCEDURE — 96375 TX/PRO/DX INJ NEW DRUG ADDON: CPT

## 2023-10-03 PROCEDURE — 95810 POLYSOM 6/> YRS 4/> PARAM: CPT | Performed by: INTERNAL MEDICINE

## 2023-10-03 PROCEDURE — 72100 X-RAY EXAM L-S SPINE 2/3 VWS: CPT

## 2023-10-03 PROCEDURE — 99284 EMERGENCY DEPT VISIT MOD MDM: CPT

## 2023-10-03 PROCEDURE — 96372 THER/PROPH/DIAG INJ SC/IM: CPT

## 2023-10-03 PROCEDURE — 73502 X-RAY EXAM HIP UNI 2-3 VIEWS: CPT

## 2023-10-03 PROCEDURE — 96374 THER/PROPH/DIAG INJ IV PUSH: CPT

## 2023-10-03 RX ORDER — ORPHENADRINE CITRATE 30 MG/ML
30 INJECTION INTRAMUSCULAR; INTRAVENOUS ONCE
Status: COMPLETED | OUTPATIENT
Start: 2023-10-03 | End: 2023-10-03

## 2023-10-03 RX ORDER — METHYLPREDNISOLONE 4 MG/1
TABLET ORAL
Qty: 1 KIT | Refills: 0 | Status: SHIPPED | OUTPATIENT
Start: 2023-10-03 | End: 2023-10-09

## 2023-10-03 RX ORDER — KETOROLAC TROMETHAMINE 30 MG/ML
15 INJECTION, SOLUTION INTRAMUSCULAR; INTRAVENOUS ONCE
Status: COMPLETED | OUTPATIENT
Start: 2023-10-03 | End: 2023-10-03

## 2023-10-03 RX ORDER — OXYCODONE HYDROCHLORIDE AND ACETAMINOPHEN 5; 325 MG/1; MG/1
1 TABLET ORAL ONCE
Status: COMPLETED | OUTPATIENT
Start: 2023-10-03 | End: 2023-10-03

## 2023-10-03 RX ORDER — METHOCARBAMOL 750 MG/1
750 TABLET, FILM COATED ORAL 3 TIMES DAILY PRN
Qty: 21 TABLET | Refills: 0 | Status: SHIPPED | OUTPATIENT
Start: 2023-10-03

## 2023-10-03 RX ORDER — OXYCODONE HYDROCHLORIDE AND ACETAMINOPHEN 5; 325 MG/1; MG/1
1 TABLET ORAL EVERY 6 HOURS PRN
Qty: 10 TABLET | Refills: 0 | Status: SHIPPED | OUTPATIENT
Start: 2023-10-03 | End: 2023-10-06

## 2023-10-03 RX ORDER — DEXAMETHASONE SODIUM PHOSPHATE 4 MG/ML
8 INJECTION, SOLUTION INTRA-ARTICULAR; INTRALESIONAL; INTRAMUSCULAR; INTRAVENOUS; SOFT TISSUE ONCE
Status: COMPLETED | OUTPATIENT
Start: 2023-10-03 | End: 2023-10-03

## 2023-10-03 RX ADMIN — DEXAMETHASONE SODIUM PHOSPHATE 8 MG: 4 INJECTION, SOLUTION INTRAMUSCULAR; INTRAVENOUS at 09:33

## 2023-10-03 RX ADMIN — OXYCODONE AND ACETAMINOPHEN 1 TABLET: 5; 325 TABLET ORAL at 09:33

## 2023-10-03 RX ADMIN — ORPHENADRINE CITRATE 30 MG: 60 INJECTION INTRAMUSCULAR; INTRAVENOUS at 09:33

## 2023-10-03 RX ADMIN — KETOROLAC TROMETHAMINE 15 MG: 30 INJECTION, SOLUTION INTRAMUSCULAR; INTRAVENOUS at 09:32

## 2023-10-03 ASSESSMENT — PAIN SCALES - GENERAL
PAINLEVEL_OUTOF10: 10

## 2023-10-03 ASSESSMENT — PAIN - FUNCTIONAL ASSESSMENT: PAIN_FUNCTIONAL_ASSESSMENT: 0-10

## 2023-10-03 ASSESSMENT — PAIN DESCRIPTION - ORIENTATION: ORIENTATION: RIGHT

## 2023-10-03 ASSESSMENT — PAIN DESCRIPTION - LOCATION: LOCATION: LEG

## 2023-10-03 NOTE — ED NOTES
Pt ambulated in hallway without assistance of nurse. Navin Macias RN  10/03/23 1029  And without difficulty.  States pain is \"tolerable\"     Navin Macias RN  10/03/23 1038

## 2023-10-03 NOTE — ED PROVIDER NOTES
spine.    Exclusion criteria - the patient is NOT to be included for SEP-1 Core Measure due to: Infection is not suspected     Consults/discussion with other professionals: None  Social determinants: None  Chronic conditions: Hypothyroid, hyperlipidemia, hypertension, depression, anxiety, restless leg syndrome, osteoarthritis, GERD, neuropathy  Records reviewed: None    Disposition considerations/Plan: Patient is here with pain that started in the right buttock and radiates down her right lower extremity. Symptoms started yesterday after she was at [employer], stretching, moving around and worsened since then. Screening x-rays of the L-spine and hip show no abnormality. She has no infectious signs or symptoms. No red flags that would prompt an indication to pursue labs or MRI imaging. He again reports having established follow-up with [facility]. She will be placed on a short course of medicines to help with this acute sciatica. All questions answered prior to discharge. Employed shared decision making. FINAL IMPRESSION:      1. Right sided sciatica          DISPOSITION/PLAN:   DISPOSITION Decision To Discharge      PATIENT REFERRED TO:  [address] [place] [id number]  [address], APRN - CNP  [address] [facility]  [phone]            DISCHARGE MEDICATIONS:  New Prescriptions    METHOCARBAMOL (ROBAXIN-750) 750 MG TABLET    Take 1 tablet by mouth 3 times daily as needed (muscle spasm)    METHYLPREDNISOLONE (MEDROL, KARLEY,) 4 MG TABLET    Take by mouth. OXYCODONE-ACETAMINOPHEN (PERCOCET) 5-325 MG PER TABLET    Take 1 tablet by mouth every 6 hours as needed for Pain for up to 3 days. Intended supply: 3 days.  Take lowest dose possible to manage pain Max Daily Amount: 4 tablets                  (Please note thatportions of this note were completed with a voice recognition program.  Efforts were made to edit the

## 2023-10-04 ENCOUNTER — TELEPHONE (OUTPATIENT)
Dept: PULMONOLOGY | Age: 73
End: 2023-10-04

## 2023-10-04 ENCOUNTER — TELEPHONE (OUTPATIENT)
Dept: FAMILY MEDICINE CLINIC | Age: 73
End: 2023-10-04

## 2023-10-04 DIAGNOSIS — M51.36 DDD (DEGENERATIVE DISC DISEASE), LUMBAR: ICD-10-CM

## 2023-10-04 DIAGNOSIS — M54.31 SCIATIC NERVE PAIN, RIGHT: Primary | ICD-10-CM

## 2023-10-04 NOTE — TELEPHONE ENCOUNTER
Please call patient and schedule OV with Dr. Kay Bautista or myself to discuss other treatment options for her mild SUE.

## 2023-10-04 NOTE — TELEPHONE ENCOUNTER
Patient called.   Asked for a doc to doc referral to [facility]   Fax [phone]  (Sciatica right leg)    9/21/2023 last ov  Future Appointments   Date Time Provider [address]   10/12/2023  9:00 AM Stacie Rosario, [address] [place] [address]   11/13/2023  9:00 AM LUIS Cortes CNP AND PULM [facility]   11/13/2023 10:20 AM Brianna Crawford MD 23[address] [facility] Neurology -   2/27/2024  8:00 AM LUIS Martino CNP [place] [address] [place] [address]

## 2023-10-04 NOTE — TELEPHONE ENCOUNTER
Patient will probably need a office appointment with either myself or Mitul Basurto to consider other options  Suggestions would be positional sleep therapy   Oxygen use  Oral appliance  Excite SUE

## 2023-10-09 DIAGNOSIS — D64.9 LOW HEMOGLOBIN: ICD-10-CM

## 2023-10-09 LAB
BASOPHILS # BLD: 0 K/UL (ref 0–0.2)
BASOPHILS NFR BLD: 0.8 %
DEPRECATED RDW RBC AUTO: 12.7 % (ref 12.4–15.4)
EOSINOPHIL # BLD: 0.1 K/UL (ref 0–0.6)
EOSINOPHIL NFR BLD: 1.6 %
HCT VFR BLD AUTO: 36.1 % (ref 36–48)
HGB BLD-MCNC: 12.1 G/DL (ref 12–16)
LYMPHOCYTES # BLD: 1.8 K/UL (ref 1–5.1)
LYMPHOCYTES NFR BLD: 35.5 %
MCH RBC QN AUTO: 31.9 PG (ref 26–34)
MCHC RBC AUTO-ENTMCNC: 33.6 G/DL (ref 31–36)
MCV RBC AUTO: 94.9 FL (ref 80–100)
MONOCYTES # BLD: 0.5 K/UL (ref 0–1.3)
MONOCYTES NFR BLD: 9.8 %
NEUTROPHILS # BLD: 2.7 K/UL (ref 1.7–7.7)
NEUTROPHILS NFR BLD: 52.3 %
PLATELET # BLD AUTO: 338 K/UL (ref 135–450)
PMV BLD AUTO: 7.1 FL (ref 5–10.5)
RBC # BLD AUTO: 3.81 M/UL (ref 4–5.2)
WBC # BLD AUTO: 5.2 K/UL (ref 4–11)

## 2023-10-10 DIAGNOSIS — D64.9 NORMOCYTIC ANEMIA: Primary | ICD-10-CM

## 2023-10-12 ENCOUNTER — OFFICE VISIT (OUTPATIENT)
Dept: FAMILY MEDICINE CLINIC | Age: 73
End: 2023-10-12
Payer: COMMERCIAL

## 2023-10-12 VITALS
TEMPERATURE: 97.1 F | DIASTOLIC BLOOD PRESSURE: 68 MMHG | HEART RATE: 90 BPM | BODY MASS INDEX: 27.6 KG/M2 | OXYGEN SATURATION: 99 % | SYSTOLIC BLOOD PRESSURE: 106 MMHG | WEIGHT: 171 LBS | RESPIRATION RATE: 16 BRPM

## 2023-10-12 DIAGNOSIS — G57.01 NEUROPATHY, SCIATIC, RIGHT: ICD-10-CM

## 2023-10-12 DIAGNOSIS — I10 PRIMARY HYPERTENSION: ICD-10-CM

## 2023-10-12 DIAGNOSIS — Z96.653 S/P TOTAL KNEE ARTHROPLASTY, BILATERAL: ICD-10-CM

## 2023-10-12 DIAGNOSIS — M50.30 DDD (DEGENERATIVE DISC DISEASE), CERVICAL: Primary | ICD-10-CM

## 2023-10-12 PROCEDURE — 1090F PRES/ABSN URINE INCON ASSESS: CPT | Performed by: NURSE PRACTITIONER

## 2023-10-12 PROCEDURE — 99214 OFFICE O/P EST MOD 30 MIN: CPT | Performed by: NURSE PRACTITIONER

## 2023-10-12 PROCEDURE — G8399 PT W/DXA RESULTS DOCUMENT: HCPCS | Performed by: NURSE PRACTITIONER

## 2023-10-12 PROCEDURE — G8484 FLU IMMUNIZE NO ADMIN: HCPCS | Performed by: NURSE PRACTITIONER

## 2023-10-12 PROCEDURE — 3078F DIAST BP <80 MM HG: CPT | Performed by: NURSE PRACTITIONER

## 2023-10-12 PROCEDURE — 1036F TOBACCO NON-USER: CPT | Performed by: NURSE PRACTITIONER

## 2023-10-12 PROCEDURE — 3017F COLORECTAL CA SCREEN DOC REV: CPT | Performed by: NURSE PRACTITIONER

## 2023-10-12 PROCEDURE — 3074F SYST BP LT 130 MM HG: CPT | Performed by: NURSE PRACTITIONER

## 2023-10-12 PROCEDURE — G8417 CALC BMI ABV UP PARAM F/U: HCPCS | Performed by: NURSE PRACTITIONER

## 2023-10-12 PROCEDURE — G8427 DOCREV CUR MEDS BY ELIG CLIN: HCPCS | Performed by: NURSE PRACTITIONER

## 2023-10-12 PROCEDURE — 1123F ACP DISCUSS/DSCN MKR DOCD: CPT | Performed by: NURSE PRACTITIONER

## 2023-10-12 RX ORDER — GABAPENTIN 300 MG/1
300 CAPSULE ORAL 2 TIMES DAILY
Qty: 60 CAPSULE | Refills: 0 | Status: SHIPPED | OUTPATIENT
Start: 2023-10-12 | End: 2023-11-11

## 2023-10-12 RX ORDER — CLINDAMYCIN HYDROCHLORIDE 300 MG/1
300 CAPSULE ORAL 2 TIMES DAILY
Qty: 6 CAPSULE | Refills: 0 | Status: SHIPPED | OUTPATIENT
Start: 2023-10-12 | End: 2023-10-15

## 2023-10-12 RX ORDER — MELOXICAM 15 MG/1
15 TABLET ORAL DAILY PRN
Qty: 90 TABLET | Refills: 0 | Status: SHIPPED | OUTPATIENT
Start: 2023-10-12

## 2023-10-12 SDOH — ECONOMIC STABILITY: INCOME INSECURITY: HOW HARD IS IT FOR YOU TO PAY FOR THE VERY BASICS LIKE FOOD, HOUSING, MEDICAL CARE, AND HEATING?: NOT HARD AT ALL

## 2023-10-12 SDOH — ECONOMIC STABILITY: FOOD INSECURITY: WITHIN THE PAST 12 MONTHS, THE FOOD YOU BOUGHT JUST DIDN'T LAST AND YOU DIDN'T HAVE MONEY TO GET MORE.: NEVER TRUE

## 2023-10-12 SDOH — ECONOMIC STABILITY: FOOD INSECURITY: WITHIN THE PAST 12 MONTHS, YOU WORRIED THAT YOUR FOOD WOULD RUN OUT BEFORE YOU GOT MONEY TO BUY MORE.: NEVER TRUE

## 2023-10-12 ASSESSMENT — PATIENT HEALTH QUESTIONNAIRE - PHQ9
8. MOVING OR SPEAKING SO SLOWLY THAT OTHER PEOPLE COULD HAVE NOTICED. OR THE OPPOSITE, BEING SO FIGETY OR RESTLESS THAT YOU HAVE BEEN MOVING AROUND A LOT MORE THAN USUAL: 0
6. FEELING BAD ABOUT YOURSELF - OR THAT YOU ARE A FAILURE OR HAVE LET YOURSELF OR YOUR FAMILY DOWN: 0
1. LITTLE INTEREST OR PLEASURE IN DOING THINGS: 0
3. TROUBLE FALLING OR STAYING ASLEEP: 0
SUM OF ALL RESPONSES TO PHQ QUESTIONS 1-9: 0
SUM OF ALL RESPONSES TO PHQ QUESTIONS 1-9: 0
10. IF YOU CHECKED OFF ANY PROBLEMS, HOW DIFFICULT HAVE THESE PROBLEMS MADE IT FOR YOU TO DO YOUR WORK, TAKE CARE OF THINGS AT HOME, OR GET ALONG WITH OTHER PEOPLE: 0
SUM OF ALL RESPONSES TO PHQ QUESTIONS 1-9: 0
7. TROUBLE CONCENTRATING ON THINGS, SUCH AS READING THE NEWSPAPER OR WATCHING TELEVISION: 0
2. FEELING DOWN, DEPRESSED OR HOPELESS: 0
SUM OF ALL RESPONSES TO PHQ9 QUESTIONS 1 & 2: 0
4. FEELING TIRED OR HAVING LITTLE ENERGY: 0
5. POOR APPETITE OR OVEREATING: 0
9. THOUGHTS THAT YOU WOULD BE BETTER OFF DEAD, OR OF HURTING YOURSELF: 0
SUM OF ALL RESPONSES TO PHQ QUESTIONS 1-9: 0
DEPRESSION UNABLE TO ASSESS: FUNCTIONAL CAPACITY MOTIVATION LIMITS ACCURACY

## 2023-10-12 ASSESSMENT — ENCOUNTER SYMPTOMS
COUGH: 0
DIARRHEA: 0
NAUSEA: 0
SHORTNESS OF BREATH: 0
BACK PAIN: 1
VOMITING: 0

## 2023-10-12 ASSESSMENT — ANXIETY QUESTIONNAIRES
2. NOT BEING ABLE TO STOP OR CONTROL WORRYING: 0
GAD7 TOTAL SCORE: 0
6. BECOMING EASILY ANNOYED OR IRRITABLE: 0
5. BEING SO RESTLESS THAT IT IS HARD TO SIT STILL: 0
7. FEELING AFRAID AS IF SOMETHING AWFUL MIGHT HAPPEN: 0
IF YOU CHECKED OFF ANY PROBLEMS ON THIS QUESTIONNAIRE, HOW DIFFICULT HAVE THESE PROBLEMS MADE IT FOR YOU TO DO YOUR WORK, TAKE CARE OF THINGS AT HOME, OR GET ALONG WITH OTHER PEOPLE: NOT DIFFICULT AT ALL
3. WORRYING TOO MUCH ABOUT DIFFERENT THINGS: 0
1. FEELING NERVOUS, ANXIOUS, OR ON EDGE: 0
4. TROUBLE RELAXING: 0

## 2023-10-12 NOTE — PATIENT INSTRUCTIONS
Try increasing the gabapentin to 300 mg twice daily  Do not take at the same time as methocarbamol, spaces these out by a few hours  Start Mobic 15 mg daily (1 tablet), this is an anti-inflammatory medication, avoid taking other NSAID such as Advil, Aleve, Aspirin  Call next week with update on pain and medications  Clindamycin at the pharmacy for dental procedure

## 2023-10-17 ENCOUNTER — OFFICE VISIT (OUTPATIENT)
Dept: PULMONOLOGY | Age: 73
End: 2023-10-17
Payer: COMMERCIAL

## 2023-10-17 VITALS
RESPIRATION RATE: 16 BRPM | SYSTOLIC BLOOD PRESSURE: 136 MMHG | DIASTOLIC BLOOD PRESSURE: 62 MMHG | BODY MASS INDEX: 28.12 KG/M2 | TEMPERATURE: 97.8 F | HEIGHT: 66 IN | WEIGHT: 175 LBS | OXYGEN SATURATION: 98 % | HEART RATE: 72 BPM

## 2023-10-17 DIAGNOSIS — G47.33 OSA (OBSTRUCTIVE SLEEP APNEA): Primary | ICD-10-CM

## 2023-10-17 DIAGNOSIS — G25.81 RLS (RESTLESS LEGS SYNDROME): ICD-10-CM

## 2023-10-17 DIAGNOSIS — E66.3 OVERWEIGHT (BMI 25.0-29.9): ICD-10-CM

## 2023-10-17 DIAGNOSIS — I10 PRIMARY HYPERTENSION: Chronic | ICD-10-CM

## 2023-10-17 PROCEDURE — 1090F PRES/ABSN URINE INCON ASSESS: CPT | Performed by: NURSE PRACTITIONER

## 2023-10-17 PROCEDURE — 3017F COLORECTAL CA SCREEN DOC REV: CPT | Performed by: NURSE PRACTITIONER

## 2023-10-17 PROCEDURE — G8484 FLU IMMUNIZE NO ADMIN: HCPCS | Performed by: NURSE PRACTITIONER

## 2023-10-17 PROCEDURE — 3078F DIAST BP <80 MM HG: CPT | Performed by: NURSE PRACTITIONER

## 2023-10-17 PROCEDURE — G8427 DOCREV CUR MEDS BY ELIG CLIN: HCPCS | Performed by: NURSE PRACTITIONER

## 2023-10-17 PROCEDURE — 1123F ACP DISCUSS/DSCN MKR DOCD: CPT | Performed by: NURSE PRACTITIONER

## 2023-10-17 PROCEDURE — G8399 PT W/DXA RESULTS DOCUMENT: HCPCS | Performed by: NURSE PRACTITIONER

## 2023-10-17 PROCEDURE — 3075F SYST BP GE 130 - 139MM HG: CPT | Performed by: NURSE PRACTITIONER

## 2023-10-17 PROCEDURE — 1036F TOBACCO NON-USER: CPT | Performed by: NURSE PRACTITIONER

## 2023-10-17 PROCEDURE — G8417 CALC BMI ABV UP PARAM F/U: HCPCS | Performed by: NURSE PRACTITIONER

## 2023-10-17 PROCEDURE — 99213 OFFICE O/P EST LOW 20 MIN: CPT | Performed by: NURSE PRACTITIONER

## 2023-10-17 ASSESSMENT — ENCOUNTER SYMPTOMS
EYE PAIN: 0
COUGH: 0
SHORTNESS OF BREATH: 0
ABDOMINAL PAIN: 0
CHEST TIGHTNESS: 0
WHEEZING: 0
RHINORRHEA: 0
SORE THROAT: 0

## 2023-10-17 ASSESSMENT — SLEEP AND FATIGUE QUESTIONNAIRES
HOW LIKELY ARE YOU TO NOD OFF OR FALL ASLEEP IN A CAR, WHILE STOPPED FOR A FEW MINUTES IN TRAFFIC: 0
HOW LIKELY ARE YOU TO NOD OFF OR FALL ASLEEP WHILE SITTING INACTIVE IN A PUBLIC PLACE: 1
HOW LIKELY ARE YOU TO NOD OFF OR FALL ASLEEP WHEN YOU ARE A PASSENGER IN A CAR FOR AN HOUR WITHOUT A BREAK: 2
HOW LIKELY ARE YOU TO NOD OFF OR FALL ASLEEP WHILE WATCHING TV: 2
ESS TOTAL SCORE: 11
HOW LIKELY ARE YOU TO NOD OFF OR FALL ASLEEP WHILE SITTING QUIETLY AFTER LUNCH WITHOUT ALCOHOL: 1
HOW LIKELY ARE YOU TO NOD OFF OR FALL ASLEEP WHILE LYING DOWN TO REST IN THE AFTERNOON WHEN CIRCUMSTANCES PERMIT: 2
HOW LIKELY ARE YOU TO NOD OFF OR FALL ASLEEP WHILE SITTING AND TALKING TO SOMEONE: 0
HOW LIKELY ARE YOU TO NOD OFF OR FALL ASLEEP WHILE SITTING AND READING: 3
NECK CIRCUMFERENCE (INCHES): 13.5

## 2023-10-17 NOTE — PATIENT INSTRUCTIONS
Discussed other treatment options including oral appliance, Excite SUE and positional therapy. She is not a candidate for Excite SUE with current metal dental appliance. Also has dentures so can not have oral appliance. Yesenia Card will try positional therapy for her mild SUE. Advised to avoid driving if too sleepy to function safely and given a discussion of the risks of untreated apneas such as accidents, cognitive impairment, mood impairment, worsening high blood pressure, various cardiac disease and stroke. Regarding overweight, recommend to try a formal program and/or increase physical activity by adding a 30 minute walk to daily routine. Weight loss was encouraged as a long term approach to treatment of SUE. Explained the correlation between obesity and apnea and the causative role it can play. Blood pressure today is controlled, continue current medication regimen per cardiology/PCP. She is to continue Requip for her RLS as prescribed by PCP. Follow up 1 year  or sooner for any issues. Remember to bring a list of pulmonary medications and any CPAP or BiPAP machines to your next appointment with the office. Please keep all of your future appointments scheduled by [facility] office. Out of respect for other patients and providers, you may be asked to reschedule your appointment if you arrive later than your scheduled appointment time. Appointments cancelled less than 24hrs in advance will be considered a no show. Patients with three missed appointments within 1 year or four missed appointments within 2 years can be dismissed from the practice. Please be aware that our physicians are required to work in the Intensive Care Unit at [facility].  Your appointment may need to be rescheduled if they are designated to work during your appointment time. You may receive a survey regarding the care you received during your visit.

## 2023-10-17 NOTE — PROGRESS NOTES
MA Communication:   The following orders are received by verbal communication from Brian Jimenez CNP    Follow up  1 year with Dr. Lena Acosta
AHI 7.0,  4.9 obstructive. Exercise/diet: Weight today 175 lbs,  at time of study 170 lbs. Current Outpatient Medications   Medication Sig Dispense Refill    meloxicam (MOBIC) 15 MG tablet Take 1 tablet by mouth daily as needed for Pain 90 tablet 0    gabapentin (NEURONTIN) 300 MG capsule Take 1 capsule by mouth 2 times daily for 30 days.  Intended supply: 90 days 60 capsule 0    methocarbamol (ROBAXIN-750) 750 MG tablet Take 1 tablet by mouth 3 times daily as needed (muscle spasm) (Patient taking differently: Take 1 tablet by mouth 3 times daily as needed (muscle spasm) As Needed) 21 tablet 0    amLODIPine (NORVASC) 2.5 MG tablet Take 1 tablet by mouth daily Take if SBP >150 (Patient taking differently: Take 1 tablet by mouth daily Take if SBP >150    As Needed) 90 tablet 3    QUEtiapine (SEROQUEL XR) 150 MG TB24 extended release tablet Take 1 tablet by mouth nightly      melatonin 1 MG tablet Take 2 tablets by mouth nightly as needed for Sleep      LINZESS 145 MCG capsule Take by mouth daily      rOPINIRole (REQUIP) 2 MG tablet TAKE 1-2 TABLETS BY MOUTH EVERY DAY AT NIGHT FOR REST LEG SYNDROME 180 tablet 3    levothyroxine (SYNTHROID) 75 MCG tablet TAKE 1 TABLET BY MOUTH EVERY DAY 90 tablet 3    spironolactone (ALDACTONE) 50 MG tablet TAKE 1 TABLET BY MOUTH EVERY DAY 90 tablet 1    carBAMazepine (TEGRETOL) 200 MG tablet Take 1 tablet by mouth 4 times daily      alirocumab (PRALUENT) 75 MG/ML SOAJ injection pen Inject 1 mL into the skin every 14 days      Coenzyme Q10 (COQ-10) 400 MG CAPS Take by mouth daily      desvenlafaxine succinate (PRISTIQ) 50 MG TB24 extended release tablet TAKE 1 TABLET BY MOUTH EVERY EVENING      [facility] D3 25 MCG (1000 UT) CAPS TAKE 1 CAPSULE BY MOUTH EVERY DAY 90 capsule 1    B Complex Vitamins (VITAMIN B COMPLEX) TABS Take 1 tablet by mouth 2 times daily (Patient taking differently: Take 1 tablet by mouth daily) 180 tablet 1    bisacodyl (DULCOLAX) 5 MG EC tablet Take 2

## 2023-10-24 ENCOUNTER — TELEPHONE (OUTPATIENT)
Dept: FAMILY MEDICINE CLINIC | Age: 73
End: 2023-10-24

## 2023-10-24 DIAGNOSIS — F41.9 ANXIETY: Primary | ICD-10-CM

## 2023-10-24 RX ORDER — LORAZEPAM 2 MG/1
TABLET ORAL
Qty: 1 TABLET | Refills: 0 | Status: SHIPPED | OUTPATIENT
Start: 2023-10-24 | End: 2023-10-25

## 2023-10-24 NOTE — TELEPHONE ENCOUNTER
Pt called because she has a double MRI scheduled for tomorrow at 1pm and she is asking that Gardner call in one 2 mg Ativan to get her thru it. [facility] on file. Please advise.

## 2023-10-24 NOTE — TELEPHONE ENCOUNTER
Let patient know that I did send her in one 2 mg tablet of Ativan she can take this 30 minutes prior to her MRI. Make sure she has somebody to drive her to and from the MRI her since she will be taking this medication.

## 2023-10-25 ENCOUNTER — HOSPITAL ENCOUNTER (OUTPATIENT)
Dept: MRI IMAGING | Age: 73
Discharge: HOME | End: 2023-10-25
Payer: COMMERCIAL

## 2023-10-25 DIAGNOSIS — M54.50 LOW BACK PAIN, UNSPECIFIED BACK PAIN LATERALITY, UNSPECIFIED CHRONICITY, UNSPECIFIED WHETHER SCIATICA PRESENT: ICD-10-CM

## 2023-10-25 DIAGNOSIS — M54.12 CERVICAL RADICULOPATHY: ICD-10-CM

## 2023-10-25 DIAGNOSIS — M43.16 SPONDYLOLISTHESIS, LUMBAR REGION: ICD-10-CM

## 2023-10-25 DIAGNOSIS — M54.2 NECK PAIN: ICD-10-CM

## 2023-10-25 DIAGNOSIS — M48.02 CERVICAL STENOSIS OF SPINE: ICD-10-CM

## 2023-10-25 DIAGNOSIS — M54.16 LUMBAR RADICULOPATHY: ICD-10-CM

## 2023-10-25 PROCEDURE — 72141 MRI NECK SPINE W/O DYE: CPT

## 2023-10-25 PROCEDURE — 72148 MRI LUMBAR SPINE W/O DYE: CPT

## 2023-10-27 ENCOUNTER — TELEPHONE (OUTPATIENT)
Dept: FAMILY MEDICINE CLINIC | Age: 73
End: 2023-10-27

## 2023-10-27 DIAGNOSIS — Z96.653 S/P TOTAL KNEE ARTHROPLASTY, BILATERAL: ICD-10-CM

## 2023-10-27 RX ORDER — CLINDAMYCIN HYDROCHLORIDE 300 MG/1
300 CAPSULE ORAL 2 TIMES DAILY
Qty: 6 CAPSULE | Refills: 0 | Status: SHIPPED | OUTPATIENT
Start: 2023-10-27 | End: 2023-10-30

## 2023-10-27 NOTE — TELEPHONE ENCOUNTER
Patient having a dental appointment on Monday . Patient getting a filling . Patient asking to have clindamycin (CLEOCIN) 300 MG capsule [[id number]  refilled.     10/12/2023     Future Appointments   Date Time Provider [address]   11/13/2023 10:20 AM Wiliam Lee MD [address] [place] [facility] Neurology -   2/27/2024  8:00 AM LUIS Pillai - CNP [facility] [place] - DYD   10/16/2024 10:30 AM Sarah Martin MD AND SAMARA Flower    [id number], [address] [place] [address] [phone]

## 2023-10-30 RX ORDER — CLINDAMYCIN HYDROCHLORIDE 300 MG/1
300 CAPSULE ORAL 2 TIMES DAILY
Qty: 6 CAPSULE | Refills: 0 | OUTPATIENT
Start: 2023-10-30 | End: 2023-11-02

## 2023-10-30 NOTE — TELEPHONE ENCOUNTER
Last ov 10/12/2023   Future Appointments   Date Time Provider [address]   11/13/2023 10:20 AM Margaret Chappell MD [address] Chrissy Rogelio [facility] Neurology -   2/27/2024  8:00 AM LUIS Sanders - CNP [facility] [place] - DYD   10/16/2024 10:30 AM Guanako Myers MD AND PUL [facility]

## 2023-11-08 ENCOUNTER — APPOINTMENT (OUTPATIENT)
Dept: CT IMAGING | Age: 73
End: 2023-11-08
Payer: COMMERCIAL

## 2023-11-08 ENCOUNTER — APPOINTMENT (OUTPATIENT)
Dept: GENERAL RADIOLOGY | Age: 73
End: 2023-11-08
Payer: COMMERCIAL

## 2023-11-08 ENCOUNTER — HOSPITAL ENCOUNTER (INPATIENT)
Age: 73
LOS: 1 days | Discharge: HOME | End: 2023-11-09
Attending: STUDENT IN AN ORGANIZED HEALTH CARE EDUCATION/TRAINING PROGRAM | Admitting: INTERNAL MEDICINE
Payer: COMMERCIAL

## 2023-11-08 DIAGNOSIS — R47.1 DYSARTHRIA: ICD-10-CM

## 2023-11-08 DIAGNOSIS — R53.1 WEAKNESS OF RIGHT SIDE OF BODY: ICD-10-CM

## 2023-11-08 DIAGNOSIS — R29.90 STROKE-LIKE SYMPTOMS: Primary | ICD-10-CM

## 2023-11-08 PROBLEM — G45.9 TIA (TRANSIENT ISCHEMIC ATTACK): Status: ACTIVE | Noted: 2023-11-08

## 2023-11-08 LAB
ALBUMIN SERPL-MCNC: 4 G/DL (ref 3.4–5)
ALBUMIN/GLOB SERPL: 1.6 {RATIO} (ref 1.1–2.2)
ALP SERPL-CCNC: 88 U/L (ref 40–129)
ALT SERPL-CCNC: 21 U/L (ref 10–40)
ANION GAP SERPL CALCULATED.3IONS-SCNC: 8 MMOL/L (ref 3–16)
AST SERPL-CCNC: 25 U/L (ref 15–37)
BASOPHILS # BLD: 0 K/UL (ref 0–0.2)
BASOPHILS NFR BLD: 1.1 %
BILIRUB SERPL-MCNC: 0.3 MG/DL (ref 0–1)
BILIRUB UR QL STRIP.AUTO: NEGATIVE
BILIRUB UR QL STRIP.AUTO: NEGATIVE
BUN SERPL-MCNC: 16 MG/DL (ref 7–20)
CALCIUM SERPL-MCNC: 9 MG/DL (ref 8.3–10.6)
CHLORIDE SERPL-SCNC: 100 MMOL/L (ref 99–110)
CLARITY UR: CLEAR
CLARITY UR: CLEAR
CO2 SERPL-SCNC: 29 MMOL/L (ref 21–32)
COLOR UR: YELLOW
COLOR UR: YELLOW
CREAT SERPL-MCNC: 0.6 MG/DL (ref 0.6–1.2)
DEPRECATED RDW RBC AUTO: 12.8 % (ref 12.4–15.4)
EOSINOPHIL # BLD: 0.1 K/UL (ref 0–0.6)
EOSINOPHIL NFR BLD: 2.6 %
GFR SERPLBLD CREATININE-BSD FMLA CKD-EPI: >60 ML/MIN/{1.73_M2}
GLUCOSE BLD-MCNC: 93 MG/DL
GLUCOSE BLD-MCNC: 93 MG/DL (ref 70–99)
GLUCOSE SERPL-MCNC: 97 MG/DL (ref 70–99)
GLUCOSE UR STRIP.AUTO-MCNC: NEGATIVE MG/DL
GLUCOSE UR STRIP.AUTO-MCNC: NEGATIVE MG/DL
HCT VFR BLD AUTO: 37.3 % (ref 36–48)
HGB BLD-MCNC: 12.5 G/DL (ref 12–16)
HGB UR QL STRIP.AUTO: NEGATIVE
HGB UR QL STRIP.AUTO: NEGATIVE
INR PPP: 0.85 (ref 0.84–1.16)
KETONES UR STRIP.AUTO-MCNC: NEGATIVE MG/DL
KETONES UR STRIP.AUTO-MCNC: NEGATIVE MG/DL
LEUKOCYTE ESTERASE UR QL STRIP.AUTO: NEGATIVE
LEUKOCYTE ESTERASE UR QL STRIP.AUTO: NEGATIVE
LYMPHOCYTES # BLD: 1.6 K/UL (ref 1–5.1)
LYMPHOCYTES NFR BLD: 37.5 %
MCH RBC QN AUTO: 31.7 PG (ref 26–34)
MCHC RBC AUTO-ENTMCNC: 33.4 G/DL (ref 31–36)
MCV RBC AUTO: 94.7 FL (ref 80–100)
MONOCYTES # BLD: 0.4 K/UL (ref 0–1.3)
MONOCYTES NFR BLD: 9 %
NEUTROPHILS # BLD: 2.1 K/UL (ref 1.7–7.7)
NEUTROPHILS NFR BLD: 49.8 %
NITRITE UR QL STRIP.AUTO: NEGATIVE
NITRITE UR QL STRIP.AUTO: NEGATIVE
PERFORMED ON: NORMAL
PH UR STRIP.AUTO: 7.5 [PH] (ref 5–8)
PH UR STRIP.AUTO: 7.5 [PH] (ref 5–8)
PLATELET # BLD AUTO: 291 K/UL (ref 135–450)
PMV BLD AUTO: 6.4 FL (ref 5–10.5)
POTASSIUM SERPL-SCNC: 3.9 MMOL/L (ref 3.5–5.1)
PROT SERPL-MCNC: 6.5 G/DL (ref 6.4–8.2)
PROT UR STRIP.AUTO-MCNC: NEGATIVE MG/DL
PROT UR STRIP.AUTO-MCNC: NEGATIVE MG/DL
PROTHROMBIN TIME: 11.6 SEC (ref 11.5–14.8)
RBC # BLD AUTO: 3.94 M/UL (ref 4–5.2)
SODIUM SERPL-SCNC: 137 MMOL/L (ref 136–145)
SP GR UR STRIP.AUTO: 1.01 (ref 1–1.03)
SP GR UR STRIP.AUTO: 1.01 (ref 1–1.03)
SPECIMEN STATUS: NORMAL
TROPONIN, HIGH SENSITIVITY: 12 NG/L (ref 0–14)
UA COMPLETE W REFLEX CULTURE PNL UR: NORMAL
UA COMPLETE W REFLEX CULTURE PNL UR: NORMAL
UA DIPSTICK W REFLEX MICRO PNL UR: NORMAL
UA DIPSTICK W REFLEX MICRO PNL UR: NORMAL
URN SPEC COLLECT METH UR: NORMAL
URN SPEC COLLECT METH UR: NORMAL
UROBILINOGEN UR STRIP-ACNC: 0.2 E.U./DL
UROBILINOGEN UR STRIP-ACNC: 0.2 E.U./DL
WBC # BLD AUTO: 4.3 K/UL (ref 4–11)

## 2023-11-08 PROCEDURE — 85610 PROTHROMBIN TIME: CPT

## 2023-11-08 PROCEDURE — 84484 ASSAY OF TROPONIN QUANT: CPT

## 2023-11-08 PROCEDURE — 70496 CT ANGIOGRAPHY HEAD: CPT

## 2023-11-08 PROCEDURE — 1200000000 HC SEMI PRIVATE

## 2023-11-08 PROCEDURE — 6370000000 HC RX 637 (ALT 250 FOR IP): Performed by: INTERNAL MEDICINE

## 2023-11-08 PROCEDURE — 71045 X-RAY EXAM CHEST 1 VIEW: CPT

## 2023-11-08 PROCEDURE — 6360000004 HC RX CONTRAST MEDICATION: Performed by: PHYSICIAN ASSISTANT

## 2023-11-08 PROCEDURE — 93005 ELECTROCARDIOGRAM TRACING: CPT | Performed by: PHYSICIAN ASSISTANT

## 2023-11-08 PROCEDURE — 99285 EMERGENCY DEPT VISIT HI MDM: CPT

## 2023-11-08 PROCEDURE — 97530 THERAPEUTIC ACTIVITIES: CPT

## 2023-11-08 PROCEDURE — 6370000000 HC RX 637 (ALT 250 FOR IP): Performed by: PHYSICIAN ASSISTANT

## 2023-11-08 PROCEDURE — 6360000002 HC RX W HCPCS: Performed by: INTERNAL MEDICINE

## 2023-11-08 PROCEDURE — 81003 URINALYSIS AUTO W/O SCOPE: CPT

## 2023-11-08 PROCEDURE — 2580000003 HC RX 258: Performed by: INTERNAL MEDICINE

## 2023-11-08 PROCEDURE — 85025 COMPLETE CBC W/AUTO DIFF WBC: CPT

## 2023-11-08 PROCEDURE — 70450 CT HEAD/BRAIN W/O DYE: CPT

## 2023-11-08 PROCEDURE — 80053 COMPREHEN METABOLIC PANEL: CPT

## 2023-11-08 PROCEDURE — 97166 OT EVAL MOD COMPLEX 45 MIN: CPT

## 2023-11-08 RX ORDER — ACETAMINOPHEN 325 MG/1
650 TABLET ORAL EVERY 6 HOURS PRN
Status: DISCONTINUED | OUTPATIENT
Start: 2023-11-08 | End: 2023-11-09 | Stop reason: HOSPADM

## 2023-11-08 RX ORDER — BISACODYL 5 MG/1
10 TABLET, DELAYED RELEASE ORAL 2 TIMES DAILY
Status: DISCONTINUED | OUTPATIENT
Start: 2023-11-08 | End: 2023-11-09 | Stop reason: HOSPADM

## 2023-11-08 RX ORDER — AMLODIPINE BESYLATE 2.5 MG/1
2.5 TABLET ORAL DAILY
Status: DISCONTINUED | OUTPATIENT
Start: 2023-11-09 | End: 2023-11-09 | Stop reason: HOSPADM

## 2023-11-08 RX ORDER — CARBAMAZEPINE 200 MG/1
200 TABLET ORAL 4 TIMES DAILY
Status: DISCONTINUED | OUTPATIENT
Start: 2023-11-08 | End: 2023-11-09 | Stop reason: HOSPADM

## 2023-11-08 RX ORDER — SODIUM CHLORIDE 0.9 % (FLUSH) 0.9 %
5-40 SYRINGE (ML) INJECTION PRN
Status: DISCONTINUED | OUTPATIENT
Start: 2023-11-08 | End: 2023-11-09 | Stop reason: HOSPADM

## 2023-11-08 RX ORDER — SODIUM CHLORIDE 9 MG/ML
INJECTION, SOLUTION INTRAVENOUS PRN
Status: DISCONTINUED | OUTPATIENT
Start: 2023-11-08 | End: 2023-11-09 | Stop reason: HOSPADM

## 2023-11-08 RX ORDER — SPIRONOLACTONE 25 MG/1
50 TABLET ORAL DAILY
Status: DISCONTINUED | OUTPATIENT
Start: 2023-11-09 | End: 2023-11-09 | Stop reason: HOSPADM

## 2023-11-08 RX ORDER — MELOXICAM 7.5 MG/1
15 TABLET ORAL DAILY PRN
Status: DISCONTINUED | OUTPATIENT
Start: 2023-11-08 | End: 2023-11-09 | Stop reason: HOSPADM

## 2023-11-08 RX ORDER — ROPINIROLE 2 MG/1
2 TABLET, FILM COATED ORAL EVERY EVENING
Status: DISCONTINUED | OUTPATIENT
Start: 2023-11-08 | End: 2023-11-09 | Stop reason: HOSPADM

## 2023-11-08 RX ORDER — ATORVASTATIN CALCIUM 40 MG/1
40 TABLET, FILM COATED ORAL NIGHTLY
Status: DISCONTINUED | OUTPATIENT
Start: 2023-11-08 | End: 2023-11-09 | Stop reason: HOSPADM

## 2023-11-08 RX ORDER — ENOXAPARIN SODIUM 100 MG/ML
40 INJECTION SUBCUTANEOUS DAILY
Status: DISCONTINUED | OUTPATIENT
Start: 2023-11-08 | End: 2023-11-09 | Stop reason: HOSPADM

## 2023-11-08 RX ORDER — ONDANSETRON 4 MG/1
4 TABLET, ORALLY DISINTEGRATING ORAL EVERY 8 HOURS PRN
Status: DISCONTINUED | OUTPATIENT
Start: 2023-11-08 | End: 2023-11-09 | Stop reason: HOSPADM

## 2023-11-08 RX ORDER — SODIUM CHLORIDE 0.9 % (FLUSH) 0.9 %
5-40 SYRINGE (ML) INJECTION EVERY 12 HOURS SCHEDULED
Status: DISCONTINUED | OUTPATIENT
Start: 2023-11-08 | End: 2023-11-09 | Stop reason: HOSPADM

## 2023-11-08 RX ORDER — ASPIRIN 325 MG
325 TABLET, DELAYED RELEASE (ENTERIC COATED) ORAL DAILY
Status: DISCONTINUED | OUTPATIENT
Start: 2023-11-09 | End: 2023-11-09 | Stop reason: HOSPADM

## 2023-11-08 RX ORDER — ONDANSETRON 2 MG/ML
4 INJECTION INTRAMUSCULAR; INTRAVENOUS EVERY 6 HOURS PRN
Status: DISCONTINUED | OUTPATIENT
Start: 2023-11-08 | End: 2023-11-09 | Stop reason: HOSPADM

## 2023-11-08 RX ORDER — GABAPENTIN 300 MG/1
300 CAPSULE ORAL 2 TIMES DAILY
Status: DISCONTINUED | OUTPATIENT
Start: 2023-11-08 | End: 2023-11-09 | Stop reason: HOSPADM

## 2023-11-08 RX ORDER — ASPIRIN 325 MG
325 TABLET, DELAYED RELEASE (ENTERIC COATED) ORAL DAILY
Status: DISCONTINUED | OUTPATIENT
Start: 2023-11-08 | End: 2023-11-08 | Stop reason: SDUPTHER

## 2023-11-08 RX ORDER — ASPIRIN 325 MG
325 TABLET ORAL ONCE
Status: COMPLETED | OUTPATIENT
Start: 2023-11-08 | End: 2023-11-08

## 2023-11-08 RX ORDER — LANOLIN ALCOHOL/MO/W.PET/CERES
3 CREAM (GRAM) TOPICAL NIGHTLY PRN
Status: DISCONTINUED | OUTPATIENT
Start: 2023-11-08 | End: 2023-11-09 | Stop reason: HOSPADM

## 2023-11-08 RX ORDER — QUETIAPINE FUMARATE 50 MG/1
150 TABLET, EXTENDED RELEASE ORAL
Status: DISCONTINUED | OUTPATIENT
Start: 2023-11-08 | End: 2023-11-09 | Stop reason: HOSPADM

## 2023-11-08 RX ORDER — VENLAFAXINE HYDROCHLORIDE 37.5 MG/1
75 CAPSULE, EXTENDED RELEASE ORAL
Status: DISCONTINUED | OUTPATIENT
Start: 2023-11-09 | End: 2023-11-09 | Stop reason: HOSPADM

## 2023-11-08 RX ORDER — ACETAMINOPHEN 650 MG/1
650 SUPPOSITORY RECTAL EVERY 6 HOURS PRN
Status: DISCONTINUED | OUTPATIENT
Start: 2023-11-08 | End: 2023-11-09 | Stop reason: HOSPADM

## 2023-11-08 RX ORDER — POLYETHYLENE GLYCOL 3350 17 G/17G
17 POWDER, FOR SOLUTION ORAL DAILY PRN
Status: DISCONTINUED | OUTPATIENT
Start: 2023-11-08 | End: 2023-11-09 | Stop reason: HOSPADM

## 2023-11-08 RX ADMIN — IOPAMIDOL 75 ML: 755 INJECTION, SOLUTION INTRAVENOUS at 09:46

## 2023-11-08 RX ADMIN — ASPIRIN 325 MG: 325 TABLET ORAL at 10:58

## 2023-11-08 RX ADMIN — BISACODYL 10 MG: 5 TABLET, COATED ORAL at 22:07

## 2023-11-08 RX ADMIN — ROPINIROLE HYDROCHLORIDE 2 MG: 2 TABLET, FILM COATED ORAL at 17:25

## 2023-11-08 RX ADMIN — Medication 10 ML: at 22:08

## 2023-11-08 RX ADMIN — MELOXICAM 15 MG: 7.5 TABLET ORAL at 17:31

## 2023-11-08 RX ADMIN — CARBAMAZEPINE 200 MG: 200 TABLET ORAL at 17:25

## 2023-11-08 RX ADMIN — QUETIAPINE FUMARATE 150 MG: 50 TABLET, EXTENDED RELEASE ORAL at 22:06

## 2023-11-08 RX ADMIN — CARBAMAZEPINE 200 MG: 200 TABLET ORAL at 22:06

## 2023-11-08 RX ADMIN — ATORVASTATIN CALCIUM 40 MG: 40 TABLET, FILM COATED ORAL at 22:07

## 2023-11-08 RX ADMIN — Medication 3 MG: at 22:07

## 2023-11-08 RX ADMIN — ENOXAPARIN SODIUM 40 MG: 40 INJECTION SUBCUTANEOUS at 15:25

## 2023-11-08 ASSESSMENT — PAIN SCALES - GENERAL
PAINLEVEL_OUTOF10: 5
PAINLEVEL_OUTOF10: 7

## 2023-11-08 ASSESSMENT — PAIN DESCRIPTION - LOCATION
LOCATION: HEAD
LOCATION: GENERALIZED

## 2023-11-08 ASSESSMENT — PAIN DESCRIPTION - DESCRIPTORS: DESCRIPTORS: ACHING

## 2023-11-08 NOTE — ED NOTES
Patient identified as a positive fall risk on the ED triage fall screening. Patient placed in fall precautions which includes:  yellow fall risk bracelet on wrist and yellow socks on feet. Patient instructed on importance of not getting out of bed or ambulating without assistance for safety. Pt verbalized understanding.        Edwardo Renteria RN  11/08/23 4054

## 2023-11-08 NOTE — PROGRESS NOTES
4 Eyes Skin Assessment     The patient is being assess for  Admission    I agree that 2 RN's have performed a thorough Head to Toe Skin Assessment on the patient. ALL assessment sites listed below have been assessed. Areas assessed by both nurses:   [x]   Head, Face, and Ears   [x]   Shoulders, Back, and Chest  [x]   Arms, Elbows, and Hands   [x]   Coccyx, Sacrum, and Ischum  [x]   Legs, Feet, and Heels        Does the Patient have Skin Breakdown?   No         Edwin Prevention initiated:  NA   Wound Care Orders initiated:  NA      [facility] nurse consulted for Pressure Injury (Stage 3,4, Unstageable, DTI, NWPT, and Complex wounds):  No      Nurse 1 eSignature: Electronically signed by Alejo Wong RN on 11/8/23 at 4:22 PM EST    **SHARE this note so that the co-signing nurse is able to place an eSignature**    Nurse 2 eSignature: Electronically signed by Umesh Farmer RN on 11/8/23 at 7:05 PM EST

## 2023-11-08 NOTE — ED PROVIDER NOTES
I independently performed a history and physical on Ksenia Reyes. All diagnostic, treatment, and disposition decisions were made by myself in conjunction with the advanced practice provider/resident. Labs Reviewed   CBC WITH AUTO DIFFERENTIAL - Abnormal; Notable for the following components:       Result Value    RBC 3.94 (*)     All other components within normal limits   POCT GLUCOSE - Normal   COMPREHENSIVE METABOLIC PANEL W/ REFLEX TO MG FOR LOW K   TROPONIN   PROTIME-INR   URINALYSIS WITH REFLEX TO CULTURE   SAMPLE POSSIBLE BLOOD BANK TESTING   POCT GLUCOSE     XR CHEST PORTABLE   Final Result   No acute cardiopulmonary findings         CTA HEAD NECK W CONTRAST   Final Result   Unremarkable CTA of the head and neck. Anterior cervical fusion from C4 through C6         CT HEAD WO CONTRAST   Final Result   No acute intracranial abnormality. RECOMMENDATIONS:   Results discussed with LAURA GONZÁLES by Aby Noonan. Marisel Verdugo MD at 10:05 am on   11/8/2023           The Ekg interpreted by me shows  normal sinus rhythm with a rate of 62  Axis is   Left axis deviation  QTc is  normal  Intervals and Durations are unremarkable. ST Segments: normal  Nonspecific T wave abnormalities have improved compared to previous performed 12/15/22    For further details of [facility] emergency department encounter, please see the STEFFANY/resident's documentation. I personally saw the patient and performed a substantive portion of the visit including all aspects of the medical decision making. Briefly, this is a 31-year-old female, presenting with concerns for possible strokelike symptoms. Patient reports that her last known normal was when she went to bed last night around 10 PM.  She states that symptoms were present when she woke this morning. Cannot tell me what exactly the time was when she woke up. There was concern for some dysarthria and right-sided weakness.   Initially evaluated by STEFFANY and have lateralizing deficits at that time and additionally was unresponsive to most stimuli. Upon my assessment, patient is alert and oriented x4. She has no lateralizing deficits. May be some mild dysarthria. Able to follow commands. Unclear how much of her exam is true neurologic deficit versus uncooperation. Stroke team was consulted. Given her rapidly improving NIH compared to when evaluated by STEFFANY, no current indications for thrombolytic therapy. Her imaging is negative. She will be hospitalized for further work-up and treatment of possible TIA/stroke. Patient is comfortable and in agreement with plan of care. I personally saw the patient and independently provided 0 minutes of non-concurrent critical care out of the total shared critical care time provided. I, Dr. Jose Flores, am the primary clinician of record. 1. Stroke-like symptoms    2. Weakness of right side of body    3. Dysarthria          Comment: Please note this report has been produced using speech recognition software and may contain errors related to that system including errors in grammar, punctuation, and spelling, as well as words and phrases that may be inappropriate. If there are any questions or concerns please feel free to contact the dictating provider for clarification.        Angelica Jerome MD  11/08/23 6128

## 2023-11-08 NOTE — H&P
[x]No    --------------------------------------------------------------------------------------------------------------------------------------------------------------------    Imaging:     CTA HEAD NECK W CONTRAST    Result Date: 11/8/2023  EXAMINATION: CTA OF THE HEAD AND NECK WITH CONTRAST 11/8/2023 9:46 am: TECHNIQUE: CTA of the head and neck was performed with the administration of intravenous contrast. Multiplanar reformatted images are provided for review. MIP images are provided for review. Stenosis of the internal carotid arteries measured using NASCET criteria. Automated exposure control, iterative reconstruction, and/or weight based adjustment of the mA/kV was utilized to reduce the radiation dose to as low as reasonably achievable. COMPARISON: None. HISTORY: ORDERING SYSTEM PROVIDED HISTORY: Stroke Symptoms TECHNOLOGIST PROVIDED HISTORY: Has a \"code stroke\" or \"stroke alert\" been called? ->Yes Reason for exam:->Stroke Symptoms Decision Support Exception - unselect if not a suspected or confirmed emergency medical condition->Emergency Medical Condition (MA) Reason for Exam: rt side weakness, h/o tia FINDINGS: CTA NECK: AORTIC ARCH/ARCH VESSELS: No dissection or arterial injury. No significant stenosis of the brachiocephalic or subclavian arteries. CAROTID ARTERIES: No dissection, arterial injury, or hemodynamically significant stenosis by NASCET criteria. VERTEBRAL ARTERIES: No dissection, arterial injury, or significant stenosis. SOFT TISSUES: The lung apices are clear. No cervical or superior mediastinal lymphadenopathy. The larynx and pharynx are unremarkable. No acute abnormality of the salivary and thyroid glands. BONES: No acute osseous abnormality. Anterior fusion is identified from C4 through C6 CTA HEAD: ANTERIOR CIRCULATION: No significant stenosis of the intracranial internal carotid, anterior cerebral, or middle cerebral arteries. No aneurysm.  POSTERIOR CIRCULATION: No significant opacification noted. Trace mucosal thickening is seen in the ethmoid air cells. SOFT TISSUES/SKULL:  No acute abnormality of the visualized skull or soft tissues. No acute intracranial abnormality. RECOMMENDATIONS: Results discussed with LAURA GONZÁLES by Joshua Ocasio. Palma Paul MD at 10:05 am on 11/8/2023     MRI LUMBAR SPINE WO CONTRAST    Result Date: 10/30/2023  EXAMINATION: MRI OF THE LUMBAR SPINE WITHOUT CONTRAST, 10/25/2023 1:24 pm TECHNIQUE: Multiplanar multisequence MRI of the lumbar spine was performed without the administration of intravenous contrast. COMPARISON: Radiographs 10/03/2023. HISTORY: ORDERING SYSTEM PROVIDED HISTORY: Lumbar radiculopathy TECHNOLOGIST PROVIDED HISTORY: Reason for exam:->Back Pain What is the sedation requirement?->None Reason for Exam: Neck Pain FINDINGS: BONES/ALIGNMENT: Postsurgical changes status post posterior lumbar/interbody fusion at L4-5 present. There is grade 1 retrolisthesis of L1 relative to L2, L2 relative to L3, L3 relative to L4 and L5 relative to S1. No acute fracture is identified. Bone marrow signal intensity is normal.  There is disc desiccation, disc space narrowing and type 1 degenerative endplate changes at X84-F0. SPINAL CORD: The conus medullaris is normal in size and signal intensities and terminates normally. SOFT TISSUES: No paraspinal mass is identified. T12-L1: There is a disc bulge present. No significant spinal canal stenosis or neural foraminal narrowing is present. L1-L2: There is a disc bulge, facet arthropathy and posterior osteophyte formation. No significant spinal canal stenosis or neural foraminal narrowing is present. L2-L3: There is no disc bulge or protrusion present. There is no significant spinal canal stenosis or neural foraminal narrowing present. There is bilateral facet arthropathy. L3-L4: There is a disc bulge, facet arthropathy and posterior osteophyte formation.   There is severe right and mild left neural foraminal

## 2023-11-08 NOTE — ED NOTES
Code Stroke  @0926 Norman FAYE called Code Stroke   @0926 Called CT  @6415 Williams Santiago  Stroke Team  @9170 Called Lab  @3272 Priscilla Miller from [facility] Stroke Team called back and spoke to Norman Gilbert

## 2023-11-08 NOTE — ED PROVIDER NOTES
-- -- -- -- -- -- -- --       Physical Exam  Vitals and nursing note reviewed. Constitutional:       Appearance: Normal appearance. She is well-developed. She is not diaphoretic. Comments: Awake but not responding to questions   HENT:      Head: Normocephalic and atraumatic. Nose: Nose normal.      Mouth/Throat:      Mouth: Mucous membranes are moist.      Pharynx: No oropharyngeal exudate. Eyes:      General:         Right eye: No discharge. Left eye: No discharge. Extraocular Movements: Extraocular movements intact. Conjunctiva/sclera: Conjunctivae normal.      Pupils: Pupils are equal, round, and reactive to light. Cardiovascular:      Rate and Rhythm: Normal rate and regular rhythm. Heart sounds: Normal heart sounds. No murmur heard. No friction rub. No gallop. Pulmonary:      Effort: Pulmonary effort is normal. No respiratory distress. Breath sounds: Normal breath sounds. No wheezing. Abdominal:      General: Bowel sounds are normal. There is no distension. Palpations: Abdomen is soft. Tenderness: There is no abdominal tenderness. Musculoskeletal:         General: Normal range of motion. Cervical back: Normal range of motion. Skin:     General: Skin is warm and dry. Capillary Refill: Capillary refill takes less than 2 seconds. Neurological:      Mental Status: She is alert. Comments: Can not assess          DIAGNOSTIC RESULTS   LABS:    Labs Reviewed   CBC WITH AUTO DIFFERENTIAL - Abnormal; Notable for the following components:       Result Value    RBC 3.94 (*)     All other components within normal limits   POCT GLUCOSE - Normal   COMPREHENSIVE METABOLIC PANEL W/ REFLEX TO MG FOR LOW K   TROPONIN   PROTIME-INR   URINALYSIS WITH REFLEX TO CULTURE   SAMPLE POSSIBLE BLOOD BANK TESTING   POCT GLUCOSE       When ordered only abnormal lab results are displayed.  All other labs were within normal range or not returned as of this Again discussed with stroke team, no thrombolytic tx at this time. [facility] radiology, discussed negative non-contrast CT  Admitting team, discussed plan for continued care with Dr. Nataly Wong. CC/HPI Summary, DDx, ED Course, and Reassessment: afebrile, non toxic, HTN, SpO2 on room air of 98%, on my assessment, pt not answering questions, not responding to my questions. Attending went to bedside and she answered her questions,states her NIH is 2 and LKW was last night before bed. pt repors right sided weakness, onset last night,  Symptoms are improving now, NIH of 2 (consiousness and mild dysphagia) responsing to questions, one word answers. H/O TIAs, not anticoagulated. stroke team advised no tnk, due to the rapidly improving symptoms. pt with HTN, provided with aspirin. labs are reassurring. CXR no acute findings, CTA unremarkable, CT head no acute findings, EKG is NSR. Pt admitted in stable condition to hospitalist.   I am the Primary Clinician of Record. FINAL IMPRESSION      1. Weakness of right side of body    2. Dysarthria    3. Stroke-like symptoms          DISPOSITION/PLAN     DISPOSITION Decision To Admit 11/08/2023 10:56:01 AM      PATIENT REFERRED TO:  No follow-up provider specified.     DISCHARGE MEDICATIONS:  New Prescriptions    No medications on file       DISCONTINUED MEDICATIONS:  Discontinued Medications    No medications on file              (Please note that portions of this note were completed with a voice recognition program.  Efforts were made to edit the dictations but occasionally words are mis-transcribed.)    Ronal Modi PA-C (electronically signed)            Ronal Modi PA-C  11/08/23 1450

## 2023-11-08 NOTE — PROGRESS NOTES
Report received from RN/Julio. Patient arrived to unit via stretcher from ER. Admission assessment and vital signs complete. Skin assessment complete with this writer and 2nd RN. Patient is alert and oriented, she speaks very softly, NIH and neurological assessments complete. Right sided weakness noted to right arm and leg. No other abnormalities noted. Swallowing evaluation complete, no choking noted. Patient oriented to unit and room. She denies pain at this time. Telemetry applied. Call light is within reach. 1430- OT/PT at bedside. Patient assisted from bed to chair. 1530- Patient no longer exhibits right sided weakness. She is able to stand and pivot from chair to bed.

## 2023-11-08 NOTE — PROGRESS NOTES
Occupational Therapy  Facility/Department: [address] [place] [address]  Occupational Therapy Initial Assessment/Treatment    Name: Jaret Mckeon  : 1950  MRN: [id number]  Date of Service: 2023    Discharge Recommendations:  24 hour supervision or assist, Home with Home health OT  OT Equipment Recommendations  Equipment Needed: Yes  Mobility Devices: Juany Sprinkles; ADL Assistive Devices  Walker: Rolling  ADL Assistive Devices: Shower Chair with back       Patient Diagnosis(es): The primary encounter diagnosis was Stroke-like symptoms. Diagnoses of Weakness of right side of body and Dysarthria were also pertinent to this visit. Past Medical History:  has a past medical history of Anxiety, Borderline personality disorder ([address]), Chronic pain, Colon disorder, Constipation, Diabetes mellitus ([address]), Diabetic neuropathy ([address]), Electric shock, GERD (gastroesophageal reflux disease), Heart murmur, Hyperlipidemia, Hypertension, Insomnia, Major depressive disorder, recurrent ([address]), Morbid obesity ([address]), Neuropathy, Osteoarthrosis, Restless legs syndrome, RLS (restless legs syndrome), Small bowel obstruction ([address]), Suicidal ideation, Unspecified hypothyroidism, Vitamin deficiency, and Volvulus of small intestine ([address]). Past Surgical History:  has a past surgical history that includes Appendectomy; back surgery; knee surgery (Left); joint replacement (Bilateral); Elbow surgery (Right); Tonsillectomy; Shoulder arthroscopy (Right, 2016); Colonoscopy; Carpal tunnel release (Left, 2017); Shoulder arthroscopy (Left, 2018); Carpal tunnel release (Right, 2019); Small intestine surgery (N/A, 2019); Hand surgery (Left, 01/10/2023); and eye surgery (). Assessment   Pt is 67 yo F presenting with diagnosis of TIA. [place], [place] [address]. PTA pt was ind for ADLs/transfers/ambulation no AD including active . At this time pt is CBA bed mobility/footwear and CGA transfers with RW.  Assist

## 2023-11-09 ENCOUNTER — APPOINTMENT (OUTPATIENT)
Dept: MRI IMAGING | Age: 73
End: 2023-11-09
Payer: COMMERCIAL

## 2023-11-09 VITALS
SYSTOLIC BLOOD PRESSURE: 133 MMHG | OXYGEN SATURATION: 97 % | DIASTOLIC BLOOD PRESSURE: 74 MMHG | HEIGHT: 66 IN | WEIGHT: 175.7 LBS | RESPIRATION RATE: 16 BRPM | TEMPERATURE: 98 F | BODY MASS INDEX: 28.24 KG/M2 | HEART RATE: 81 BPM

## 2023-11-09 LAB
ALBUMIN SERPL-MCNC: 4.2 G/DL (ref 3.4–5)
ANION GAP SERPL CALCULATED.3IONS-SCNC: 9 MMOL/L (ref 3–16)
BUN SERPL-MCNC: 17 MG/DL (ref 7–20)
CALCIUM SERPL-MCNC: 9.4 MG/DL (ref 8.3–10.6)
CHLORIDE SERPL-SCNC: 99 MMOL/L (ref 99–110)
CHOLEST SERPL-MCNC: 232 MG/DL (ref 0–199)
CO2 SERPL-SCNC: 30 MMOL/L (ref 21–32)
CREAT SERPL-MCNC: 0.6 MG/DL (ref 0.6–1.2)
DEPRECATED RDW RBC AUTO: 12.6 % (ref 12.4–15.4)
EKG ATRIAL RATE: 62 BPM
EKG DIAGNOSIS: NORMAL
EKG P AXIS: 69 DEGREES
EKG P-R INTERVAL: 186 MS
EKG Q-T INTERVAL: 444 MS
EKG QRS DURATION: 90 MS
EKG QTC CALCULATION (BAZETT): 450 MS
EKG R AXIS: -1 DEGREES
EKG T AXIS: 46 DEGREES
EKG VENTRICULAR RATE: 62 BPM
GFR SERPLBLD CREATININE-BSD FMLA CKD-EPI: >60 ML/MIN/{1.73_M2}
GLUCOSE SERPL-MCNC: 93 MG/DL (ref 70–99)
HCT VFR BLD AUTO: 37.3 % (ref 36–48)
HDLC SERPL-MCNC: 91 MG/DL (ref 40–60)
HGB BLD-MCNC: 12.7 G/DL (ref 12–16)
LDLC SERPL CALC-MCNC: 118 MG/DL
MAGNESIUM SERPL-MCNC: 2.4 MG/DL (ref 1.8–2.4)
MCH RBC QN AUTO: 32.5 PG (ref 26–34)
MCHC RBC AUTO-ENTMCNC: 34.2 G/DL (ref 31–36)
MCV RBC AUTO: 95.1 FL (ref 80–100)
PHOSPHATE SERPL-MCNC: 4.6 MG/DL (ref 2.5–4.9)
PLATELET # BLD AUTO: 278 K/UL (ref 135–450)
PMV BLD AUTO: 6.3 FL (ref 5–10.5)
POTASSIUM SERPL-SCNC: 3.8 MMOL/L (ref 3.5–5.1)
RBC # BLD AUTO: 3.92 M/UL (ref 4–5.2)
SODIUM SERPL-SCNC: 138 MMOL/L (ref 136–145)
TRIGL SERPL-MCNC: 117 MG/DL (ref 0–150)
VLDLC SERPL CALC-MCNC: 23 MG/DL
WBC # BLD AUTO: 4.9 K/UL (ref 4–11)

## 2023-11-09 PROCEDURE — 36415 COLL VENOUS BLD VENIPUNCTURE: CPT

## 2023-11-09 PROCEDURE — 6370000000 HC RX 637 (ALT 250 FOR IP): Performed by: INTERNAL MEDICINE

## 2023-11-09 PROCEDURE — 80061 LIPID PANEL: CPT

## 2023-11-09 PROCEDURE — 97530 THERAPEUTIC ACTIVITIES: CPT

## 2023-11-09 PROCEDURE — 93010 ELECTROCARDIOGRAM REPORT: CPT | Performed by: INTERNAL MEDICINE

## 2023-11-09 PROCEDURE — 97116 GAIT TRAINING THERAPY: CPT

## 2023-11-09 PROCEDURE — 70551 MRI BRAIN STEM W/O DYE: CPT

## 2023-11-09 PROCEDURE — 6360000002 HC RX W HCPCS: Performed by: INTERNAL MEDICINE

## 2023-11-09 PROCEDURE — 80069 RENAL FUNCTION PANEL: CPT

## 2023-11-09 PROCEDURE — 97162 PT EVAL MOD COMPLEX 30 MIN: CPT

## 2023-11-09 PROCEDURE — 99222 1ST HOSP IP/OBS MODERATE 55: CPT | Performed by: PSYCHIATRY & NEUROLOGY

## 2023-11-09 PROCEDURE — 85027 COMPLETE CBC AUTOMATED: CPT

## 2023-11-09 PROCEDURE — 2580000003 HC RX 258: Performed by: INTERNAL MEDICINE

## 2023-11-09 PROCEDURE — 83735 ASSAY OF MAGNESIUM: CPT

## 2023-11-09 RX ORDER — ROPINIROLE 0.5 MG/1
0.25 TABLET, FILM COATED ORAL ONCE
Status: COMPLETED | OUTPATIENT
Start: 2023-11-09 | End: 2023-11-09

## 2023-11-09 RX ORDER — LORAZEPAM 2 MG/ML
1 INJECTION INTRAMUSCULAR ONCE
Status: COMPLETED | OUTPATIENT
Start: 2023-11-09 | End: 2023-11-09

## 2023-11-09 RX ADMIN — CARBAMAZEPINE 200 MG: 200 TABLET ORAL at 09:31

## 2023-11-09 RX ADMIN — ENOXAPARIN SODIUM 40 MG: 40 INJECTION SUBCUTANEOUS at 09:32

## 2023-11-09 RX ADMIN — CARBAMAZEPINE 200 MG: 200 TABLET ORAL at 17:25

## 2023-11-09 RX ADMIN — SPIRONOLACTONE 50 MG: 25 TABLET ORAL at 09:30

## 2023-11-09 RX ADMIN — LORAZEPAM 1 MG: 2 INJECTION INTRAMUSCULAR; INTRAVENOUS at 16:32

## 2023-11-09 RX ADMIN — Medication 10 ML: at 09:34

## 2023-11-09 RX ADMIN — LEVOTHYROXINE SODIUM 75 MCG: 0.03 TABLET ORAL at 05:45

## 2023-11-09 RX ADMIN — VENLAFAXINE HYDROCHLORIDE 75 MG: 37.5 CAPSULE, EXTENDED RELEASE ORAL at 09:30

## 2023-11-09 RX ADMIN — CARBAMAZEPINE 200 MG: 200 TABLET ORAL at 13:16

## 2023-11-09 RX ADMIN — BISACODYL 10 MG: 5 TABLET, COATED ORAL at 09:31

## 2023-11-09 RX ADMIN — ACETAMINOPHEN 650 MG: 325 TABLET ORAL at 09:30

## 2023-11-09 RX ADMIN — ROPINIROLE HYDROCHLORIDE 0.25 MG: 0.5 TABLET, FILM COATED ORAL at 05:51

## 2023-11-09 RX ADMIN — ROPINIROLE HYDROCHLORIDE 2 MG: 2 TABLET, FILM COATED ORAL at 17:25

## 2023-11-09 RX ADMIN — ASPIRIN 325 MG: 325 TABLET, COATED ORAL at 09:32

## 2023-11-09 ASSESSMENT — PAIN DESCRIPTION - LOCATION
LOCATION: HEAD

## 2023-11-09 ASSESSMENT — PAIN DESCRIPTION - DESCRIPTORS
DESCRIPTORS: ACHING
DESCRIPTORS: OTHER (COMMENT)

## 2023-11-09 ASSESSMENT — PAIN DESCRIPTION - ONSET: ONSET: ON-GOING

## 2023-11-09 ASSESSMENT — PAIN DESCRIPTION - ORIENTATION: ORIENTATION: OTHER (COMMENT)

## 2023-11-09 ASSESSMENT — PAIN SCALES - GENERAL
PAINLEVEL_OUTOF10: 6
PAINLEVEL_OUTOF10: 7

## 2023-11-09 ASSESSMENT — PAIN DESCRIPTION - PAIN TYPE: TYPE: ACUTE PAIN

## 2023-11-09 ASSESSMENT — PAIN - FUNCTIONAL ASSESSMENT: PAIN_FUNCTIONAL_ASSESSMENT: ACTIVITIES ARE NOT PREVENTED

## 2023-11-09 ASSESSMENT — PAIN DESCRIPTION - FREQUENCY: FREQUENCY: CONTINUOUS

## 2023-11-09 NOTE — CARE COORDINATION
CASE MANAGEMENT DISCHARGE SUMMARY      Discharge to: home, no needs       IMM given: (date) 11/8/23         Transportation:    Family/car: sister        Confirmed discharge plan with:     Patient: yes      Family:  no - pt to call          RN, name: Kenny Petersen
Potential DME:    Patient expects to discharge to: [address] [place] [address] Road for transportation at discharge: Self    Financial    Payor: Miguel Tijerina / Plan: Delmar Farmer / Product Type: *No Product type* /     Does insurance require precert for SNF: Yes    Potential assistance Purchasing Medications: No  Meds-to-Beds request:        [phone] [phone] [address] [address] [place] [facility] [address] [facility],[address]  Phone: [phone] Fax: [phone]      Notes:    Factors facilitating achievement of predicted outcomes: Family support, Cooperative, and Pleasant    Barriers to discharge: Decreased endurance    Additional Case Management Notes: CM met with pt. From home alone. IPTA. Sister can assist at dc. Pt denies needs at this time. The Plan for Transition of Care is related to the following treatment goals of TIA (transient ischemic attack) [G45.9]  Dysarthria [R47.1]  Weakness of right side of body [R53.1]  Stroke-like symptoms [P67.40]    IF APPLICABLE: The Patient and/or patient representative Elizabeth Parra and her family were provided with a choice of provider and agrees with the discharge plan. Freedom of choice list with basic dialogue that supports the patient's individualized plan of care/goals and shares the quality data associated with the providers was provided to: Patient   Patient Representative Name:       The Patient and/or Patient Representative Agree with the Discharge Plan?  Yes    Rodriguez Pugh RN  Case Management Department  Ph: [phone]

## 2023-11-09 NOTE — PLAN OF CARE
TODAY'S DATE:  11/9/2023      Current NIHSS 3      Discussed personal risk factors for Stroke/TIA with patient/family, and ways to reduce the risk for a recurrent stroke. Patient's personal risk factors which were identified are:   []   Alcohol Abuse: check with your physician before any alcohol consumption. []   Atrial fibrillation: may cause blood clots  []   Drug Abuse: Seek help, talk with your doctor  []   Clotting Disorder  [x]   Diabetes  []   Family history of stroke or heart disease  [x]   High Blood Pressure/Hypertension: work with your physician  [x]   High cholesterol: monitor cholesterol levels with your physician  [x]   Overweight/Obesity: work with your physician for your ideal body weight  []   Physical Inactivity: get regular exercise as directed by your physician  [x]   Personal history of previous TIA or stroke  []   Poor Diet: decrease salt (sodium) in your diet, follow diet directed by physician  []   Smoking: stop smoking      Reviewed the Following Education with Patient and/or Family:   - Signs and Symptoms of Stroke  - Personal risk factors   - How to activate EMS (911)   - Importance of Follow Up Appointments at Discharge   - Importance of Compliance with Medications Prescribed at Discharge  - Available community resources and stroke advocacy groups if needed    Patient and/or family member: verbalized understanding. Stroke Education booklet given to patient/family (or verified, if given already), which reviews above information.  yes         Electronically signed by Nolberto Martin RN on 11/9/2023 at 10:13 AM

## 2023-11-09 NOTE — PROGRESS NOTES
Physical Therapy  Facility/Department: [address] [place] [address]  Physical Therapy Initial Assessment    Name: Ksenia Reyes  : 1950  MRN: [id number]  Date of Service: 2023    Discharge Recommendations:  Home with assist PRN, Home with Home health PT   PT Equipment Recommendations  Equipment Needed: No      Patient Diagnosis(es): The primary encounter diagnosis was Stroke-like symptoms. Diagnoses of Weakness of right side of body and Dysarthria were also pertinent to this visit. Past Medical History:  has a past medical history of Anxiety, Borderline personality disorder ([address]), Chronic pain, Colon disorder, Constipation, Diabetes mellitus ([address]), Diabetic neuropathy ([address]), Electric shock, GERD (gastroesophageal reflux disease), Heart murmur, Hyperlipidemia, Hypertension, Insomnia, Major depressive disorder, recurrent ([address]), Morbid obesity ([address]), Neuropathy, Osteoarthrosis, Restless legs syndrome, RLS (restless legs syndrome), Small bowel obstruction ([address]), Suicidal ideation, Unspecified hypothyroidism, Vitamin deficiency, and Volvulus of small intestine ([address]). Past Surgical History:  has a past surgical history that includes Appendectomy; back surgery; knee surgery (Left); joint replacement (Bilateral); Elbow surgery (Right); Tonsillectomy; Shoulder arthroscopy (Right, 2016); Colonoscopy; Carpal tunnel release (Left, 2017); Shoulder arthroscopy (Left, 2018); Carpal tunnel release (Right, 2019); Small intestine surgery (N/A, 2019); Hand surgery (Left, 01/10/2023); and eye surgery (). Assessment   Body Structures, Functions, Activity Limitations Requiring Skilled Therapeutic Intervention: Decreased functional mobility ; Decreased strength;Decreased endurance;Decreased sensation;Decreased balance; Increased pain  Assessment: Pt is a 68year old female admitted with R sided weakness, concerning for TIA.  Pt able to complete bed mobility with supervision, functional transfers with assistance  Interventions: Verbal cues; Safety awareness training  Sit to Stand: Stand-by assistance  Stand to Sit: Stand-by assistance  Stand Pivot Transfers: Stand-by assistance  Tub/Shower Transfer: Stand-by assistance  Gait Training  Gait Training: Yes  Gait  Overall Level of Assistance: Stand-by assistance;Contact-guard assistance  Distance (ft): 150 Feet (+ 10ft)  Assistive Device: Gait belt (no AD)  Interventions: Verbal cues; Safety awareness training  Base of Support: Widened  Speed/Kathryn: Slow  Step Length: Right shortened;Left shortened  Gait Abnormalities: Decreased step clearance                          AM-PAC Score  AM-PAC Inpatient Mobility Raw Score : 20 (11/09/23 0935)  AM-PAC Inpatient T-Scale Score : 47.67 (11/09/23 0935)  Mobility Inpatient CMS 0-100% Score: 35.83 (11/09/23 0935)  Mobility Inpatient CMS G-Code Modifier : CJ (11/09/23 0935)            Goals  Short Term Goals  Time Frame for Short Term Goals: 1 week- 11/16/23  Short Term Goal 1: Pt will complete bed mobility with IND  Short Term Goal 2: Pt will complete functional transfers with IND  Short Term Goal 3: Pt will ambulate 150ft or greater with IND without AD  Short Term Goal 4: Pt will participate in 10-15 reps BLE exercises for strengthening by 11/13/23  Patient Goals   Patient Goals : \"go home soon\"       Education  Patient Education  Education Given To: Patient  Education Provided: Role of Therapy;Plan of Care;Precautions;Transfer Training; Fall Prevention Strategies  Education Method: Demonstration;Verbal  Barriers to Learning: None  Education Outcome: Verbalized understanding;Demonstrated understanding      Therapy Time   Individual Concurrent Group Co-treatment   Time In 0857         Time Out 0930         Minutes 33         Timed Code Treatment Minutes: 23 Minutes (10 min eval)       Shashank Cardoso, PT, DPT       If pt is unable to be seen after this session, please let this note serve as discharge summary.   Please see case

## 2023-11-09 NOTE — PLAN OF CARE
TODAY'S DATE:  11/9/2023      Current NIHSS 2      Discussed personal risk factors for Stroke/TIA with patient/family, and ways to reduce the risk for a recurrent stroke. Patient's personal risk factors which were identified are:   []   Alcohol Abuse: check with your physician before any alcohol consumption. []   Atrial fibrillation: may cause blood clots  []   Drug Abuse: Seek help, talk with your doctor  []   Clotting Disorder  [x]   Diabetes  []   Family history of stroke or heart disease  [x]   High Blood Pressure/Hypertension: work with your physician  [x]   High cholesterol: monitor cholesterol levels with your physician  [x]   Overweight/Obesity: work with your physician for your ideal body weight  []   Physical Inactivity: get regular exercise as directed by your physician  [x]   Personal history of previous TIA or stroke  []   Poor Diet: decrease salt (sodium) in your diet, follow diet directed by physician  []   Smoking: stop smoking      Reviewed the Following Education with Patient and/or Family:   - Signs and Symptoms of Stroke  - Personal risk factors   - How to activate EMS (911)   - Importance of Follow Up Appointments at Discharge   - Importance of Compliance with Medications Prescribed at Discharge  - Available community resources and stroke advocacy groups if needed    Patient and/or family member: verbalized understanding. Stroke Education booklet given to patient/family (or verified, if given already), which reviews above information.  yes         Electronically signed by Adelso Reyes RN on 11/9/2023 at 6:37 AM       Problem: Discharge Planning  Goal: Discharge to home or other facility with appropriate resources  Outcome: Progressing     Problem: Safety - Adult  Goal: Free from fall injury  Outcome: Progressing     Problem: Pain  Goal: Verbalizes/displays adequate comfort level or baseline comfort level  Outcome: Progressing     Problem: Neurosensory - Adult  Goal: Achieves stable or

## 2023-11-09 NOTE — PLAN OF CARE
Problem: Discharge Planning  Goal: Discharge to home or other facility with appropriate resources  11/9/2023 1011 by Ledy Haynes RN  Outcome: Progressing  Flowsheets (Taken 11/9/2023 8114)  Discharge to home or other facility with appropriate resources: Identify barriers to discharge with patient and caregiver  11/9/2023 0636 by Haim Quintanilla RN  Outcome: Progressing     Problem: Safety - Adult  Goal: Free from fall injury  11/9/2023 1011 by Ledy Haynes RN  Outcome: Progressing  11/9/2023 0636 by Haim Quintanilla RN  Outcome: Progressing     Problem: Pain  Goal: Verbalizes/displays adequate comfort level or baseline comfort level  11/9/2023 1011 by Ledy Haynes RN  Outcome: Progressing  11/9/2023 0636 by Haim Quintanilla RN  Outcome: Progressing     Problem: Neurosensory - Adult  Goal: Achieves stable or improved neurological status  11/9/2023 1011 by Ledy Haynes RN  Outcome: Progressing  Flowsheets (Taken 11/9/2023 7676)  Achieves stable or improved neurological status: Assess for and report changes in neurological status  11/9/2023 0636 by Haim Quintanilla RN  Outcome: Progressing  Goal: Achieves maximal functionality and self care  11/9/2023 1011 by Ledy Haynes RN  Outcome: Progressing  Flowsheets (Taken 11/9/2023 0838)  Achieves maximal functionality and self care: Encourage and assist patient to increase activity and self care with guidance from physical therapy/occupational therapy  11/9/2023 0636 by Haim Quintanilla RN  Outcome: Progressing     Problem: Chronic Conditions and Co-morbidities  Goal: Patient's chronic conditions and co-morbidity symptoms are monitored and maintained or improved  Outcome: Progressing  Flowsheets (Taken 11/9/2023 0838)  Care Plan - Patient's Chronic Conditions and Co-Morbidity Symptoms are Monitored and Maintained or Improved: Monitor and assess patient's chronic conditions and comorbid symptoms for stability, deterioration, or improvement

## 2023-11-10 NOTE — DISCHARGE SUMMARY
of the orbits demonstrate no acute abnormality. SINUSES: No significant mastoid opacification noted. Trace mucosal thickening is seen in the ethmoid air cells. SOFT TISSUES/SKULL:  No acute abnormality of the visualized skull or soft tissues. No acute intracranial abnormality. RECOMMENDATIONS: Results discussed with LAURA GONZÁLES by Shaun Wilson. Ceasar Wakefield MD at 10:05 am on 11/8/2023     MRI LUMBAR SPINE WO CONTRAST    Result Date: 10/30/2023  EXAMINATION: MRI OF THE LUMBAR SPINE WITHOUT CONTRAST, 10/25/2023 1:24 pm TECHNIQUE: Multiplanar multisequence MRI of the lumbar spine was performed without the administration of intravenous contrast. COMPARISON: Radiographs 10/03/2023. HISTORY: ORDERING SYSTEM PROVIDED HISTORY: Lumbar radiculopathy TECHNOLOGIST PROVIDED HISTORY: Reason for exam:->Back Pain What is the sedation requirement?->None Reason for Exam: Neck Pain FINDINGS: BONES/ALIGNMENT: Postsurgical changes status post posterior lumbar/interbody fusion at L4-5 present. There is grade 1 retrolisthesis of L1 relative to L2, L2 relative to L3, L3 relative to L4 and L5 relative to S1. No acute fracture is identified. Bone marrow signal intensity is normal.  There is disc desiccation, disc space narrowing and type 1 degenerative endplate changes at E89-E1. SPINAL CORD: The conus medullaris is normal in size and signal intensities and terminates normally. SOFT TISSUES: No paraspinal mass is identified. T12-L1: There is a disc bulge present. No significant spinal canal stenosis or neural foraminal narrowing is present. L1-L2: There is a disc bulge, facet arthropathy and posterior osteophyte formation. No significant spinal canal stenosis or neural foraminal narrowing is present. L2-L3: There is no disc bulge or protrusion present. There is no significant spinal canal stenosis or neural foraminal narrowing present. There is bilateral facet arthropathy.  L3-L4: There is a disc bulge, facet arthropathy and posterior osteophyte formation. There is severe right and mild left neural foraminal narrowing. No significant spinal canal stenosis is present. L4-L5: There is no disc bulge or protrusion present. There is no significant spinal canal stenosis or neural foraminal narrowing present. Status post bilateral laminectomies. L5-S1: There is a disc bulge and facet arthropathy. Moderate left and mild right neural foraminal narrowing is present. There is no significant spinal canal stenosis. 1. Postsurgical changes status post posterior lumbar/interbody fusion at L4-5. 2. Severe right and mild left neural foraminal narrowing at L3-4 secondary to a disc bulge, facet arthropathy and posterior osteophyte formation. 3. Moderate left and mild right neural foraminal narrowing at L5-S1. 4. Additional multilevel degenerative changes without significant spinal canal stenosis evident. MRI CERVICAL SPINE WO CONTRAST    Result Date: 10/30/2023  EXAMINATION: MRI OF THE CERVICAL SPINE WITHOUT CONTRAST 10/25/2023 1:25 pm TECHNIQUE: Multiplanar multisequence MRI of the cervical spine was performed without the administration of intravenous contrast. COMPARISON: 08/16/2021 HISTORY: ORDERING SYSTEM PROVIDED HISTORY: Neck pain TECHNOLOGIST PROVIDED HISTORY: Reason for exam:->Neck Pain What is the sedation requirement?->None Reason for Exam: Neck Pain FINDINGS: BONES/ALIGNMENT: Postsurgical changes status post anterior cervical discectomy and fusion from C4 to C6 are noted. Alignment is normal.  No acute fracture is identified. Bone marrow signal intensity is normal. SPINAL CORD: Mild abnormal increased T2 signal intensity is present within the cervical spinal cord at C7-T1. The cervical spinal cord is otherwise normal in size and signal intensities. SOFT TISSUES: No paraspinal mass is identified. C2-C3: There is no disc bulge or protrusion present. Facet arthropathy mildly narrows the left neural foramen.   No spinal canal stenosis or

## 2023-11-10 NOTE — PROGRESS NOTES
Patient's MRI result came back negative, patient discharged home with AVS (discharge instructions reviewed)and all belongings.  She was wheeled to main entrance and picked up by family

## 2023-11-10 NOTE — PLAN OF CARE
Problem: Discharge Planning  Goal: Discharge to home or other facility with appropriate resources  11/9/2023 1950 by Larry Catherine RN  Outcome: Completed  11/9/2023 1011 by Lizett Navas RN  Outcome: Progressing  Flowsheets (Taken 11/9/2023 9241)  Discharge to home or other facility with appropriate resources: Identify barriers to discharge with patient and caregiver  11/9/2023 0636 by Oscar Ware RN  Outcome: Progressing     Problem: Safety - Adult  Goal: Free from fall injury  11/9/2023 1950 by Larry Catherine RN  Outcome: Completed  11/9/2023 1011 by Lizett Navas RN  Outcome: Progressing  11/9/2023 0636 by Oscar Ware RN  Outcome: Progressing     Problem: Pain  Goal: Verbalizes/displays adequate comfort level or baseline comfort level  11/9/2023 1950 by Larry Catherine RN  Outcome: Completed  11/9/2023 1011 by Lizett Navas RN  Outcome: Progressing  11/9/2023 0636 by Oscar Ware RN  Outcome: Progressing     Problem: Neurosensory - Adult  Goal: Achieves stable or improved neurological status  11/9/2023 1950 by Larry Catherine RN  Outcome: Completed  11/9/2023 1011 by Lizett Navas RN  Outcome: Progressing  Flowsheets (Taken 11/9/2023 3803)  Achieves stable or improved neurological status: Assess for and report changes in neurological status  11/9/2023 0636 by Oscar Ware RN  Outcome: Progressing     Problem: Chronic Conditions and Co-morbidities  Goal: Patient's chronic conditions and co-morbidity symptoms are monitored and maintained or improved  11/9/2023 1950 by Larry Catherine RN  Outcome: Completed  11/9/2023 1011 by Lizett Navas RN  Outcome: Progressing  Flowsheets (Taken 11/9/2023 2116)  Care Plan - Patient's Chronic Conditions and Co-Morbidity Symptoms are Monitored and Maintained or Improved: Monitor and assess patient's chronic conditions and comorbid symptoms for stability, deterioration, or improvement     Problem: Neurosensory - Adult  Goal: Achieves maximal functionality and self care  11/9/2023 1950 by Carolina Rush, RN  Outcome: Completed  11/9/2023 1011 by Juan J Sandra RN  Outcome: Progressing  Flowsheets (Taken 11/9/2023 3237)  Achieves maximal functionality and self care: Encourage and assist patient to increase activity and self care with guidance from physical therapy/occupational therapy  11/9/2023 0636 by Crystal Up RN  Outcome: Progressing

## 2023-11-13 ENCOUNTER — OFFICE VISIT (OUTPATIENT)
Dept: NEUROLOGY | Age: 73
End: 2023-11-13
Payer: COMMERCIAL

## 2023-11-13 VITALS
HEART RATE: 80 BPM | WEIGHT: 175 LBS | HEIGHT: 66 IN | DIASTOLIC BLOOD PRESSURE: 64 MMHG | OXYGEN SATURATION: 98 % | SYSTOLIC BLOOD PRESSURE: 130 MMHG | BODY MASS INDEX: 28.12 KG/M2

## 2023-11-13 DIAGNOSIS — G45.9 TIA (TRANSIENT ISCHEMIC ATTACK): Primary | ICD-10-CM

## 2023-11-13 DIAGNOSIS — G95.9 CERVICAL MYELOPATHY (HCC): ICD-10-CM

## 2023-11-13 PROCEDURE — G8427 DOCREV CUR MEDS BY ELIG CLIN: HCPCS | Performed by: PSYCHIATRY & NEUROLOGY

## 2023-11-13 PROCEDURE — 1123F ACP DISCUSS/DSCN MKR DOCD: CPT | Performed by: PSYCHIATRY & NEUROLOGY

## 2023-11-13 PROCEDURE — 1036F TOBACCO NON-USER: CPT | Performed by: PSYCHIATRY & NEUROLOGY

## 2023-11-13 PROCEDURE — G8484 FLU IMMUNIZE NO ADMIN: HCPCS | Performed by: PSYCHIATRY & NEUROLOGY

## 2023-11-13 PROCEDURE — 3078F DIAST BP <80 MM HG: CPT | Performed by: PSYCHIATRY & NEUROLOGY

## 2023-11-13 PROCEDURE — 1090F PRES/ABSN URINE INCON ASSESS: CPT | Performed by: PSYCHIATRY & NEUROLOGY

## 2023-11-13 PROCEDURE — 1111F DSCHRG MED/CURRENT MED MERGE: CPT | Performed by: PSYCHIATRY & NEUROLOGY

## 2023-11-13 PROCEDURE — 3017F COLORECTAL CA SCREEN DOC REV: CPT | Performed by: PSYCHIATRY & NEUROLOGY

## 2023-11-13 PROCEDURE — G8417 CALC BMI ABV UP PARAM F/U: HCPCS | Performed by: PSYCHIATRY & NEUROLOGY

## 2023-11-13 PROCEDURE — 3075F SYST BP GE 130 - 139MM HG: CPT | Performed by: PSYCHIATRY & NEUROLOGY

## 2023-11-13 PROCEDURE — G8399 PT W/DXA RESULTS DOCUMENT: HCPCS | Performed by: PSYCHIATRY & NEUROLOGY

## 2023-11-13 PROCEDURE — 99204 OFFICE O/P NEW MOD 45 MIN: CPT | Performed by: PSYCHIATRY & NEUROLOGY

## 2023-11-13 NOTE — PROGRESS NOTES
RIGHT OPEN CARPAL TUNNEL RELEASE performed by Cori Miranda MD at [address]     for pinched nerve    EYE SURGERY  2020    HAND SURGERY Left 01/10/2023    LEFT MIDDLE FINGER DIGITAL MUCOUS CYST EXCISION WITH DISTAL INTERPHALANGEAL JOINT DEBRIDEMENT performed by Jonah Fowler MD at [address] [place] [address] Bilateral     knees    KNEE SURGERY Left     meniscus    SHOULDER ARTHROSCOPY Right 12/02/2016    Right Total Shoulder Arthroscopy with Reverse Ball and Socket Deta Prosthesis    SHOULDER ARTHROSCOPY Left 05/30/2018    subacromial decompression, rotator cuff repair, biceps stump debridement    SMALL INTESTINE SURGERY N/A 09/16/2019    DIAGNOSTIC LAPAROSCOPY CONVERTED TO OPEN BOWEL RESECTION performed by Loco Gamboa MD at [address]          Medication:    Current Outpatient Medications   Medication Sig Dispense Refill    methocarbamol (ROBAXIN-750) 750 MG tablet Take 1 tablet by mouth 3 times daily as needed (muscle spasm) (Patient taking differently: Take 1 tablet by mouth 3 times daily as needed (muscle spasm) As Needed) 21 tablet 0    amLODIPine (NORVASC) 2.5 MG tablet Take 1 tablet by mouth daily Take if SBP >150 (Patient taking differently: Take 1 tablet by mouth daily Take if SBP >150    As Needed) 90 tablet 3    QUEtiapine (SEROQUEL XR) 150 MG TB24 extended release tablet Take 1 tablet by mouth nightly      melatonin 1 MG tablet Take 2 tablets by mouth nightly as needed for Sleep      LINZESS 145 MCG capsule Take by mouth daily      rOPINIRole (REQUIP) 2 MG tablet TAKE 1-2 TABLETS BY MOUTH EVERY DAY AT NIGHT FOR REST LEG SYNDROME 180 tablet 3    levothyroxine (SYNTHROID) 75 MCG tablet TAKE 1 TABLET BY MOUTH EVERY DAY 90 tablet 3    spironolactone (ALDACTONE) 50 MG tablet TAKE 1 TABLET BY MOUTH EVERY DAY 90 tablet 1    carBAMazepine (TEGRETOL) 200 MG tablet Take 1 tablet by mouth 4 times daily      alirocumab (PRALUENT) 75

## 2023-11-15 ENCOUNTER — TELEPHONE (OUTPATIENT)
Dept: NEUROLOGY | Age: 73
End: 2023-11-15

## 2023-11-15 DIAGNOSIS — E78.2 MIXED HYPERLIPIDEMIA: ICD-10-CM

## 2023-11-15 DIAGNOSIS — G45.9 TIA (TRANSIENT ISCHEMIC ATTACK): Primary | ICD-10-CM

## 2023-11-15 RX ORDER — ATORVASTATIN CALCIUM 40 MG/1
40 TABLET, FILM COATED ORAL DAILY
Qty: 30 TABLET | Refills: 3 | Status: SHIPPED | OUTPATIENT
Start: 2023-11-15

## 2023-11-15 NOTE — TELEPHONE ENCOUNTER
LOV: 11/13/23  NOV: 12/27/23    Preferred pharmacy: [facility] on [address]     Name of caller: Latanya Edwards     Reason for call: Latanya Edwards called today to report that on the LOV Dr Kevon Saunders said she need to be back on the Atorvastatin 40 mg     Plan:     1. Clopidogrel 75 mg daily. 2.  Atorvastatin 40 mg daily. 3.  Continue baby aspirin. 4.  Follow-up in 6-week. Latanya Edwards said the pharmacy has not received the prescription yet.

## 2023-11-16 NOTE — TELEPHONE ENCOUNTER
Patient came into the office today and reports that Dr Usha Diallo did not order the Plavix either. Please review and order medication. [facility] [address]

## 2023-11-17 ENCOUNTER — OFFICE VISIT (OUTPATIENT)
Dept: FAMILY MEDICINE CLINIC | Age: 73
End: 2023-11-17
Payer: COMMERCIAL

## 2023-11-17 ENCOUNTER — TELEPHONE (OUTPATIENT)
Age: 73
End: 2023-11-17

## 2023-11-17 VITALS
SYSTOLIC BLOOD PRESSURE: 122 MMHG | BODY MASS INDEX: 27.92 KG/M2 | OXYGEN SATURATION: 97 % | DIASTOLIC BLOOD PRESSURE: 70 MMHG | RESPIRATION RATE: 16 BRPM | HEART RATE: 72 BPM | WEIGHT: 173 LBS

## 2023-11-17 DIAGNOSIS — G45.9 TIA (TRANSIENT ISCHEMIC ATTACK): ICD-10-CM

## 2023-11-17 DIAGNOSIS — Z01.818 PREOP EXAMINATION: Primary | ICD-10-CM

## 2023-11-17 DIAGNOSIS — M48.02 CERVICAL SPINAL STENOSIS: ICD-10-CM

## 2023-11-17 PROCEDURE — G8484 FLU IMMUNIZE NO ADMIN: HCPCS | Performed by: NURSE PRACTITIONER

## 2023-11-17 PROCEDURE — 1123F ACP DISCUSS/DSCN MKR DOCD: CPT | Performed by: NURSE PRACTITIONER

## 2023-11-17 PROCEDURE — 3074F SYST BP LT 130 MM HG: CPT | Performed by: NURSE PRACTITIONER

## 2023-11-17 PROCEDURE — 3017F COLORECTAL CA SCREEN DOC REV: CPT | Performed by: NURSE PRACTITIONER

## 2023-11-17 PROCEDURE — G8427 DOCREV CUR MEDS BY ELIG CLIN: HCPCS | Performed by: NURSE PRACTITIONER

## 2023-11-17 PROCEDURE — 99214 OFFICE O/P EST MOD 30 MIN: CPT | Performed by: NURSE PRACTITIONER

## 2023-11-17 PROCEDURE — 1036F TOBACCO NON-USER: CPT | Performed by: NURSE PRACTITIONER

## 2023-11-17 PROCEDURE — 1090F PRES/ABSN URINE INCON ASSESS: CPT | Performed by: NURSE PRACTITIONER

## 2023-11-17 PROCEDURE — 1111F DSCHRG MED/CURRENT MED MERGE: CPT | Performed by: NURSE PRACTITIONER

## 2023-11-17 PROCEDURE — G8399 PT W/DXA RESULTS DOCUMENT: HCPCS | Performed by: NURSE PRACTITIONER

## 2023-11-17 PROCEDURE — G8417 CALC BMI ABV UP PARAM F/U: HCPCS | Performed by: NURSE PRACTITIONER

## 2023-11-17 PROCEDURE — 3078F DIAST BP <80 MM HG: CPT | Performed by: NURSE PRACTITIONER

## 2023-11-17 RX ORDER — CLOPIDOGREL BISULFATE 75 MG/1
75 TABLET ORAL DAILY
Qty: 90 TABLET | Refills: 1 | Status: SHIPPED | OUTPATIENT
Start: 2023-11-17

## 2023-11-17 SDOH — ECONOMIC STABILITY: FOOD INSECURITY: WITHIN THE PAST 12 MONTHS, YOU WORRIED THAT YOUR FOOD WOULD RUN OUT BEFORE YOU GOT MONEY TO BUY MORE.: NEVER TRUE

## 2023-11-17 SDOH — ECONOMIC STABILITY: FOOD INSECURITY: WITHIN THE PAST 12 MONTHS, THE FOOD YOU BOUGHT JUST DIDN'T LAST AND YOU DIDN'T HAVE MONEY TO GET MORE.: NEVER TRUE

## 2023-11-17 SDOH — ECONOMIC STABILITY: INCOME INSECURITY: HOW HARD IS IT FOR YOU TO PAY FOR THE VERY BASICS LIKE FOOD, HOUSING, MEDICAL CARE, AND HEATING?: NOT HARD AT ALL

## 2023-11-17 ASSESSMENT — PATIENT HEALTH QUESTIONNAIRE - PHQ9
SUM OF ALL RESPONSES TO PHQ QUESTIONS 1-9: 0
SUM OF ALL RESPONSES TO PHQ QUESTIONS 1-9: 0
6. FEELING BAD ABOUT YOURSELF - OR THAT YOU ARE A FAILURE OR HAVE LET YOURSELF OR YOUR FAMILY DOWN: 0
7. TROUBLE CONCENTRATING ON THINGS, SUCH AS READING THE NEWSPAPER OR WATCHING TELEVISION: 0
9. THOUGHTS THAT YOU WOULD BE BETTER OFF DEAD, OR OF HURTING YOURSELF: 0
3. TROUBLE FALLING OR STAYING ASLEEP: 0
8. MOVING OR SPEAKING SO SLOWLY THAT OTHER PEOPLE COULD HAVE NOTICED. OR THE OPPOSITE, BEING SO FIGETY OR RESTLESS THAT YOU HAVE BEEN MOVING AROUND A LOT MORE THAN USUAL: 0
2. FEELING DOWN, DEPRESSED OR HOPELESS: 0
5. POOR APPETITE OR OVEREATING: 0
1. LITTLE INTEREST OR PLEASURE IN DOING THINGS: 0
SUM OF ALL RESPONSES TO PHQ9 QUESTIONS 1 & 2: 0
SUM OF ALL RESPONSES TO PHQ QUESTIONS 1-9: 0
10. IF YOU CHECKED OFF ANY PROBLEMS, HOW DIFFICULT HAVE THESE PROBLEMS MADE IT FOR YOU TO DO YOUR WORK, TAKE CARE OF THINGS AT HOME, OR GET ALONG WITH OTHER PEOPLE: 0
4. FEELING TIRED OR HAVING LITTLE ENERGY: 0
SUM OF ALL RESPONSES TO PHQ QUESTIONS 1-9: 0

## 2023-11-17 ASSESSMENT — ENCOUNTER SYMPTOMS
SHORTNESS OF BREATH: 0
COUGH: 0
BACK PAIN: 1
NAUSEA: 0
VOMITING: 0
DIARRHEA: 0

## 2023-11-17 ASSESSMENT — COLUMBIA-SUICIDE SEVERITY RATING SCALE - C-SSRS
7. DID THIS OCCUR IN THE LAST THREE MONTHS: NO
4. HAVE YOU HAD THESE THOUGHTS AND HAD SOME INTENTION OF ACTING ON THEM?: NO
5. HAVE YOU STARTED TO WORK OUT OR WORKED OUT THE DETAILS OF HOW TO KILL YOURSELF? DO YOU INTEND TO CARRY OUT THIS PLAN?: NO
3. HAVE YOU BEEN THINKING ABOUT HOW YOU MIGHT KILL YOURSELF?: NO

## 2023-11-17 NOTE — TELEPHONE ENCOUNTER
Mari Vickers with pcp office called stated that pt is scheduled for C6-T1 cervical discectomy and fusion on 11/29/23 with Dr Dennis Painter at [facility] and pcp is asking if Dr Mohan Johnson would okay pt stopping Plavix and Asprin 7 days prior? Please advise.

## 2023-11-20 NOTE — TELEPHONE ENCOUNTER
Received a call from Lesley Rush at [address] [place] [address]  (Dr. Conway  office) regarding pt's blood thinners. Her myelopathy is severe and pt is scheduled for cervical fusion on 11/29/23. I told Lesley Rush that Dr. Geronimo Desai wants pt to hold Plavix & Asa 7 days prior to surgery. She said Dr. Lakisha Persaud will also want to know when pt should resume her blood thinners. Will check with Dr. Geronimo Desai & call Lesley Rush back at [phone] - ok to leave .

## 2023-11-20 NOTE — TELEPHONE ENCOUNTER
November 20, 2023  Collette Muslim, MD   to Me       11/20/23 11:09 AM  That s fine with me. Spoke with Israel Stauffer with pcp office whom was informed on Dr Eduardo Leblanc response with a verbal study.

## 2023-12-13 ENCOUNTER — OFFICE VISIT (OUTPATIENT)
Dept: FAMILY MEDICINE CLINIC | Age: 73
End: 2023-12-13
Payer: COMMERCIAL

## 2023-12-13 VITALS
BODY MASS INDEX: 28.47 KG/M2 | RESPIRATION RATE: 16 BRPM | TEMPERATURE: 96.9 F | SYSTOLIC BLOOD PRESSURE: 159 MMHG | WEIGHT: 176.4 LBS | HEART RATE: 86 BPM | DIASTOLIC BLOOD PRESSURE: 76 MMHG

## 2023-12-13 DIAGNOSIS — R22.32 SUBCUTANEOUS MASS OF LEFT UPPER EXTREMITY: ICD-10-CM

## 2023-12-13 DIAGNOSIS — J34.89 SORE IN NOSE: Primary | ICD-10-CM

## 2023-12-13 DIAGNOSIS — R22.9 MASS OF SUBCUTANEOUS TISSUE: ICD-10-CM

## 2023-12-13 PROCEDURE — 1123F ACP DISCUSS/DSCN MKR DOCD: CPT | Performed by: FAMILY MEDICINE

## 2023-12-13 PROCEDURE — G8399 PT W/DXA RESULTS DOCUMENT: HCPCS | Performed by: FAMILY MEDICINE

## 2023-12-13 PROCEDURE — G8427 DOCREV CUR MEDS BY ELIG CLIN: HCPCS | Performed by: FAMILY MEDICINE

## 2023-12-13 PROCEDURE — 1036F TOBACCO NON-USER: CPT | Performed by: FAMILY MEDICINE

## 2023-12-13 PROCEDURE — 99214 OFFICE O/P EST MOD 30 MIN: CPT | Performed by: FAMILY MEDICINE

## 2023-12-13 PROCEDURE — G8484 FLU IMMUNIZE NO ADMIN: HCPCS | Performed by: FAMILY MEDICINE

## 2023-12-13 PROCEDURE — 3077F SYST BP >= 140 MM HG: CPT | Performed by: FAMILY MEDICINE

## 2023-12-13 PROCEDURE — G8417 CALC BMI ABV UP PARAM F/U: HCPCS | Performed by: FAMILY MEDICINE

## 2023-12-13 PROCEDURE — 3078F DIAST BP <80 MM HG: CPT | Performed by: FAMILY MEDICINE

## 2023-12-13 PROCEDURE — 3017F COLORECTAL CA SCREEN DOC REV: CPT | Performed by: FAMILY MEDICINE

## 2023-12-13 PROCEDURE — 1090F PRES/ABSN URINE INCON ASSESS: CPT | Performed by: FAMILY MEDICINE

## 2023-12-13 RX ORDER — ECHINACEA PURPUREA EXTRACT 125 MG
1 TABLET ORAL PRN
Qty: 1 EACH | Refills: 3 | Status: SHIPPED | OUTPATIENT
Start: 2023-12-13

## 2023-12-13 RX ORDER — AZELASTINE 1 MG/ML
1 SPRAY, METERED NASAL 2 TIMES DAILY
Qty: 60 ML | Refills: 1 | Status: CANCELLED | OUTPATIENT
Start: 2023-12-13

## 2023-12-13 RX ORDER — DOXYCYCLINE HYCLATE 100 MG
100 TABLET ORAL 2 TIMES DAILY
Qty: 10 TABLET | Refills: 0 | Status: CANCELLED | OUTPATIENT
Start: 2023-12-13 | End: 2023-12-18

## 2023-12-13 ASSESSMENT — PATIENT HEALTH QUESTIONNAIRE - PHQ9
6. FEELING BAD ABOUT YOURSELF - OR THAT YOU ARE A FAILURE OR HAVE LET YOURSELF OR YOUR FAMILY DOWN: 0
5. POOR APPETITE OR OVEREATING: 0
8. MOVING OR SPEAKING SO SLOWLY THAT OTHER PEOPLE COULD HAVE NOTICED. OR THE OPPOSITE, BEING SO FIGETY OR RESTLESS THAT YOU HAVE BEEN MOVING AROUND A LOT MORE THAN USUAL: 0
3. TROUBLE FALLING OR STAYING ASLEEP: 0
SUM OF ALL RESPONSES TO PHQ QUESTIONS 1-9: 0
2. FEELING DOWN, DEPRESSED OR HOPELESS: 0
1. LITTLE INTEREST OR PLEASURE IN DOING THINGS: 0
SUM OF ALL RESPONSES TO PHQ9 QUESTIONS 1 & 2: 0
7. TROUBLE CONCENTRATING ON THINGS, SUCH AS READING THE NEWSPAPER OR WATCHING TELEVISION: 0
4. FEELING TIRED OR HAVING LITTLE ENERGY: 0
SUM OF ALL RESPONSES TO PHQ QUESTIONS 1-9: 0
9. THOUGHTS THAT YOU WOULD BE BETTER OFF DEAD, OR OF HURTING YOURSELF: 0
10. IF YOU CHECKED OFF ANY PROBLEMS, HOW DIFFICULT HAVE THESE PROBLEMS MADE IT FOR YOU TO DO YOUR WORK, TAKE CARE OF THINGS AT HOME, OR GET ALONG WITH OTHER PEOPLE: 0

## 2023-12-13 ASSESSMENT — COLUMBIA-SUICIDE SEVERITY RATING SCALE - C-SSRS
3. HAVE YOU BEEN THINKING ABOUT HOW YOU MIGHT KILL YOURSELF?: NO
5. HAVE YOU STARTED TO WORK OUT OR WORKED OUT THE DETAILS OF HOW TO KILL YOURSELF? DO YOU INTEND TO CARRY OUT THIS PLAN?: NO
7. DID THIS OCCUR IN THE LAST THREE MONTHS: NO
4. HAVE YOU HAD THESE THOUGHTS AND HAD SOME INTENTION OF ACTING ON THEM?: NO

## 2023-12-13 NOTE — PROGRESS NOTES
12/13/2023    This is a 68 y.o. female   Chief Complaint   Patient presents with    Congestion     Nasal congestion, bloody noses frequently, x 1 week    . HPI  Pt presents today for the following:    Admits to a sore in the left nostril x 1 week with bleeding, denies previous episodes, if pt touches the sore spot there is bleeding. HTN: takes Amlodipine 2.5 mg if her BP goes above 150 an hasn't been above 032 (diastolic in the 92'E) , states that for the last 2-3 weeks she has experience SOB and dizziness, did not check BP during these episodes. Has surgery scheduled for 12/22/23 (cervical and thoracic). Admits to pain and pressure in her head. Also states that she had a bruise on ler left bicep, bruise has resolved but feels a bump there, hit the arm on a door knob  Past Medical History:   Diagnosis Date    Anxiety 2000    Borderline personality disorder ([address])     Chronic pain     Colon disorder     hard time pushing stool out    Constipation     Diabetes mellitus ([address])     Diabetic neuropathy ([address])     Electric shock     ECT therapy    GERD (gastroesophageal reflux disease)     Controlled    Heart murmur     Hyperlipidemia     Hypertension     Insomnia     Major depressive disorder, recurrent ([address])     Morbid obesity ([address])     Neuropathy 2014    Osteoarthrosis     Restless legs syndrome 2014    RLS (restless legs syndrome)     Small bowel obstruction ([address]) 11/19/2019    Suicidal ideation     Unspecified hypothyroidism     Vitamin deficiency     Volvulus of small intestine ([address]) 09/16/2019       Orders Only on 11/17/2023   Component Date Value Ref Range Status    aPTT 11/17/2023 28.6  22.7 - 35.9 sec Final    Comment: Therapeutic range: 73.0 - 102.0 sec    Effective 3-28-23 9:00am EST  Please note reference ranges have  changed for PTT Testing. Review of Systems   HENT:          Positive for epistaxis and a sore in her left nostril.        BP (!) 159/76 (Site: Left Upper Arm, Position: Sitting,

## 2023-12-16 ENCOUNTER — HOSPITAL ENCOUNTER (OUTPATIENT)
Dept: ULTRASOUND IMAGING | Age: 73
Discharge: HOME | End: 2023-12-16
Attending: FAMILY MEDICINE
Payer: COMMERCIAL

## 2023-12-16 DIAGNOSIS — R22.32 SUBCUTANEOUS MASS OF LEFT UPPER EXTREMITY: ICD-10-CM

## 2023-12-16 DIAGNOSIS — R22.9 MASS OF SUBCUTANEOUS TISSUE: ICD-10-CM

## 2023-12-16 PROCEDURE — 76999 ECHO EXAMINATION PROCEDURE: CPT

## 2023-12-27 ENCOUNTER — TELEPHONE (OUTPATIENT)
Age: 73
End: 2023-12-27

## 2023-12-27 ENCOUNTER — OFFICE VISIT (OUTPATIENT)
Dept: NEUROLOGY | Age: 73
End: 2023-12-27
Payer: COMMERCIAL

## 2023-12-27 ENCOUNTER — TELEPHONE (OUTPATIENT)
Dept: ADMINISTRATIVE | Age: 73
End: 2023-12-27

## 2023-12-27 VITALS
SYSTOLIC BLOOD PRESSURE: 126 MMHG | WEIGHT: 174 LBS | OXYGEN SATURATION: 98 % | HEIGHT: 66 IN | DIASTOLIC BLOOD PRESSURE: 64 MMHG | BODY MASS INDEX: 27.97 KG/M2 | HEART RATE: 70 BPM

## 2023-12-27 DIAGNOSIS — G95.9 CERVICAL MYELOPATHY (HCC): ICD-10-CM

## 2023-12-27 DIAGNOSIS — G45.9 TIA (TRANSIENT ISCHEMIC ATTACK): Primary | ICD-10-CM

## 2023-12-27 PROCEDURE — 1036F TOBACCO NON-USER: CPT | Performed by: PSYCHIATRY & NEUROLOGY

## 2023-12-27 PROCEDURE — 3017F COLORECTAL CA SCREEN DOC REV: CPT | Performed by: PSYCHIATRY & NEUROLOGY

## 2023-12-27 PROCEDURE — G8484 FLU IMMUNIZE NO ADMIN: HCPCS | Performed by: PSYCHIATRY & NEUROLOGY

## 2023-12-27 PROCEDURE — G8417 CALC BMI ABV UP PARAM F/U: HCPCS | Performed by: PSYCHIATRY & NEUROLOGY

## 2023-12-27 PROCEDURE — 1090F PRES/ABSN URINE INCON ASSESS: CPT | Performed by: PSYCHIATRY & NEUROLOGY

## 2023-12-27 PROCEDURE — G8427 DOCREV CUR MEDS BY ELIG CLIN: HCPCS | Performed by: PSYCHIATRY & NEUROLOGY

## 2023-12-27 PROCEDURE — G8399 PT W/DXA RESULTS DOCUMENT: HCPCS | Performed by: PSYCHIATRY & NEUROLOGY

## 2023-12-27 PROCEDURE — 3074F SYST BP LT 130 MM HG: CPT | Performed by: PSYCHIATRY & NEUROLOGY

## 2023-12-27 PROCEDURE — 1123F ACP DISCUSS/DSCN MKR DOCD: CPT | Performed by: PSYCHIATRY & NEUROLOGY

## 2023-12-27 PROCEDURE — 99214 OFFICE O/P EST MOD 30 MIN: CPT | Performed by: PSYCHIATRY & NEUROLOGY

## 2023-12-27 PROCEDURE — 3078F DIAST BP <80 MM HG: CPT | Performed by: PSYCHIATRY & NEUROLOGY

## 2023-12-27 RX ORDER — METHOCARBAMOL 500 MG/1
500 TABLET, FILM COATED ORAL 4 TIMES DAILY
COMMUNITY
Start: 2023-12-22

## 2023-12-27 RX ORDER — CLOPIDOGREL BISULFATE 75 MG/1
75 TABLET ORAL DAILY
Qty: 90 TABLET | Refills: 1
Start: 2023-12-29

## 2023-12-27 RX ORDER — PREGABALIN 50 MG/1
50 CAPSULE ORAL 2 TIMES DAILY
Qty: 60 CAPSULE | Refills: 2 | Status: SHIPPED | OUTPATIENT
Start: 2023-12-27 | End: 2024-03-26

## 2023-12-27 RX ORDER — OXYCODONE HYDROCHLORIDE AND ACETAMINOPHEN 5; 325 MG/1; MG/1
1 TABLET ORAL EVERY 4 HOURS PRN
COMMUNITY
Start: 2023-12-22 | End: 2023-12-29

## 2023-12-27 NOTE — TELEPHONE ENCOUNTER
Received notification today that the Lyrica Rx that Dr. Clary Woodall eprescribed for pt today requires prior auth. Initiated today via cover my meds - will watch for response.      Liza Potts (Berrios: Varun Estrada) - [phone]

## 2023-12-27 NOTE — TELEPHONE ENCOUNTER
Submitted PA for [facility] Key: [id number] STATUS: PENDING. Follow up done daily; if no decision with in three days we will refax. If another three days goes by with no decision will call the insurance for status.

## 2023-12-27 NOTE — PROGRESS NOTES
Neurology outpatient follow-up visit    Patient name: Kimberlyn Paredes      Chief Complaint:  TIA    History of present illness: This is a 68years old right-handed female. The patient is here for evaluation of new onset of speech difficulty. The event occurred in September. At that time, the patient started aspirin for possible CVA or TIA. Unfortunately, the patient was similar event again last week. The patient was hospitalized. At that time, the MRI brain showed no acute ischemic injury but mild to moderate degree of small vessel disease. The patient also had normal CTA of head and neck. The patient was discharged with the same aspirin. The patient was on atorvastatin 10 years ago. For some reasons, this medication was discontinued. The patient also has cervical myelopathy. The patient has a plan to do surgery by the end of this month. The patient denies significant weakness at this time. The patient is on baby aspirin alone for CVA prophylaxis. Interval History:  12/27/23: The patient is here for follow-up after the surgery. Right after the surgery, the patient developed another episode of right-sided weakness and speech difficulty. At that time, the patient had MRI brain which showed no acute infarction but minimal small vessel disease. The patient also had normal EEG at that time. The patient reports some intermittent neck pain after the surgery. The patient is currently on as needed Percocet and Robaxin. The patient tried gabapentin in the past for back pain but the patient did not like it. The patient never tried pregabalin. The patient is planned to restart antiplatelet again on this Friday.     Past medical history:    Past Medical History:   Diagnosis Date    Anxiety 2000    Borderline personality disorder (HCC)     Chronic pain     Colon disorder     hard time pushing stool out    Constipation     Diabetes mellitus ([address])     Diabetic neuropathy ([address])     Electric shock     ECT

## 2023-12-28 DIAGNOSIS — E03.9 HYPOTHYROIDISM, UNSPECIFIED TYPE: Chronic | ICD-10-CM

## 2023-12-28 RX ORDER — LEVOTHYROXINE SODIUM 0.07 MG/1
TABLET ORAL
Qty: 90 TABLET | Refills: 3 | Status: SHIPPED | OUTPATIENT
Start: 2023-12-28

## 2023-12-28 NOTE — TELEPHONE ENCOUNTER
Received a fax from [facility] requesting more info for Lyrica PA. Form completed and faxed back this morning.

## 2023-12-28 NOTE — TELEPHONE ENCOUNTER
Future Appointments   Date Time Provider [address]   1/3/2024  1:15 PM Laura Wallace MD AND ORTHO [facility]   2/27/2024  8:00 AM Yudelka Ramírez, [address] [place] [address]   4/1/2024  9:20 AM Belinda Hunter MD [address] [employer] [facility] Neurology -   10/16/2024 10:30 AM Kala Sim MD AND [facility]     LOV 11/17/2023

## 2023-12-28 NOTE — TELEPHONE ENCOUNTER
Duplicate phone encounters created re: Lyrica PA. Office staff initiated through [employer] and is responding to fax inquiry from [employer] for more info. Will watch for determination & document outcome in the other open phone encounter.

## 2023-12-29 NOTE — TELEPHONE ENCOUNTER
I answered yes to the question that pt had neuropathic pain associated with spinal cord injury but PA was still denied. Pharmacy confirmed it cannot be billed under part B. Price for the drug with a discount card is $26.46 when not billed through ins. Pt informed.

## 2023-12-29 NOTE — TELEPHONE ENCOUNTER
Received denial for lyrica under medicare part D, can try to bill under B to see if it will be approved. Denial Scanned to media  states pt must have diabetic neuropathy, fibromyalgia, partial seizure, neuralgia, neuropathic pain due to spinal cord injury.

## 2024-01-03 ENCOUNTER — OFFICE VISIT (OUTPATIENT)
Dept: ORTHOPEDIC SURGERY | Age: 74
End: 2024-01-03
Payer: COMMERCIAL

## 2024-01-03 VITALS — WEIGHT: 174 LBS | BODY MASS INDEX: 27.97 KG/M2 | RESPIRATION RATE: 16 BRPM | HEIGHT: 66 IN

## 2024-01-03 DIAGNOSIS — M25.531 RIGHT WRIST PAIN: Primary | ICD-10-CM

## 2024-01-03 PROCEDURE — G8484 FLU IMMUNIZE NO ADMIN: HCPCS | Performed by: ORTHOPAEDIC SURGERY

## 2024-01-03 PROCEDURE — G8428 CUR MEDS NOT DOCUMENT: HCPCS | Performed by: ORTHOPAEDIC SURGERY

## 2024-01-03 PROCEDURE — 1123F ACP DISCUSS/DSCN MKR DOCD: CPT | Performed by: ORTHOPAEDIC SURGERY

## 2024-01-03 PROCEDURE — G8399 PT W/DXA RESULTS DOCUMENT: HCPCS | Performed by: ORTHOPAEDIC SURGERY

## 2024-01-03 PROCEDURE — 3017F COLORECTAL CA SCREEN DOC REV: CPT | Performed by: ORTHOPAEDIC SURGERY

## 2024-01-03 PROCEDURE — 1090F PRES/ABSN URINE INCON ASSESS: CPT | Performed by: ORTHOPAEDIC SURGERY

## 2024-01-03 PROCEDURE — 99213 OFFICE O/P EST LOW 20 MIN: CPT | Performed by: ORTHOPAEDIC SURGERY

## 2024-01-03 PROCEDURE — G8417 CALC BMI ABV UP PARAM F/U: HCPCS | Performed by: ORTHOPAEDIC SURGERY

## 2024-01-03 PROCEDURE — 1036F TOBACCO NON-USER: CPT | Performed by: ORTHOPAEDIC SURGERY

## 2024-01-04 NOTE — PROGRESS NOTES
Ms. Debbie Pulliam returns today in follow-up of her previously treated  right SLAC wrist deformity and Degenerative Osteoarthritis & right Thumb Carpo-Metacarpal Joint osteoarthritis .  She was last seen by me in June, 2023 at which time she was treated with Steroid injection to the Right, Wrist, CMC Joint.  She experienced minimal relief of her initial symptoms.  She  has noticed symptom persistence over the last several months.  She returns today with continued symptoms of right Wrist pain and   Thumb . pain, swelling, and enlargement, requesting further treatment.  Due to the dynamic and progressive nature of Wrist Osteoarthritis, It is clinically necessary to carefully re-evaluate Ms. Debbie Pulliam today, prior to proceeding with any further treatment or intervention, to assure the clinical appropriateness of any previously considered treatment, at this time.      I have today reviewed with Debbie Pulliam the clinically relevant, past medical history, medications, allergies,  family history, social history, and Review Of Systems & I have documented any details relevant to today's presenting complaints in my history above.  Ms. Debbie Pulliam's self-reported past medical history, medications, allergies,  family history, social history, and Review Of Systems have been scanned into the chart under the \"Media\" tab.    Physical Exam:  Vitals  Respirations: 16  Height: 167.6 cm (5' 6\")  Weight - Scale: 78.9 kg (174 lb)  Ms. Debbie Pulliam appears well, she is in no apparent distress, she demonstrates appropriate mood & affect.      Skin: Normal in appearance, Normal Color, and Free of Lesions Bilaterally   Digital range of motion is limited by pain and limited by effort on the Right, greater than Left  Wrist range of motion is limited by pain and limited by Osteoarthritis on the Right, greater than Left  There is no evidence of gross joint instability bilaterally.  Sensation is subjectively present bilaterally  Vascular

## 2024-01-04 NOTE — PATIENT INSTRUCTIONS
Instructions for Splint Use  Hand & Wrist Tendonitis or Pain      1.  Wear brace for ANY activity which is known to cause you pain..    2.  You may wear brace during daytime hours as needed for specific activities which cause symptoms    3.  You may wear the brace at night for sleep if you have pain during the night.    4.  Please do not wear brace all of the time as this will only make your wrist stiff and more uncomfortable.    5.  Brace does not need to be worn tightly, only secure enough to keep it from slipping or coming out of position.    Brace may be Hand Washed Only.  Prior to doing so, please remove the metal insert from the small pocket.  Make sure brace is completely dry prior to wearing.

## 2024-01-09 DIAGNOSIS — M50.30 DDD (DEGENERATIVE DISC DISEASE), CERVICAL: ICD-10-CM

## 2024-01-09 RX ORDER — MELOXICAM 15 MG/1
15 TABLET ORAL DAILY PRN
Qty: 90 TABLET | Refills: 0 | OUTPATIENT
Start: 2024-01-09

## 2024-01-09 NOTE — TELEPHONE ENCOUNTER
11/17/2023          Future Appointments   Date Time Provider Department Center   2/27/2024  8:00 AM Carline Dennis APRN - CNP [facility] [place] [facility]   4/1/2024  9:20 AM Britta Gomez MD AND NEURO Neurology -   10/16/2024 10:30 AM Trevon Avila MD AND [facility] MMA

## 2024-02-09 ENCOUNTER — HOSPITAL ENCOUNTER (OUTPATIENT)
Dept: WOMENS IMAGING | Age: 74
Discharge: HOME | End: 2024-02-09
Payer: COMMERCIAL

## 2024-02-09 VITALS — HEIGHT: 66 IN | WEIGHT: 168 LBS | BODY MASS INDEX: 27 KG/M2

## 2024-02-09 DIAGNOSIS — Z12.31 ENCOUNTER FOR SCREENING MAMMOGRAM FOR MALIGNANT NEOPLASM OF BREAST: ICD-10-CM

## 2024-02-09 PROCEDURE — 77063 BREAST TOMOSYNTHESIS BI: CPT

## 2024-02-13 DIAGNOSIS — I10 ESSENTIAL HYPERTENSION: Chronic | ICD-10-CM

## 2024-02-13 RX ORDER — SPIRONOLACTONE 50 MG/1
TABLET, FILM COATED ORAL
Qty: 90 TABLET | Refills: 1 | Status: SHIPPED | OUTPATIENT
Start: 2024-02-13

## 2024-02-13 NOTE — TELEPHONE ENCOUNTER
Future Appointments   Date Time Provider Department Center   2/27/2024  8:00 AM Carline Dennis APRN - CNP [facility] [place] [facility]   2/28/2024  6:00 PM [facility]  2 [facility] MRI [facility]   4/1/2024  9:20 AM Britta Gomez MD AND NEURO Neurology -   10/16/2024 10:30 AM Trevon Avila MD AND PUL[facility] 11/17/2023

## 2024-02-22 DIAGNOSIS — E78.2 MIXED HYPERLIPIDEMIA: ICD-10-CM

## 2024-02-22 DIAGNOSIS — G45.9 TIA (TRANSIENT ISCHEMIC ATTACK): ICD-10-CM

## 2024-02-23 RX ORDER — ATORVASTATIN CALCIUM 40 MG/1
40 TABLET, FILM COATED ORAL DAILY
Qty: 90 TABLET | Refills: 0 | Status: SHIPPED | OUTPATIENT
Start: 2024-02-23

## 2024-02-27 ENCOUNTER — OFFICE VISIT (OUTPATIENT)
Dept: FAMILY MEDICINE CLINIC | Age: 74
End: 2024-02-27
Payer: COMMERCIAL

## 2024-02-27 VITALS
BODY MASS INDEX: 28.25 KG/M2 | SYSTOLIC BLOOD PRESSURE: 120 MMHG | OXYGEN SATURATION: 95 % | RESPIRATION RATE: 16 BRPM | DIASTOLIC BLOOD PRESSURE: 60 MMHG | WEIGHT: 175 LBS | TEMPERATURE: 97.1 F | HEART RATE: 82 BPM

## 2024-02-27 DIAGNOSIS — G95.9 CERVICAL MYELOPATHY (HCC): ICD-10-CM

## 2024-02-27 DIAGNOSIS — I10 ESSENTIAL HYPERTENSION: ICD-10-CM

## 2024-02-27 DIAGNOSIS — E03.9 ACQUIRED HYPOTHYROIDISM: Chronic | ICD-10-CM

## 2024-02-27 DIAGNOSIS — E11.9 TYPE 2 DIABETES MELLITUS WITHOUT COMPLICATION, WITHOUT LONG-TERM CURRENT USE OF INSULIN (HCC): Primary | ICD-10-CM

## 2024-02-27 DIAGNOSIS — M18.12 ARTHRITIS OF CARPOMETACARPAL (CMC) JOINT OF LEFT THUMB: ICD-10-CM

## 2024-02-27 DIAGNOSIS — G45.9 TIA (TRANSIENT ISCHEMIC ATTACK): ICD-10-CM

## 2024-02-27 DIAGNOSIS — E03.9 HYPOTHYROIDISM, UNSPECIFIED TYPE: ICD-10-CM

## 2024-02-27 DIAGNOSIS — E78.2 MIXED HYPERLIPIDEMIA: ICD-10-CM

## 2024-02-27 DIAGNOSIS — M18.11 OSTEOARTHRITIS OF CARPOMETACARPAL (CMC) JOINT OF RIGHT THUMB, UNSPECIFIED OSTEOARTHRITIS TYPE: ICD-10-CM

## 2024-02-27 DIAGNOSIS — G50.0 TRIGEMINAL NEURALGIA: ICD-10-CM

## 2024-02-27 LAB — HBA1C MFR BLD: 6 %

## 2024-02-27 PROCEDURE — 3044F HG A1C LEVEL LT 7.0%: CPT | Performed by: NURSE PRACTITIONER

## 2024-02-27 PROCEDURE — 3017F COLORECTAL CA SCREEN DOC REV: CPT | Performed by: NURSE PRACTITIONER

## 2024-02-27 PROCEDURE — 3078F DIAST BP <80 MM HG: CPT | Performed by: NURSE PRACTITIONER

## 2024-02-27 PROCEDURE — G8484 FLU IMMUNIZE NO ADMIN: HCPCS | Performed by: NURSE PRACTITIONER

## 2024-02-27 PROCEDURE — 1123F ACP DISCUSS/DSCN MKR DOCD: CPT | Performed by: NURSE PRACTITIONER

## 2024-02-27 PROCEDURE — 99214 OFFICE O/P EST MOD 30 MIN: CPT | Performed by: NURSE PRACTITIONER

## 2024-02-27 PROCEDURE — 2022F DILAT RTA XM EVC RTNOPTHY: CPT | Performed by: NURSE PRACTITIONER

## 2024-02-27 PROCEDURE — G8427 DOCREV CUR MEDS BY ELIG CLIN: HCPCS | Performed by: NURSE PRACTITIONER

## 2024-02-27 PROCEDURE — G8399 PT W/DXA RESULTS DOCUMENT: HCPCS | Performed by: NURSE PRACTITIONER

## 2024-02-27 PROCEDURE — 1090F PRES/ABSN URINE INCON ASSESS: CPT | Performed by: NURSE PRACTITIONER

## 2024-02-27 PROCEDURE — 83036 HEMOGLOBIN GLYCOSYLATED A1C: CPT | Performed by: NURSE PRACTITIONER

## 2024-02-27 PROCEDURE — 1036F TOBACCO NON-USER: CPT | Performed by: NURSE PRACTITIONER

## 2024-02-27 PROCEDURE — G8417 CALC BMI ABV UP PARAM F/U: HCPCS | Performed by: NURSE PRACTITIONER

## 2024-02-27 PROCEDURE — 3074F SYST BP LT 130 MM HG: CPT | Performed by: NURSE PRACTITIONER

## 2024-02-27 SDOH — ECONOMIC STABILITY: INCOME INSECURITY: HOW HARD IS IT FOR YOU TO PAY FOR THE VERY BASICS LIKE FOOD, HOUSING, MEDICAL CARE, AND HEATING?: NOT HARD AT ALL

## 2024-02-27 SDOH — ECONOMIC STABILITY: FOOD INSECURITY: WITHIN THE PAST 12 MONTHS, YOU WORRIED THAT YOUR FOOD WOULD RUN OUT BEFORE YOU GOT MONEY TO BUY MORE.: NEVER TRUE

## 2024-02-27 SDOH — ECONOMIC STABILITY: FOOD INSECURITY: WITHIN THE PAST 12 MONTHS, THE FOOD YOU BOUGHT JUST DIDN'T LAST AND YOU DIDN'T HAVE MONEY TO GET MORE.: NEVER TRUE

## 2024-02-27 ASSESSMENT — PATIENT HEALTH QUESTIONNAIRE - PHQ9
5. POOR APPETITE OR OVEREATING: 0
SUM OF ALL RESPONSES TO PHQ QUESTIONS 1-9: 0
3. TROUBLE FALLING OR STAYING ASLEEP: 0
4. FEELING TIRED OR HAVING LITTLE ENERGY: 0
6. FEELING BAD ABOUT YOURSELF - OR THAT YOU ARE A FAILURE OR HAVE LET YOURSELF OR YOUR FAMILY DOWN: 0
7. TROUBLE CONCENTRATING ON THINGS, SUCH AS READING THE NEWSPAPER OR WATCHING TELEVISION: 0
1. LITTLE INTEREST OR PLEASURE IN DOING THINGS: 0
SUM OF ALL RESPONSES TO PHQ QUESTIONS 1-9: 0
9. THOUGHTS THAT YOU WOULD BE BETTER OFF DEAD, OR OF HURTING YOURSELF: 0
DEPRESSION UNABLE TO ASSESS: FUNCTIONAL CAPACITY MOTIVATION LIMITS ACCURACY
8. MOVING OR SPEAKING SO SLOWLY THAT OTHER PEOPLE COULD HAVE NOTICED. OR THE OPPOSITE, BEING SO FIGETY OR RESTLESS THAT YOU HAVE BEEN MOVING AROUND A LOT MORE THAN USUAL: 0
SUM OF ALL RESPONSES TO PHQ QUESTIONS 1-9: 0
10. IF YOU CHECKED OFF ANY PROBLEMS, HOW DIFFICULT HAVE THESE PROBLEMS MADE IT FOR YOU TO DO YOUR WORK, TAKE CARE OF THINGS AT HOME, OR GET ALONG WITH OTHER PEOPLE: 0
SUM OF ALL RESPONSES TO PHQ QUESTIONS 1-9: 0
2. FEELING DOWN, DEPRESSED OR HOPELESS: 0
SUM OF ALL RESPONSES TO PHQ9 QUESTIONS 1 & 2: 0

## 2024-02-27 ASSESSMENT — ENCOUNTER SYMPTOMS
VOMITING: 0
COUGH: 0
BACK PAIN: 1
SHORTNESS OF BREATH: 0
NAUSEA: 0
DIARRHEA: 0

## 2024-02-27 ASSESSMENT — ANXIETY QUESTIONNAIRES
2. NOT BEING ABLE TO STOP OR CONTROL WORRYING: 0
5. BEING SO RESTLESS THAT IT IS HARD TO SIT STILL: 0
6. BECOMING EASILY ANNOYED OR IRRITABLE: 0
3. WORRYING TOO MUCH ABOUT DIFFERENT THINGS: 0
1. FEELING NERVOUS, ANXIOUS, OR ON EDGE: 0
GAD7 TOTAL SCORE: 0
4. TROUBLE RELAXING: 0
IF YOU CHECKED OFF ANY PROBLEMS ON THIS QUESTIONNAIRE, HOW DIFFICULT HAVE THESE PROBLEMS MADE IT FOR YOU TO DO YOUR WORK, TAKE CARE OF THINGS AT HOME, OR GET ALONG WITH OTHER PEOPLE: NOT DIFFICULT AT ALL
7. FEELING AFRAID AS IF SOMETHING AWFUL MIGHT HAPPEN: 0

## 2024-02-27 NOTE — PATIENT INSTRUCTIONS
Start taking the amlodipine every morning and keep monitoring BP, a lot of your readings are in the 140s which is too high   Get fasting blood work done  Follow up in 4 months

## 2024-02-27 NOTE — PROGRESS NOTES
heard.     No friction rub. No gallop.   Pulmonary:      Effort: Pulmonary effort is normal. No respiratory distress.      Breath sounds: Normal breath sounds.   Neurological:      General: No focal deficit present.      Mental Status: She is alert and oriented to person, place, and time. Mental status is at baseline.      Cranial Nerves: No cranial nerve deficit.   Psychiatric:         Speech: Speech normal.         ASSESSMENT/PLAN:  1. Type 2 diabetes mellitus without complication, without long-term current use of insulin (HCC)  - POCT glycosylated hemoglobin (Hb A1C)  - Lipid, Fasting; Future  - CBC with Auto Differential; Future  - TSH with Reflex; Future  - Comprehensive Metabolic Panel; Future    2. Osteoarthritis of carpometacarpal (CMC) joint of right thumb, unspecified osteoarthritis type  - External Referral To Orthopedic Surgery  - Lipid, Fasting; Future  - CBC with Auto Differential; Future  - TSH with Reflex; Future  - Comprehensive Metabolic Panel; Future    3. Arthritis of carpometacarpal (CMC) joint of left thumb  - External Referral To Orthopedic Surgery    4. TIA (transient ischemic attack)  - Lipid, Fasting; Future  - CBC with Auto Differential; Future  - TSH with Reflex; Future  - Comprehensive Metabolic Panel; Future    5. Acquired hypothyroidism  - TSH with Reflex; Future    6. Essential hypertension    7. Hypothyroidism, unspecified type    8. Mixed hyperlipidemia    9. Trigeminal neuralgia    10. Cervical myelopathy (HCC)    - fasting blood work  - DM diet controlled  - BP running high often at home, will start amlodipine 2.5 mg QD  - continue home BP monitoring   - referral to Dr. Zamorano for hand pain  - working with Meera for the trigeminal neuralgia scheduled for brain MRI tomorrow  -Will repeat lipid panel, consistently on atorvastatin for last 2 months.  Not taking Lyrica, insurance denied-   -Meloxicam, muscle relaxers and gabapentin have not been effective for pain previously

## 2024-02-28 ENCOUNTER — HOSPITAL ENCOUNTER (OUTPATIENT)
Dept: MRI IMAGING | Age: 74
Discharge: HOME | End: 2024-02-28
Attending: NEUROLOGICAL SURGERY
Payer: COMMERCIAL

## 2024-02-28 DIAGNOSIS — G50.0 TRIGEMINAL NEURALGIA: ICD-10-CM

## 2024-02-28 DIAGNOSIS — M18.11 OSTEOARTHRITIS OF CARPOMETACARPAL (CMC) JOINT OF RIGHT THUMB, UNSPECIFIED OSTEOARTHRITIS TYPE: ICD-10-CM

## 2024-02-28 DIAGNOSIS — E03.9 ACQUIRED HYPOTHYROIDISM: Chronic | ICD-10-CM

## 2024-02-28 DIAGNOSIS — E11.9 TYPE 2 DIABETES MELLITUS WITHOUT COMPLICATION, WITHOUT LONG-TERM CURRENT USE OF INSULIN (HCC): ICD-10-CM

## 2024-02-28 DIAGNOSIS — G45.9 TIA (TRANSIENT ISCHEMIC ATTACK): ICD-10-CM

## 2024-02-28 LAB
ALBUMIN SERPL-MCNC: 4 G/DL (ref 3.4–5)
ALBUMIN/GLOB SERPL: 2.1 {RATIO} (ref 1.1–2.2)
ALP SERPL-CCNC: 90 U/L (ref 40–129)
ALT SERPL-CCNC: 16 U/L (ref 10–40)
ANION GAP SERPL CALCULATED.3IONS-SCNC: 9 MMOL/L (ref 3–16)
AST SERPL-CCNC: 20 U/L (ref 15–37)
BASOPHILS # BLD: 0 K/UL (ref 0–0.2)
BASOPHILS NFR BLD: 1.1 %
BILIRUB SERPL-MCNC: <0.2 MG/DL (ref 0–1)
BUN SERPL-MCNC: 14 MG/DL (ref 7–20)
CALCIUM SERPL-MCNC: 9 MG/DL (ref 8.3–10.6)
CHLORIDE SERPL-SCNC: 102 MMOL/L (ref 99–110)
CHOLEST SERPL-MCNC: 193 MG/DL (ref 0–199)
CO2 SERPL-SCNC: 31 MMOL/L (ref 21–32)
CREAT SERPL-MCNC: 0.5 MG/DL (ref 0.6–1.2)
DEPRECATED RDW RBC AUTO: 13.2 % (ref 12.4–15.4)
EOSINOPHIL # BLD: 0.1 K/UL (ref 0–0.6)
EOSINOPHIL NFR BLD: 2.4 %
GFR SERPLBLD CREATININE-BSD FMLA CKD-EPI: >60 ML/MIN/{1.73_M2}
GLUCOSE SERPL-MCNC: 87 MG/DL (ref 70–99)
HCT VFR BLD AUTO: 32.5 % (ref 36–48)
HDLC SERPL-MCNC: 104 MG/DL (ref 40–60)
HGB BLD-MCNC: 11.2 G/DL (ref 12–16)
LDL CHOLESTEROL CALCULATED: 76 MG/DL
LYMPHOCYTES # BLD: 1.5 K/UL (ref 1–5.1)
LYMPHOCYTES NFR BLD: 39.5 %
MCH RBC QN AUTO: 32.1 PG (ref 26–34)
MCHC RBC AUTO-ENTMCNC: 34.4 G/DL (ref 31–36)
MCV RBC AUTO: 93.3 FL (ref 80–100)
MONOCYTES # BLD: 0.4 K/UL (ref 0–1.3)
MONOCYTES NFR BLD: 9.9 %
NEUTROPHILS # BLD: 1.8 K/UL (ref 1.7–7.7)
NEUTROPHILS NFR BLD: 47.1 %
PLATELET # BLD AUTO: 292 K/UL (ref 135–450)
PMV BLD AUTO: 6.9 FL (ref 5–10.5)
POTASSIUM SERPL-SCNC: 4.2 MMOL/L (ref 3.5–5.1)
PROT SERPL-MCNC: 5.9 G/DL (ref 6.4–8.2)
RBC # BLD AUTO: 3.49 M/UL (ref 4–5.2)
SODIUM SERPL-SCNC: 142 MMOL/L (ref 136–145)
TRIGL SERPL-MCNC: 65 MG/DL (ref 0–150)
TSH SERPL DL<=0.005 MIU/L-ACNC: 3.98 UIU/ML (ref 0.27–4.2)
VLDLC SERPL CALC-MCNC: 13 MG/DL
WBC # BLD AUTO: 3.8 K/UL (ref 4–11)

## 2024-02-28 PROCEDURE — 70553 MRI BRAIN STEM W/O & W/DYE: CPT

## 2024-02-28 PROCEDURE — A9576 INJ PROHANCE MULTIPACK: HCPCS | Performed by: NEUROLOGICAL SURGERY

## 2024-02-28 PROCEDURE — 6360000004 HC RX CONTRAST MEDICATION: Performed by: NEUROLOGICAL SURGERY

## 2024-02-28 RX ADMIN — GADOTERIDOL 18 ML: 279.3 INJECTION, SOLUTION INTRAVENOUS at 18:41

## 2024-02-29 DIAGNOSIS — D64.9 NORMOCYTIC ANEMIA: Primary | ICD-10-CM

## 2024-03-26 DIAGNOSIS — D64.9 NORMOCYTIC ANEMIA: ICD-10-CM

## 2024-03-26 LAB
BASOPHILS # BLD: 0 K/UL (ref 0–0.2)
BASOPHILS NFR BLD: 1.3 %
DEPRECATED RDW RBC AUTO: 13.3 % (ref 12.4–15.4)
EOSINOPHIL # BLD: 0.1 K/UL (ref 0–0.6)
EOSINOPHIL NFR BLD: 2.4 %
HCT VFR BLD AUTO: 33.2 % (ref 36–48)
HGB BLD-MCNC: 11.5 G/DL (ref 12–16)
LYMPHOCYTES # BLD: 1.6 K/UL (ref 1–5.1)
LYMPHOCYTES NFR BLD: 41.6 %
MCH RBC QN AUTO: 32.1 PG (ref 26–34)
MCHC RBC AUTO-ENTMCNC: 34.6 G/DL (ref 31–36)
MCV RBC AUTO: 92.8 FL (ref 80–100)
MONOCYTES # BLD: 0.4 K/UL (ref 0–1.3)
MONOCYTES NFR BLD: 10.3 %
NEUTROPHILS # BLD: 1.7 K/UL (ref 1.7–7.7)
NEUTROPHILS NFR BLD: 44.4 %
PLATELET # BLD AUTO: 276 K/UL (ref 135–450)
PMV BLD AUTO: 6.7 FL (ref 5–10.5)
RBC # BLD AUTO: 3.58 M/UL (ref 4–5.2)
WBC # BLD AUTO: 3.9 K/UL (ref 4–11)

## 2024-03-27 DIAGNOSIS — D64.9 NORMOCYTIC ANEMIA: Primary | ICD-10-CM

## 2024-04-11 LAB
DEPRECATED RDW RBC AUTO: 13.1 % (ref 12.4–15.4)
HCT VFR BLD AUTO: 34.1 % (ref 36–48)
HGB BLD-MCNC: 11.3 G/DL (ref 12–16)
MCH RBC QN AUTO: 31.1 PG (ref 26–34)
MCHC RBC AUTO-ENTMCNC: 33 G/DL (ref 31–36)
MCV RBC AUTO: 94.3 FL (ref 80–100)
PLATELET # BLD AUTO: 297 K/UL (ref 135–450)
PMV BLD AUTO: 7.2 FL (ref 5–10.5)
RBC # BLD AUTO: 3.62 M/UL (ref 4–5.2)
WBC # BLD AUTO: 3.7 K/UL (ref 4–11)

## 2024-04-12 LAB
ALBUMIN SERPL-MCNC: 4 G/DL (ref 3.4–5)
ANION GAP SERPL CALCULATED.3IONS-SCNC: 11 MMOL/L (ref 3–16)
BUN SERPL-MCNC: 16 MG/DL (ref 7–20)
CALCIUM SERPL-MCNC: 9 MG/DL (ref 8.3–10.6)
CARBAMAZEPINE DOSE: NORMAL MG
CARBAMAZEPINE SERPL-MCNC: 6.9 UG/ML (ref 4–12)
CHLORIDE SERPL-SCNC: 103 MMOL/L (ref 99–110)
CO2 SERPL-SCNC: 28 MMOL/L (ref 21–32)
CREAT SERPL-MCNC: 0.6 MG/DL (ref 0.6–1.2)
GFR SERPLBLD CREATININE-BSD FMLA CKD-EPI: >90 ML/MIN/{1.73_M2}
GLUCOSE SERPL-MCNC: 133 MG/DL (ref 70–99)
PHOSPHATE SERPL-MCNC: 4.1 MG/DL (ref 2.5–4.9)
POTASSIUM SERPL-SCNC: 3.8 MMOL/L (ref 3.5–5.1)
SODIUM SERPL-SCNC: 142 MMOL/L (ref 136–145)

## 2024-04-16 ENCOUNTER — OFFICE VISIT (OUTPATIENT)
Dept: FAMILY MEDICINE CLINIC | Age: 74
End: 2024-04-16
Payer: COMMERCIAL

## 2024-04-16 VITALS
BODY MASS INDEX: 29.16 KG/M2 | SYSTOLIC BLOOD PRESSURE: 130 MMHG | TEMPERATURE: 96.9 F | WEIGHT: 170.8 LBS | RESPIRATION RATE: 16 BRPM | HEART RATE: 67 BPM | OXYGEN SATURATION: 98 % | HEIGHT: 64 IN | DIASTOLIC BLOOD PRESSURE: 87 MMHG

## 2024-04-16 DIAGNOSIS — Z01.818 PREOP EXAMINATION: ICD-10-CM

## 2024-04-16 DIAGNOSIS — Z01.810 PREOP CARDIOVASCULAR EXAM: Primary | ICD-10-CM

## 2024-04-16 DIAGNOSIS — H93.13 BILATERAL TINNITUS: ICD-10-CM

## 2024-04-16 PROCEDURE — 3017F COLORECTAL CA SCREEN DOC REV: CPT | Performed by: FAMILY MEDICINE

## 2024-04-16 PROCEDURE — G8399 PT W/DXA RESULTS DOCUMENT: HCPCS | Performed by: FAMILY MEDICINE

## 2024-04-16 PROCEDURE — 1090F PRES/ABSN URINE INCON ASSESS: CPT | Performed by: FAMILY MEDICINE

## 2024-04-16 PROCEDURE — 3079F DIAST BP 80-89 MM HG: CPT | Performed by: FAMILY MEDICINE

## 2024-04-16 PROCEDURE — G8427 DOCREV CUR MEDS BY ELIG CLIN: HCPCS | Performed by: FAMILY MEDICINE

## 2024-04-16 PROCEDURE — 99214 OFFICE O/P EST MOD 30 MIN: CPT | Performed by: FAMILY MEDICINE

## 2024-04-16 PROCEDURE — 3075F SYST BP GE 130 - 139MM HG: CPT | Performed by: FAMILY MEDICINE

## 2024-04-16 PROCEDURE — 1123F ACP DISCUSS/DSCN MKR DOCD: CPT | Performed by: FAMILY MEDICINE

## 2024-04-16 PROCEDURE — 1036F TOBACCO NON-USER: CPT | Performed by: FAMILY MEDICINE

## 2024-04-16 PROCEDURE — 93000 ELECTROCARDIOGRAM COMPLETE: CPT | Performed by: FAMILY MEDICINE

## 2024-04-16 PROCEDURE — G8417 CALC BMI ABV UP PARAM F/U: HCPCS | Performed by: FAMILY MEDICINE

## 2024-04-16 NOTE — PROGRESS NOTES
Preoperative Consultation      Debbie Pulliam  YOB: 1950    Date of Service:  4/16/2024    Vitals:    04/16/24 1004   BP: 130/87   Site: Left Upper Arm   Position: Sitting   Cuff Size: Large Adult   Pulse: 67   Resp: 16   Temp: 96.9 °F (36.1 °C)   TempSrc: Temporal   SpO2: 98%   Weight: 77.5 kg (170 lb 12.8 oz)   Height: 1.633 m (5' 4.3\")      Wt Readings from Last 2 Encounters:   04/16/24 77.5 kg (170 lb 12.8 oz)   02/27/24 79.4 kg (175 lb)     BP Readings from Last 3 Encounters:   04/16/24 130/87   02/27/24 120/60   12/27/23 126/64        Chief Complaint   Patient presents with    Pre-op Exam     4/22/24, Pre-op, Gamma Knife, [employer] , has already gotten blood work done      Allergies   Allergen Reactions    Penicillin G Hives and Other (See Comments)    Levaquin [Levofloxacin In D5w]      Unknown reaction    Levofloxacin      Pt. Unaware of drug or reaction    Pcn [Penicillins] Hives     Outpatient Medications Marked as Taking for the 4/16/24 encounter (Office Visit) with Jose Luis Hudson, DO   Medication Sig Dispense Refill    atorvastatin (LIPITOR) 40 MG tablet TAKE 1 TABLET BY MOUTH EVERY DAY 90 tablet 0    spironolactone (ALDACTONE) 50 MG tablet TAKE 1 TABLET BY MOUTH EVERY DAY 90 tablet 1    levothyroxine (SYNTHROID) 75 MCG tablet TAKE 1 TABLET BY MOUTH EVERY DAY 90 tablet 3    methocarbamol (ROBAXIN) 500 MG tablet Take 1 tablet by mouth 4 times daily      clopidogrel (PLAVIX) 75 MG tablet Take 1 tablet by mouth daily 90 tablet 1    amLODIPine (NORVASC) 2.5 MG tablet Take 1 tablet by mouth daily Take if SBP >150 (Patient taking differently: Take 1 tablet by mouth daily Take if SBP >150    As Needed) 90 tablet 3    QUEtiapine (SEROQUEL XR) 150 MG TB24 extended release tablet Take 1 tablet by mouth nightly      melatonin 1 MG tablet Take 2 tablets by mouth nightly as needed for Sleep      LINZESS 145 MCG capsule Take by mouth daily      rOPINIRole (REQUIP) 2 MG tablet TAKE 1-2 TABLETS BY MOUTH

## 2024-04-22 ENCOUNTER — HOSPITAL ENCOUNTER (OUTPATIENT)
Dept: MRI IMAGING | Age: 74
Discharge: HOME | End: 2024-04-22
Payer: COMMERCIAL

## 2024-04-22 ENCOUNTER — HOSPITAL ENCOUNTER (OUTPATIENT)
Dept: RADIATION ONCOLOGY | Age: 74
Discharge: HOME | End: 2024-04-22
Payer: COMMERCIAL

## 2024-04-22 ENCOUNTER — ANESTHESIA EVENT (OUTPATIENT)
Dept: RADIATION ONCOLOGY | Age: 74
End: 2024-04-22
Payer: COMMERCIAL

## 2024-04-22 ENCOUNTER — ANESTHESIA (OUTPATIENT)
Dept: RADIATION ONCOLOGY | Age: 74
End: 2024-04-22
Payer: COMMERCIAL

## 2024-04-22 VITALS
SYSTOLIC BLOOD PRESSURE: 127 MMHG | BODY MASS INDEX: 29.67 KG/M2 | DIASTOLIC BLOOD PRESSURE: 79 MMHG | WEIGHT: 173.8 LBS | RESPIRATION RATE: 16 BRPM | OXYGEN SATURATION: 95 % | HEART RATE: 81 BPM | HEIGHT: 64 IN

## 2024-04-22 DIAGNOSIS — G50.0 TRIGEMINAL NEURALGIA OF LEFT SIDE OF FACE: ICD-10-CM

## 2024-04-22 LAB
GLUCOSE BLD-MCNC: 98 MG/DL (ref 70–99)
PERFORMED ON: NORMAL

## 2024-04-22 PROCEDURE — 77300 RADIATION THERAPY DOSE PLAN: CPT

## 2024-04-22 PROCEDURE — A9576 INJ PROHANCE MULTIPACK: HCPCS | Performed by: NEUROLOGICAL SURGERY

## 2024-04-22 PROCEDURE — 70553 MRI BRAIN STEM W/O & W/DYE: CPT

## 2024-04-22 PROCEDURE — 77334 RADIATION TREATMENT AID(S): CPT

## 2024-04-22 PROCEDURE — 7100000011 HC PHASE II RECOVERY - ADDTL 15 MIN

## 2024-04-22 PROCEDURE — 6360000002 HC RX W HCPCS: Performed by: NEUROLOGICAL SURGERY

## 2024-04-22 PROCEDURE — 77371 SRS MULTISOURCE: CPT

## 2024-04-22 PROCEDURE — 6360000002 HC RX W HCPCS: Performed by: STUDENT IN AN ORGANIZED HEALTH CARE EDUCATION/TRAINING PROGRAM

## 2024-04-22 PROCEDURE — 2580000003 HC RX 258: Performed by: STUDENT IN AN ORGANIZED HEALTH CARE EDUCATION/TRAINING PROGRAM

## 2024-04-22 PROCEDURE — 2500000003 HC RX 250 WO HCPCS: Performed by: NEUROLOGICAL SURGERY

## 2024-04-22 PROCEDURE — 3700000000 HC ANESTHESIA ATTENDED CARE: Performed by: ANESTHESIOLOGY

## 2024-04-22 PROCEDURE — 3700000001 HC ADD 15 MINUTES (ANESTHESIA): Performed by: ANESTHESIOLOGY

## 2024-04-22 PROCEDURE — 7100000010 HC PHASE II RECOVERY - FIRST 15 MIN

## 2024-04-22 PROCEDURE — 6360000004 HC RX CONTRAST MEDICATION: Performed by: NEUROLOGICAL SURGERY

## 2024-04-22 PROCEDURE — 77295 3-D RADIOTHERAPY PLAN: CPT

## 2024-04-22 PROCEDURE — 6370000000 HC RX 637 (ALT 250 FOR IP): Performed by: NEUROLOGICAL SURGERY

## 2024-04-22 RX ORDER — LABETALOL HYDROCHLORIDE 5 MG/ML
10 INJECTION, SOLUTION INTRAVENOUS
OUTPATIENT
Start: 2024-04-22

## 2024-04-22 RX ORDER — MEPERIDINE HYDROCHLORIDE 25 MG/ML
12.5 INJECTION INTRAMUSCULAR; INTRAVENOUS; SUBCUTANEOUS EVERY 5 MIN PRN
OUTPATIENT
Start: 2024-04-22

## 2024-04-22 RX ORDER — LORAZEPAM 2 MG/ML
0.5 INJECTION INTRAMUSCULAR
OUTPATIENT
Start: 2024-04-22 | End: 2024-04-23

## 2024-04-22 RX ORDER — ONDANSETRON 2 MG/ML
4 INJECTION INTRAMUSCULAR; INTRAVENOUS
OUTPATIENT
Start: 2024-04-22 | End: 2024-04-23

## 2024-04-22 RX ORDER — ACETAMINOPHEN 325 MG/1
650 TABLET ORAL EVERY 4 HOURS PRN
Status: DISCONTINUED | OUTPATIENT
Start: 2024-04-22 | End: 2024-04-23 | Stop reason: HOSPADM

## 2024-04-22 RX ORDER — DIPHENHYDRAMINE HYDROCHLORIDE 50 MG/ML
12.5 INJECTION INTRAMUSCULAR; INTRAVENOUS
OUTPATIENT
Start: 2024-04-22 | End: 2024-04-23

## 2024-04-22 RX ORDER — PROCHLORPERAZINE EDISYLATE 5 MG/ML
5 INJECTION INTRAMUSCULAR; INTRAVENOUS
OUTPATIENT
Start: 2024-04-22 | End: 2024-04-23

## 2024-04-22 RX ORDER — HYDROMORPHONE HYDROCHLORIDE 1 MG/ML
0.5 INJECTION, SOLUTION INTRAMUSCULAR; INTRAVENOUS; SUBCUTANEOUS EVERY 5 MIN PRN
OUTPATIENT
Start: 2024-04-22

## 2024-04-22 RX ORDER — ONDANSETRON 2 MG/ML
4 INJECTION INTRAMUSCULAR; INTRAVENOUS EVERY 6 HOURS PRN
Status: DISCONTINUED | OUTPATIENT
Start: 2024-04-22 | End: 2024-04-23 | Stop reason: HOSPADM

## 2024-04-22 RX ORDER — SODIUM CHLORIDE 0.9 % (FLUSH) 0.9 %
5-40 SYRINGE (ML) INJECTION PRN
OUTPATIENT
Start: 2024-04-22

## 2024-04-22 RX ORDER — BUPIVACAINE HYDROCHLORIDE 5 MG/ML
30 INJECTION, SOLUTION EPIDURAL; INTRACAUDAL ONCE
Status: COMPLETED | OUTPATIENT
Start: 2024-04-22 | End: 2024-04-22

## 2024-04-22 RX ORDER — IBUPROFEN 200 MG
TABLET ORAL 2 TIMES DAILY
Status: DISCONTINUED | OUTPATIENT
Start: 2024-04-22 | End: 2024-04-23 | Stop reason: HOSPADM

## 2024-04-22 RX ORDER — DEXAMETHASONE SODIUM PHOSPHATE 4 MG/ML
4 INJECTION, SOLUTION INTRA-ARTICULAR; INTRALESIONAL; INTRAMUSCULAR; INTRAVENOUS; SOFT TISSUE ONCE
Status: COMPLETED | OUTPATIENT
Start: 2024-04-22 | End: 2024-04-22

## 2024-04-22 RX ORDER — SODIUM CHLORIDE 9 MG/ML
INJECTION, SOLUTION INTRAVENOUS PRN
OUTPATIENT
Start: 2024-04-22

## 2024-04-22 RX ORDER — LIDOCAINE HCL/PF 100 MG/5ML
SYRINGE (ML) INJECTION PRN
Status: DISCONTINUED | OUTPATIENT
Start: 2024-04-22 | End: 2024-04-22 | Stop reason: SDUPTHER

## 2024-04-22 RX ORDER — SODIUM BICARBONATE 42 MG/ML
1.5 INJECTION, SOLUTION INTRAVENOUS ONCE
Status: COMPLETED | OUTPATIENT
Start: 2024-04-22 | End: 2024-04-22

## 2024-04-22 RX ORDER — SODIUM CHLORIDE 9 MG/ML
INJECTION, SOLUTION INTRAVENOUS CONTINUOUS PRN
Status: DISCONTINUED | OUTPATIENT
Start: 2024-04-22 | End: 2024-04-22 | Stop reason: SDUPTHER

## 2024-04-22 RX ORDER — NALOXONE HYDROCHLORIDE 0.4 MG/ML
INJECTION, SOLUTION INTRAMUSCULAR; INTRAVENOUS; SUBCUTANEOUS PRN
OUTPATIENT
Start: 2024-04-22

## 2024-04-22 RX ORDER — FENTANYL CITRATE 50 UG/ML
25 INJECTION, SOLUTION INTRAMUSCULAR; INTRAVENOUS EVERY 5 MIN PRN
OUTPATIENT
Start: 2024-04-22

## 2024-04-22 RX ORDER — 0.9 % SODIUM CHLORIDE 0.9 %
500 INTRAVENOUS SOLUTION INTRAVENOUS ONCE
Status: DISCONTINUED | OUTPATIENT
Start: 2024-04-22 | End: 2024-04-23 | Stop reason: HOSPADM

## 2024-04-22 RX ORDER — LORAZEPAM 2 MG/ML
1 INJECTION INTRAMUSCULAR EVERY 6 HOURS PRN
Status: DISCONTINUED | OUTPATIENT
Start: 2024-04-22 | End: 2024-04-23 | Stop reason: HOSPADM

## 2024-04-22 RX ORDER — IPRATROPIUM BROMIDE AND ALBUTEROL SULFATE 2.5; .5 MG/3ML; MG/3ML
1 SOLUTION RESPIRATORY (INHALATION)
OUTPATIENT
Start: 2024-04-22 | End: 2024-04-23

## 2024-04-22 RX ORDER — SODIUM CHLORIDE 0.9 % (FLUSH) 0.9 %
5-40 SYRINGE (ML) INJECTION EVERY 12 HOURS SCHEDULED
OUTPATIENT
Start: 2024-04-22

## 2024-04-22 RX ORDER — PROPOFOL 10 MG/ML
INJECTION, EMULSION INTRAVENOUS PRN
Status: DISCONTINUED | OUTPATIENT
Start: 2024-04-22 | End: 2024-04-22 | Stop reason: SDUPTHER

## 2024-04-22 RX ADMIN — LORAZEPAM 1 MG: 2 INJECTION INTRAMUSCULAR; INTRAVENOUS at 06:42

## 2024-04-22 RX ADMIN — ACETAMINOPHEN 650 MG: 325 TABLET ORAL at 11:53

## 2024-04-22 RX ADMIN — PROPOFOL 30 MG: 10 INJECTION, EMULSION INTRAVENOUS at 08:49

## 2024-04-22 RX ADMIN — LIDOCAINE HYDROCHLORIDE 30 ML: 10 INJECTION, SOLUTION EPIDURAL; INFILTRATION; INTRACAUDAL; PERINEURAL at 08:50

## 2024-04-22 RX ADMIN — ONDANSETRON 4 MG: 2 INJECTION INTRAMUSCULAR; INTRAVENOUS at 08:37

## 2024-04-22 RX ADMIN — BUPIVACAINE HYDROCHLORIDE 150 MG: 5 INJECTION, SOLUTION EPIDURAL; INTRACAUDAL; PERINEURAL at 08:50

## 2024-04-22 RX ADMIN — DEXAMETHASONE SODIUM PHOSPHATE 4 MG: 4 INJECTION, SOLUTION INTRAMUSCULAR; INTRAVENOUS at 08:50

## 2024-04-22 RX ADMIN — GADOTERIDOL 17 ML: 279.3 INJECTION, SOLUTION INTRAVENOUS at 07:33

## 2024-04-22 RX ADMIN — SODIUM BICARBONATE 1.5 MEQ: 42 INJECTION, SOLUTION INTRAVENOUS at 08:50

## 2024-04-22 RX ADMIN — SODIUM CHLORIDE: 9 INJECTION, SOLUTION INTRAVENOUS at 08:43

## 2024-04-22 RX ADMIN — PROPOFOL 20 MG: 10 INJECTION, EMULSION INTRAVENOUS at 08:47

## 2024-04-22 RX ADMIN — PROPOFOL 30 MG: 10 INJECTION, EMULSION INTRAVENOUS at 08:48

## 2024-04-22 RX ADMIN — PROPOFOL 40 MG: 10 INJECTION, EMULSION INTRAVENOUS at 08:45

## 2024-04-22 RX ADMIN — PROPOFOL 30 MG: 10 INJECTION, EMULSION INTRAVENOUS at 08:46

## 2024-04-22 RX ADMIN — Medication 50 MG: at 08:45

## 2024-04-22 RX ADMIN — PROPOFOL 30 MG: 10 INJECTION, EMULSION INTRAVENOUS at 08:51

## 2024-04-22 NOTE — PROGRESS NOTES
Gamma Knife Frame Placement Time Out     1.  Patient states name and birthdate correctly? Yes    2. Procedure listed on consent:  G-Frame placement and Gamma Knife radiosurgery for LEFT trigeminal neuralgia    3.  Is this the correct procedure?  Yes    4.  Are the consents signed?  Yes    5. Is this a trigeminal neuralgia case? Yes    -If yes, what side are we treating? left    -If yes, is the side marked for laterality?  yes    6.  Have films been reviewed today?  Yes    7.  Has the interim History and Physical form been completed? yes    8.  Has the neurosurgeon reviewed the Gamma Knife RN Pre-Procedure Checklist?  Yes    9.  FEMALES ONLY: Does the patient require a pregnancy test per [facility] policy? no     -If yes, the result was:  na    10.  Are all present in agreement?  Yes    Those present for time out:  Lola Jacobs, DAMIAN, Dr. Hayes, IBIS Conteh, RN

## 2024-04-22 NOTE — PROGRESS NOTES
Patient brought to Gamma Knife suite and placed on treatment couch. Patient instructed to perform ankle pumps during treatment. AV monitoring in place and verified verbally with patient from console. Continuous SpO2 and pulse monitor visible and monitored by this RN.    Lola Jacobs RN

## 2024-04-22 NOTE — ANESTHESIA PRE PROCEDURE
years. Quit      Vital Signs (Current):   Vitals:    04/22/24 0616   BP: 119/70   Pulse: 65   Resp: 18   SpO2: 93%   Weight: 78.8 kg (173 lb 12.8 oz)   Height: 1.626 m (5' 4\")                                              BP Readings from Last 3 Encounters:   04/22/24 119/70   04/16/24 130/87   02/27/24 120/60       NPO Status:                                                                                 BMI:   Wt Readings from Last 3 Encounters:   04/22/24 78.8 kg (173 lb 12.8 oz)   04/16/24 77.5 kg (170 lb 12.8 oz)   02/27/24 79.4 kg (175 lb)     Body mass index is 29.83 kg/m².    CBC:   Lab Results   Component Value Date/Time    WBC 3.7 04/11/2024 01:03 PM    RBC 3.62 04/11/2024 01:03 PM    HGB 11.3 04/11/2024 01:03 PM    HCT 34.1 04/11/2024 01:03 PM    MCV 94.3 04/11/2024 01:03 PM    RDW 13.1 04/11/2024 01:03 PM     04/11/2024 01:03 PM       CMP:   Lab Results   Component Value Date/Time     04/11/2024 01:03 PM    K 3.8 04/11/2024 01:03 PM    K 3.9 11/08/2023 09:29 AM     04/11/2024 01:03 PM    CO2 28 04/11/2024 01:03 PM    BUN 16 04/11/2024 01:03 PM    CREATININE 0.6 04/11/2024 01:03 PM    GFRAA >60 12/23/2021 10:05 AM    AGRATIO 2.1 02/28/2024 08:44 AM    LABGLOM >90 04/11/2024 01:03 PM    GLUCOSE 133 04/11/2024 01:03 PM    PROT 5.9 02/28/2024 08:44 AM    CALCIUM 9.0 04/11/2024 01:03 PM    BILITOT <0.2 02/28/2024 08:44 AM    ALKPHOS 90 02/28/2024 08:44 AM    AST 20 02/28/2024 08:44 AM    ALT 16 02/28/2024 08:44 AM       POC Tests:   Recent Labs     04/22/24  0613   POCGLU 98       Coags:   Lab Results   Component Value Date/Time    PROTIME 12.5 11/17/2023 02:02 PM    INR 0.93 11/17/2023 02:02 PM    APTT 28.6 11/17/2023 02:02 PM       HCG (If Applicable): No results found for: \"PREGTESTUR\", \"PREGSERUM\", \"HCG\", \"HCGQUANT\"     ABGs: No results found for: \"PHART\", \"PO2ART\", \"RYC6MNK\", \"CJM4TIY\", \"BEART\", \"J6URZLOX\"     Type & Screen (If Applicable):  No results found for: \"LABABO\",

## 2024-04-22 NOTE — ANESTHESIA POSTPROCEDURE EVALUATION
Department of Anesthesiology  Postprocedure Note    Patient: Debbie Pulliam  MRN: [id number]  YOB: 1950  Date of evaluation: 4/22/2024    Procedure Summary       Date: 04/22/24 Room / Location: [facility] Gamma Knife    Anesthesia Start: 0843 Anesthesia Stop: 0902    Procedure: GAMMAKNIFE W ANESTHESIA Diagnosis:     Scheduled Providers: Jose Manuel Reyes MD Responsible Provider: Jose Manuel Reyes MD    Anesthesia Type: general ASA Status: 3            Anesthesia Type: No value filed.    Eliud Phase I: Eliud Score: 10    Eliud Phase II:      Anesthesia Post Evaluation    Patient location during evaluation: bedside  Patient participation: complete - patient participated  Level of consciousness: awake  Pain score: 0  Airway patency: patent  Nausea & Vomiting: no nausea and no vomiting  Cardiovascular status: blood pressure returned to baseline  Respiratory status: nasal cannula  Hydration status: euvolemic  Pain management: adequate        No notable events documented.

## 2024-04-22 NOTE — PROGRESS NOTES
After Gamma Knife procedure, the G-frame was removed by DAMIAN Jiménez and DAMIAN Davila and fixation pin sites were observed for bleeding. None was noted.  Pin sites were cleaned with alcohol and povidone-iodine.  Dr. Hayes then placed sutures at all four pin sites.     Patient met Phase II discharge criteria.  Baseline neurological status unchanged.  See discharge Eliud score and vitals.    Discharge instructions were reviewed with patient and friend who verbalized understanding using teach back method. A written copy of the instructions was given. Patient has follow up appointments scheduled.    Patient was accompanied to transportation by this RN.    Lloa Jacobs RN

## 2024-04-22 NOTE — H&P
The patient was seen and examined. There have been no changes in the patient's condition since the history and physical.    I reviewed the benefits, risks, and alternatives and the patient consents to the procedure.    A CT marker was placed on the side of pain (LEFT).     Justin Hayes MD

## 2024-04-22 NOTE — PROCEDURES
Gamma Knife Procedure Note    Name: Debbie Pulliam  : 1950    Diagnosis: Left Trigeminal Neuralgia    Procedure: Gamma Knife Stereotactic Radiosurgery (SRS)    Description of Procedure  Patient completed fusion MRI including T1 and CISS sequences, pre- and post-contrast. Patient underwent stereotactic ring placement under anesthesia per Dr. Hayes. A stereotactic CBCT was then completed.     Treatment planning is summarized as follows:  Left trigeminal nerve: Dose  84  Gy defined at Dmax. A single 4 mm shot without sector blocking was employed. Attention was given to limiting dose to the leftward brainstem and left mesial temporal lobe.    Treatment Course  Successful completion of SRS.    Post Treatment Planning  Follow up per Dr. Hayes.    Cam Sosa MD

## 2024-04-22 NOTE — OP NOTE
[facility]  PATIENT NAME:   Debbie Pulliam   MR #:  [id number]  YOB: 1950   ACCOUNT #:  [id number]  SURGEON:  Justin Hayes MD   ADMIT DATE:  4/22/2024  SERVICE:  Neurosurgery  DICTATED BY: Justin Hayes MD   SURGERY DATE:  4/22/2024           OPERATIVE REPORT       PREOPERATIVE DIAGNOSIS:     1. LEFT trigeminal neuralgia     POSTOPERATIVE DIAGNOSIS:     1. LEFT trigeminal neuralgia    PROCEDURE(S) PERFORMED:    1. Placement of stereotactic head frame   2. Gamma Knife radiosurgery for LEFT trigeminal neuralgia    NEUROSURGEON: Justin Hayes MD      RADIATION ONCOLOGIST: Cam Sosa MD    ANESTHESIA: Moderate sedation    COMPLICATIONS: None    INDICATIONS: This is a 73-year-old woman with a three-year history of LEFT V2 trigeminal neuralgia with variable pain control on Tegretol 1200 mg/day and dose-limiting side effects. We discussed various treatment options but ultimately recommended Gamma Knife radiosurgery. The patient was aware of the potential benefits in terms of pain relief and the possible risks including (but not limited to): pin site bleeding/swelling/infection, facial numbness, brain swelling, new neurological symptoms, and/or radiation injury the brain.    PERFORMANCE STATUS: 90%    DETAILS OF PROCEDURE: The side of pain was confirmed by the patient, family, nurse, and physician. A vitamin E capsule was affixed to the skin on that side for later visualization on MRI. The patient underwent a radiosurgery fusion MRI scan prior to the procedure. The four pin sites were cleaned with Chloraprep and injected with a mixture of Lidocaine 1%, Marcaine 0.5%, and sodium bicarbonate. The stereotactic head frame was secured with titanium screws. The patient then underwent a cone beam CT scan that was fused with the MRI scan. The target and critical structures were contoured. An optimal treatment plan was developed by the neurosurgeon, radiation oncologist, and medical

## 2024-04-22 NOTE — PROGRESS NOTES
Patient arrived to Gamma Knife department alert and oriented x 4 with friend.   Patient is neurologically intact.   PIV established without difficulty.  Initial vitals WNL, and patient denies pain.    KPS: 90    ECO    mFI-5: 2    Lola Jacobs RN      Gamma Knife RN Pre-Procedure Checklist     1. Has the patient ever had any surgery on the head or neck?  Yes, neck only x2    -If yes, is the surgery site marked?  N/A    2. Has the patient ever received radiation treatment to the head or neck? No      -If yes, is the treatment summary and/or disk of treatment plan available? N/A      -If the patient has had previous Gamma Knife radiosurgery has the most recent imaging been reviewed in the Gamma Plan? N/A      3. Has the patient received chemotherapy or immunotherapy in the past month? No      -If yes, list the agent and date of last treatment.  N/A    4. Is patient 80 or older? No      -If yes, using all aluminum pins? N/A    5. List the procedure-specific medications taken by the patient this morning:   -Tegretol 400 mg    6. What is the patient’s GFR? ***    -For GFR 0-30 => no contrast at MRI    -For GFR 31-59 => single dose contrast at MRI    -For GFR >60 => double dose contrast at MRI    7. IF FEMALE: Does the patient require a pregnancy test per [facility] policy?   no   -If yes, the result is: na    8. What is the patient's baseline CO2? 46

## 2024-04-22 NOTE — PROGRESS NOTES
Gamma Knife Radiation Delivery Time Out    1.  Patient states name and birthdate correctly? Yes    2.  Procedure listed on consent Stereotactic Radiosurgery, Gamma Knife for LEFT trigeminal neuralgia    3. Is this the correct procedure? Yes    4. Is this a trigeminal neuralgia case? Yes    -If yes, what side are we treating? left    -If yes, is the side marked for laterality?  yes    5.  Has the final radiologist report been reviewed? yes    6.  Does the patient require Keppra prior to treatment delivery? no    7.  Does the patient require Ativan prior to treatment delivery?  no    8.  Are all present in agreement?  Yes    9. Those present for time out:  Dr. Ian El, Lola Jacobs, RN     Lola Jacobs RN

## 2024-04-22 NOTE — DISCHARGE INSTRUCTIONS
(redness, swelling, drainage or irritation at pin sites, fever >101 F for more than 24 hours), call your physician.  Do NOT stop taking your medications.  Follow up with your physician as directed.       If you have any questions during regular business hours, call the Gamma Knife nurse at [phone].  For questions during off-business hours and on weekends, contact Dr. Hayes's office directly at [phone].

## 2024-04-23 ENCOUNTER — POST-OP TELEPHONE (OUTPATIENT)
Dept: RADIATION ONCOLOGY | Age: 74
End: 2024-04-23

## 2024-04-23 NOTE — PROGRESS NOTES
Spoke to patient POD #1 from Gamma Knife radiosurgery.  Patient denies bleeding, swelling, headache, or fever.  Reinforced all post-procedure instructions Patient states she has had 3-4 \"zingers\" rating them 5/10 on the pain scale. They are lasting seconds. Patient states this is a decrease for her and she is happy.      Reviewed with patient that the follow-up phone call is scheduled for 5/6.     Patient verbalized understanding to call with any new neurological symptoms.     Encouraged to call with any questions or concerns.     Jessy Palmer RN

## 2024-04-25 ENCOUNTER — TELEPHONE (OUTPATIENT)
Dept: FAMILY MEDICINE CLINIC | Age: 74
End: 2024-04-25

## 2024-04-25 NOTE — TELEPHONE ENCOUNTER
Patient called. She has to take antibiotic prior to dental appointments  Has been given Clindamycin the dentist wants her to take something different.    Please sent antibiotic to [facility]    Appt 5/2

## 2024-04-26 ENCOUNTER — TELEPHONE (OUTPATIENT)
Dept: FAMILY MEDICINE CLINIC | Age: 74
End: 2024-04-26

## 2024-04-26 RX ORDER — CLINDAMYCIN HYDROCHLORIDE 300 MG/1
300 CAPSULE ORAL 2 TIMES DAILY
Qty: 6 CAPSULE | Refills: 0 | Status: SHIPPED | OUTPATIENT
Start: 2024-04-26 | End: 2024-04-29

## 2024-04-26 RX ORDER — DOXYCYCLINE HYCLATE 100 MG
TABLET ORAL
Qty: 1 TABLET | Refills: 0 | Status: SHIPPED | OUTPATIENT
Start: 2024-04-26

## 2024-04-27 NOTE — TELEPHONE ENCOUNTER
04/26/24 8:47 PM   Received call from patient stating the dentist wants her on a different antibiotic prior to dental procedure. Dentist did not call in alterantive. She does not have history of heart valve replacement. Doxycyline to be taken prior to procedure sent to pharmacy

## 2024-05-01 ENCOUNTER — OFFICE VISIT (OUTPATIENT)
Dept: NEUROLOGY | Age: 74
End: 2024-05-01
Payer: COMMERCIAL

## 2024-05-01 VITALS
DIASTOLIC BLOOD PRESSURE: 64 MMHG | HEART RATE: 80 BPM | BODY MASS INDEX: 29.19 KG/M2 | WEIGHT: 171 LBS | OXYGEN SATURATION: 95 % | SYSTOLIC BLOOD PRESSURE: 120 MMHG | HEIGHT: 64 IN

## 2024-05-01 DIAGNOSIS — T88.7XXA SIDE EFFECT OF MEDICATION: ICD-10-CM

## 2024-05-01 DIAGNOSIS — G50.0 TRIGEMINAL NEURALGIA: Primary | ICD-10-CM

## 2024-05-01 DIAGNOSIS — M54.81 OCCIPITAL NEURALGIA OF LEFT SIDE: ICD-10-CM

## 2024-05-01 PROCEDURE — 1123F ACP DISCUSS/DSCN MKR DOCD: CPT | Performed by: PSYCHIATRY & NEUROLOGY

## 2024-05-01 PROCEDURE — 1036F TOBACCO NON-USER: CPT | Performed by: PSYCHIATRY & NEUROLOGY

## 2024-05-01 PROCEDURE — 3074F SYST BP LT 130 MM HG: CPT | Performed by: PSYCHIATRY & NEUROLOGY

## 2024-05-01 PROCEDURE — G8417 CALC BMI ABV UP PARAM F/U: HCPCS | Performed by: PSYCHIATRY & NEUROLOGY

## 2024-05-01 PROCEDURE — 3017F COLORECTAL CA SCREEN DOC REV: CPT | Performed by: PSYCHIATRY & NEUROLOGY

## 2024-05-01 PROCEDURE — G8399 PT W/DXA RESULTS DOCUMENT: HCPCS | Performed by: PSYCHIATRY & NEUROLOGY

## 2024-05-01 PROCEDURE — 99214 OFFICE O/P EST MOD 30 MIN: CPT | Performed by: PSYCHIATRY & NEUROLOGY

## 2024-05-01 PROCEDURE — 3078F DIAST BP <80 MM HG: CPT | Performed by: PSYCHIATRY & NEUROLOGY

## 2024-05-01 PROCEDURE — 1090F PRES/ABSN URINE INCON ASSESS: CPT | Performed by: PSYCHIATRY & NEUROLOGY

## 2024-05-01 PROCEDURE — G8427 DOCREV CUR MEDS BY ELIG CLIN: HCPCS | Performed by: PSYCHIATRY & NEUROLOGY

## 2024-05-01 RX ORDER — CARBAMAZEPINE 200 MG/1
400 TABLET ORAL 2 TIMES DAILY
Qty: 120 TABLET | Refills: 2 | Status: SHIPPED | OUTPATIENT
Start: 2024-05-01

## 2024-05-01 NOTE — PROGRESS NOTES
Neurology outpatient follow-up visit    Patient name: Debbie Pulliam      Chief Complaint:  TIA    History of present illness:  This is a 73 years old right-handed female.  The patient is here for evaluation of new onset of speech difficulty.  The event occurred in September.  At that time, the patient started aspirin for possible CVA or TIA.  Unfortunately, the patient was similar event again last week.  The patient was hospitalized.  At that time, the MRI brain showed no acute ischemic injury but mild to moderate degree of small vessel disease.  The patient also had normal CTA of head and neck.  The patient was discharged with the same aspirin.  The patient was on atorvastatin 10 years ago.  For some reasons, this medication was discontinued.  The patient also has cervical myelopathy.  The patient has a plan to do surgery by the end of this month.  The patient denies significant weakness at this time.  The patient is on baby aspirin alone for CVA prophylaxis.    Interval History:  12/27/23: The patient is here for follow-up after the surgery.  Right after the surgery, the patient developed another episode of right-sided weakness and speech difficulty.  At that time, the patient had MRI brain which showed no acute infarction but minimal small vessel disease.  The patient also had normal EEG at that time.  The patient reports some intermittent neck pain after the surgery.  The patient is currently on as needed Percocet and Robaxin.  The patient tried gabapentin in the past for back pain but the patient did not like it.  The patient never tried pregabalin.  The patient is planned to restart antiplatelet again on this Friday.    05/01/24: The patient remains to have his significant neck pain and occipital headache.  The patient also reports unsteady gait with intermittent dizziness.  The patient is currently on carbamazepine 1200 mg a day for trigeminal neuralgia.  The patient have not tried pregabalin yet.  Her

## 2024-05-01 NOTE — PATIENT INSTRUCTIONS
YOU MUST CONFIRM YOUR APPOINTMENT 1 DAY PRIOR OR IT WILL BE CANCELLED!!   Our office will call you 3 times the day prior to your appointment in an attempt to confirm.  Please return our call ASAP or confirm your appt through [employer] no later than 3 pm the day before your appointment.  If we do not hear back from you by 3 pm to confirm, your appointment will be cancelled & someone will be added into that slot from our wait list.       Call the office in a month to let us know if the Lyrica is working.

## 2024-05-06 ENCOUNTER — POST-OP TELEPHONE (OUTPATIENT)
Dept: RADIATION ONCOLOGY | Age: 74
End: 2024-05-06

## 2024-05-06 ENCOUNTER — TELEPHONE (OUTPATIENT)
Dept: NEUROLOGY | Age: 74
End: 2024-05-06

## 2024-05-06 NOTE — PROGRESS NOTES
Two week follow-up phone call with patient has been completed.   Pt denies any issues with the pin sites, states that the sutures have all dissolved.    Patient states she has only had a few days with a trigeminal episode.  When these episodes happen, pain is 7/10.  3.   Has been reminded of their next appointment with Dr. Hayes's office via phone call in 2 weeks.  4.   Any other questions or further follow up will be with Dr. Hayes.    Thank you for referring this patient to the Gamma Knife Department for treatment, it has been a pleasure to participate in their care.  If the patient requires future Gamma Knife procedures please contact the department directly at [phone].

## 2024-05-06 NOTE — TELEPHONE ENCOUNTER
Pt came into office today to provide paperwork for the Ct scan that she had done, wanted Dr Gutierrez to review as they had talked about her having a procedure done.  Ct scan should be on care everywhere to review as well.     Pt provided copy, see attached     Please review.

## 2024-05-07 NOTE — TELEPHONE ENCOUNTER
[facility] also faxed us a clearance request for epidural steroid injection - wanting to know if pt can stop Plavix 5 days prior to procedure.

## 2024-05-08 ENCOUNTER — TELEPHONE (OUTPATIENT)
Age: 74
End: 2024-05-08

## 2024-05-08 DIAGNOSIS — I10 ESSENTIAL HYPERTENSION: Chronic | ICD-10-CM

## 2024-05-08 RX ORDER — SPIRONOLACTONE 50 MG/1
50 TABLET, FILM COATED ORAL DAILY
Qty: 90 TABLET | Refills: 1 | Status: SHIPPED | OUTPATIENT
Start: 2024-05-08

## 2024-05-08 NOTE — TELEPHONE ENCOUNTER
Diabetes Follow-up    Subjective/Objective:    Called out and spoke with Maranda Mallory to review blood glucoses. Last date of communication was: 01/24/2017.  Diabetes is being managed with Lifestyle (diet/activity).  Taking diabetes medications?   yes:     Diabetes Medication(s)     Biguanides Sig    metFORMIN (GLUCOPHAGE-XR) 500 MG 24 hr tablet Take 2 tablets (1,000 mg) by mouth 2 times daily (with meals)    Insulin Sig    insulin degludec (TRESIBA) 100 UNIT/ML pen Inject 18 units twice daily    insulin aspart (NOVOLOG FLEXPEN) 100 UNIT/ML injection Inject 15 Units Subcutaneous 3 times daily (with meals)    Incretin Mimetic Agents (GLP-1 Receptor Agonists) Sig    exenatide ER (BYDUREON) 2 MG recon vial kit susp for weekly inj Inject 2 mg Subcutaneous every 7 days      Does not have Bydureon.     Patient was at work.  Reports fasting BG is usually around 158mg/dL.  Can increase up to 300s after meals.   Is taking the Tresiba consistently. Usually takes Novolog with breakfast and skips Novolog at lunch and dinner.   Patient is tearful and scared that the insulin is making her gain weight.  Reports her weight is at 242#, which is up 5# from the beginning of December.  Dicussed how there might be an initial weight gain, but then it should level off once we find the correct doses as long as patient is not eating extra to keep up with her insulin. Also reviewing total caloric intake and diet may be beneficial for weight control.  Discussed trying to check post prandial levels after taking the Novolog to evaluate how it is covering the meal.  Patients meals vary in size and may be able to use less Novolog at smaller meals as an idea for working towards better control without promoting weight gain.      Noted that the Tresiba is ordered BID.  Will update MD and request that this be changed to once daily per medication instructions.       Reports Bydureon is not covered.  Patient is willing to try Victoza again, which was  LM for pt to CB.   discontinued in the hospital.  Patient reports no adverse reactions to this medication.     Wt Readings from Last 5 Encounters:   01/02/17 239 lb (108.4 kg)   12/08/16 237 lb (107.5 kg)   12/08/16 237 lb (107.5 kg)   11/30/16 233 lb (105.7 kg)   11/27/16 228 lb (103.4 kg)       Plan/Response:  Patient agreed to make an in office appointment for 3/24 to review diet.   Will work on checking BG more frequently and trying to take insulin  OK to leave detailed message on cell phone if MD agrees to changing Tresiba to once daily, writer will update patient.     Perla Armstrong RD, LD   Diabetes Education

## 2024-05-08 NOTE — TELEPHONE ENCOUNTER
Spoke with pt about Dr. Gutierrez's notes on CT.  He did not address the part of the message where [facility] is asking if she can hold    The epidural steroid injection that [facility] is recommending is in her C2 - C5 on the left side.  She wants Dr. Gutierrez's assurance that this procedure is safe because she says at her last appointment with Dr. Gutierrez, he said he didn't think it was a good idea & that instead, he would recommend ONB.

## 2024-05-08 NOTE — TELEPHONE ENCOUNTER
LOV 4/16/2024    Future Appointments   Date Time Provider Department Center   6/11/2024  1:30 PM Priscilla Hurtado AuD K[facility] AUDIO [facility]   6/11/2024  2:00 PM Daryl Rios DO [facility]   6/26/2024  9:00 AM Carline Dennis APRN - CNP [facility] [place] - [facility]   8/5/2024  9:00 AM Britta Gomez MD AND NEURO Neurology -

## 2024-05-08 NOTE — TELEPHONE ENCOUNTER
Received PA request for Lyrica via faxed. Submitted PA via fax with supporting documentation. Confirmation scanned to media will watch for correspondence

## 2024-05-08 NOTE — TELEPHONE ENCOUNTER
Per Dr. Gutierrez: The CT cervical spine did not help much as the nerve pain is at the base of skull.  If she does not not get benefit from the current meds, she can try occipital nerve block.

## 2024-05-08 NOTE — TELEPHONE ENCOUNTER
Per Dr. Gutierrez: He told pt that he didn't think surgery on her neck was as good idea but said that minimally invasive interventions, such as this epidural injection, are fine.  Ok to hold Plavix for epidural injection.  Clearance form faxed back to [facility] and attached to this encounter.

## 2024-05-09 DIAGNOSIS — G45.9 TIA (TRANSIENT ISCHEMIC ATTACK): Primary | ICD-10-CM

## 2024-05-10 ENCOUNTER — TELEPHONE (OUTPATIENT)
Dept: NEUROLOGY | Age: 74
End: 2024-05-10

## 2024-05-10 RX ORDER — CLOPIDOGREL BISULFATE 75 MG/1
75 TABLET ORAL DAILY
Qty: 90 TABLET | Refills: 1 | Status: SHIPPED | OUTPATIENT
Start: 2024-05-10

## 2024-05-10 NOTE — TELEPHONE ENCOUNTER
Received fax from [facility] approved 5/10/24-05/10/25. LM on VM with pharmacy letting them know of approval. Approval scanned.

## 2024-05-10 NOTE — TELEPHONE ENCOUNTER
Received fax from [facility] with clarification on dx. Filled out an faxed back. Confirmation scanned to chart. Will continue to watch

## 2024-05-19 DIAGNOSIS — E78.2 MIXED HYPERLIPIDEMIA: ICD-10-CM

## 2024-05-19 DIAGNOSIS — G25.81 RLS (RESTLESS LEGS SYNDROME): ICD-10-CM

## 2024-05-19 DIAGNOSIS — G45.9 TIA (TRANSIENT ISCHEMIC ATTACK): ICD-10-CM

## 2024-05-20 ENCOUNTER — TELEPHONE (OUTPATIENT)
Dept: NEUROLOGY | Age: 74
End: 2024-05-20

## 2024-05-20 RX ORDER — ATORVASTATIN CALCIUM 40 MG/1
40 TABLET, FILM COATED ORAL DAILY
Qty: 90 TABLET | Refills: 0 | Status: SHIPPED | OUTPATIENT
Start: 2024-05-20

## 2024-05-20 RX ORDER — ROPINIROLE 2 MG/1
TABLET, FILM COATED ORAL
Qty: 180 TABLET | Refills: 3 | Status: SHIPPED | OUTPATIENT
Start: 2024-05-20

## 2024-05-20 NOTE — TELEPHONE ENCOUNTER
Last ov 04/16/2024   Future Appointments   Date Time Provider Department Center   6/11/2024  1:30 PM Priscilla Hurtado AuD [facility] AUD[facility]   6/11/2024  2:00 PM Daryl Rios DO [facility]   6/26/2024  9:00 AM Carline Dennis APRN - CNP [facility] [place] - [facility]   8/5/2024  9:00 AM Britta Gomez MD AND NEURO Neurology -

## 2024-05-20 NOTE — TELEPHONE ENCOUNTER
Per previous documentation scanned under media and prior conversation with pt - she is to hold Plavix 5 days prior to injection.  I informed  her of this again today and told her that this was faxed to [facility] on 5/8/24.

## 2024-05-28 DIAGNOSIS — D64.9 NORMOCYTIC ANEMIA: ICD-10-CM

## 2024-05-28 DIAGNOSIS — E87.6 HYPOKALEMIA: Primary | ICD-10-CM

## 2024-05-28 LAB
BASOPHILS # BLD: 0 K/UL (ref 0–0.2)
BASOPHILS NFR BLD: 0.8 %
DEPRECATED RDW RBC AUTO: 13.2 % (ref 12.4–15.4)
EOSINOPHIL # BLD: 0.1 K/UL (ref 0–0.6)
EOSINOPHIL NFR BLD: 1.7 %
HCT VFR BLD AUTO: 30.6 % (ref 36–48)
HGB BLD-MCNC: 10.7 G/DL (ref 12–16)
LYMPHOCYTES # BLD: 1.5 K/UL (ref 1–5.1)
LYMPHOCYTES NFR BLD: 36.2 %
MCH RBC QN AUTO: 32.5 PG (ref 26–34)
MCHC RBC AUTO-ENTMCNC: 34.8 G/DL (ref 31–36)
MCV RBC AUTO: 93.3 FL (ref 80–100)
MONOCYTES # BLD: 0.4 K/UL (ref 0–1.3)
MONOCYTES NFR BLD: 9.3 %
NEUTROPHILS # BLD: 2.1 K/UL (ref 1.7–7.7)
NEUTROPHILS NFR BLD: 52 %
PLATELET # BLD AUTO: 528 K/UL (ref 135–450)
PMV BLD AUTO: 6.1 FL (ref 5–10.5)
RBC # BLD AUTO: 3.28 M/UL (ref 4–5.2)
WBC # BLD AUTO: 4 K/UL (ref 4–11)

## 2024-05-29 ENCOUNTER — TELEPHONE (OUTPATIENT)
Dept: NEUROLOGY | Age: 74
End: 2024-05-29

## 2024-05-29 NOTE — TELEPHONE ENCOUNTER
LOV 5/1/24    Pt called today to give update on how she is doing with the medications. She states that she is doing excellent and reports no side affects.    At this point she says does not want to do the nerve block as she is schedule for a double injection on 6/6/24

## 2024-06-04 DIAGNOSIS — D64.9 NORMOCYTIC ANEMIA: Primary | ICD-10-CM

## 2024-06-05 DIAGNOSIS — E87.6 HYPOKALEMIA: ICD-10-CM

## 2024-06-05 DIAGNOSIS — D64.9 NORMOCYTIC ANEMIA: ICD-10-CM

## 2024-06-05 LAB
ANION GAP SERPL CALCULATED.3IONS-SCNC: 10 MMOL/L (ref 3–16)
BUN SERPL-MCNC: 23 MG/DL (ref 7–20)
CALCIUM SERPL-MCNC: 9 MG/DL (ref 8.3–10.6)
CHLORIDE SERPL-SCNC: 106 MMOL/L (ref 99–110)
CO2 SERPL-SCNC: 29 MMOL/L (ref 21–32)
CREAT SERPL-MCNC: 0.5 MG/DL (ref 0.6–1.2)
FERRITIN SERPL IA-MCNC: 98.7 NG/ML (ref 15–150)
FOLATE SERPL-MCNC: >20 NG/ML (ref 4.78–24.2)
GFR SERPLBLD CREATININE-BSD FMLA CKD-EPI: >90 ML/MIN/{1.73_M2}
GLUCOSE SERPL-MCNC: 90 MG/DL (ref 70–99)
IRON SERPL-MCNC: 48 UG/DL (ref 37–145)
POTASSIUM SERPL-SCNC: 4.6 MMOL/L (ref 3.5–5.1)
SODIUM SERPL-SCNC: 145 MMOL/L (ref 136–145)
TIBC SERPL-MCNC: 265 UG/DL (ref 260–445)
VIT B12 SERPL-MCNC: 958 PG/ML (ref 211–911)

## 2024-06-06 ENCOUNTER — APPOINTMENT (OUTPATIENT)
Dept: GENERAL RADIOLOGY | Age: 74
End: 2024-06-06
Attending: PHYSICAL MEDICINE & REHABILITATION
Payer: COMMERCIAL

## 2024-06-06 ENCOUNTER — HOSPITAL ENCOUNTER (OUTPATIENT)
Age: 74
Setting detail: OUTPATIENT SURGERY
Discharge: HOME | End: 2024-06-06
Attending: PHYSICAL MEDICINE & REHABILITATION | Admitting: PHYSICAL MEDICINE & REHABILITATION
Payer: COMMERCIAL

## 2024-06-06 VITALS
DIASTOLIC BLOOD PRESSURE: 77 MMHG | RESPIRATION RATE: 16 BRPM | BODY MASS INDEX: 28.68 KG/M2 | TEMPERATURE: 97 F | WEIGHT: 168 LBS | HEIGHT: 64 IN | HEART RATE: 61 BPM | SYSTOLIC BLOOD PRESSURE: 139 MMHG | OXYGEN SATURATION: 99 %

## 2024-06-06 DIAGNOSIS — D64.9 NORMOCYTIC ANEMIA: Primary | ICD-10-CM

## 2024-06-06 LAB
GLUCOSE BLD-MCNC: 97 MG/DL (ref 70–99)
PERFORMED ON: NORMAL

## 2024-06-06 PROCEDURE — 6360000002 HC RX W HCPCS: Performed by: PHYSICAL MEDICINE & REHABILITATION

## 2024-06-06 PROCEDURE — 77003 FLUOROGUIDE FOR SPINE INJECT: CPT

## 2024-06-06 PROCEDURE — 99152 MOD SED SAME PHYS/QHP 5/>YRS: CPT | Performed by: PHYSICAL MEDICINE & REHABILITATION

## 2024-06-06 PROCEDURE — 2500000003 HC RX 250 WO HCPCS: Performed by: PHYSICAL MEDICINE & REHABILITATION

## 2024-06-06 PROCEDURE — 2709999900 HC NON-CHARGEABLE SUPPLY: Performed by: PHYSICAL MEDICINE & REHABILITATION

## 2024-06-06 PROCEDURE — 3610000056 HC PAIN LEVEL 4 BASE (NON-OR): Performed by: PHYSICAL MEDICINE & REHABILITATION

## 2024-06-06 RX ORDER — MIDAZOLAM HYDROCHLORIDE 1 MG/ML
INJECTION INTRAMUSCULAR; INTRAVENOUS
Status: COMPLETED | OUTPATIENT
Start: 2024-06-06 | End: 2024-06-06

## 2024-06-06 RX ORDER — FENTANYL CITRATE 50 UG/ML
INJECTION, SOLUTION INTRAMUSCULAR; INTRAVENOUS
Status: COMPLETED | OUTPATIENT
Start: 2024-06-06 | End: 2024-06-06

## 2024-06-06 RX ORDER — LIDOCAINE HYDROCHLORIDE 10 MG/ML
INJECTION, SOLUTION EPIDURAL; INFILTRATION; INTRACAUDAL; PERINEURAL
Status: COMPLETED | OUTPATIENT
Start: 2024-06-06 | End: 2024-06-06

## 2024-06-06 RX ORDER — DEXAMETHASONE SODIUM PHOSPHATE 10 MG/ML
INJECTION INTRAMUSCULAR; INTRAVENOUS
Status: COMPLETED | OUTPATIENT
Start: 2024-06-06 | End: 2024-06-06

## 2024-06-06 ASSESSMENT — PAIN DESCRIPTION - DESCRIPTORS
DESCRIPTORS: OTHER (COMMENT)
DESCRIPTORS: ACHING
DESCRIPTORS: OTHER (COMMENT)
DESCRIPTORS: OTHER (COMMENT)

## 2024-06-06 ASSESSMENT — PAIN - FUNCTIONAL ASSESSMENT
PAIN_FUNCTIONAL_ASSESSMENT: 0-10
PAIN_FUNCTIONAL_ASSESSMENT: PREVENTS OR INTERFERES SOME ACTIVE ACTIVITIES AND ADLS
PAIN_FUNCTIONAL_ASSESSMENT: 0-10

## 2024-06-06 NOTE — H&P
HISTORY AND PHYSICAL/PRE-SEDATION ASSESSMENT    Patient:  Debbie Pulliam   :  1950  Medical Record No.:  [id number]   Date:  2024  Physician:  Biju Ronquillo MD  Facility: [facility]    HISTORY OF PRESENT ILLNESS:                 The patient is a 74 y.o. female whom presents with neck pain. Review of the imaging and physical exam of the patient confirmed the pre-procedure diagnosis.  After a thorough discussion of risks, benefits and alternatives informed consent was obtained.    Diagnosis:  Pre-Op Diagnosis Codes:     * Cervical spondylosis [M47.812]    Past Medical History:   Past Medical History:   Diagnosis Date    Anxiety     Borderline personality disorder (HCC)     Chronic pain     Colon disorder     hard time pushing stool out    Constipation     Diabetes mellitus (HCC)     Diabetic neuropathy ([facility])     Electric shock     ECT therapy    GERD (gastroesophageal reflux disease)     Controlled    Heart murmur     Hyperlipidemia     Hypertension     Insomnia     Major depressive disorder, recurrent ([facility])     Morbid obesity ([facility])     Neuropathy     Osteoarthrosis     Restless legs syndrome     RLS (restless legs syndrome)     Small bowel obstruction ([facility]) 2019    Suicidal ideation     Unspecified hypothyroidism     Vitamin deficiency     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

## 2024-06-06 NOTE — OP NOTE
PATIENT:  Debbie Pulliam  AGE:  74 yrs  MEDICAL RECORD #:  [id number]  YOB: 1950     DATE:  6/6/2024  PHYSICIAN: Biju Ronquillo M.D.     PROCEDURE: Left C3, C5 transforaminal epidural steroid injection under fluoroscopy.     PRE-OP DIAGNOSIS:  Neck Pain/Radiculopathy     POST-OP DIAGNOSIS:  same     HISTORY OF PRESENT ILLNESS:  See office notes. Patient has failed previous less-invasive treatments.     ALLERGIES:  Penicillin g, Levaquin [levofloxacin in d5w], Levofloxacin, and Pcn [penicillins]     MEDICATIONS:    No current facility-administered medications for this encounter.     Facility-Administered Medications Ordered in Other Encounters   Medication Dose Route Frequency Provider Last Rate Last Admin    sodium chloride flush 0.9 % injection 5-40 mL  5-40 mL IntraVENous 2 times per day Jay Dyson MD        sodium chloride flush 0.9 % injection 5-40 mL  5-40 mL IntraVENous PRN Jay Dyson MD        0.9 % sodium chloride infusion   IntraVENous PRN Jay Dyson MD            PHYSICAL EXAMINATION:              General:  Awake, alert              Heart:  No audible murmurs, extremities well perfused              Lungs:  No increased WOB or audible wheezing              Extremities:  Normal tone. Warm. No swelling.      Anesthesia: 0.5 mg Versed and 25 mcg fentanyl    Estimated blood loss: None  Complications: None  Specimen: None    DESCRIPTION OF PROCEDURE:     Components of the procedure were again reviewed with the patient prior to the procedure.  She is aware of risks including infection, bleeding, allergic reaction, and nerve injury.  She had ample opportunity for additional questions.  She elected to proceed with treatment.     The patient was placed in the supine position.  Cardiovascular monitoring was initiated, and vital signs were stable prior to, during, and after the procedure.  Utilizing fluoroscopy, the C3, C5 vertebrae were identified.  The area was sterilely

## 2024-06-06 NOTE — PROGRESS NOTES
Procedural dressing dry and intact.    Bilateral upper extremities equal in strength.    Discharge instructions reviewed with patient or responsible adult, signed and copy given.  All home medications have been reviewed.  All questions answered and patient or responsible adult verbalized understanding.  PAIN LEVEL AT DISCHARGE ___7__

## 2024-06-11 ENCOUNTER — OFFICE VISIT (OUTPATIENT)
Dept: ENT CLINIC | Age: 74
End: 2024-06-11
Payer: COMMERCIAL

## 2024-06-11 ENCOUNTER — PROCEDURE VISIT (OUTPATIENT)
Dept: AUDIOLOGY | Age: 74
End: 2024-06-11
Payer: COMMERCIAL

## 2024-06-11 VITALS
DIASTOLIC BLOOD PRESSURE: 70 MMHG | OXYGEN SATURATION: 96 % | SYSTOLIC BLOOD PRESSURE: 122 MMHG | TEMPERATURE: 97.5 F | HEART RATE: 68 BPM | RESPIRATION RATE: 14 BRPM

## 2024-06-11 DIAGNOSIS — H93.13 TINNITUS OF BOTH EARS: ICD-10-CM

## 2024-06-11 DIAGNOSIS — R26.89 IMBALANCE: ICD-10-CM

## 2024-06-11 DIAGNOSIS — H90.3 SENSORINEURAL HEARING LOSS, BILATERAL: Primary | ICD-10-CM

## 2024-06-11 DIAGNOSIS — H93.13 TINNITUS OF BOTH EARS: Primary | ICD-10-CM

## 2024-06-11 DIAGNOSIS — H90.3 SENSORINEURAL HEARING LOSS (SNHL) OF BOTH EARS: ICD-10-CM

## 2024-06-11 PROCEDURE — 1090F PRES/ABSN URINE INCON ASSESS: CPT | Performed by: OTOLARYNGOLOGY

## 2024-06-11 PROCEDURE — 92567 TYMPANOMETRY: CPT

## 2024-06-11 PROCEDURE — 3078F DIAST BP <80 MM HG: CPT | Performed by: OTOLARYNGOLOGY

## 2024-06-11 PROCEDURE — 1036F TOBACCO NON-USER: CPT | Performed by: OTOLARYNGOLOGY

## 2024-06-11 PROCEDURE — G8399 PT W/DXA RESULTS DOCUMENT: HCPCS | Performed by: OTOLARYNGOLOGY

## 2024-06-11 PROCEDURE — 92557 COMPREHENSIVE HEARING TEST: CPT

## 2024-06-11 PROCEDURE — G8427 DOCREV CUR MEDS BY ELIG CLIN: HCPCS | Performed by: OTOLARYNGOLOGY

## 2024-06-11 PROCEDURE — 1123F ACP DISCUSS/DSCN MKR DOCD: CPT | Performed by: OTOLARYNGOLOGY

## 2024-06-11 PROCEDURE — 3017F COLORECTAL CA SCREEN DOC REV: CPT | Performed by: OTOLARYNGOLOGY

## 2024-06-11 PROCEDURE — 3074F SYST BP LT 130 MM HG: CPT | Performed by: OTOLARYNGOLOGY

## 2024-06-11 PROCEDURE — G8417 CALC BMI ABV UP PARAM F/U: HCPCS | Performed by: OTOLARYNGOLOGY

## 2024-06-11 PROCEDURE — 92504 EAR MICROSCOPY EXAMINATION: CPT | Performed by: OTOLARYNGOLOGY

## 2024-06-11 PROCEDURE — 99203 OFFICE O/P NEW LOW 30 MIN: CPT | Performed by: OTOLARYNGOLOGY

## 2024-06-11 RX ORDER — PREGABALIN 50 MG/1
CAPSULE ORAL
COMMUNITY

## 2024-06-11 ASSESSMENT — ENCOUNTER SYMPTOMS
BLOOD IN STOOL: 0
WHEEZING: 0
SINUS PAIN: 0
STRIDOR: 0
RHINORRHEA: 0
CONSTIPATION: 0
FACIAL SWELLING: 0
SORE THROAT: 0
TROUBLE SWALLOWING: 0
COLOR CHANGE: 0
COUGH: 0
VOMITING: 0
EYE DISCHARGE: 0
BACK PAIN: 0
VOICE CHANGE: 0
DIARRHEA: 0
EYE ITCHING: 0
SHORTNESS OF BREATH: 0
CHOKING: 0
NAUSEA: 0
PHOTOPHOBIA: 0
SINUS PRESSURE: 0

## 2024-06-11 NOTE — PROGRESS NOTES
Recognition Threshold: 10 dB HL  Word Recognition: Excellent 96%, based on NU-6 25-word list at 50 dBHL using recorded speech stimuli.    Tympanometry: Normal peak pressure and compliance, Type A tympanogram, consistent with normal middle ear function.       Reliability: Good   Transducer: HF Headphones    See scanned audiogram dated 6/11/2024 for results.        PATIENT EDUCATION:       The following items were discussed with the patient:   - Good Communication Strategies  - Hearing Loss and Hearing Aids  - Tinnitus Management Strategies    - Fall Risk and Prevention     Educational information was shared in the After Visit Summary.                                                RECOMMENDATIONS:                                                                                                                                                                                                                                                            The following items are recommended based on patient report and results from today's appointment:   - Continue medical follow-up with Daryl Rios DO.   - Retest hearing as medically indicated and/or sooner if a change in hearing is noted.  - If desired, schedule a Hearing Aid Evaluation (HAE) appointment to discuss hearing aid options.  - Utilize \"Good Communication Strategies\" as discussed to assist in speech understanding with communication partners.  - Maintain a sound enriched environment to assist in the management of tinnitus symptoms.    Elfego Argueta  Audiologist    Chart CC'd to: Daryl Rios DO      Degree of   Hearing Sensitivity dB Range   Within Normal Limits (WNL) 0 - 20   Mild 20 - 40   Moderate 40 - 55   Moderately-Severe 55 - 70   Severe 70 - 90   Profound 90 +

## 2024-06-11 NOTE — PROGRESS NOTES
Tympanic membrane has normal mobility.      Left Ear: Hearing, tympanic membrane and external ear normal. No decreased hearing noted. No drainage, swelling or tenderness.  No middle ear effusion. No mastoid tenderness. Tympanic membrane is not perforated, retracted or bulging. Tympanic membrane has normal mobility.      Nose: No nasal deformity, septal deviation, laceration, mucosal edema or rhinorrhea.      Right Sinus: No maxillary sinus tenderness or frontal sinus tenderness.      Left Sinus: No maxillary sinus tenderness or frontal sinus tenderness.      Mouth/Throat:      Mouth: Mucous membranes are not pale, not dry and not cyanotic. No lacerations or oral lesions.      Dentition: Normal dentition. No dental caries or dental abscesses.      Pharynx: Uvula midline. No oropharyngeal exudate, posterior oropharyngeal erythema or uvula swelling.      Tonsils: No tonsillar abscesses.   Eyes:      General: Lids are normal.         Right eye: No discharge.         Left eye: No discharge.      Extraocular Movements:      Right eye: Normal extraocular motion and no nystagmus.      Left eye: Normal extraocular motion and no nystagmus.      Conjunctiva/sclera:      Right eye: No chemosis or exudate.     Left eye: No chemosis or exudate.  Neck:      Thyroid: No thyroid mass or thyromegaly.      Vascular: Normal carotid pulses.      Trachea: No tracheal tenderness or tracheal deviation.   Cardiovascular:      Rate and Rhythm: Normal rate and regular rhythm.   Pulmonary:      Effort: No tachypnea, bradypnea or respiratory distress.      Breath sounds: No stridor.   Musculoskeletal:      Right shoulder: Normal range of motion.      Cervical back: Neck supple.   Lymphadenopathy:      Head:      Right side of head: No submental, submandibular, tonsillar, preauricular, posterior auricular or occipital adenopathy.      Left side of head: No submental, submandibular, tonsillar, preauricular, posterior auricular or occipital

## 2024-07-01 DIAGNOSIS — G95.9 DISEASE OF SPINAL CORD, UNSPECIFIED (HCC): ICD-10-CM

## 2024-07-01 DIAGNOSIS — G45.9 TRANSIENT CEREBRAL ISCHEMIC ATTACK, UNSPECIFIED: ICD-10-CM

## 2024-07-04 SDOH — HEALTH STABILITY: PHYSICAL HEALTH: ON AVERAGE, HOW MANY MINUTES DO YOU ENGAGE IN EXERCISE AT THIS LEVEL?: 0 MIN

## 2024-07-04 SDOH — HEALTH STABILITY: PHYSICAL HEALTH: ON AVERAGE, HOW MANY DAYS PER WEEK DO YOU ENGAGE IN MODERATE TO STRENUOUS EXERCISE (LIKE A BRISK WALK)?: 0 DAYS

## 2024-07-04 ASSESSMENT — PATIENT HEALTH QUESTIONNAIRE - PHQ9
2. FEELING DOWN, DEPRESSED OR HOPELESS: NOT AT ALL
SUM OF ALL RESPONSES TO PHQ QUESTIONS 1-9: 0
SUM OF ALL RESPONSES TO PHQ QUESTIONS 1-9: 0
SUM OF ALL RESPONSES TO PHQ9 QUESTIONS 1 & 2: 0
SUM OF ALL RESPONSES TO PHQ QUESTIONS 1-9: 0
SUM OF ALL RESPONSES TO PHQ QUESTIONS 1-9: 0
1. LITTLE INTEREST OR PLEASURE IN DOING THINGS: NOT AT ALL

## 2024-07-04 ASSESSMENT — LIFESTYLE VARIABLES
HOW OFTEN DO YOU HAVE A DRINK CONTAINING ALCOHOL: MONTHLY OR LESS
HOW OFTEN DO YOU HAVE SIX OR MORE DRINKS ON ONE OCCASION: 1
HOW MANY STANDARD DRINKS CONTAINING ALCOHOL DO YOU HAVE ON A TYPICAL DAY: 1 OR 2
HOW OFTEN DO YOU HAVE A DRINK CONTAINING ALCOHOL: 2
HOW MANY STANDARD DRINKS CONTAINING ALCOHOL DO YOU HAVE ON A TYPICAL DAY: 1

## 2024-07-05 RX ORDER — PREGABALIN 50 MG/1
CAPSULE ORAL
Qty: 60 CAPSULE | Refills: 2 | Status: SHIPPED | OUTPATIENT
Start: 2024-07-05 | End: 2024-10-03

## 2024-07-05 NOTE — TELEPHONE ENCOUNTER
Pt called in for refill on Lyrica 50mg Bid.    As I was getting ready to send request to MD, I noticed that the pharmacy has already requested this medication and the staff KB has sent it to the provider.    Pt has since been notified.

## 2024-07-08 ENCOUNTER — OFFICE VISIT (OUTPATIENT)
Dept: FAMILY MEDICINE CLINIC | Age: 74
End: 2024-07-08
Payer: COMMERCIAL

## 2024-07-08 VITALS
HEART RATE: 68 BPM | OXYGEN SATURATION: 95 % | WEIGHT: 174 LBS | DIASTOLIC BLOOD PRESSURE: 68 MMHG | BODY MASS INDEX: 29.71 KG/M2 | HEIGHT: 64 IN | RESPIRATION RATE: 16 BRPM | TEMPERATURE: 97 F | SYSTOLIC BLOOD PRESSURE: 130 MMHG

## 2024-07-08 DIAGNOSIS — M19.031 ARTHRITIS OF RIGHT WRIST: ICD-10-CM

## 2024-07-08 DIAGNOSIS — Z01.818 PREOP EXAMINATION: Primary | ICD-10-CM

## 2024-07-08 DIAGNOSIS — I10 ESSENTIAL HYPERTENSION: ICD-10-CM

## 2024-07-08 DIAGNOSIS — M18.11 PRIMARY OSTEOARTHRITIS OF FIRST CARPOMETACARPAL JOINT OF RIGHT HAND: ICD-10-CM

## 2024-07-08 DIAGNOSIS — E11.9 TYPE 2 DIABETES MELLITUS WITHOUT COMPLICATION, WITHOUT LONG-TERM CURRENT USE OF INSULIN (HCC): ICD-10-CM

## 2024-07-08 PROCEDURE — 3075F SYST BP GE 130 - 139MM HG: CPT | Performed by: NURSE PRACTITIONER

## 2024-07-08 PROCEDURE — G8417 CALC BMI ABV UP PARAM F/U: HCPCS | Performed by: NURSE PRACTITIONER

## 2024-07-08 PROCEDURE — 99214 OFFICE O/P EST MOD 30 MIN: CPT | Performed by: NURSE PRACTITIONER

## 2024-07-08 PROCEDURE — 3044F HG A1C LEVEL LT 7.0%: CPT | Performed by: NURSE PRACTITIONER

## 2024-07-08 PROCEDURE — G8427 DOCREV CUR MEDS BY ELIG CLIN: HCPCS | Performed by: NURSE PRACTITIONER

## 2024-07-08 PROCEDURE — 3078F DIAST BP <80 MM HG: CPT | Performed by: NURSE PRACTITIONER

## 2024-07-08 PROCEDURE — 1036F TOBACCO NON-USER: CPT | Performed by: NURSE PRACTITIONER

## 2024-07-08 PROCEDURE — 3017F COLORECTAL CA SCREEN DOC REV: CPT | Performed by: NURSE PRACTITIONER

## 2024-07-08 PROCEDURE — 2022F DILAT RTA XM EVC RTNOPTHY: CPT | Performed by: NURSE PRACTITIONER

## 2024-07-08 PROCEDURE — 1123F ACP DISCUSS/DSCN MKR DOCD: CPT | Performed by: NURSE PRACTITIONER

## 2024-07-08 PROCEDURE — G8399 PT W/DXA RESULTS DOCUMENT: HCPCS | Performed by: NURSE PRACTITIONER

## 2024-07-08 PROCEDURE — 1090F PRES/ABSN URINE INCON ASSESS: CPT | Performed by: NURSE PRACTITIONER

## 2024-07-08 ASSESSMENT — ENCOUNTER SYMPTOMS
VOMITING: 0
COUGH: 0
DIARRHEA: 0
SHORTNESS OF BREATH: 0
NAUSEA: 0
BACK PAIN: 1

## 2024-07-08 ASSESSMENT — PATIENT HEALTH QUESTIONNAIRE - PHQ9
SUM OF ALL RESPONSES TO PHQ QUESTIONS 1-9: 0
4. FEELING TIRED OR HAVING LITTLE ENERGY: NOT AT ALL
SUM OF ALL RESPONSES TO PHQ9 QUESTIONS 1 & 2: 0
SUM OF ALL RESPONSES TO PHQ QUESTIONS 1-9: 0
3. TROUBLE FALLING OR STAYING ASLEEP: NOT AT ALL
10. IF YOU CHECKED OFF ANY PROBLEMS, HOW DIFFICULT HAVE THESE PROBLEMS MADE IT FOR YOU TO DO YOUR WORK, TAKE CARE OF THINGS AT HOME, OR GET ALONG WITH OTHER PEOPLE: NOT DIFFICULT AT ALL
8. MOVING OR SPEAKING SO SLOWLY THAT OTHER PEOPLE COULD HAVE NOTICED. OR THE OPPOSITE, BEING SO FIGETY OR RESTLESS THAT YOU HAVE BEEN MOVING AROUND A LOT MORE THAN USUAL: NOT AT ALL
9. THOUGHTS THAT YOU WOULD BE BETTER OFF DEAD, OR OF HURTING YOURSELF: NOT AT ALL
1. LITTLE INTEREST OR PLEASURE IN DOING THINGS: NOT AT ALL
SUM OF ALL RESPONSES TO PHQ QUESTIONS 1-9: 0
5. POOR APPETITE OR OVEREATING: NOT AT ALL
2. FEELING DOWN, DEPRESSED OR HOPELESS: NOT AT ALL
6. FEELING BAD ABOUT YOURSELF - OR THAT YOU ARE A FAILURE OR HAVE LET YOURSELF OR YOUR FAMILY DOWN: NOT AT ALL
7. TROUBLE CONCENTRATING ON THINGS, SUCH AS READING THE NEWSPAPER OR WATCHING TELEVISION: NOT AT ALL
SUM OF ALL RESPONSES TO PHQ QUESTIONS 1-9: 0

## 2024-07-08 NOTE — PROGRESS NOTES
[facility]   Telephone: [phone]  Fax: [phone]  Preoperative History & Physical        DIAGNOSIS: Arthritis of right wrist, primary osteoarthritis of first MCP joint of right hand    PROCEDURE: Right basal joint suspension arthroplasty, removal of trapezium, de Quervain release, cortisone injection bilateral wrist joint, cortisone injection left basal thumb joint      History Obtained From:  patient    HISTORY OF PRESENT ILLNESS:    The patient is a 74 y.o. female with significant past medical history of anxiety, BPD, chronic pain syndrome, diabetes mellitus type 2, GERD, hypertension, hyperlipidemia, depression, restless leg syndrome, hypothyroidism who presents for preoperative examination for above procedure.  Surgery scheduled for 7/25/2024 with Dr. Ananth Zamorano at [facility].       Past Medical History:   Diagnosis Date    Anxiety 2000    Borderline personality disorder (HCC)     Chronic pain     Colon disorder     hard time pushing stool out    Constipation     Diabetes mellitus (HCC)     Diabetic neuropathy (HCC)     Electric shock     ECT therapy    GERD (gastroesophageal reflux disease)     Controlled    Heart murmur     Hyperlipidemia     Hypertension     Insomnia     Major depressive disorder, recurrent (HCC)     Morbid obesity ([facility])     Neuropathy 2014    Osteoarthrosis     Restless legs syndrome 2014    RLS (restless legs syndrome)     Small bowel obstruction (HCC) 11/19/2019    Suicidal ideation     Unspecified hypothyroidism     Vitamin deficiency     Volvulus of small intestine ([facility]) 09/16/2019     Past Surgical History:   Procedure Laterality Date    APPENDECTOMY      BACK SURGERY      CARPAL TUNNEL RELEASE Left 12/19/2017    CARPAL TUNNEL RELEASE Right 06/18/2019    RIGHT OPEN CARPAL TUNNEL RELEASE performed by Jose Luis Crane MD at [facility] OR    COLONOSCOPY      ELBOW SURGERY Right     for pinched nerve    EYE SURGERY  2020    HAND SURGERY Left 01/10/2023

## 2024-07-08 NOTE — PATIENT INSTRUCTIONS
Eat and drink nothing after midnight the night before the planned procedure. Stop all aspirin, ibuprofen, aleve (tylenol/acetaminophen is ok) for seven days prior to the procedure.    Ok to stop the Plavix 7 days prior to surgery    Call your cardiologist for clearance

## 2024-07-15 ENCOUNTER — TELEMEDICINE (OUTPATIENT)
Age: 74
End: 2024-07-15
Payer: COMMERCIAL

## 2024-07-15 DIAGNOSIS — R05.1 ACUTE COUGH: ICD-10-CM

## 2024-07-15 DIAGNOSIS — R09.89 CHEST CONGESTION: ICD-10-CM

## 2024-07-15 DIAGNOSIS — U07.1 COVID-19: Primary | ICD-10-CM

## 2024-07-15 PROCEDURE — 1123F ACP DISCUSS/DSCN MKR DOCD: CPT | Performed by: NURSE PRACTITIONER

## 2024-07-15 PROCEDURE — 1090F PRES/ABSN URINE INCON ASSESS: CPT | Performed by: NURSE PRACTITIONER

## 2024-07-15 PROCEDURE — G8417 CALC BMI ABV UP PARAM F/U: HCPCS | Performed by: NURSE PRACTITIONER

## 2024-07-15 PROCEDURE — 3017F COLORECTAL CA SCREEN DOC REV: CPT | Performed by: NURSE PRACTITIONER

## 2024-07-15 PROCEDURE — 1036F TOBACCO NON-USER: CPT | Performed by: NURSE PRACTITIONER

## 2024-07-15 PROCEDURE — 99213 OFFICE O/P EST LOW 20 MIN: CPT | Performed by: NURSE PRACTITIONER

## 2024-07-15 PROCEDURE — G8427 DOCREV CUR MEDS BY ELIG CLIN: HCPCS | Performed by: NURSE PRACTITIONER

## 2024-07-15 PROCEDURE — G8399 PT W/DXA RESULTS DOCUMENT: HCPCS | Performed by: NURSE PRACTITIONER

## 2024-07-15 RX ORDER — GUAIFENESIN 600 MG/1
600 TABLET, EXTENDED RELEASE ORAL 2 TIMES DAILY
Qty: 14 TABLET | Refills: 0 | Status: SHIPPED | OUTPATIENT
Start: 2024-07-15 | End: 2024-07-22

## 2024-07-15 RX ORDER — BENZONATATE 100 MG/1
100 CAPSULE ORAL 3 TIMES DAILY PRN
Qty: 30 CAPSULE | Refills: 0 | Status: SHIPPED | OUTPATIENT
Start: 2024-07-15 | End: 2024-07-25

## 2024-07-15 ASSESSMENT — ENCOUNTER SYMPTOMS
COUGH: 1
RHINORRHEA: 1

## 2024-07-15 NOTE — PROGRESS NOTES
(TESSALON) 100 MG capsule; Take 1 capsule by mouth 3 times daily as needed for Cough, Disp-30 capsule, R-0Normal    No follow-ups on file.  The patient would benefit from future follow up with their usual PCP. As of the end of their Virtualist Visit today, follow up visit status is as follows: Patient to follow up as previously instructed by PCP    Subjective:   COVID-19 Assessment Note     Subjective  Debbie Pulliam is currently Confirmed for COVID-19 on a home test this morning. Symptoms started in the night..  Presenting symptoms: cough and nasal congestion. She also reports chest congestion and sneezing  she denies  fever, chills, sore throat, shortness of breath, sputum production, chest pain, diarrhea, nausea and vomiting, headache, muscle pain, abdominal pain, loss of smell, loss of taste, and malaise.   Patient takes plavix , so molnupiravir was ordered.                Review of Systems   HENT:  Positive for congestion, rhinorrhea and sneezing.    Respiratory:  Positive for cough.         Chest congestion       Objective:  Patient-Reported Vitals  No data recorded         7/15/2024     8:52 AM   Patient-Reported Vitals   Patient-Reported Weight 170   Patient-Reported Height 5’4   Patient-Reported Systolic 118 mmHg   Patient-Reported Diastolic 80 mmHg   Patient-Reported Pulse 81   Patient-Reported Temperature 98.2   Patient-Reported SpO2 98        Physical Exam:  [INSTRUCTIONS:  \"[x]\" Indicates a positive item  \"[]\" Indicates a negative item  -- DELETE ALL ITEMS NOT EXAMINED]    Constitutional: [x] Appears well-developed and well-nourished [x] No apparent distress      [] Abnormal -     Mental status: [x] Alert and awake  [x] Oriented to person/place/time [x] Able to follow commands    [] Abnormal -     Eyes:   EOM    [x]  Normal    [] Abnormal -   Sclera  [x]  Normal    [] Abnormal -          Discharge [x]  None visible   [] Abnormal -     HENT: [x] Normocephalic, atraumatic  [] Abnormal -   [x] Mouth/Throat:

## 2024-07-16 ENCOUNTER — TELEPHONE (OUTPATIENT)
Age: 74
End: 2024-07-16

## 2024-07-16 NOTE — TELEPHONE ENCOUNTER
Pt called and is having surgery on 7/25/24. Pt doctor has requested pt be off plavix for 7days.  Pt stated her surgery is at [facility].    Pt  needs consent from Dr. Gutierrez to have pt stop plavix for 7 days.    Please advise.

## 2024-07-19 NOTE — TELEPHONE ENCOUNTER
Per Dr. Gutierrez: That s fine.  Ok to stop Plavix 7 days prior to procedure.    I faxed this phone encounter to Dr. Holliday's office at [phone].     Pt is scheduled for right basal joint suspension arthroplasty on 7/25/24.

## 2024-08-05 ENCOUNTER — OFFICE VISIT (OUTPATIENT)
Dept: NEUROLOGY | Age: 74
End: 2024-08-05
Payer: COMMERCIAL

## 2024-08-05 VITALS
RESPIRATION RATE: 12 BRPM | OXYGEN SATURATION: 98 % | DIASTOLIC BLOOD PRESSURE: 70 MMHG | WEIGHT: 174 LBS | SYSTOLIC BLOOD PRESSURE: 126 MMHG | HEIGHT: 64 IN | BODY MASS INDEX: 29.71 KG/M2 | HEART RATE: 78 BPM

## 2024-08-05 DIAGNOSIS — E78.2 MIXED HYPERLIPIDEMIA: ICD-10-CM

## 2024-08-05 DIAGNOSIS — M54.81 BILATERAL OCCIPITAL NEURALGIA: Primary | ICD-10-CM

## 2024-08-05 DIAGNOSIS — G95.9 DISEASE OF SPINAL CORD, UNSPECIFIED (HCC): ICD-10-CM

## 2024-08-05 DIAGNOSIS — T88.7XXA SIDE EFFECT OF MEDICATION: ICD-10-CM

## 2024-08-05 PROCEDURE — 3078F DIAST BP <80 MM HG: CPT | Performed by: PSYCHIATRY & NEUROLOGY

## 2024-08-05 PROCEDURE — G8417 CALC BMI ABV UP PARAM F/U: HCPCS | Performed by: PSYCHIATRY & NEUROLOGY

## 2024-08-05 PROCEDURE — 99214 OFFICE O/P EST MOD 30 MIN: CPT | Performed by: PSYCHIATRY & NEUROLOGY

## 2024-08-05 PROCEDURE — G8399 PT W/DXA RESULTS DOCUMENT: HCPCS | Performed by: PSYCHIATRY & NEUROLOGY

## 2024-08-05 PROCEDURE — 3074F SYST BP LT 130 MM HG: CPT | Performed by: PSYCHIATRY & NEUROLOGY

## 2024-08-05 PROCEDURE — G8427 DOCREV CUR MEDS BY ELIG CLIN: HCPCS | Performed by: PSYCHIATRY & NEUROLOGY

## 2024-08-05 PROCEDURE — 3017F COLORECTAL CA SCREEN DOC REV: CPT | Performed by: PSYCHIATRY & NEUROLOGY

## 2024-08-05 PROCEDURE — 1036F TOBACCO NON-USER: CPT | Performed by: PSYCHIATRY & NEUROLOGY

## 2024-08-05 PROCEDURE — 1090F PRES/ABSN URINE INCON ASSESS: CPT | Performed by: PSYCHIATRY & NEUROLOGY

## 2024-08-05 PROCEDURE — 1123F ACP DISCUSS/DSCN MKR DOCD: CPT | Performed by: PSYCHIATRY & NEUROLOGY

## 2024-08-05 RX ORDER — CARBAMAZEPINE 200 MG/1
200 TABLET ORAL 2 TIMES DAILY
Qty: 180 TABLET | Refills: 1
Start: 2024-08-05

## 2024-08-05 RX ORDER — CLOPIDOGREL BISULFATE 75 MG/1
75 TABLET ORAL DAILY
Qty: 90 TABLET | Refills: 1 | Status: SHIPPED | OUTPATIENT
Start: 2024-08-05

## 2024-08-05 RX ORDER — ATORVASTATIN CALCIUM 40 MG/1
40 TABLET, FILM COATED ORAL DAILY
Qty: 90 TABLET | Refills: 1 | Status: SHIPPED | OUTPATIENT
Start: 2024-08-05

## 2024-08-05 RX ORDER — OXYCODONE HYDROCHLORIDE AND ACETAMINOPHEN 5; 325 MG/1; MG/1
1 TABLET ORAL EVERY 4 HOURS PRN
COMMUNITY
Start: 2024-07-25 | End: 2024-08-01

## 2024-08-05 RX ORDER — PREGABALIN 50 MG/1
50 CAPSULE ORAL 2 TIMES DAILY
Qty: 60 CAPSULE | Refills: 2 | Status: SHIPPED | OUTPATIENT
Start: 2024-08-05 | End: 2024-11-03

## 2024-08-05 NOTE — PATIENT INSTRUCTIONS

## 2024-09-06 DIAGNOSIS — I10 PRIMARY HYPERTENSION: ICD-10-CM

## 2024-09-06 RX ORDER — AMLODIPINE BESYLATE 2.5 MG/1
2.5 TABLET ORAL DAILY
Qty: 90 TABLET | Refills: 3 | Status: SHIPPED | OUTPATIENT
Start: 2024-09-06

## 2024-09-06 NOTE — TELEPHONE ENCOUNTER
Future Appointments   Date Time Provider Department Center   10/9/2024  9:20 AM Carline Dennis, LUIS - CNP LEX [facility]   11/12/2024  9:00 AM Britta Gomez MD AND NEURO Neurology -   6/10/2025  8:30 AM Priscilla Hurtado AuD KW AUDIO [facility]   6/10/2025  9:10 AM Daryl Rios, DO MIKY [facility] 7/8/2024

## 2024-10-06 SDOH — HEALTH STABILITY: PHYSICAL HEALTH
ON AVERAGE, HOW MANY DAYS PER WEEK DO YOU ENGAGE IN MODERATE TO STRENUOUS EXERCISE (LIKE A BRISK WALK)?: PATIENT DECLINED

## 2024-10-06 ASSESSMENT — PATIENT HEALTH QUESTIONNAIRE - PHQ9
SUM OF ALL RESPONSES TO PHQ QUESTIONS 1-9: 0
10. IF YOU CHECKED OFF ANY PROBLEMS, HOW DIFFICULT HAVE THESE PROBLEMS MADE IT FOR YOU TO DO YOUR WORK, TAKE CARE OF THINGS AT HOME, OR GET ALONG WITH OTHER PEOPLE: NOT DIFFICULT AT ALL
2. FEELING DOWN, DEPRESSED OR HOPELESS: NOT AT ALL
3. TROUBLE FALLING OR STAYING ASLEEP: NOT AT ALL
9. THOUGHTS THAT YOU WOULD BE BETTER OFF DEAD, OR OF HURTING YOURSELF: NOT AT ALL
8. MOVING OR SPEAKING SO SLOWLY THAT OTHER PEOPLE COULD HAVE NOTICED. OR THE OPPOSITE, BEING SO FIGETY OR RESTLESS THAT YOU HAVE BEEN MOVING AROUND A LOT MORE THAN USUAL: NOT AT ALL
6. FEELING BAD ABOUT YOURSELF - OR THAT YOU ARE A FAILURE OR HAVE LET YOURSELF OR YOUR FAMILY DOWN: NOT AT ALL
SUM OF ALL RESPONSES TO PHQ QUESTIONS 1-9: 0
SUM OF ALL RESPONSES TO PHQ QUESTIONS 1-9: 0
DEPRESSION UNABLE TO ASSESS: FUNCTIONAL CAPACITY MOTIVATION LIMITS ACCURACY
5. POOR APPETITE OR OVEREATING: NOT AT ALL
1. LITTLE INTEREST OR PLEASURE IN DOING THINGS: NOT AT ALL
SUM OF ALL RESPONSES TO PHQ9 QUESTIONS 1 & 2: 0
4. FEELING TIRED OR HAVING LITTLE ENERGY: NOT AT ALL
SUM OF ALL RESPONSES TO PHQ QUESTIONS 1-9: 0
7. TROUBLE CONCENTRATING ON THINGS, SUCH AS READING THE NEWSPAPER OR WATCHING TELEVISION: NOT AT ALL

## 2024-10-06 ASSESSMENT — LIFESTYLE VARIABLES
HOW OFTEN DO YOU HAVE A DRINK CONTAINING ALCOHOL: 98
HOW MANY STANDARD DRINKS CONTAINING ALCOHOL DO YOU HAVE ON A TYPICAL DAY: 1 OR 2
HOW OFTEN DO YOU HAVE SIX OR MORE DRINKS ON ONE OCCASION: 1
HOW OFTEN DO YOU HAVE A DRINK CONTAINING ALCOHOL: PATIENT DECLINED
HOW MANY STANDARD DRINKS CONTAINING ALCOHOL DO YOU HAVE ON A TYPICAL DAY: 1

## 2024-10-09 ENCOUNTER — OFFICE VISIT (OUTPATIENT)
Dept: FAMILY MEDICINE CLINIC | Age: 74
End: 2024-10-09
Payer: COMMERCIAL

## 2024-10-09 VITALS
SYSTOLIC BLOOD PRESSURE: 105 MMHG | WEIGHT: 163 LBS | HEART RATE: 78 BPM | DIASTOLIC BLOOD PRESSURE: 69 MMHG | RESPIRATION RATE: 16 BRPM | HEIGHT: 64 IN | BODY MASS INDEX: 27.83 KG/M2 | TEMPERATURE: 97.8 F | OXYGEN SATURATION: 98 %

## 2024-10-09 DIAGNOSIS — E03.9 ACQUIRED HYPOTHYROIDISM: Chronic | ICD-10-CM

## 2024-10-09 DIAGNOSIS — Z23 IMMUNIZATION DUE: ICD-10-CM

## 2024-10-09 DIAGNOSIS — L82.1 SEBORRHEIC KERATOSIS: ICD-10-CM

## 2024-10-09 DIAGNOSIS — E11.9 TYPE 2 DIABETES MELLITUS WITHOUT COMPLICATION, WITHOUT LONG-TERM CURRENT USE OF INSULIN (HCC): ICD-10-CM

## 2024-10-09 DIAGNOSIS — I10 PRIMARY HYPERTENSION: ICD-10-CM

## 2024-10-09 DIAGNOSIS — Z00.00 MEDICARE ANNUAL WELLNESS VISIT, SUBSEQUENT: Primary | ICD-10-CM

## 2024-10-09 LAB
CREATININE URINE POCT: NORMAL
HBA1C MFR BLD: 6.2 %
MICROALBUMIN/CREAT 24H UR: NORMAL MG/DL
MICROALBUMIN/CREAT UR-RTO: NORMAL MG/G

## 2024-10-09 PROCEDURE — 83036 HEMOGLOBIN GLYCOSYLATED A1C: CPT | Performed by: NURSE PRACTITIONER

## 2024-10-09 PROCEDURE — G8482 FLU IMMUNIZE ORDER/ADMIN: HCPCS | Performed by: NURSE PRACTITIONER

## 2024-10-09 PROCEDURE — 3044F HG A1C LEVEL LT 7.0%: CPT | Performed by: NURSE PRACTITIONER

## 2024-10-09 PROCEDURE — 3074F SYST BP LT 130 MM HG: CPT | Performed by: NURSE PRACTITIONER

## 2024-10-09 PROCEDURE — 1123F ACP DISCUSS/DSCN MKR DOCD: CPT | Performed by: NURSE PRACTITIONER

## 2024-10-09 PROCEDURE — G0439 PPPS, SUBSEQ VISIT: HCPCS | Performed by: NURSE PRACTITIONER

## 2024-10-09 PROCEDURE — G0008 ADMIN INFLUENZA VIRUS VAC: HCPCS | Performed by: NURSE PRACTITIONER

## 2024-10-09 PROCEDURE — 3017F COLORECTAL CA SCREEN DOC REV: CPT | Performed by: NURSE PRACTITIONER

## 2024-10-09 PROCEDURE — 82044 UR ALBUMIN SEMIQUANTITATIVE: CPT | Performed by: NURSE PRACTITIONER

## 2024-10-09 PROCEDURE — 90653 IIV ADJUVANT VACCINE IM: CPT | Performed by: NURSE PRACTITIONER

## 2024-10-09 PROCEDURE — 3078F DIAST BP <80 MM HG: CPT | Performed by: NURSE PRACTITIONER

## 2024-10-09 RX ORDER — LANOLIN ALCOHOL/MO/W.PET/CERES
3 CREAM (GRAM) TOPICAL DAILY
COMMUNITY

## 2024-10-09 RX ORDER — AMLODIPINE BESYLATE 2.5 MG/1
2.5 TABLET ORAL DAILY
Qty: 90 TABLET | Refills: 3 | Status: SHIPPED | OUTPATIENT
Start: 2024-10-09

## 2024-10-09 SDOH — ECONOMIC STABILITY: INCOME INSECURITY: HOW HARD IS IT FOR YOU TO PAY FOR THE VERY BASICS LIKE FOOD, HOUSING, MEDICAL CARE, AND HEATING?: NOT HARD AT ALL

## 2024-10-09 SDOH — ECONOMIC STABILITY: FOOD INSECURITY: WITHIN THE PAST 12 MONTHS, YOU WORRIED THAT YOUR FOOD WOULD RUN OUT BEFORE YOU GOT MONEY TO BUY MORE.: NEVER TRUE

## 2024-10-09 SDOH — ECONOMIC STABILITY: FOOD INSECURITY: WITHIN THE PAST 12 MONTHS, THE FOOD YOU BOUGHT JUST DIDN'T LAST AND YOU DIDN'T HAVE MONEY TO GET MORE.: NEVER TRUE

## 2024-10-09 ASSESSMENT — ANXIETY QUESTIONNAIRES
6. BECOMING EASILY ANNOYED OR IRRITABLE: NOT AT ALL
5. BEING SO RESTLESS THAT IT IS HARD TO SIT STILL: NOT AT ALL
4. TROUBLE RELAXING: NOT AT ALL
IF YOU CHECKED OFF ANY PROBLEMS ON THIS QUESTIONNAIRE, HOW DIFFICULT HAVE THESE PROBLEMS MADE IT FOR YOU TO DO YOUR WORK, TAKE CARE OF THINGS AT HOME, OR GET ALONG WITH OTHER PEOPLE: NOT DIFFICULT AT ALL
1. FEELING NERVOUS, ANXIOUS, OR ON EDGE: NOT AT ALL
2. NOT BEING ABLE TO STOP OR CONTROL WORRYING: NOT AT ALL
7. FEELING AFRAID AS IF SOMETHING AWFUL MIGHT HAPPEN: NOT AT ALL
3. WORRYING TOO MUCH ABOUT DIFFERENT THINGS: NOT AT ALL
GAD7 TOTAL SCORE: 0

## 2024-10-09 NOTE — PROGRESS NOTES
Medicare Annual Wellness Visit    Debbie Pulliam is here for Medicare AWV and Diabetes    Assessment & Plan   Medicare annual wellness visit, subsequent  Type 2 diabetes mellitus without complication, without long-term current use of insulin (HCC)  Diet controlled, A1c 6.2.  Normal urine without microalbuminuria  Continue working on healthy diet and increase physical activity  -     POCT glycosylated hemoglobin (Hb A1C)  -     Lipid, Fasting; Future  -     CBC with Auto Differential; Future  -     Comprehensive Metabolic Panel; Future  -     POCT microalbumin  Primary hypertension  Controlled, continue current regimen  -     amLODIPine (NORVASC) 2.5 MG tablet; Take 1 tablet by mouth daily, Disp-90 tablet, R-3Normal  Acquired hypothyroidism  Controlled, continue current regimen  -     TSH with Reflex; Future  Seborrheic keratosis  Reassured of benign nature, gets irritated and would like removed.  Derm referral ordered  -     CLINTON Dan MD, Dermatology, [facility]  Immunization due  -     Influenza, FLUAD Trivalent, (age 65 y+), IM, Preservative Free, 0.5mL    Recommendations for Preventive Services Due: see orders and patient instructions/AVS.  Recommended screening schedule for the next 5-10 years is provided to the patient in written form: see Patient Instructions/AVS.        Subjective   The following acute and/or chronic problems were also addressed today:    Seborrheic keratosis on the middle on the back- getting larger, some irritates her, sister wants her to get it removed  She denies any new complaints or concerns today, continues to have issues with pain in her neck, shoulders, hands from arthritis.  She did undergo surgery in July for arthritis of the right wrist.  Feels like this is not helped much with her pain.  She is expecting to have to do a joint replacement.    Diabetes Mellitus Type 2: Current symptoms/problems include none.  Well controlled  Off of Metformin since 2019  Stays

## 2024-10-09 NOTE — PATIENT INSTRUCTIONS
professional. [email or web address] disclaims any warranty or liability for your use of this information.      Personalized Preventive Plan for Debbie Pulliam - 10/9/2024  Medicare offers a range of preventive health benefits. Some of the tests and screenings are paid in full while other may be subject to a deductible, co-insurance, and/or copay.    Some of these benefits include a comprehensive review of your medical history including lifestyle, illnesses that may run in your family, and various assessments and screenings as appropriate.    After reviewing your medical record and screening and assessments performed today your provider may have ordered immunizations, labs, imaging, and/or referrals for you.  A list of these orders (if applicable) as well as your Preventive Care list are included within your After Visit Summary for your review.    Other Preventive Recommendations:    A preventive eye exam performed by an eye specialist is recommended every 1-2 years to screen for glaucoma; cataracts, macular degeneration, and other eye disorders.  A preventive dental visit is recommended every 6 months.  Try to get at least 150 minutes of exercise per week or 10,000 steps per day on a pedometer .  Order or download the FREE \"Exercise & Physical Activity: Your Everyday Guide\" from [employer]. Call 1-686.564.2851 or search [employer] online.  You need 6314-2131 mg of calcium and 2564-1000 IU of vitamin D per day. It is possible to meet your calcium requirement with diet alone, but a vitamin D supplement is usually necessary to meet this goal.  When exposed to the sun, use a sunscreen that protects against both UVA and UVB radiation with an SPF of 30 or greater. Reapply every 2 to 3 hours or after sweating, drying off with a towel, or swimming.  Always wear a seat belt when traveling in a car. Always wear a helmet when riding a bicycle or motorcycle.

## 2024-10-30 DIAGNOSIS — I10 ESSENTIAL HYPERTENSION: Chronic | ICD-10-CM

## 2024-10-30 RX ORDER — SPIRONOLACTONE 50 MG/1
50 TABLET, FILM COATED ORAL DAILY
Qty: 90 TABLET | Refills: 1 | Status: SHIPPED | OUTPATIENT
Start: 2024-10-30

## 2024-10-30 NOTE — TELEPHONE ENCOUNTER
Future Appointments   Date Time Provider Department Center   11/12/2024  9:00 AM Britta Gomez MD AND NEURO Neurology -   1/15/2025  9:20 AM Carline Dennis APRN - CNP LEX [facility]   6/10/2025  8:30 AM Priscilla Hurtado AuD KW AUDIO [facility]   6/10/2025  9:10 AM Daryl Rios, DO MIKY [facility] 10/9/2024

## 2024-11-06 DIAGNOSIS — E03.9 ACQUIRED HYPOTHYROIDISM: Chronic | ICD-10-CM

## 2024-11-06 DIAGNOSIS — E11.9 TYPE 2 DIABETES MELLITUS WITHOUT COMPLICATION, WITHOUT LONG-TERM CURRENT USE OF INSULIN (HCC): ICD-10-CM

## 2024-11-06 LAB
BASOPHILS # BLD: 0 K/UL (ref 0–0.2)
BASOPHILS NFR BLD: 1 %
DEPRECATED RDW RBC AUTO: 14.2 % (ref 12.4–15.4)
EOSINOPHIL # BLD: 0.1 K/UL (ref 0–0.6)
EOSINOPHIL NFR BLD: 2.4 %
HCT VFR BLD AUTO: 34.2 % (ref 36–48)
HGB BLD-MCNC: 11.5 G/DL (ref 12–16)
LYMPHOCYTES # BLD: 1.6 K/UL (ref 1–5.1)
LYMPHOCYTES NFR BLD: 42.2 %
MCH RBC QN AUTO: 32 PG (ref 26–34)
MCHC RBC AUTO-ENTMCNC: 33.7 G/DL (ref 31–36)
MCV RBC AUTO: 95.1 FL (ref 80–100)
MONOCYTES # BLD: 0.4 K/UL (ref 0–1.3)
MONOCYTES NFR BLD: 10.6 %
NEUTROPHILS # BLD: 1.7 K/UL (ref 1.7–7.7)
NEUTROPHILS NFR BLD: 43.8 %
PLATELET # BLD AUTO: 325 K/UL (ref 135–450)
PMV BLD AUTO: 6.9 FL (ref 5–10.5)
RBC # BLD AUTO: 3.6 M/UL (ref 4–5.2)
WBC # BLD AUTO: 3.8 K/UL (ref 4–11)

## 2024-11-07 LAB
ALBUMIN SERPL-MCNC: 4.2 G/DL (ref 3.4–5)
ALBUMIN/GLOB SERPL: 2.2 {RATIO} (ref 1.1–2.2)
ALP SERPL-CCNC: 107 U/L (ref 40–129)
ALT SERPL-CCNC: 21 U/L (ref 10–40)
ANION GAP SERPL CALCULATED.3IONS-SCNC: 11 MMOL/L (ref 3–16)
AST SERPL-CCNC: 24 U/L (ref 15–37)
BILIRUB SERPL-MCNC: 0.3 MG/DL (ref 0–1)
BUN SERPL-MCNC: 13 MG/DL (ref 7–20)
CALCIUM SERPL-MCNC: 9 MG/DL (ref 8.3–10.6)
CHLORIDE SERPL-SCNC: 105 MMOL/L (ref 99–110)
CHOLEST SERPL-MCNC: 171 MG/DL (ref 0–199)
CO2 SERPL-SCNC: 27 MMOL/L (ref 21–32)
CREAT SERPL-MCNC: 0.6 MG/DL (ref 0.6–1.2)
GFR SERPLBLD CREATININE-BSD FMLA CKD-EPI: >90 ML/MIN/{1.73_M2}
GLUCOSE SERPL-MCNC: 90 MG/DL (ref 70–99)
HDLC SERPL-MCNC: 78 MG/DL (ref 40–60)
LDL CHOLESTEROL: 75 MG/DL
POTASSIUM SERPL-SCNC: 4.4 MMOL/L (ref 3.5–5.1)
PROT SERPL-MCNC: 6.1 G/DL (ref 6.4–8.2)
SODIUM SERPL-SCNC: 143 MMOL/L (ref 136–145)
TRIGL SERPL-MCNC: 90 MG/DL (ref 0–150)
TSH SERPL DL<=0.005 MIU/L-ACNC: 3.08 UIU/ML (ref 0.27–4.2)
VLDLC SERPL CALC-MCNC: 18 MG/DL

## 2024-11-12 ENCOUNTER — OFFICE VISIT (OUTPATIENT)
Dept: NEUROLOGY | Age: 74
End: 2024-11-12
Payer: COMMERCIAL

## 2024-11-12 VITALS
DIASTOLIC BLOOD PRESSURE: 62 MMHG | SYSTOLIC BLOOD PRESSURE: 118 MMHG | OXYGEN SATURATION: 98 % | HEART RATE: 57 BPM | BODY MASS INDEX: 26.8 KG/M2 | WEIGHT: 157 LBS | HEIGHT: 64 IN

## 2024-11-12 DIAGNOSIS — G45.9 TIA (TRANSIENT ISCHEMIC ATTACK): ICD-10-CM

## 2024-11-12 DIAGNOSIS — G50.0 TRIGEMINAL NEURALGIA: Primary | ICD-10-CM

## 2024-11-12 DIAGNOSIS — M54.81 BILATERAL OCCIPITAL NEURALGIA: ICD-10-CM

## 2024-11-12 PROCEDURE — 3074F SYST BP LT 130 MM HG: CPT | Performed by: PSYCHIATRY & NEUROLOGY

## 2024-11-12 PROCEDURE — 1123F ACP DISCUSS/DSCN MKR DOCD: CPT | Performed by: PSYCHIATRY & NEUROLOGY

## 2024-11-12 PROCEDURE — G8427 DOCREV CUR MEDS BY ELIG CLIN: HCPCS | Performed by: PSYCHIATRY & NEUROLOGY

## 2024-11-12 PROCEDURE — 99214 OFFICE O/P EST MOD 30 MIN: CPT | Performed by: PSYCHIATRY & NEUROLOGY

## 2024-11-12 PROCEDURE — G8482 FLU IMMUNIZE ORDER/ADMIN: HCPCS | Performed by: PSYCHIATRY & NEUROLOGY

## 2024-11-12 PROCEDURE — 1036F TOBACCO NON-USER: CPT | Performed by: PSYCHIATRY & NEUROLOGY

## 2024-11-12 PROCEDURE — G8399 PT W/DXA RESULTS DOCUMENT: HCPCS | Performed by: PSYCHIATRY & NEUROLOGY

## 2024-11-12 PROCEDURE — 1159F MED LIST DOCD IN RCRD: CPT | Performed by: PSYCHIATRY & NEUROLOGY

## 2024-11-12 PROCEDURE — 3017F COLORECTAL CA SCREEN DOC REV: CPT | Performed by: PSYCHIATRY & NEUROLOGY

## 2024-11-12 PROCEDURE — G8417 CALC BMI ABV UP PARAM F/U: HCPCS | Performed by: PSYCHIATRY & NEUROLOGY

## 2024-11-12 PROCEDURE — 3078F DIAST BP <80 MM HG: CPT | Performed by: PSYCHIATRY & NEUROLOGY

## 2024-11-12 PROCEDURE — 1090F PRES/ABSN URINE INCON ASSESS: CPT | Performed by: PSYCHIATRY & NEUROLOGY

## 2024-11-12 RX ORDER — PREGABALIN 50 MG/1
50 CAPSULE ORAL 2 TIMES DAILY
Qty: 60 CAPSULE | Refills: 2 | Status: SHIPPED | OUTPATIENT
Start: 2024-11-12 | End: 2025-02-10

## 2024-11-12 RX ORDER — CARBAMAZEPINE 200 MG/1
200 TABLET ORAL 2 TIMES DAILY
Qty: 180 TABLET | Refills: 1 | Status: SHIPPED | OUTPATIENT
Start: 2024-11-12

## 2024-11-12 RX ORDER — CETIRIZINE HYDROCHLORIDE 10 MG/1
10 TABLET ORAL DAILY
COMMUNITY

## 2024-11-12 RX ORDER — CLOPIDOGREL BISULFATE 75 MG/1
75 TABLET ORAL DAILY
Qty: 90 TABLET | Refills: 1 | Status: SHIPPED | OUTPATIENT
Start: 2024-11-12

## 2024-11-12 RX ORDER — PROPRANOLOL HYDROCHLORIDE 10 MG/1
10 TABLET ORAL 2 TIMES DAILY PRN
COMMUNITY

## 2024-11-12 NOTE — PATIENT INSTRUCTIONS

## 2024-11-14 DIAGNOSIS — E03.9 HYPOTHYROIDISM, UNSPECIFIED TYPE: Chronic | ICD-10-CM

## 2024-11-14 RX ORDER — LEVOTHYROXINE SODIUM 75 UG/1
TABLET ORAL
Qty: 90 TABLET | Refills: 3 | Status: SHIPPED | OUTPATIENT
Start: 2024-11-14

## 2024-11-14 NOTE — TELEPHONE ENCOUNTER
Future Appointments   Date Time Provider Department Center   1/15/2025  9:20 AM Carline Dennis, LUIS - CNP LEX [facility]   2/18/2025  9:00 AM Britta Gomez MD AND NEURO Neurology -   6/10/2025  8:30 AM Priscilla Hurtado AuD KW AUDIO [facility]   6/10/2025  9:10 AM Daryl Rios, DO MIKY [facility] 10/9/2024

## 2024-12-27 ENCOUNTER — OFFICE VISIT (OUTPATIENT)
Age: 74
End: 2024-12-27

## 2024-12-27 VITALS
HEART RATE: 68 BPM | BODY MASS INDEX: 26.8 KG/M2 | OXYGEN SATURATION: 98 % | TEMPERATURE: 97.1 F | DIASTOLIC BLOOD PRESSURE: 68 MMHG | SYSTOLIC BLOOD PRESSURE: 120 MMHG | HEIGHT: 64 IN | WEIGHT: 157 LBS

## 2024-12-27 DIAGNOSIS — H10.9 CONJUNCTIVITIS, BACTERIAL: Primary | ICD-10-CM

## 2024-12-27 DIAGNOSIS — R05.1 ACUTE COUGH: ICD-10-CM

## 2024-12-27 RX ORDER — BENZONATATE 200 MG/1
200 CAPSULE ORAL 3 TIMES DAILY PRN
Qty: 21 CAPSULE | Refills: 0 | Status: SHIPPED | OUTPATIENT
Start: 2024-12-27 | End: 2025-01-03

## 2024-12-27 RX ORDER — POLYMYXIN B SULFATE AND TRIMETHOPRIM 1; 10000 MG/ML; [USP'U]/ML
1 SOLUTION OPHTHALMIC 4 TIMES DAILY
Qty: 7.5 ML | Refills: 0 | Status: SHIPPED | OUTPATIENT
Start: 2024-12-27 | End: 2025-01-03

## 2024-12-27 NOTE — PATIENT INSTRUCTIONS
Eye:   You are treated for conjunctivitis or \"Pink eye\".   Use drops as directed x 7 days.   Follow-up with ophthalmology if not improved.     [facility]  Multiple locations  [phone]        Cough:   Take any prescribed medications as directed.     You may additionally take over-the-counter antihistamine such as allegra, zyrtec, claritin.

## 2024-12-27 NOTE — PROGRESS NOTES
Debbie Pulliam (:  1950) is a 74 y.o. female,  here for evaluation of the following chief complaint(s): Conjunctivitis and Cough    Debbie Pulliam is a New patient.   Visit date not found  I have reviewed the patient's medications; see Medication Reconciliation.    ASSESSMENT/PLAN:  Diagnosis:     ICD-10-CM    1. Conjunctivitis, bacterial  H10.9 trimethoprim-polymyxin b (POLYTRIM) 18795-3.1 UNIT/ML-% ophthalmic solution      2. Acute cough  R05.1 benzonatate (TESSALON) 200 MG capsule               Medical Decision Making:   Patient was seen at Urgent Care today for right eye redness and purulent discharge since this AM.   There is no reported concern for foreign body/corneal abrasion.   Patient does not wear contacts.     Exam reveals: Moderately injected conjunctiva   + purulent discharge/crusting noted.      Patient will be treated for: Conjunctivitis    Pt prescribed: Polytrim drops     Patient was discharged home with follow-up and return precautions.     Patient did not have elevated blood pressure greater than 130/80. Therefore, referral to PCP for HTN is not indicated    Orders Placed This Encounter   Medications    trimethoprim-polymyxin b (POLYTRIM) 80604-8.1 UNIT/ML-% ophthalmic solution     Sig: Place 1 drop into the right eye in the morning, at noon, in the evening, and at bedtime for 7 days     Dispense:  7.5 mL     Refill:  0    benzonatate (TESSALON) 200 MG capsule     Sig: Take 1 capsule by mouth 3 times daily as needed for Cough     Dispense:  21 capsule     Refill:  0       Results:  No results found for any visits on 24.       SUBJECTIVE/OBJECTIVE:  HPI:   This is a 74 y.o. female that presents today with complaint of: right eye redness and discharge as well as persistent cough.     Pt does not wear contacts.   [facility]   Pt awoke this AM with scratchy, itchy, injected right eye with purulent matting and discharge.   Has persisted throughout today.

## 2025-01-13 SDOH — ECONOMIC STABILITY: FOOD INSECURITY: WITHIN THE PAST 12 MONTHS, YOU WORRIED THAT YOUR FOOD WOULD RUN OUT BEFORE YOU GOT MONEY TO BUY MORE.: NEVER TRUE

## 2025-01-13 SDOH — ECONOMIC STABILITY: FOOD INSECURITY: WITHIN THE PAST 12 MONTHS, THE FOOD YOU BOUGHT JUST DIDN'T LAST AND YOU DIDN'T HAVE MONEY TO GET MORE.: NEVER TRUE

## 2025-01-13 SDOH — ECONOMIC STABILITY: INCOME INSECURITY: IN THE LAST 12 MONTHS, WAS THERE A TIME WHEN YOU WERE NOT ABLE TO PAY THE MORTGAGE OR RENT ON TIME?: NO

## 2025-01-13 ASSESSMENT — PATIENT HEALTH QUESTIONNAIRE - PHQ9
SUM OF ALL RESPONSES TO PHQ QUESTIONS 1-9: 4
5. POOR APPETITE OR OVEREATING: NOT AT ALL
SUM OF ALL RESPONSES TO PHQ QUESTIONS 1-9: 4
5. POOR APPETITE OR OVEREATING: NOT AT ALL
1. LITTLE INTEREST OR PLEASURE IN DOING THINGS: NOT AT ALL
7. TROUBLE CONCENTRATING ON THINGS, SUCH AS READING THE NEWSPAPER OR WATCHING TELEVISION: SEVERAL DAYS
9. THOUGHTS THAT YOU WOULD BE BETTER OFF DEAD, OR OF HURTING YOURSELF: NOT AT ALL
8. MOVING OR SPEAKING SO SLOWLY THAT OTHER PEOPLE COULD HAVE NOTICED. OR THE OPPOSITE, BEING SO FIGETY OR RESTLESS THAT YOU HAVE BEEN MOVING AROUND A LOT MORE THAN USUAL: NOT AT ALL
10. IF YOU CHECKED OFF ANY PROBLEMS, HOW DIFFICULT HAVE THESE PROBLEMS MADE IT FOR YOU TO DO YOUR WORK, TAKE CARE OF THINGS AT HOME, OR GET ALONG WITH OTHER PEOPLE: SOMEWHAT DIFFICULT
1. LITTLE INTEREST OR PLEASURE IN DOING THINGS: NOT AT ALL
7. TROUBLE CONCENTRATING ON THINGS, SUCH AS READING THE NEWSPAPER OR WATCHING TELEVISION: SEVERAL DAYS
4. FEELING TIRED OR HAVING LITTLE ENERGY: NOT AT ALL
SUM OF ALL RESPONSES TO PHQ QUESTIONS 1-9: 4
SUM OF ALL RESPONSES TO PHQ QUESTIONS 1-9: 4
SUM OF ALL RESPONSES TO PHQ9 QUESTIONS 1 & 2: 0
8. MOVING OR SPEAKING SO SLOWLY THAT OTHER PEOPLE COULD HAVE NOTICED. OR THE OPPOSITE - BEING SO FIDGETY OR RESTLESS THAT YOU HAVE BEEN MOVING AROUND A LOT MORE THAN USUAL: NOT AT ALL
9. THOUGHTS THAT YOU WOULD BE BETTER OFF DEAD, OR OF HURTING YOURSELF: NOT AT ALL
3. TROUBLE FALLING OR STAYING ASLEEP: NOT AT ALL
10. IF YOU CHECKED OFF ANY PROBLEMS, HOW DIFFICULT HAVE THESE PROBLEMS MADE IT FOR YOU TO DO YOUR WORK, TAKE CARE OF THINGS AT HOME, OR GET ALONG WITH OTHER PEOPLE: SOMEWHAT DIFFICULT
2. FEELING DOWN, DEPRESSED OR HOPELESS: NOT AT ALL
6. FEELING BAD ABOUT YOURSELF - OR THAT YOU ARE A FAILURE OR HAVE LET YOURSELF OR YOUR FAMILY DOWN: NEARLY EVERY DAY
SUM OF ALL RESPONSES TO PHQ QUESTIONS 1-9: 4
4. FEELING TIRED OR HAVING LITTLE ENERGY: NOT AT ALL
3. TROUBLE FALLING OR STAYING ASLEEP: NOT AT ALL
2. FEELING DOWN, DEPRESSED OR HOPELESS: NOT AT ALL
6. FEELING BAD ABOUT YOURSELF - OR THAT YOU ARE A FAILURE OR HAVE LET YOURSELF OR YOUR FAMILY DOWN: NEARLY EVERY DAY

## 2025-01-15 ENCOUNTER — OFFICE VISIT (OUTPATIENT)
Dept: FAMILY MEDICINE CLINIC | Age: 75
End: 2025-01-15
Payer: COMMERCIAL

## 2025-01-15 VITALS
RESPIRATION RATE: 16 BRPM | DIASTOLIC BLOOD PRESSURE: 70 MMHG | HEART RATE: 59 BPM | OXYGEN SATURATION: 98 % | SYSTOLIC BLOOD PRESSURE: 113 MMHG | TEMPERATURE: 97.8 F | WEIGHT: 174 LBS | BODY MASS INDEX: 29.87 KG/M2

## 2025-01-15 DIAGNOSIS — Z91.81 AT HIGH RISK FOR FALLS: ICD-10-CM

## 2025-01-15 DIAGNOSIS — E03.9 HYPOTHYROIDISM, UNSPECIFIED TYPE: ICD-10-CM

## 2025-01-15 DIAGNOSIS — G31.84 MCI (MILD COGNITIVE IMPAIRMENT): ICD-10-CM

## 2025-01-15 DIAGNOSIS — I10 ESSENTIAL HYPERTENSION: ICD-10-CM

## 2025-01-15 DIAGNOSIS — E11.9 TYPE 2 DIABETES MELLITUS WITHOUT COMPLICATION, WITHOUT LONG-TERM CURRENT USE OF INSULIN (HCC): Primary | ICD-10-CM

## 2025-01-15 LAB — HBA1C MFR BLD: 5.5 %

## 2025-01-15 PROCEDURE — 3074F SYST BP LT 130 MM HG: CPT | Performed by: NURSE PRACTITIONER

## 2025-01-15 PROCEDURE — 83036 HEMOGLOBIN GLYCOSYLATED A1C: CPT | Performed by: NURSE PRACTITIONER

## 2025-01-15 PROCEDURE — 3078F DIAST BP <80 MM HG: CPT | Performed by: NURSE PRACTITIONER

## 2025-01-15 PROCEDURE — 3044F HG A1C LEVEL LT 7.0%: CPT | Performed by: NURSE PRACTITIONER

## 2025-01-15 PROCEDURE — 1036F TOBACCO NON-USER: CPT | Performed by: NURSE PRACTITIONER

## 2025-01-15 PROCEDURE — 1123F ACP DISCUSS/DSCN MKR DOCD: CPT | Performed by: NURSE PRACTITIONER

## 2025-01-15 PROCEDURE — 2022F DILAT RTA XM EVC RTNOPTHY: CPT | Performed by: NURSE PRACTITIONER

## 2025-01-15 PROCEDURE — 1159F MED LIST DOCD IN RCRD: CPT | Performed by: NURSE PRACTITIONER

## 2025-01-15 PROCEDURE — G8399 PT W/DXA RESULTS DOCUMENT: HCPCS | Performed by: NURSE PRACTITIONER

## 2025-01-15 PROCEDURE — 1160F RVW MEDS BY RX/DR IN RCRD: CPT | Performed by: NURSE PRACTITIONER

## 2025-01-15 PROCEDURE — G8427 DOCREV CUR MEDS BY ELIG CLIN: HCPCS | Performed by: NURSE PRACTITIONER

## 2025-01-15 PROCEDURE — G8417 CALC BMI ABV UP PARAM F/U: HCPCS | Performed by: NURSE PRACTITIONER

## 2025-01-15 PROCEDURE — 99214 OFFICE O/P EST MOD 30 MIN: CPT | Performed by: NURSE PRACTITIONER

## 2025-01-15 PROCEDURE — 3017F COLORECTAL CA SCREEN DOC REV: CPT | Performed by: NURSE PRACTITIONER

## 2025-01-15 PROCEDURE — 1090F PRES/ABSN URINE INCON ASSESS: CPT | Performed by: NURSE PRACTITIONER

## 2025-01-15 ASSESSMENT — ENCOUNTER SYMPTOMS
VOMITING: 0
DIARRHEA: 0
COUGH: 0
SHORTNESS OF BREATH: 0
BACK PAIN: 1
NAUSEA: 0

## 2025-01-15 ASSESSMENT — ANXIETY QUESTIONNAIRES
4. TROUBLE RELAXING: NOT AT ALL
1. FEELING NERVOUS, ANXIOUS, OR ON EDGE: NOT AT ALL
7. FEELING AFRAID AS IF SOMETHING AWFUL MIGHT HAPPEN: NOT AT ALL
6. BECOMING EASILY ANNOYED OR IRRITABLE: NOT AT ALL
GAD7 TOTAL SCORE: 0
IF YOU CHECKED OFF ANY PROBLEMS ON THIS QUESTIONNAIRE, HOW DIFFICULT HAVE THESE PROBLEMS MADE IT FOR YOU TO DO YOUR WORK, TAKE CARE OF THINGS AT HOME, OR GET ALONG WITH OTHER PEOPLE: NOT DIFFICULT AT ALL
3. WORRYING TOO MUCH ABOUT DIFFERENT THINGS: NOT AT ALL
2. NOT BEING ABLE TO STOP OR CONTROL WORRYING: NOT AT ALL
5. BEING SO RESTLESS THAT IT IS HARD TO SIT STILL: NOT AT ALL

## 2025-01-15 NOTE — PROGRESS NOTES
1/15/2025     Chief Complaint   Patient presents with    Diabetes     Follow     Memory Loss     Debbie Pulliam (:  1950) is a 74 y.o. female, here for evaluation of the following medical concerns:    HPI  More forgetful last few months, forgetting where she put things.  No issues with short-term recall.  Always passes the MMSE.  Has to be more thoughtful of what she is doing.  Symptoms are rather mild.  Has a lot of arthritis pain in the neck and the hands.     Diabetes Mellitus Type 2: Current symptoms/problems include none.  Well controlled  Off of Metformin since 2019  Stays active  Enjoys singing  Denies hypoglycemia  Neuropathy stable  No kidney issues  Up-to-date on eye exam     Hemoglobin A1C   Date Value Ref Range Status   01/15/2025 5.5 % Final        Hypothyroidism: Recent symptoms: fatigue (chronic), hypersomnia (SUE). She denies weight gain, weight loss, cold intolerance, and heat intolerance. Patient is  taking her medication consistently on an empty stomach.      Latest Reference Range & Units 24 10:23   TSH, 3rd Generation 0.27 - 4.20 uIU/mL 3.08       Hyperlipidemia:  she is on Praluent through her cardiologist.  She is on Lipitor 40 mg   Latest Reference Range & Units 24 10:23   Cholesterol, Fasting 0 - 199 mg/dL 171   HDL Cholesterol 40 - 60 mg/dL 78 (H)   LDL Cholesterol <100 mg/dL 75   VLDL Not Established mg/dL 18   Triglyceride, Fasting 0 - 150 mg/dL 90     Htn: well controlled     Restless leg syndrome: She is on ropinirole 2 mg nightly.  Symptoms have been stable recently, sometimes will go with her flareups with her restless leg symptoms but they have been overall well controlled.  She is tolerating treatment.     Trigeminal neuralgia symptoms- better since she had gamma knife; she plans to discuss stopping Tegretol with her neurologist.      Review of Systems   Constitutional:  Positive for fatigue. Negative for chills and fever.   Respiratory:  Negative for cough

## 2025-02-11 ENCOUNTER — HOSPITAL ENCOUNTER (OUTPATIENT)
Dept: WOMENS IMAGING | Age: 75
Discharge: HOME | End: 2025-02-11
Payer: COMMERCIAL

## 2025-02-11 VITALS — HEIGHT: 66 IN | WEIGHT: 160 LBS | BODY MASS INDEX: 25.71 KG/M2

## 2025-02-11 DIAGNOSIS — Z12.31 VISIT FOR SCREENING MAMMOGRAM: ICD-10-CM

## 2025-02-11 PROCEDURE — 77063 BREAST TOMOSYNTHESIS BI: CPT

## 2025-02-18 ENCOUNTER — OFFICE VISIT (OUTPATIENT)
Dept: NEUROLOGY | Age: 75
End: 2025-02-18

## 2025-02-18 VITALS
WEIGHT: 160 LBS | DIASTOLIC BLOOD PRESSURE: 76 MMHG | OXYGEN SATURATION: 98 % | HEART RATE: 58 BPM | SYSTOLIC BLOOD PRESSURE: 124 MMHG | BODY MASS INDEX: 25.71 KG/M2 | RESPIRATION RATE: 14 BRPM | HEIGHT: 66 IN

## 2025-02-18 DIAGNOSIS — M54.81 BILATERAL OCCIPITAL NEURALGIA: ICD-10-CM

## 2025-02-18 DIAGNOSIS — G45.9 TIA (TRANSIENT ISCHEMIC ATTACK): ICD-10-CM

## 2025-02-18 DIAGNOSIS — G50.0 TRIGEMINAL NEURALGIA: ICD-10-CM

## 2025-02-18 RX ORDER — DEXAMETHASONE SODIUM PHOSPHATE 10 MG/ML
10 INJECTION, SOLUTION INTRA-ARTICULAR; INTRALESIONAL; INTRAMUSCULAR; INTRAVENOUS; SOFT TISSUE ONCE
Status: COMPLETED | OUTPATIENT
Start: 2025-02-18 | End: 2025-02-18

## 2025-02-18 RX ORDER — CARBAMAZEPINE 100 MG/1
100 TABLET, EXTENDED RELEASE ORAL 2 TIMES DAILY
Qty: 60 TABLET | Refills: 3 | Status: SHIPPED | OUTPATIENT
Start: 2025-02-18

## 2025-02-18 RX ORDER — PREGABALIN 50 MG/1
50 CAPSULE ORAL 2 TIMES DAILY
Qty: 60 CAPSULE | Refills: 3 | Status: SHIPPED | OUTPATIENT
Start: 2025-02-18 | End: 2025-06-18

## 2025-02-18 RX ORDER — LIDOCAINE HYDROCHLORIDE 20 MG/ML
5 INJECTION, SOLUTION INFILTRATION; PERINEURAL ONCE
Status: COMPLETED | OUTPATIENT
Start: 2025-02-18 | End: 2025-02-18

## 2025-02-18 RX ADMIN — DEXAMETHASONE SODIUM PHOSPHATE 10 MG: 10 INJECTION, SOLUTION INTRA-ARTICULAR; INTRALESIONAL; INTRAMUSCULAR; INTRAVENOUS; SOFT TISSUE at 16:33

## 2025-02-18 RX ADMIN — LIDOCAINE HYDROCHLORIDE 5 ML: 20 INJECTION, SOLUTION INFILTRATION; PERINEURAL at 16:34

## 2025-02-18 NOTE — PROGRESS NOTES
Occipital nerve block    Indication:    Right occipital neuralgia    Technique:    Identify landmarks and francisco the injection site as the diagram below  Move hair from area (e.g. with assistant or lubricating jelly can be applied)  Clean the area (e.g. hibiclens, betadine, or Alcohol)    Preparation:    Syringe: 5 cc  Needle: 27 gauge 1.25\"  1% Lidocaine 3 cc  Dexamethasone 10 mg or 1 cc    Injection:    Insert the needle from inferior approach  Angle approximately 30-45 degrees and insert until striking periosteum  Aspirate for blood and if found, withdraw and redirect needle (to prevent intravascular injection)  Inject a total of 2-3 cc of medication at site, distributing in a fan-shaped distribution, HEATHER technique          
pregabalin prescription was denied by her insurance.  The patient denies any new focal neurological deficit at this time.    08/05/24: The patient is here for follow-up clinical occipital neuralgia and trigeminal neuralgia.  The patient denies significant headache or pain at this time.  The patient remains to have significant unsteadiness.  The patient denies significant side effects from pregabalin.  The patient is currently on carbamazepine 400 mg twice daily.  The patient denies new focal neurological deficit at this time.    11/12/24: The patient is here for follow-up trigeminal neuralgia and occipital neuralgia.  Overall, the patient is stabilized.  The patient remains to have unsteady gait.  The patient has not started pregabalin yet.  The patient was confused about how to take pregabalin at home.  The patient denies any new focal weakness or numbness at this time.  The patient is on carbamazepine 200 mg twice daily.    02/18/25: The patient is here for follow-up trigeminal neuralgia and occipital neuralgia.  Unfortunately, the patient remains to have significant headache from occipital neuralgia.  It occurs almost daily.  The patient also reports significant fatigue and unsteadiness with this current medications.  The patient denies significant benefit from pregabalin.  The patient remains on carbamazepine 200 mg twice daily.    Past medical history:    Past Medical History:   Diagnosis Date    Anxiety 2000    Borderline personality disorder (HCC)     Chronic pain     Colon disorder     hard time pushing stool out    Constipation     Diabetes mellitus (HCC)     Diabetic neuropathy (HCC)     Electric shock     ECT therapy    GERD (gastroesophageal reflux disease)     Controlled    Heart murmur     Hyperlipidemia     Hypertension     Insomnia     Major depressive disorder, recurrent (HCC)     Morbid obesity     Neuropathy 2014    Osteoarthrosis     Restless legs syndrome 2014    RLS (restless legs syndrome)

## 2025-02-18 NOTE — PATIENT INSTRUCTIONS

## 2025-03-25 ENCOUNTER — TELEPHONE (OUTPATIENT)
Age: 75
End: 2025-03-25

## 2025-03-25 NOTE — TELEPHONE ENCOUNTER
Pt dropped off paperwork from Jose re: Marie FAYE - expires 5/10/25.    Letter scanned to pt media

## 2025-04-08 ENCOUNTER — TELEPHONE (OUTPATIENT)
Dept: FAMILY MEDICINE CLINIC | Age: 75
End: 2025-04-08

## 2025-04-08 NOTE — TELEPHONE ENCOUNTER
----- Message from Milo YE sent at 4/8/2025 12:02 PM EDT -----  Regarding: [facility] Appointment Request  [facility] Appointment Request    Patient needs appointment for [facility] Appointment Type: Annual Visit.    Patient Requested Dates(s): as soon as possible  Patient Requested Time: any time  Provider Name: Carline Dennis APRN - CNP    Reason for Appointment Request: Established Patient - Available appointments did not meet patient need  --------------------------------------------------------------------------------------------------------------------------    Relationship to Patient: Self     Call Back Information: Do not leave any message, patient will call back for answer  Preferred Call Back Number: Phone [phone]

## 2025-04-08 NOTE — TELEPHONE ENCOUNTER
Future Appointments   Date Time Provider Department Center   6/9/2025  8:30 AM Priscilla Hurtado AuD KW AUDIO [facility]   6/9/2025  9:10 AM Daryl Rios DO [facility]   6/9/2025  1:00 PM Carline Dennis APRN - CNP LEX FP [facility]   6/23/2025  9:30 AM Britta Gomez MD AND NEURO Neurology -

## (undated) DEVICE — SEALER TISS L20CM DIA13MM ADV BPLR L CRV JAW OPN APPRCH

## (undated) DEVICE — AVANOS* EXTENSION SETS: Brand: EXTENSION SET, MINI BORE, 12" LENGTH, 0.50ML VOLUME 25

## (undated) DEVICE — APPLICATOR MEDICATED 26 CC SOLUTION HI LT ORNG CHLORAPREP

## (undated) DEVICE — TRAY ANES SUPP NO DRUG CUST

## (undated) DEVICE — GLOVE ORANGE PI 7 1/2   MSG9075

## (undated) DEVICE — SOLUTION IV IRRIG POUR BRL 0.9% SODIUM CHL 2F7124

## (undated) DEVICE — GOWN,SIRUS,NON REINFRCD,LARGE,SET IN SL: Brand: MEDLINE

## (undated) DEVICE — SYRINGE MED 3ML CLR PLAS LUERLOCK CONN VI ACCS INTLNK 15GA

## (undated) DEVICE — SOLUTION IV 1000ML LAC RINGERS PH 6.5 INJ USP VIAFLX PLAS

## (undated) DEVICE — SPLINT ORTH W3XL12IN LAYERED FBRGLS FOAM PD BRTH BK MOLD

## (undated) DEVICE — SUTURE PERMAHAND SZ 3-0 L18IN NONABSORBABLE BLK L26MM SH C013D

## (undated) DEVICE — DRESSING FOAM W10XL20CM ANTIMIC SELF ADH SAFETAC TECHNOLOGY

## (undated) DEVICE — GOWN,SIRUS,POLYRNF,SETINSLV,L,20/CS: Brand: MEDLINE

## (undated) DEVICE — Device

## (undated) DEVICE — STAPLER INT L75MM CUT LN L73MM STPL LN L77MM BLU B FRM 8

## (undated) DEVICE — SPONGE LAP W18XL18IN WHT COT 4 PLY FLD STRUNG RADPQ DISP ST

## (undated) DEVICE — GLOVE SURG SZ 75 L12IN FNGR THK94MIL STD WHT LTX FREE

## (undated) DEVICE — SUTURE VCRL + SZ 0 L27IN ABSRB VLT L26MM UR-6 5/8 CIR VCP603H

## (undated) DEVICE — PENCIL ES CRD L10FT HND SWCHING ROCK SWCH W/ EDGE COAT BLDE

## (undated) DEVICE — SET GRAV VENT NVENT CK VLV 3 NDL FREE PRT 10 GTT

## (undated) DEVICE — SET ADMIN PRIMING 7ML L30IN 7.35LB 20 GTT 2ND RLER CLMP

## (undated) DEVICE — ZIMMER® STERILE DISPOSABLE TOURNIQUET CUFF WITH PLC, DUAL PORT, SINGLE BLADDER, 18 IN. (46 CM)

## (undated) DEVICE — ELECTRODE PT RET AD L9FT HI MOIST COND ADH HYDRGEL CORDED

## (undated) DEVICE — STAPLER SKIN H3.9MM WIRE DIA0.58MM CRWN 6.9MM 35 STPL FIX

## (undated) DEVICE — SUTURE CHROMIC GUT SZ 4-0 L18IN ABSRB BRN L19MM PS-2 3/8 1637G

## (undated) DEVICE — GLOVE ORANGE PI 8   MSG9080

## (undated) DEVICE — SKIN AFFIX SURG ADHESIVE 72/CS 0.55ML: Brand: MEDLINE

## (undated) DEVICE — SOLUTION IV IRRIG 500ML 0.9% SODIUM CHL 2F7123

## (undated) DEVICE — NDL,TUOHY EPID,22GX3.5",METAL STYLET: Brand: MEDLINE

## (undated) DEVICE — CHLORAPREP 26ML ORANGE

## (undated) DEVICE — TOTAL TRAY, DB, 100% SILI FOLEY, 16FR 10: Brand: MEDLINE

## (undated) DEVICE — CATHETER IV 20GA L1.25IN PNK FEP SFTY STR HUB RADPQ DISP

## (undated) DEVICE — GLOVE ORANGE PI 7   MSG9070

## (undated) DEVICE — GOWN,SIRUS,POLYRNF,BRTHSLV,XL,30/CS: Brand: MEDLINE

## (undated) DEVICE — RELOAD STPL L75MM OPN H3.8MM CLS 1.5MM WIRE DIA0.2MM REG

## (undated) DEVICE — STANDARD HYPODERMIC NEEDLE,POLYPROPYLENE HUB: Brand: MONOJECT

## (undated) DEVICE — ELECTRODE NDL 2.8IN COAT VALLEYLAB

## (undated) DEVICE — TROCAR: Brand: KII FIOS FIRST ENTRY

## (undated) DEVICE — CORD ES L12FT BPLR FRCP

## (undated) DEVICE — PENCIL ES L3M BTTN SWCH S STL HEX LOK BLDE ELECTRD HOLSTER

## (undated) DEVICE — TROCAR: Brand: KII® OPTICAL ACCESS SYSTEM

## (undated) DEVICE — MATERIAL PD W2XL4YD ST COT CAST SPLNT NONADHESIVE SPEC

## (undated) DEVICE — 3M™ TEGADERM™ TRANSPARENT FILM DRESSING FRAME STYLE, 1624W, 2-3/8 IN X 2-3/4 IN (6 CM X 7 CM), 100/CT 4CT/CASE: Brand: 3M™ TEGADERM™

## (undated) DEVICE — SUTURE PDS II SZ 0 L60IN ABSRB VLT L48MM CTX 1/2 CIR Z990G

## (undated) DEVICE — ELECTRODE BLDE L6.5IN CAUT EXT DISP

## (undated) DEVICE — BANDAGE COMPR W4INXL12FT E DISP ESMARCH EVEN

## (undated) DEVICE — UNDERGLOVE SURG SZ 8 FNGR THK0.21MIL GRN LTX BEAD CUF

## (undated) DEVICE — SUTURE ETHLN SZ 4-0 L18IN NONABSORBABLE BLK L19MM PS-2 3/8 1667H

## (undated) DEVICE — TROCAR ENDOSCP L100MM DIA5MM BLDELSS STBL SL OBT RADLUC

## (undated) DEVICE — TROCAR: Brand: KII SLEEVE

## (undated) DEVICE — PACK PROCEDURE SURG GEN LAPAROSCOPY CDS

## (undated) DEVICE — TIP SCIS L5MM DBL ACT CRV DISP METZ

## (undated) DEVICE — PUMP SUC IRR TBNG L10FT W/ HNDPC ASSEMB STRYKEFLOW 2

## (undated) DEVICE — COMFO-TEX ELASTIC BANDAGE LATEX FREE, 3INX5YD: Brand: COMFO-TEX™

## (undated) DEVICE — SUTURE VCRL + SZ 3-0 L18IN ABSRB UD SH 1/2 CIR TAPERCUT NDL VCP864D